# Patient Record
Sex: MALE | Race: WHITE | Employment: OTHER | URBAN - METROPOLITAN AREA
[De-identification: names, ages, dates, MRNs, and addresses within clinical notes are randomized per-mention and may not be internally consistent; named-entity substitution may affect disease eponyms.]

---

## 2018-10-03 ENCOUNTER — ANESTHESIA (OUTPATIENT)
Dept: OPERATING ROOM | Age: 63
End: 2018-10-03
Payer: COMMERCIAL

## 2018-10-03 ENCOUNTER — ANESTHESIA EVENT (OUTPATIENT)
Dept: OPERATING ROOM | Age: 63
End: 2018-10-03
Payer: COMMERCIAL

## 2018-10-03 ENCOUNTER — HOSPITAL ENCOUNTER (OUTPATIENT)
Age: 63
Setting detail: OUTPATIENT SURGERY
Discharge: HOME | End: 2018-10-03
Attending: ORTHOPAEDIC SURGERY | Admitting: ORTHOPAEDIC SURGERY
Payer: COMMERCIAL

## 2018-10-03 VITALS
BODY MASS INDEX: 30.36 KG/M2 | HEART RATE: 87 BPM | SYSTOLIC BLOOD PRESSURE: 167 MMHG | RESPIRATION RATE: 20 BRPM | WEIGHT: 205 LBS | TEMPERATURE: 97.5 F | HEIGHT: 69 IN | OXYGEN SATURATION: 96 % | DIASTOLIC BLOOD PRESSURE: 86 MMHG

## 2018-10-03 VITALS — DIASTOLIC BLOOD PRESSURE: 108 MMHG | SYSTOLIC BLOOD PRESSURE: 205 MMHG | OXYGEN SATURATION: 97 %

## 2018-10-03 LAB
GLUCOSE BLD-MCNC: 211 MG/DL (ref 60–115)
PERFORMED ON: ABNORMAL

## 2018-10-03 PROCEDURE — 2709999900 HC NON-CHARGEABLE SUPPLY: Performed by: ORTHOPAEDIC SURGERY

## 2018-10-03 PROCEDURE — 88305 TISSUE EXAM BY PATHOLOGIST: CPT

## 2018-10-03 PROCEDURE — 2580000003 HC RX 258

## 2018-10-03 PROCEDURE — 3700000000 HC ANESTHESIA ATTENDED CARE: Performed by: ORTHOPAEDIC SURGERY

## 2018-10-03 PROCEDURE — 3700000001 HC ADD 15 MINUTES (ANESTHESIA): Performed by: ORTHOPAEDIC SURGERY

## 2018-10-03 PROCEDURE — 88311 DECALCIFY TISSUE: CPT

## 2018-10-03 PROCEDURE — 3600000002 HC SURGERY LEVEL 2 BASE: Performed by: ORTHOPAEDIC SURGERY

## 2018-10-03 PROCEDURE — 2580000003 HC RX 258: Performed by: ORTHOPAEDIC SURGERY

## 2018-10-03 PROCEDURE — 3600000012 HC SURGERY LEVEL 2 ADDTL 15MIN: Performed by: ORTHOPAEDIC SURGERY

## 2018-10-03 PROCEDURE — 6360000002 HC RX W HCPCS: Performed by: ORTHOPAEDIC SURGERY

## 2018-10-03 RX ORDER — HYDROCODONE BITARTRATE AND ACETAMINOPHEN 5; 325 MG/1; MG/1
1 TABLET ORAL PRN
Status: DISCONTINUED | OUTPATIENT
Start: 2018-10-03 | End: 2018-10-03 | Stop reason: HOSPADM

## 2018-10-03 RX ORDER — SODIUM CHLORIDE, SODIUM LACTATE, POTASSIUM CHLORIDE, CALCIUM CHLORIDE 600; 310; 30; 20 MG/100ML; MG/100ML; MG/100ML; MG/100ML
INJECTION, SOLUTION INTRAVENOUS
Status: COMPLETED
Start: 2018-10-03 | End: 2018-10-03

## 2018-10-03 RX ORDER — INSULIN GLARGINE 100 [IU]/ML
INJECTION, SOLUTION SUBCUTANEOUS NIGHTLY
COMMUNITY
End: 2020-09-30 | Stop reason: ALTCHOICE

## 2018-10-03 RX ORDER — HYDROCODONE BITARTRATE AND ACETAMINOPHEN 5; 325 MG/1; MG/1
2 TABLET ORAL PRN
Status: DISCONTINUED | OUTPATIENT
Start: 2018-10-03 | End: 2018-10-03 | Stop reason: HOSPADM

## 2018-10-03 RX ORDER — GLIPIZIDE 10 MG/1
10 TABLET ORAL
COMMUNITY
Start: 2014-12-31 | End: 2020-09-30 | Stop reason: ALTCHOICE

## 2018-10-03 RX ORDER — MEPERIDINE HYDROCHLORIDE 25 MG/ML
12.5 INJECTION INTRAMUSCULAR; INTRAVENOUS; SUBCUTANEOUS EVERY 5 MIN PRN
Status: DISCONTINUED | OUTPATIENT
Start: 2018-10-03 | End: 2018-10-03 | Stop reason: HOSPADM

## 2018-10-03 RX ORDER — ONDANSETRON 2 MG/ML
4 INJECTION INTRAMUSCULAR; INTRAVENOUS
Status: DISCONTINUED | OUTPATIENT
Start: 2018-10-03 | End: 2018-10-03 | Stop reason: HOSPADM

## 2018-10-03 RX ORDER — MAGNESIUM HYDROXIDE 1200 MG/15ML
LIQUID ORAL CONTINUOUS PRN
Status: DISCONTINUED | OUTPATIENT
Start: 2018-10-03 | End: 2018-10-03 | Stop reason: HOSPADM

## 2018-10-03 RX ORDER — METOCLOPRAMIDE HYDROCHLORIDE 5 MG/ML
10 INJECTION INTRAMUSCULAR; INTRAVENOUS
Status: DISCONTINUED | OUTPATIENT
Start: 2018-10-03 | End: 2018-10-03 | Stop reason: HOSPADM

## 2018-10-03 RX ORDER — FENTANYL CITRATE 50 UG/ML
50 INJECTION, SOLUTION INTRAMUSCULAR; INTRAVENOUS EVERY 10 MIN PRN
Status: DISCONTINUED | OUTPATIENT
Start: 2018-10-03 | End: 2018-10-03 | Stop reason: HOSPADM

## 2018-10-03 RX ORDER — DIPHENHYDRAMINE HYDROCHLORIDE 50 MG/ML
12.5 INJECTION INTRAMUSCULAR; INTRAVENOUS
Status: DISCONTINUED | OUTPATIENT
Start: 2018-10-03 | End: 2018-10-03 | Stop reason: HOSPADM

## 2018-10-03 RX ORDER — SODIUM CHLORIDE, SODIUM LACTATE, POTASSIUM CHLORIDE, CALCIUM CHLORIDE 600; 310; 30; 20 MG/100ML; MG/100ML; MG/100ML; MG/100ML
INJECTION, SOLUTION INTRAVENOUS CONTINUOUS
Status: DISCONTINUED | OUTPATIENT
Start: 2018-10-03 | End: 2018-10-03 | Stop reason: HOSPADM

## 2018-10-03 RX ADMIN — Medication 2 G: at 08:29

## 2018-10-03 RX ADMIN — SODIUM CHLORIDE, POTASSIUM CHLORIDE, SODIUM LACTATE AND CALCIUM CHLORIDE: 600; 310; 30; 20 INJECTION, SOLUTION INTRAVENOUS at 07:14

## 2018-10-03 RX ADMIN — SODIUM CHLORIDE, SODIUM LACTATE, POTASSIUM CHLORIDE, CALCIUM CHLORIDE: 600; 310; 30; 20 INJECTION, SOLUTION INTRAVENOUS at 07:14

## 2018-10-03 ASSESSMENT — PULMONARY FUNCTION TESTS
PIF_VALUE: 0
PIF_VALUE: 0
PIF_VALUE: 1
PIF_VALUE: 0
PIF_VALUE: 1
PIF_VALUE: 1
PIF_VALUE: 0
PIF_VALUE: 1
PIF_VALUE: 0
PIF_VALUE: 1
PIF_VALUE: 0
PIF_VALUE: 0
PIF_VALUE: 1
PIF_VALUE: 0
PIF_VALUE: 1
PIF_VALUE: 0
PIF_VALUE: 1
PIF_VALUE: 0
PIF_VALUE: 1
PIF_VALUE: 0
PIF_VALUE: 1
PIF_VALUE: 0
PIF_VALUE: 1
PIF_VALUE: 0
PIF_VALUE: 1
PIF_VALUE: 1
PIF_VALUE: 0

## 2018-10-03 ASSESSMENT — PAIN - FUNCTIONAL ASSESSMENT: PAIN_FUNCTIONAL_ASSESSMENT: 0-10

## 2018-10-03 NOTE — OP NOTE
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 Assistant:     Anesthesia: Digital block monitored by the anesthesia team    Estimated blood loss: Less than 5 mL    Drains: None    Tourniquet: 10min    Specimens: The specimen was marked for orientation and sent for pathology    Implants: None    Indications: The patient is a pleasant 81-year-old male who presented with chronic wound and a subungual position to the right index finger. Excisional biopsy was performed and the patient was found to have evidence of invasive squamous cell carcinoma with positive margins. Treatment options were discussed. The patient elected to undergo any salvage measures in favor of amputation through the distal portion of the middle phalanx. He understood that there was a possibility that he would need additional surgery in the future. Informed consent was signed and placed in the chart. Complications: None noted at the time of surgery     Description of operation: The patient was taken to the operative suite and placed in the supine position on the operating table. A timeout was performed and the right index finger was confirmed. The operative site. The digital block placed in the preoperative holding area was working well. He was administered appropriate IV antibiotics. He was then prepped and draped in normal sterile fashion. After prepping and draping a turnicot was placed in the base of the right index finger and the planned skin flaps were marked out. We left the palmar flap longer than the dorsal flap. The 15 blade was used to incise skin and dissect down sharply to bone circumferentially. The bone cutter was then used to cut through the P2 neck and the specimen was sent for pathology.   The specimen was

## 2020-09-30 ENCOUNTER — OFFICE VISIT (OUTPATIENT)
Dept: ENDOCRINOLOGY | Age: 65
End: 2020-09-30
Payer: COMMERCIAL

## 2020-09-30 VITALS
DIASTOLIC BLOOD PRESSURE: 73 MMHG | WEIGHT: 209 LBS | BODY MASS INDEX: 30.96 KG/M2 | HEIGHT: 69 IN | HEART RATE: 71 BPM | SYSTOLIC BLOOD PRESSURE: 124 MMHG | OXYGEN SATURATION: 93 %

## 2020-09-30 PROBLEM — E11.65 UNCONTROLLED TYPE 2 DIABETES MELLITUS WITH HYPERGLYCEMIA (HCC): Status: ACTIVE | Noted: 2020-09-30

## 2020-09-30 LAB
CHP ED QC CHECK: NORMAL
GLUCOSE BLD-MCNC: 116 MG/DL
HBA1C MFR BLD: 8.5 %

## 2020-09-30 PROCEDURE — G8417 CALC BMI ABV UP PARAM F/U: HCPCS | Performed by: INTERNAL MEDICINE

## 2020-09-30 PROCEDURE — 2022F DILAT RTA XM EVC RTNOPTHY: CPT | Performed by: INTERNAL MEDICINE

## 2020-09-30 PROCEDURE — 99203 OFFICE O/P NEW LOW 30 MIN: CPT | Performed by: INTERNAL MEDICINE

## 2020-09-30 PROCEDURE — 83036 HEMOGLOBIN GLYCOSYLATED A1C: CPT | Performed by: INTERNAL MEDICINE

## 2020-09-30 PROCEDURE — 95250 CONT GLUC MNTR PHYS/QHP EQP: CPT | Performed by: INTERNAL MEDICINE

## 2020-09-30 PROCEDURE — G8427 DOCREV CUR MEDS BY ELIG CLIN: HCPCS | Performed by: INTERNAL MEDICINE

## 2020-09-30 RX ORDER — INSULIN ASPART 100 [IU]/ML
INJECTION, SUSPENSION SUBCUTANEOUS
Qty: 10 PEN | Refills: 3 | Status: SHIPPED | OUTPATIENT
Start: 2020-09-30 | End: 2020-09-30 | Stop reason: SDUPTHER

## 2020-09-30 RX ORDER — INSULIN ASPART 100 [IU]/ML
INJECTION, SUSPENSION SUBCUTANEOUS
Qty: 10 PEN | Refills: 3 | Status: SHIPPED | OUTPATIENT
Start: 2020-09-30 | End: 2021-06-21

## 2020-09-30 RX ORDER — INSULIN LISPRO 100 [IU]/ML
INJECTION, SUSPENSION SUBCUTANEOUS
Qty: 10 PEN | Refills: 3 | Status: SHIPPED | OUTPATIENT
Start: 2020-09-30 | End: 2020-10-28 | Stop reason: SDUPTHER

## 2020-09-30 RX ORDER — CARVEDILOL 12.5 MG/1
12.5 TABLET ORAL 2 TIMES DAILY
COMMUNITY
Start: 2020-08-20

## 2020-09-30 ASSESSMENT — ENCOUNTER SYMPTOMS: EYES NEGATIVE: 1

## 2020-09-30 NOTE — PROGRESS NOTES
Subjective:      Patient ID: Jennifer Ignacio is a 60 y.o. male. Patient referred here for uncontrolled diabetes has had diabetes for 20 years or so currently on Lantus 30 units at bedtime plus metformin gives a total body was hypotensive was seen recently by  endocrinologist at [facility] A1c done today was 8.5 patient does complain of some neuropathy numbness in his lower feet bilaterally  Patient also sees cardiologist  Patient is A1c 1 time was 11+  Diabetes   He presents for his initial diabetic visit. He has type 2 diabetes mellitus. His disease course has been fluctuating. There are no hypoglycemic associated symptoms. Associated symptoms include foot paresthesias and weakness. Pertinent negatives for diabetes include no polydipsia, no polyuria and no weight loss. Diabetic complications include heart disease and peripheral neuropathy. Current diabetic treatment includes insulin injections (Lantus metformin glipizide). He is currently taking insulin at bedtime. His overall blood glucose range is >200 mg/dl. (Lab Results       Component                Value               Date                       LABA1C                   8.5                 09/30/2020              )        Results for Ben Pascual (MRN [id number]) as of 9/30/2020 10:45   Ref.  Range 9/30/2020 10:24 9/30/2020 10:38   Glucose Latest Units: mg/dL 116    Hemoglobin A1C Latest Units: %  8.5     Lab Results   Component Value Date    GLUCOSE 116 09/30/2020           Patient Active Problem List   Diagnosis    Uncontrolled type 2 diabetes mellitus with hyperglycemia ([facility] [address]     Social History     Socioeconomic History    Marital status:      Spouse name: Not on file    Number of children: Not on file    Years of education: Not on file    Highest education level: Not on file   Occupational History    Not on file   Social Needs    Financial resource strain: Not on file    Food insecurity     Worry: Not on file     Inability: Not on file Disp: 10 pen, Rfl: 3    diltiazem (CARDIZEM LA) 120 MG TB24 extended release tablet, Take 120 mg by mouth daily, Disp: , Rfl:       Review of Systems   Constitutional: Negative for weight loss. Eyes: Negative. Cardiovascular: Negative. Endocrine: Negative for polydipsia and polyuria. Neurological: Positive for weakness. All other systems reviewed and are negative. Vitals:    09/30/20 1026   BP: 124/73   Pulse: 71   SpO2: 93%   Weight: 209 lb (94.8 kg)   Height: 5' 9\" (1.753 m)       Objective:   Physical Exam  Constitutional:       Appearance: Normal appearance. He is obese. HENT:      Head: Normocephalic and atraumatic. Right Ear: External ear normal.      Left Ear: External ear normal.      Nose: Nose normal.      Mouth/Throat:      Mouth: Mucous membranes are moist.      Pharynx: Oropharynx is clear. Eyes:      Extraocular Movements: Extraocular movements intact. Conjunctiva/sclera: Conjunctivae normal.      Pupils: Pupils are equal, round, and reactive to light. Neck:      Musculoskeletal: Normal range of motion and neck supple. Cardiovascular:      Rate and Rhythm: Normal rate and regular rhythm. Heart sounds: Normal heart sounds. Pulmonary:      Breath sounds: Normal breath sounds. Abdominal:      Palpations: Abdomen is soft. Musculoskeletal: Normal range of motion. Feet:    Skin:     General: Skin is warm and dry. Neurological:      General: No focal deficit present. Mental Status: He is alert and oriented to person, place, and time. Psychiatric:         Mood and Affect: Mood normal.         Behavior: Behavior normal.         Assessment:       Diagnosis Orders   1. Type 2 diabetes mellitus with other specified complication, with long-term current use of insulin ([facility])  POCT Glucose    POCT glycosylated hemoglobin (Hb A1C)    [place] CONT GLUC MNTR PHYSICIAN/QHP PROVIDED EQUIPTMENT   2.  Uncontrolled type 2 diabetes mellitus with hyperglycemia ([facility] [address] Plan:      Orders Placed This Encounter   Procedures    POCT Glucose    POCT glycosylated hemoglobin (Hb A1C)    [place] CONT GLUC MNTR PHYSICIAN/QHP PROVIDED EQUIPTMENT     Orders Placed This Encounter   Medications    insulin lispro protamine & lispro (HUMALOG MIX 75/25 KWIKPEN) (75-25) 100 UNIT per ML SUPN injection pen     Si units am and 30 units pm     Dispense:  10 pen     Refill:  3    Insulin Pen Needle (NOVOFINE) 32G X 6 MM MISC     Sig: Bid     Dispense:  100 each     Refill:  5     Medications Discontinued During This Encounter   Medication Reason    glipiZIDE (GLUCOTROL) 10 MG tablet Therapy completed    insulin glargine (LANTUS) 100 UNIT/ML injection vial Therapy completed         Diabetes education provided today:    Continuous Glucose monitor. How it works and checks blood sugars every 5 min. for 4 days during our tests. Managing high and low sugar readings.   Discontinue Lantus insulin discontinue glipizide continue on metformin start patient on Humalog mix 25 units in the morning 30 in the evening will also do 2-week glucose monitoring A1c goal of 6.5-7 more than 50% of 40 minutes spent in patient education thank you for the referral        Rosana Rivers MD

## 2020-09-30 NOTE — TELEPHONE ENCOUNTER
Patient calling to inform you that Humalog Mix 75/25 Sarkis Chadwick is not formulary. Send new RX of Novolog Mix 70/30 to listed [facility].

## 2020-10-14 ENCOUNTER — NURSE ONLY (OUTPATIENT)
Dept: ENDOCRINOLOGY | Age: 65
End: 2020-10-14
Payer: COMMERCIAL

## 2020-10-14 LAB
CHP ED QC CHECK: NORMAL
GLUCOSE BLD-MCNC: 84 MG/DL

## 2020-10-14 PROCEDURE — 95251 CONT GLUC MNTR ANALYSIS I&R: CPT | Performed by: INTERNAL MEDICINE

## 2020-10-14 PROCEDURE — 82962 GLUCOSE BLOOD TEST: CPT | Performed by: INTERNAL MEDICINE

## 2020-10-14 NOTE — PROGRESS NOTES
Removed patient CGM, patient was feeling low and took patient sugar. Patient sugar was 84, gave patient OJ.

## 2020-10-28 ENCOUNTER — VIRTUAL VISIT (OUTPATIENT)
Dept: ENDOCRINOLOGY | Age: 65
End: 2020-10-28
Payer: COMMERCIAL

## 2020-10-28 PROCEDURE — 99443 PR PHYS/QHP TELEPHONE EVALUATION 21-30 MIN: CPT | Performed by: INTERNAL MEDICINE

## 2020-10-28 RX ORDER — INSULIN LISPRO 100 [IU]/ML
INJECTION, SUSPENSION SUBCUTANEOUS
Qty: 10 PEN | Refills: 3
Start: 2020-10-28

## 2020-10-28 NOTE — PROGRESS NOTES
10/28/2020    TELEHEALTH EVALUATION -- Audio/Visual (During RCAMD-68 public health emergency)    Due to COVID 19 outbreak, patient's office visit was converted to a virtual visit. Patient was contacted and agreed to proceed with a virtual visit via Telephone Visit  The risks and benefits of converting to a virtual visit were discussed in light of the current infectious disease epidemic. Patient also understood that insurance coverage and co-pays are up to their individual insurance plans. HPI:    Lyla Hendrix (:  1955) has requested an audio/video evaluation for the following concern(s):    Follow-up of type 2 diabetes condition currently taking 75/25 Humalog 25 units in the morning 25 units in the evening night dose was lowered because of his lower blood sugars late night early morning patient also had a 2-week continuous glucose monitoring which was reviewed with his wife    Average blood sugar was 154 target range was 58% high was 26% very high was 7% low was 5% very low was 4%    Patient also has been testing multiple times a day 6+ due to labile glucose    Patient Active Problem List   Diagnosis    Uncontrolled type 2 diabetes mellitus with hyperglycemia ([facility] [address]         Review of Systems   Endocrine: Negative. All other systems reviewed and are negative. Prior to Visit Medications    Medication Sig Taking?  Authorizing Provider   carvedilol (COREG) 12.5 MG tablet Take 12.5 mg by mouth 2 times daily  Historical Provider, MD   insulin lispro protamine & lispro (HUMALOG MIX 75/25 KWIKPEN) (75-25) 100 UNIT per ML SUPN injection pen 25 units am and 30 units pm  Guanako Carrillo MD   Insulin Pen Needle (NOVOFINE) 32G X 6 MM MISC Bid  uGanako Carrillo MD   insulin aspart protamine-insulin aspart (NOVOLOG MIX 70/30 FLEXPEN) (70-30) 100 UNIT/ML injection 25 units am and 30 units pm  Sharri Hutson MD   metFORMIN (GLUCOPHAGE) 1000 MG tablet Take 1,000 mg by mouth  Historical Provider, MD   aspirin 81 MG tablet Take 81 mg by mouth daily  Historical Provider, MD   diltiazem (CARDIZEM LA) 120 MG TB24 extended release tablet Take 120 mg by mouth daily  Historical Provider, MD       Social History     Tobacco Use    Smoking status: Current Every Day Smoker    Smokeless tobacco: Never Used   Substance Use Topics    Alcohol use: Not on file    Drug use: Not on file            PHYSICAL EXAMINATION:  [ INSTRUCTIONS:  \"[x]\" Indicates a positive item  \"[]\" Indicates a negative item  -- DELETE ALL ITEMS NOT EXAMINED]  [] Alert  [] Oriented to person/place/time    [] No apparent distress  [] Toxic appearing    [] Face flushed appearing [] Sclera clear  [] Lips are cyanotic      [] Breathing appears normal  [] Appears tachypneic      [] Rash on visible skin    [] Cranial Nerves II-XII grossly intact    [] Motor grossly intact in visible upper extremities    [] Motor grossly intact in visible lower extremities    [] Normal Mood  [] Anxious appearing    [] Depressed appearing  [] Confused appearing      [] Poor short term memory  [] Poor long term memory    [] OTHER:      Due to this being a TeleHealth encounter, evaluation of the following organ systems is limited: Vitals/Constitutional/EENT/Resp/CV/GI//MS/Neuro/Skin/Heme-Lymph-Imm. ASSESSMENT/PLAN:       Diagnosis Orders   1. Type 2 diabetes mellitus with other specified complication, with long-term current use of insulin ([facility])  Basic Metabolic Panel    Hemoglobin A1C       Diabetes education provided today:    Insulin pumps, how they work and how they affect blood sugar levels. Continuous Glucose monitor. How it works and checks blood sugars every 5 min. for 4 days during our tests.     Adjusted insulin today to 3 injections a day 25 units in the morning 8 units at lunch and 25 units in the evening of 7525 discussed also about insulin pump for better control    Follow-up in 2 to 3 weeks time to discuss insulin pump therapy    Total time spent with patient was 22 minutes  An electronic signature was used to authenticate this note. --Jef Messina MD on 10/28/2020 at 10:05 AM        Pursuant to the emergency declaration under the [address] [place] [address] waiver authority and the opendorse and [employer] Act, this Virtual  Visit was conducted, with patient's consent, to reduce the patient's risk of exposure to COVID-19 and provide continuity of care for an established patient. Services were provided through a video synchronous discussion virtually to substitute for in-person clinic visit.

## 2021-06-18 DIAGNOSIS — E11.69 TYPE 2 DIABETES MELLITUS WITH OTHER SPECIFIED COMPLICATION, WITH LONG-TERM CURRENT USE OF INSULIN (HCC): ICD-10-CM

## 2021-06-18 DIAGNOSIS — Z79.4 TYPE 2 DIABETES MELLITUS WITH OTHER SPECIFIED COMPLICATION, WITH LONG-TERM CURRENT USE OF INSULIN (HCC): ICD-10-CM

## 2021-06-21 RX ORDER — INSULIN ASPART 100 [IU]/ML
INJECTION, SUSPENSION SUBCUTANEOUS
Qty: 30 ML | Refills: 3 | Status: SHIPPED | OUTPATIENT
Start: 2021-06-21

## 2022-11-16 ENCOUNTER — OFFICE VISIT (OUTPATIENT)
Dept: ENDOCRINOLOGY | Age: 67
End: 2022-11-16

## 2022-11-16 VITALS
WEIGHT: 220 LBS | SYSTOLIC BLOOD PRESSURE: 146 MMHG | HEIGHT: 69 IN | DIASTOLIC BLOOD PRESSURE: 80 MMHG | OXYGEN SATURATION: 97 % | BODY MASS INDEX: 32.58 KG/M2 | HEART RATE: 67 BPM

## 2022-11-16 DIAGNOSIS — Z79.4 TYPE 2 DIABETES MELLITUS WITH OTHER SPECIFIED COMPLICATION, WITH LONG-TERM CURRENT USE OF INSULIN (HCC): Primary | ICD-10-CM

## 2022-11-16 DIAGNOSIS — E11.69 TYPE 2 DIABETES MELLITUS WITH OTHER SPECIFIED COMPLICATION, WITH LONG-TERM CURRENT USE OF INSULIN (HCC): Primary | ICD-10-CM

## 2022-11-16 LAB
CHP ED QC CHECK: NORMAL
GLUCOSE BLD-MCNC: 78 MG/DL

## 2022-11-16 PROCEDURE — 82962 GLUCOSE BLOOD TEST: CPT | Performed by: INTERNAL MEDICINE

## 2022-11-16 PROCEDURE — 1123F ACP DISCUSS/DSCN MKR DOCD: CPT | Performed by: INTERNAL MEDICINE

## 2022-11-16 PROCEDURE — 99213 OFFICE O/P EST LOW 20 MIN: CPT | Performed by: INTERNAL MEDICINE

## 2022-11-16 RX ORDER — LISINOPRIL 20 MG/1
TABLET ORAL
COMMUNITY
Start: 2022-09-12

## 2022-11-16 RX ORDER — BLOOD SUGAR DIAGNOSTIC
STRIP MISCELLANEOUS
COMMUNITY
Start: 2022-11-07

## 2022-11-16 RX ORDER — NITROGLYCERIN 0.4 MG/1
TABLET SUBLINGUAL
COMMUNITY
Start: 2022-11-07

## 2022-11-16 RX ORDER — BLOOD-GLUCOSE METER
EACH MISCELLANEOUS
COMMUNITY
Start: 2022-09-06

## 2022-11-16 RX ORDER — INSULIN ASPART 100 [IU]/ML
INJECTION, SUSPENSION SUBCUTANEOUS
Qty: 30 ADJUSTABLE DOSE PRE-FILLED PEN SYRINGE | Refills: 3 | Status: SHIPPED | OUTPATIENT
Start: 2022-11-16

## 2022-11-16 NOTE — PROGRESS NOTES
2022    Assessment:       Diagnosis Orders   1. Type 2 diabetes mellitus with other specified complication, with long-term current use of insulin ([facility])  POCT Glucose            PLAN:     Orders Placed This Encounter   Procedures    Microalbumin / Creatinine Urine Ratio     Standing Status:   Future     Standing Expiration Date:   2023    Lipid, Fasting     Standing Status:   Future     Standing Expiration Date:   2023    Hemoglobin A1C     Standing Status:   Future     Standing Expiration Date:   63/10/3200    Basic Metabolic Panel, Fasting     Standing Status:   Future     Standing Expiration Date:   2023    POCT Glucose     Orders Placed This Encounter   Medications    insulin aspart protamine-insulin aspart (NOVOLOG MIX 70/30 FLEXPEN) (70-30) 100 UNIT/ML injection     Sig: INJECT 25 UNITS IN THE MORNING and INJECT 30 UNITS in the afternoon. Dispense:  30 Adjustable Dose Pre-filled Pen Syringe     Refill:  3    Insulin Pen Needle 32G X 4 MM MISC     Si each by Does not apply route 2 times daily     Dispense:  100 each     Refill:  5         Orders Placed This Encounter   Procedures    POCT Glucose     No orders of the defined types were placed in this encounter. No follow-ups on file.   Subjective:     Chief Complaint   Patient presents with    Diabetes     Vitals:    22 1328 22 1332   BP: (!) 162/73 (!) 146/80   Pulse: 67    SpO2: 97%    Weight: 220 lb (99.8 kg)    Height: 5' 9\" (1.753 m)      Wt Readings from Last 3 Encounters:   22 220 lb (99.8 kg)   20 209 lb (94.8 kg)   10/03/18 205 lb (93 kg)     BP Readings from Last 3 Encounters:   22 (!) 146/80   20 124/73   10/03/18 (!) 167/86     Follow-up on type 2 diabetes patient is on NovoLog 70/30 25 units in the morning 30 units in the evening A1c was 7.8 LDL was 127 requesting refills overall blood sugars close to 170/180 denies any hypoglycemia    Diabetes  He presents for his follow-up diabetic visit. He has type 2 diabetes mellitus. His disease course has been stable. There are no hypoglycemic associated symptoms. Pertinent negatives for diabetes include no polydipsia and no polyuria. There are no hypoglycemic complications. Risk factors for coronary artery disease include obesity. Current diabetic treatment includes insulin injections. He is currently taking insulin pre-breakfast and pre-dinner. His overall blood glucose range is 180-200 mg/dl. Labs reviewed from outside facility  11/2/2022  Hemoglobin A1c was 7.8  Glucose 153      No past medical history on file. Past Surgical History:   Procedure Laterality Date    NM AMPUTATE METACARPAL+FINGER Right 10/3/2018    RIGHT INDEX FINGER AMPUTATION TRANS MIDDLE PHALANX performed by Ailyn Padilla DO at [address] History     Socioeconomic History    Marital status:      Spouse name: Not on file    Number of children: Not on file    Years of education: Not on file    Highest education level: Not on file   Occupational History    Not on file   Tobacco Use    Smoking status: Every Day    Smokeless tobacco: Never   Vaping Use    Vaping Use: Never used   Substance and Sexual Activity    Alcohol use: Not on file    Drug use: Never    Sexual activity: Yes   Other Topics Concern    Not on file   Social History Narrative    Not on file     Social Determinants of Health     Financial Resource Strain: Not on file   Food Insecurity: Not on file   Transportation Needs: Not on file   Physical Activity: Not on file   Stress: Not on file   Social Connections: Not on file   Intimate Partner Violence: Not on file   Housing Stability: Not on file     No family history on file.   Allergies   Allergen Reactions    Sulfamethoxazole Hives       Current Outpatient Medications:     Blood Glucose Monitoring Suppl (ACCU-CHEK GUIDE) w/Device KIT, Test THREE TIMES DAILY, Disp: , Rfl:     ACCU-CHEK GUIDE strip, test THREE TIMES DAILY, Disp: , Rfl: lisinopril (PRINIVIL;ZESTRIL) 20 MG tablet, TAKE 1 TABLET BY MOUTH DAILY, Disp: , Rfl:     nitroGLYCERIN (NITROSTAT) 0.4 MG SL tablet, DISSOLVE 1 TABLET UNDER THE TONGUE AS NEEDED FOR CHEST PAIN- MAY REPEAT EVERY 5 MINUTES IF NEEDED (MAX 3 DOSES.- IF NO RELIEF CALL 911), Disp: , Rfl:     insulin aspart protamine-insulin aspart (NOVOLOG MIX 70/30 FLEXPEN) (70-30) 100 UNIT/ML injection, INJECT 25 UNITS IN THE MORNING and INJECT 30 UNITS in the afternoon. , Disp: 30 mL, Rfl: 3    carvedilol (COREG) 12.5 MG tablet, Take 12.5 mg by mouth 2 times daily, Disp: , Rfl:     Insulin Pen Needle (NOVOFINE) 32G X 6 MM MISC, Bid, Disp: 100 each, Rfl: 5    metFORMIN (GLUCOPHAGE) 1000 MG tablet, Take 1,000 mg by mouth, Disp: , Rfl:     aspirin 81 MG tablet, Take 81 mg by mouth daily, Disp: , Rfl:     diltiazem (CARDIZEM LA) 120 MG TB24 extended release tablet, Take 120 mg by mouth daily, Disp: , Rfl:   Lab Results   Component Value Date     (L) 05/26/2021    K 4.1 05/26/2021     05/26/2021    CREATININE 0.93 05/26/2021    GLUCOSE 78 11/16/2022    CALCIUM 8.9 05/26/2021    PROT 6.4 04/28/2021    LABALBU 3.7 05/26/2021    BILITOT 0.5 04/28/2021    ALKPHOS 69 04/28/2021    AST 11 04/28/2021    ALT 13 04/28/2021    LABGLOM >60 05/26/2021    GFRAA >60 05/26/2021     No results found for: WBC, HGB, HCT, MCV, PLT  Lab Results   Component Value Date    LABA1C 8.5 09/30/2020       Review of Systems   HENT: Negative. Eyes: Negative. Cardiovascular: Negative. Endocrine: Negative for polydipsia and polyuria. All other systems reviewed and are negative. Objective:   Physical Exam  Vitals reviewed. Constitutional:       General: He is not in acute distress. Appearance: Normal appearance. He is obese. HENT:      Head: Normocephalic and atraumatic. Right Ear: External ear normal.      Left Ear: External ear normal.      Nose: Nose normal.   Eyes:      General: No scleral icterus.         Right eye: No discharge. Left eye: No discharge. Extraocular Movements: Extraocular movements intact. Conjunctiva/sclera: Conjunctivae normal.   Cardiovascular:      Rate and Rhythm: Normal rate. Pulmonary:      Effort: Pulmonary effort is normal.   Musculoskeletal:         General: Normal range of motion. Cervical back: Normal range of motion and neck supple. Neurological:      General: No focal deficit present. Mental Status: He is alert and oriented to person, place, and time.    Psychiatric:         Mood and Affect: Mood normal.         Behavior: Behavior normal.

## 2022-11-23 ASSESSMENT — ENCOUNTER SYMPTOMS: EYES NEGATIVE: 1

## 2023-04-18 DIAGNOSIS — Z79.4 TYPE 2 DIABETES MELLITUS WITH OTHER SPECIFIED COMPLICATION, WITH LONG-TERM CURRENT USE OF INSULIN (HCC): ICD-10-CM

## 2023-04-18 DIAGNOSIS — E11.69 TYPE 2 DIABETES MELLITUS WITH OTHER SPECIFIED COMPLICATION, WITH LONG-TERM CURRENT USE OF INSULIN (HCC): ICD-10-CM

## 2023-04-18 RX ORDER — INSULIN ASPART 100 [IU]/ML
INJECTION, SUSPENSION SUBCUTANEOUS
Qty: 30 ML | Refills: 3 | OUTPATIENT
Start: 2023-04-18

## 2023-07-13 LAB
ALANINE AMINOTRANSFERASE (SGPT) (U/L) IN SER/PLAS: 9 U/L (ref 10–52)
ALBUMIN (G/DL) IN SER/PLAS: 3.1 G/DL (ref 3.4–5)
ALBUMIN (MG/L) IN URINE: >2250 MG/L
ALBUMIN/CREATININE (UG/MG) IN URINE: NORMAL UG/MG CRT (ref 0–30)
ALKALINE PHOSPHATASE (U/L) IN SER/PLAS: 70 U/L (ref 33–136)
ANION GAP IN SER/PLAS: 10 MMOL/L (ref 10–20)
ASPARTATE AMINOTRANSFERASE (SGOT) (U/L) IN SER/PLAS: 10 U/L (ref 9–39)
BILIRUBIN TOTAL (MG/DL) IN SER/PLAS: 0.3 MG/DL (ref 0–1.2)
CALCIUM (MG/DL) IN SER/PLAS: 8.8 MG/DL (ref 8.6–10.3)
CARBON DIOXIDE, TOTAL (MMOL/L) IN SER/PLAS: 30 MMOL/L (ref 21–32)
CHLORIDE (MMOL/L) IN SER/PLAS: 103 MMOL/L (ref 98–107)
CREATININE (MG/DL) IN SER/PLAS: 1.27 MG/DL (ref 0.5–1.3)
CREATININE (MG/DL) IN URINE: 76.5 MG/DL (ref 20–370)
ERYTHROCYTE DISTRIBUTION WIDTH (RATIO) BY AUTOMATED COUNT: 13.8 % (ref 11.5–14.5)
ERYTHROCYTE MEAN CORPUSCULAR HEMOGLOBIN CONCENTRATION (G/DL) BY AUTOMATED: 32.3 G/DL (ref 32–36)
ERYTHROCYTE MEAN CORPUSCULAR VOLUME (FL) BY AUTOMATED COUNT: 92 FL (ref 80–100)
ERYTHROCYTES (10*6/UL) IN BLOOD BY AUTOMATED COUNT: 4.58 X10E12/L (ref 4.5–5.9)
GFR MALE: 62 ML/MIN/1.73M2
GLUCOSE (MG/DL) IN SER/PLAS: 109 MG/DL (ref 74–99)
HEMATOCRIT (%) IN BLOOD BY AUTOMATED COUNT: 42.1 % (ref 41–52)
HEMOGLOBIN (G/DL) IN BLOOD: 13.6 G/DL (ref 13.5–17.5)
LEUKOCYTES (10*3/UL) IN BLOOD BY AUTOMATED COUNT: 13.7 X10E9/L (ref 4.4–11.3)
PLATELETS (10*3/UL) IN BLOOD AUTOMATED COUNT: 312 X10E9/L (ref 150–450)
POTASSIUM (MMOL/L) IN SER/PLAS: 4.5 MMOL/L (ref 3.5–5.3)
PROSTATE SPECIFIC ANTIGEN,SCREEN: 2.79 NG/ML (ref 0–4)
PROTEIN TOTAL: 6 G/DL (ref 6.4–8.2)
SODIUM (MMOL/L) IN SER/PLAS: 138 MMOL/L (ref 136–145)
UREA NITROGEN (MG/DL) IN SER/PLAS: 13 MG/DL (ref 6–23)

## 2023-07-14 LAB
ESTIMATED AVERAGE GLUCOSE FOR HBA1C: 183 MG/DL
HEMOGLOBIN A1C/HEMOGLOBIN TOTAL IN BLOOD: 8 %

## 2023-07-24 ENCOUNTER — OFFICE VISIT (OUTPATIENT)
Dept: ENDOCRINOLOGY | Age: 68
End: 2023-07-24
Payer: COMMERCIAL

## 2023-07-24 VITALS
HEART RATE: 74 BPM | HEIGHT: 69 IN | SYSTOLIC BLOOD PRESSURE: 140 MMHG | WEIGHT: 215 LBS | DIASTOLIC BLOOD PRESSURE: 70 MMHG | BODY MASS INDEX: 31.84 KG/M2 | OXYGEN SATURATION: 93 %

## 2023-07-24 DIAGNOSIS — E11.69 TYPE 2 DIABETES MELLITUS WITH OTHER SPECIFIED COMPLICATION, WITH LONG-TERM CURRENT USE OF INSULIN (HCC): Primary | ICD-10-CM

## 2023-07-24 DIAGNOSIS — B35.1 ONYCHOMYCOSIS: ICD-10-CM

## 2023-07-24 DIAGNOSIS — Z79.4 TYPE 2 DIABETES MELLITUS WITH OTHER SPECIFIED COMPLICATION, WITH LONG-TERM CURRENT USE OF INSULIN (HCC): Primary | ICD-10-CM

## 2023-07-24 LAB
CHP ED QC CHECK: NORMAL
GLUCOSE BLD-MCNC: 276 MG/DL

## 2023-07-24 PROCEDURE — 82962 GLUCOSE BLOOD TEST: CPT | Performed by: INTERNAL MEDICINE

## 2023-07-24 PROCEDURE — 1123F ACP DISCUSS/DSCN MKR DOCD: CPT | Performed by: INTERNAL MEDICINE

## 2023-07-24 PROCEDURE — 99213 OFFICE O/P EST LOW 20 MIN: CPT | Performed by: INTERNAL MEDICINE

## 2023-07-24 RX ORDER — INSULIN ASPART 100 [IU]/ML
INJECTION, SUSPENSION SUBCUTANEOUS
Qty: 30 ADJUSTABLE DOSE PRE-FILLED PEN SYRINGE | Refills: 3 | Status: SHIPPED | OUTPATIENT
Start: 2023-07-24

## 2023-07-24 RX ORDER — LISINOPRIL 40 MG/1
40 TABLET ORAL DAILY
COMMUNITY
Start: 2023-06-19

## 2023-07-24 RX ORDER — BLOOD SUGAR DIAGNOSTIC
STRIP MISCELLANEOUS
Qty: 100 EACH | Refills: 2 | Status: SHIPPED | OUTPATIENT
Start: 2023-07-24

## 2023-07-24 ASSESSMENT — ENCOUNTER SYMPTOMS: VISUAL CHANGE: 0

## 2023-07-24 NOTE — PROGRESS NOTES
2023    Assessment:       Diagnosis Orders   1. Type 2 diabetes mellitus with other specified complication, with long-term current use of insulin ([facility])  POCT Glucose      2. Onychomycosis              PLAN:     Orders Placed This Encounter   Procedures    Hemoglobin A1C     Standing Status:   Future     Standing Expiration Date:       Basic Metabolic Panel     Standing Status:   Future     Standing Expiration Date:   2024    Didi Mendez DPM, Podiatry, [place]     Referral Priority:   Routine     Referral Type:   Eval and Treat     Referral Reason:   Specialty Services Required     Referred to Provider:   Brett Roach DPM     Requested Specialty:   Podiatry     Number of Visits Requested:   1    POCT Glucose     DIABETES FOOT EXAM     Orders Placed This Encounter   Medications    insulin aspart protamine-insulin aspart (NOVOLOG MIX 70/30 FLEXPEN) injection pen     Sig: INJECT 25 UNITS IN THE MORNING and INJECT 30 UNITS in the afternoon. Dispense:  30 Adjustable Dose Pre-filled Pen Syringe     Refill:  3    Insulin Pen Needle 32G X 4 MM MISC     Si each by Does not apply route 2 times daily     Dispense:  100 each     Refill:  5    ACCU-CHEK GUIDE strip     Sig: test THREE TIMES DAILY     Dispense:  100 each     Refill:  2           Orders Placed This Encounter   Procedures    POCT Glucose     No orders of the defined types were placed in this encounter. No follow-ups on file.   Subjective:     Chief Complaint   Patient presents with    Diabetes     Vitals:    23 1442 23 1446   BP: (!) 149/66 (!) 140/70   Pulse: 74    SpO2: 93%    Weight: 215 lb (97.5 kg)    Height: 5' 9\" (1.753 m)      Wt Readings from Last 3 Encounters:   23 215 lb (97.5 kg)   22 220 lb (99.8 kg)   20 209 lb (94.8 kg)     BP Readings from Last 3 Encounters:   23 (!) 140/70   22 (!) 146/80   20 124/73     Follow-up on type 2 diabetes patient on NovoLog

## 2023-10-20 ENCOUNTER — HOSPITAL ENCOUNTER (INPATIENT)
Facility: HOSPITAL | Age: 68
LOS: 6 days | Discharge: NURSING, REHAB OR LONG-TERM CARE | DRG: 065 | End: 2023-10-26
Attending: STUDENT IN AN ORGANIZED HEALTH CARE EDUCATION/TRAINING PROGRAM | Admitting: INTERNAL MEDICINE
Payer: COMMERCIAL

## 2023-10-20 ENCOUNTER — APPOINTMENT (OUTPATIENT)
Dept: CARDIOLOGY | Facility: HOSPITAL | Age: 68
DRG: 065 | End: 2023-10-20
Payer: COMMERCIAL

## 2023-10-20 ENCOUNTER — APPOINTMENT (OUTPATIENT)
Dept: RADIOLOGY | Facility: HOSPITAL | Age: 68
DRG: 065 | End: 2023-10-20
Payer: COMMERCIAL

## 2023-10-20 DIAGNOSIS — I63.49 CEREBRAL INFARCTION DUE TO EMBOLISM OF OTHER CEREBRAL ARTERY (MULTI): ICD-10-CM

## 2023-10-20 DIAGNOSIS — E11.42 DIABETIC POLYNEUROPATHY ASSOCIATED WITH TYPE 2 DIABETES MELLITUS (MULTI): ICD-10-CM

## 2023-10-20 DIAGNOSIS — I63.9 CEREBROVASCULAR ACCIDENT (CVA), UNSPECIFIED MECHANISM (MULTI): Primary | ICD-10-CM

## 2023-10-20 DIAGNOSIS — G45.8 OTHER TRANSIENT CEREBRAL ISCHEMIC ATTACKS AND RELATED SYNDROMES: ICD-10-CM

## 2023-10-20 DIAGNOSIS — I63.40 CEREBROVASCULAR ACCIDENT (CVA) DUE TO EMBOLISM OF CEREBRAL ARTERY (MULTI): ICD-10-CM

## 2023-10-20 LAB
ALBUMIN SERPL BCP-MCNC: 3.3 G/DL (ref 3.4–5)
ALP SERPL-CCNC: 82 U/L (ref 33–136)
ALT SERPL W P-5'-P-CCNC: 12 U/L (ref 10–52)
ANION GAP SERPL CALC-SCNC: 16 MMOL/L (ref 10–20)
AST SERPL W P-5'-P-CCNC: 21 U/L (ref 9–39)
BASOPHILS # BLD AUTO: 0.07 X10*3/UL (ref 0–0.1)
BASOPHILS NFR BLD AUTO: 0.4 %
BILIRUB SERPL-MCNC: 0.5 MG/DL (ref 0–1.2)
BNP SERPL-MCNC: 950 PG/ML (ref 0–99)
BUN SERPL-MCNC: 16 MG/DL (ref 6–23)
CALCIUM SERPL-MCNC: 8.8 MG/DL (ref 8.6–10.3)
CARDIAC TROPONIN I PNL SERPL HS: 32 NG/L (ref 0–20)
CARDIAC TROPONIN I PNL SERPL HS: 35 NG/L (ref 0–20)
CHLORIDE SERPL-SCNC: 101 MMOL/L (ref 98–107)
CHOLEST SERPL-MCNC: 204 MG/DL (ref 0–199)
CHOLESTEROL/HDL RATIO: 4.4
CO2 SERPL-SCNC: 26 MMOL/L (ref 21–32)
CREAT SERPL-MCNC: 1.11 MG/DL (ref 0.5–1.3)
EOSINOPHIL # BLD AUTO: 0.13 X10*3/UL (ref 0–0.7)
EOSINOPHIL NFR BLD AUTO: 0.7 %
ERYTHROCYTE [DISTWIDTH] IN BLOOD BY AUTOMATED COUNT: 13.6 % (ref 11.5–14.5)
GFR SERPL CREATININE-BSD FRML MDRD: 73 ML/MIN/1.73M*2
GLUCOSE BLD MANUAL STRIP-MCNC: 285 MG/DL (ref 74–99)
GLUCOSE SERPL-MCNC: 243 MG/DL (ref 74–99)
HCT VFR BLD AUTO: 39.5 % (ref 41–52)
HDLC SERPL-MCNC: 45.9 MG/DL
HGB BLD-MCNC: 13.2 G/DL (ref 13.5–17.5)
HOLD SPECIMEN: NORMAL
IMM GRANULOCYTES # BLD AUTO: 0.06 X10*3/UL (ref 0–0.7)
IMM GRANULOCYTES NFR BLD AUTO: 0.3 % (ref 0–0.9)
INR PPP: 0.9 (ref 0.9–1.1)
LDLC SERPL CALC-MCNC: 129 MG/DL
LYMPHOCYTES # BLD AUTO: 1.83 X10*3/UL (ref 1.2–4.8)
LYMPHOCYTES NFR BLD AUTO: 10.2 %
MAGNESIUM SERPL-MCNC: 1.93 MG/DL (ref 1.6–2.4)
MCH RBC QN AUTO: 29.7 PG (ref 26–34)
MCHC RBC AUTO-ENTMCNC: 33.4 G/DL (ref 32–36)
MCV RBC AUTO: 89 FL (ref 80–100)
MONOCYTES # BLD AUTO: 0.76 X10*3/UL (ref 0.1–1)
MONOCYTES NFR BLD AUTO: 4.2 %
NEUTROPHILS # BLD AUTO: 15.04 X10*3/UL (ref 1.2–7.7)
NEUTROPHILS NFR BLD AUTO: 84.2 %
NON HDL CHOLESTEROL: 158 MG/DL (ref 0–149)
NRBC BLD-RTO: 0 /100 WBCS (ref 0–0)
PLATELET # BLD AUTO: 361 X10*3/UL (ref 150–450)
PMV BLD AUTO: 10.7 FL (ref 7.5–11.5)
POTASSIUM SERPL-SCNC: 4.7 MMOL/L (ref 3.5–5.3)
PROT SERPL-MCNC: 6.4 G/DL (ref 6.4–8.2)
PROTHROMBIN TIME: 10.5 SECONDS (ref 9.8–12.8)
RBC # BLD AUTO: 4.44 X10*6/UL (ref 4.5–5.9)
SODIUM SERPL-SCNC: 138 MMOL/L (ref 136–145)
TRIGL SERPL-MCNC: 144 MG/DL (ref 0–149)
VLDL: 29 MG/DL (ref 0–40)
WBC # BLD AUTO: 17.9 X10*3/UL (ref 4.4–11.3)

## 2023-10-20 PROCEDURE — 36415 COLL VENOUS BLD VENIPUNCTURE: CPT | Performed by: PHYSICIAN ASSISTANT

## 2023-10-20 PROCEDURE — 99223 1ST HOSP IP/OBS HIGH 75: CPT | Performed by: INTERNAL MEDICINE

## 2023-10-20 PROCEDURE — 2500000001 HC RX 250 WO HCPCS SELF ADMINISTERED DRUGS (ALT 637 FOR MEDICARE OP): Performed by: PHYSICIAN ASSISTANT

## 2023-10-20 PROCEDURE — 70551 MRI BRAIN STEM W/O DYE: CPT

## 2023-10-20 PROCEDURE — 93005 ELECTROCARDIOGRAM TRACING: CPT

## 2023-10-20 PROCEDURE — 85610 PROTHROMBIN TIME: CPT | Performed by: PHYSICIAN ASSISTANT

## 2023-10-20 PROCEDURE — 85025 COMPLETE CBC W/AUTO DIFF WBC: CPT | Performed by: PHYSICIAN ASSISTANT

## 2023-10-20 PROCEDURE — 83036 HEMOGLOBIN GLYCOSYLATED A1C: CPT | Mod: CMCLAB,ELYLAB | Performed by: INTERNAL MEDICINE

## 2023-10-20 PROCEDURE — 96372 THER/PROPH/DIAG INJ SC/IM: CPT | Performed by: INTERNAL MEDICINE

## 2023-10-20 PROCEDURE — 80053 COMPREHEN METABOLIC PANEL: CPT | Performed by: PHYSICIAN ASSISTANT

## 2023-10-20 PROCEDURE — 2500000004 HC RX 250 GENERAL PHARMACY W/ HCPCS (ALT 636 FOR OP/ED): Performed by: INTERNAL MEDICINE

## 2023-10-20 PROCEDURE — 71045 X-RAY EXAM CHEST 1 VIEW: CPT | Mod: FY

## 2023-10-20 PROCEDURE — 82947 ASSAY GLUCOSE BLOOD QUANT: CPT

## 2023-10-20 PROCEDURE — 2500000001 HC RX 250 WO HCPCS SELF ADMINISTERED DRUGS (ALT 637 FOR MEDICARE OP): Performed by: INTERNAL MEDICINE

## 2023-10-20 PROCEDURE — 36415 COLL VENOUS BLD VENIPUNCTURE: CPT | Performed by: INTERNAL MEDICINE

## 2023-10-20 PROCEDURE — 1200000002 HC GENERAL ROOM WITH TELEMETRY DAILY

## 2023-10-20 PROCEDURE — 83735 ASSAY OF MAGNESIUM: CPT | Performed by: PHYSICIAN ASSISTANT

## 2023-10-20 PROCEDURE — 80061 LIPID PANEL: CPT | Performed by: INTERNAL MEDICINE

## 2023-10-20 PROCEDURE — 70551 MRI BRAIN STEM W/O DYE: CPT | Performed by: RADIOLOGY

## 2023-10-20 PROCEDURE — 99285 EMERGENCY DEPT VISIT HI MDM: CPT | Performed by: STUDENT IN AN ORGANIZED HEALTH CARE EDUCATION/TRAINING PROGRAM

## 2023-10-20 PROCEDURE — 2500000002 HC RX 250 W HCPCS SELF ADMINISTERED DRUGS (ALT 637 FOR MEDICARE OP, ALT 636 FOR OP/ED): Performed by: INTERNAL MEDICINE

## 2023-10-20 PROCEDURE — 84484 ASSAY OF TROPONIN QUANT: CPT | Performed by: PHYSICIAN ASSISTANT

## 2023-10-20 PROCEDURE — 71045 X-RAY EXAM CHEST 1 VIEW: CPT | Mod: FOREIGN READ | Performed by: RADIOLOGY

## 2023-10-20 PROCEDURE — 70450 CT HEAD/BRAIN W/O DYE: CPT | Mod: MG

## 2023-10-20 PROCEDURE — 83880 ASSAY OF NATRIURETIC PEPTIDE: CPT | Performed by: INTERNAL MEDICINE

## 2023-10-20 PROCEDURE — 70450 CT HEAD/BRAIN W/O DYE: CPT | Performed by: RADIOLOGY

## 2023-10-20 RX ORDER — CARVEDILOL 25 MG/1
25 TABLET ORAL 2 TIMES DAILY
COMMUNITY

## 2023-10-20 RX ORDER — LISINOPRIL 40 MG/1
40 TABLET ORAL DAILY
Status: ON HOLD | COMMUNITY
End: 2024-03-23 | Stop reason: SDUPTHER

## 2023-10-20 RX ORDER — ATORVASTATIN CALCIUM 20 MG/1
40 TABLET, FILM COATED ORAL NIGHTLY
Status: DISCONTINUED | OUTPATIENT
Start: 2023-10-20 | End: 2023-10-26 | Stop reason: HOSPADM

## 2023-10-20 RX ORDER — DEXTROSE 50 % IN WATER (D50W) INTRAVENOUS SYRINGE
25
Status: DISCONTINUED | OUTPATIENT
Start: 2023-10-20 | End: 2023-10-26 | Stop reason: HOSPADM

## 2023-10-20 RX ORDER — LABETALOL HYDROCHLORIDE 5 MG/ML
10 INJECTION, SOLUTION INTRAVENOUS EVERY 10 MIN PRN
Status: ACTIVE | OUTPATIENT
Start: 2023-10-20 | End: 2023-10-24

## 2023-10-20 RX ORDER — CLOPIDOGREL BISULFATE 75 MG/1
75 TABLET ORAL DAILY
Status: DISCONTINUED | OUTPATIENT
Start: 2023-10-21 | End: 2023-10-26 | Stop reason: HOSPADM

## 2023-10-20 RX ORDER — INSULIN ASPART 100 [IU]/ML
20 INJECTION, SUSPENSION SUBCUTANEOUS
COMMUNITY
End: 2024-03-23 | Stop reason: HOSPADM

## 2023-10-20 RX ORDER — ASPIRIN 81 MG/1
81 TABLET ORAL DAILY
COMMUNITY

## 2023-10-20 RX ORDER — NAPROXEN SODIUM 220 MG/1
243 TABLET, FILM COATED ORAL ONCE
Status: COMPLETED | OUTPATIENT
Start: 2023-10-20 | End: 2023-10-20

## 2023-10-20 RX ORDER — DEXTROSE MONOHYDRATE 100 MG/ML
0.3 INJECTION, SOLUTION INTRAVENOUS ONCE AS NEEDED
Status: DISCONTINUED | OUTPATIENT
Start: 2023-10-20 | End: 2023-10-26 | Stop reason: HOSPADM

## 2023-10-20 RX ORDER — ENOXAPARIN SODIUM 100 MG/ML
40 INJECTION SUBCUTANEOUS EVERY 24 HOURS
Status: DISCONTINUED | OUTPATIENT
Start: 2023-10-20 | End: 2023-10-26 | Stop reason: HOSPADM

## 2023-10-20 RX ORDER — AMOXICILLIN 250 MG
2 CAPSULE ORAL 2 TIMES DAILY
Status: DISCONTINUED | OUTPATIENT
Start: 2023-10-20 | End: 2023-10-26 | Stop reason: HOSPADM

## 2023-10-20 RX ORDER — GLUCOSAM/CHONDRO/HERB 149/HYAL 750-100 MG
1 TABLET ORAL DAILY
Status: ON HOLD | COMMUNITY
End: 2024-03-20 | Stop reason: WASHOUT

## 2023-10-20 RX ORDER — INSULIN GLARGINE 100 [IU]/ML
20 INJECTION, SOLUTION SUBCUTANEOUS NIGHTLY
Status: DISCONTINUED | OUTPATIENT
Start: 2023-10-20 | End: 2023-10-26 | Stop reason: HOSPADM

## 2023-10-20 RX ORDER — HYDRALAZINE HYDROCHLORIDE 25 MG/1
25 TABLET, FILM COATED ORAL EVERY 6 HOURS PRN
Status: DISCONTINUED | OUTPATIENT
Start: 2023-10-22 | End: 2023-10-26 | Stop reason: HOSPADM

## 2023-10-20 RX ORDER — METFORMIN HYDROCHLORIDE 1000 MG/1
1000 TABLET ORAL
COMMUNITY

## 2023-10-20 RX ORDER — ASCORBIC ACID 250 MG
250 TABLET ORAL DAILY
Status: ON HOLD | COMMUNITY
End: 2024-03-20 | Stop reason: WASHOUT

## 2023-10-20 RX ORDER — NITROGLYCERIN 0.4 MG/1
0.4 TABLET SUBLINGUAL EVERY 5 MIN PRN
COMMUNITY

## 2023-10-20 RX ORDER — INSULIN LISPRO 100 [IU]/ML
0-10 INJECTION, SOLUTION INTRAVENOUS; SUBCUTANEOUS
Status: DISCONTINUED | OUTPATIENT
Start: 2023-10-21 | End: 2023-10-26 | Stop reason: HOSPADM

## 2023-10-20 RX ORDER — ZINC SULFATE 50(220)MG
50 CAPSULE ORAL DAILY
Status: ON HOLD | COMMUNITY
End: 2024-03-20 | Stop reason: WASHOUT

## 2023-10-20 RX ORDER — HYDRALAZINE HYDROCHLORIDE 20 MG/ML
10 INJECTION INTRAMUSCULAR; INTRAVENOUS
Status: ACTIVE | OUTPATIENT
Start: 2023-10-20 | End: 2023-10-22

## 2023-10-20 RX ORDER — CARVEDILOL 12.5 MG/1
12.5 TABLET ORAL
Status: DISCONTINUED | OUTPATIENT
Start: 2023-10-21 | End: 2023-10-21

## 2023-10-20 RX ORDER — ASPIRIN 81 MG/1
81 TABLET ORAL DAILY
Status: DISCONTINUED | OUTPATIENT
Start: 2023-10-21 | End: 2023-10-26 | Stop reason: HOSPADM

## 2023-10-20 RX ADMIN — ASPIRIN 243 MG: 81 TABLET, CHEWABLE ORAL at 16:15

## 2023-10-20 RX ADMIN — ENOXAPARIN SODIUM 40 MG: 40 INJECTION SUBCUTANEOUS at 21:30

## 2023-10-20 RX ADMIN — SENNOSIDES AND DOCUSATE SODIUM 2 TABLET: 8.6; 5 TABLET ORAL at 21:30

## 2023-10-20 RX ADMIN — ATORVASTATIN CALCIUM 40 MG: 20 TABLET, FILM COATED ORAL at 21:30

## 2023-10-20 RX ADMIN — INSULIN GLARGINE 20 UNITS: 100 INJECTION, SOLUTION SUBCUTANEOUS at 21:30

## 2023-10-20 SDOH — SOCIAL STABILITY: SOCIAL NETWORK: ARE YOU MARRIED, WIDOWED, DIVORCED, SEPARATED, NEVER MARRIED, OR LIVING WITH A PARTNER?: MARRIED

## 2023-10-20 SDOH — ECONOMIC STABILITY: HOUSING INSECURITY: IN THE LAST 12 MONTHS, HOW MANY PLACES HAVE YOU LIVED?: 1

## 2023-10-20 SDOH — SOCIAL STABILITY: SOCIAL INSECURITY: DO YOU FEEL ANYONE HAS EXPLOITED OR TAKEN ADVANTAGE OF YOU FINANCIALLY OR OF YOUR PERSONAL PROPERTY?: NO

## 2023-10-20 SDOH — SOCIAL STABILITY: SOCIAL NETWORK
DO YOU BELONG TO ANY CLUBS OR ORGANIZATIONS SUCH AS CHURCH GROUPS UNIONS, FRATERNAL OR ATHLETIC GROUPS, OR SCHOOL GROUPS?: NO

## 2023-10-20 SDOH — SOCIAL STABILITY: SOCIAL INSECURITY: WITHIN THE LAST YEAR, HAVE YOU BEEN HUMILIATED OR EMOTIONALLY ABUSED IN OTHER WAYS BY YOUR PARTNER OR EX-PARTNER?: NO

## 2023-10-20 SDOH — HEALTH STABILITY: MENTAL HEALTH: HOW OFTEN DO YOU HAVE 6 OR MORE DRINKS ON ONE OCCASION?: NEVER

## 2023-10-20 SDOH — SOCIAL STABILITY: SOCIAL INSECURITY: HAVE YOU HAD THOUGHTS OF HARMING ANYONE ELSE?: NO

## 2023-10-20 SDOH — SOCIAL STABILITY: SOCIAL NETWORK: HOW OFTEN DO YOU ATTEND CHURCH OR RELIGIOUS SERVICES?: NEVER

## 2023-10-20 SDOH — SOCIAL STABILITY: SOCIAL INSECURITY: DOES ANYONE TRY TO KEEP YOU FROM HAVING/CONTACTING OTHER FRIENDS OR DOING THINGS OUTSIDE YOUR HOME?: NO

## 2023-10-20 SDOH — ECONOMIC STABILITY: INCOME INSECURITY: IN THE PAST 12 MONTHS, HAS THE ELECTRIC, GAS, OIL, OR WATER COMPANY THREATENED TO SHUT OFF SERVICE IN YOUR HOME?: NO

## 2023-10-20 SDOH — HEALTH STABILITY: MENTAL HEALTH
STRESS IS WHEN SOMEONE FEELS TENSE, NERVOUS, ANXIOUS, OR CAN'T SLEEP AT NIGHT BECAUSE THEIR MIND IS TROUBLED. HOW STRESSED ARE YOU?: NOT AT ALL

## 2023-10-20 SDOH — SOCIAL STABILITY: SOCIAL NETWORK: HOW OFTEN DO YOU GET TOGETHER WITH FRIENDS OR RELATIVES?: ONCE A WEEK

## 2023-10-20 SDOH — SOCIAL STABILITY: SOCIAL INSECURITY
WITHIN THE LAST YEAR, HAVE YOU BEEN KICKED, HIT, SLAPPED, OR OTHERWISE PHYSICALLY HURT BY YOUR PARTNER OR EX-PARTNER?: NO

## 2023-10-20 SDOH — SOCIAL STABILITY: SOCIAL INSECURITY: WITHIN THE LAST YEAR, HAVE YOU BEEN AFRAID OF YOUR PARTNER OR EX-PARTNER?: NO

## 2023-10-20 SDOH — ECONOMIC STABILITY: FOOD INSECURITY: WITHIN THE PAST 12 MONTHS, THE FOOD YOU BOUGHT JUST DIDN'T LAST AND YOU DIDN'T HAVE MONEY TO GET MORE.: PATIENT DECLINED

## 2023-10-20 SDOH — HEALTH STABILITY: MENTAL HEALTH: HOW OFTEN DO YOU HAVE A DRINK CONTAINING ALCOHOL?: NEVER

## 2023-10-20 SDOH — SOCIAL STABILITY: SOCIAL INSECURITY: HAS ANYONE EVER THREATENED TO HURT YOUR FAMILY OR YOUR PETS?: NO

## 2023-10-20 SDOH — SOCIAL STABILITY: SOCIAL INSECURITY
WITHIN THE LAST YEAR, HAVE TO BEEN RAPED OR FORCED TO HAVE ANY KIND OF SEXUAL ACTIVITY BY YOUR PARTNER OR EX-PARTNER?: NO

## 2023-10-20 SDOH — SOCIAL STABILITY: SOCIAL INSECURITY: WERE YOU ABLE TO COMPLETE ALL THE BEHAVIORAL HEALTH SCREENINGS?: YES

## 2023-10-20 SDOH — ECONOMIC STABILITY: HOUSING INSECURITY
IN THE LAST 12 MONTHS, WAS THERE A TIME WHEN YOU DID NOT HAVE A STEADY PLACE TO SLEEP OR SLEPT IN A SHELTER (INCLUDING NOW)?: PATIENT REFUSED

## 2023-10-20 SDOH — SOCIAL STABILITY: SOCIAL INSECURITY: POSSIBLE ABUSE REPORTED TO:: OTHER (COMMENT)

## 2023-10-20 SDOH — SOCIAL STABILITY: SOCIAL NETWORK: HOW OFTEN DO YOU ATTENT MEETINGS OF THE CLUB OR ORGANIZATION YOU BELONG TO?: NEVER

## 2023-10-20 SDOH — SOCIAL STABILITY: SOCIAL NETWORK: IN A TYPICAL WEEK, HOW MANY TIMES DO YOU TALK ON THE PHONE WITH FAMILY, FRIENDS, OR NEIGHBORS?: THREE TIMES A WEEK

## 2023-10-20 SDOH — HEALTH STABILITY: MENTAL HEALTH: HOW MANY STANDARD DRINKS CONTAINING ALCOHOL DO YOU HAVE ON A TYPICAL DAY?: PATIENT DOES NOT DRINK

## 2023-10-20 SDOH — ECONOMIC STABILITY: FOOD INSECURITY: WITHIN THE PAST 12 MONTHS, YOU WORRIED THAT YOUR FOOD WOULD RUN OUT BEFORE YOU GOT MONEY TO BUY MORE.: PATIENT DECLINED

## 2023-10-20 SDOH — SOCIAL STABILITY: SOCIAL INSECURITY: DO YOU FEEL UNSAFE GOING BACK TO THE PLACE WHERE YOU ARE LIVING?: NO

## 2023-10-20 SDOH — ECONOMIC STABILITY: TRANSPORTATION INSECURITY
IN THE PAST 12 MONTHS, HAS LACK OF TRANSPORTATION KEPT YOU FROM MEETINGS, WORK, OR FROM GETTING THINGS NEEDED FOR DAILY LIVING?: NO

## 2023-10-20 SDOH — SOCIAL STABILITY: SOCIAL INSECURITY: ARE YOU OR HAVE YOU BEEN THREATENED OR ABUSED PHYSICALLY, EMOTIONALLY, OR SEXUALLY BY ANYONE?: NO

## 2023-10-20 SDOH — SOCIAL STABILITY: SOCIAL INSECURITY: ARE THERE ANY APPARENT SIGNS OF INJURIES/BEHAVIORS THAT COULD BE RELATED TO ABUSE/NEGLECT?: NO

## 2023-10-20 SDOH — ECONOMIC STABILITY: TRANSPORTATION INSECURITY
IN THE PAST 12 MONTHS, HAS THE LACK OF TRANSPORTATION KEPT YOU FROM MEDICAL APPOINTMENTS OR FROM GETTING MEDICATIONS?: NO

## 2023-10-20 SDOH — HEALTH STABILITY: PHYSICAL HEALTH: ON AVERAGE, HOW MANY MINUTES DO YOU ENGAGE IN EXERCISE AT THIS LEVEL?: 0 MIN

## 2023-10-20 SDOH — HEALTH STABILITY: MENTAL HEALTH: EXPERIENCED ANY OF THE FOLLOWING LIFE EVENTS: OTHER (COMMENT)

## 2023-10-20 SDOH — ECONOMIC STABILITY: INCOME INSECURITY: HOW HARD IS IT FOR YOU TO PAY FOR THE VERY BASICS LIKE FOOD, HOUSING, MEDICAL CARE, AND HEATING?: PATIENT DECLINED

## 2023-10-20 SDOH — ECONOMIC STABILITY: INCOME INSECURITY: IN THE LAST 12 MONTHS, WAS THERE A TIME WHEN YOU WERE NOT ABLE TO PAY THE MORTGAGE OR RENT ON TIME?: PATIENT REFUSED

## 2023-10-20 SDOH — SOCIAL STABILITY: SOCIAL INSECURITY: ABUSE: ADULT

## 2023-10-20 SDOH — HEALTH STABILITY: PHYSICAL HEALTH: ON AVERAGE, HOW MANY DAYS PER WEEK DO YOU ENGAGE IN MODERATE TO STRENUOUS EXERCISE (LIKE A BRISK WALK)?: 0 DAYS

## 2023-10-20 ASSESSMENT — COLUMBIA-SUICIDE SEVERITY RATING SCALE - C-SSRS
1. IN THE PAST MONTH, HAVE YOU WISHED YOU WERE DEAD OR WISHED YOU COULD GO TO SLEEP AND NOT WAKE UP?: NO
6. HAVE YOU EVER DONE ANYTHING, STARTED TO DO ANYTHING, OR PREPARED TO DO ANYTHING TO END YOUR LIFE?: NO
2. HAVE YOU ACTUALLY HAD ANY THOUGHTS OF KILLING YOURSELF?: NO
4. HAVE YOU HAD THESE THOUGHTS AND HAD SOME INTENTION OF ACTING ON THEM?: NO
5. HAVE YOU STARTED TO WORK OUT OR WORKED OUT THE DETAILS OF HOW TO KILL YOURSELF? DO YOU INTEND TO CARRY OUT THIS PLAN?: NO
1. IN THE PAST MONTH, HAVE YOU WISHED YOU WERE DEAD OR WISHED YOU COULD GO TO SLEEP AND NOT WAKE UP?: NO

## 2023-10-20 ASSESSMENT — ENCOUNTER SYMPTOMS
ALLERGIC/IMMUNOLOGIC NEGATIVE: 1
MUSCULOSKELETAL NEGATIVE: 1
COUGH: 1
CONSTITUTIONAL NEGATIVE: 1
ENDOCRINE NEGATIVE: 1
WEAKNESS: 1
NUMBNESS: 1
PSYCHIATRIC NEGATIVE: 1
DIARRHEA: 1
DIZZINESS: 1
FACIAL ASYMMETRY: 1
CARDIOVASCULAR NEGATIVE: 1

## 2023-10-20 ASSESSMENT — LIFESTYLE VARIABLES
AUDIT-C TOTAL SCORE: 0
HOW MANY STANDARD DRINKS CONTAINING ALCOHOL DO YOU HAVE ON A TYPICAL DAY: PATIENT DOES NOT DRINK
HAVE YOU EVER FELT YOU SHOULD CUT DOWN ON YOUR DRINKING: NO
REASON UNABLE TO ASSESS: NO
HAVE PEOPLE ANNOYED YOU BY CRITICIZING YOUR DRINKING: NO
HOW OFTEN DO YOU HAVE 6 OR MORE DRINKS ON ONE OCCASION: NEVER
SKIP TO QUESTIONS 9-10: 1
PRESCIPTION_ABUSE_PAST_12_MONTHS: NO
SUBSTANCE_ABUSE_PAST_12_MONTHS: NO
AUDIT-C TOTAL SCORE: 0
EVER HAD A DRINK FIRST THING IN THE MORNING TO STEADY YOUR NERVES TO GET RID OF A HANGOVER: NO
EVER FELT BAD OR GUILTY ABOUT YOUR DRINKING: NO
SKIP TO QUESTIONS 9-10: 1
AUDIT-C TOTAL SCORE: 0
HOW OFTEN DO YOU HAVE A DRINK CONTAINING ALCOHOL: NEVER

## 2023-10-20 ASSESSMENT — COGNITIVE AND FUNCTIONAL STATUS - GENERAL
DAILY ACTIVITIY SCORE: 24
PATIENT BASELINE BEDBOUND: NO
MOBILITY SCORE: 24

## 2023-10-20 ASSESSMENT — PATIENT HEALTH QUESTIONNAIRE - PHQ9
1. LITTLE INTEREST OR PLEASURE IN DOING THINGS: NOT AT ALL
SUM OF ALL RESPONSES TO PHQ9 QUESTIONS 1 & 2: 0
2. FEELING DOWN, DEPRESSED OR HOPELESS: NOT AT ALL

## 2023-10-20 ASSESSMENT — ACTIVITIES OF DAILY LIVING (ADL)
BATHING: INDEPENDENT
ASSISTIVE_DEVICE: EYEGLASSES
LACK_OF_TRANSPORTATION: NO
HEARING - RIGHT EAR: FUNCTIONAL
GROOMING: INDEPENDENT
FEEDING YOURSELF: INDEPENDENT
HEARING - LEFT EAR: FUNCTIONAL
JUDGMENT_ADEQUATE_SAFELY_COMPLETE_DAILY_ACTIVITIES: YES
PATIENT'S MEMORY ADEQUATE TO SAFELY COMPLETE DAILY ACTIVITIES?: YES
DRESSING YOURSELF: INDEPENDENT
WALKS IN HOME: INDEPENDENT
ADEQUATE_TO_COMPLETE_ADL: YES
TOILETING: INDEPENDENT

## 2023-10-20 ASSESSMENT — PAIN SCALES - GENERAL
PAINLEVEL_OUTOF10: 0 - NO PAIN

## 2023-10-20 ASSESSMENT — PAIN - FUNCTIONAL ASSESSMENT
PAIN_FUNCTIONAL_ASSESSMENT: 0-10
PAIN_FUNCTIONAL_ASSESSMENT: 0-10

## 2023-10-20 NOTE — H&P
History Of Present Illness  Amandeep Lara is a 68 y.o. male presenting with medical history for hyperlipidemia, hypertension, diabetes mellitus type 2, diabetic neuropathy, active smoker, bilateral carotid stenosis comes into the hospital secondary to right-sided weakness numbness and tingling.  Patient states all started around 9 PM yesterday.  Patient also stated he has been having difficulty ambulating.  The patient went to sleep and did not improve today.  The family brought the patient into the hospital.  Patient reports she has had vomiting last night and today.  He states his symptoms are somewhat better but he still has numbness and tingling in his right upper extremity right lower extremity and right face.  He denies any chest pain reports a bit of dizziness at times.  He also has cataracts.  He states he had double vision yesterday.  Has any double vision at this time.  Patient is he has a chronic cough because he is a smoker for many years.  Denies any chest pain abdominal pain at this time.  CT of the head was done that was negative.  The patient did receive 243 of aspirin in ED.  Patient is out of window for TNK at this time.  Patient is admitted for further care and management      Past Medical History  Past Medical History:   Diagnosis Date    Personal history of other diseases of the circulatory system     History of hypertension    Personal history of other endocrine, nutritional and metabolic disease     History of diabetes mellitus       Surgical History  Past Surgical History:   Procedure Laterality Date    CARPAL TUNNEL RELEASE  04/28/2015    Neuroplasty Decompression Median Nerve At Carpal Tunnel    CT HEAD ANGIO W AND WO IV CONTRAST  6/3/2021    CT HEAD ANGIO W AND WO IV CONTRAST 6/3/2021 [facility]    CT HEAD ANGIO W AND WO IV CONTRAST  6/3/2021    CT HEAD ANGIO W AND WO IV CONTRAST    CT NECK ANGIO W AND WO IV CONTRAST  6/3/2021    CT NECK ANGIO W AND WO IV CONTRAST 6/3/2021 [facility]    MR HEAD ANGIO WO IV CONTRAST  4/1/2021    MR HEAD ANGIO WO IV CONTRAST 4/1/2021 [facility]    MR HEAD ANGIO WO IV CONTRAST  4/1/2021    MR HEAD ANGIO WO IV CONTRAST    NECK SURGERY  12/27/2021    Neck Surgery    OTHER SURGICAL HISTORY  12/27/2021    Surgery    OTHER SURGICAL HISTORY  12/27/2021    Cardiac catheterization    OTHER SURGICAL HISTORY  12/27/2021    Finger amputation    OTHER SURGICAL HISTORY  12/27/2021    Colonoscopy    OTHER SURGICAL HISTORY  12/27/2021    Wrist surgery    ROTATOR CUFF REPAIR  12/27/2021    Rotator Cuff Repair    TONSILLECTOMY  04/28/2015    Tonsillectomy        Social History  He has no history on file for tobacco use, alcohol use, and drug use.    Family History  No family history on file.     Allergies  Patient has no known allergies.    Review of Systems   Constitutional: Negative.    HENT: Negative.     Eyes:  Positive for visual disturbance.   Respiratory:  Positive for cough.    Cardiovascular: Negative.    Gastrointestinal:  Positive for diarrhea.   Endocrine: Negative.    Musculoskeletal: Negative.    Skin: Negative.    Allergic/Immunologic: Negative.    Neurological:  Positive for dizziness, facial asymmetry, weakness and numbness.   Psychiatric/Behavioral: Negative.     All other systems reviewed and are negative.       Physical Exam  Constitutional:       Appearance: He is obese.   HENT:      Head: Normocephalic and atraumatic.      Nose: Nose normal.      Mouth/Throat:      Mouth: Mucous membranes are dry.   Eyes:      Extraocular Movements: Extraocular movements intact.      Conjunctiva/sclera: Conjunctivae normal.   Cardiovascular:      Rate and Rhythm: Regular rhythm. Tachycardia present.      Pulses: Normal pulses.      Heart sounds: Normal heart sounds.   Pulmonary:      Effort: Pulmonary effort is normal.      Breath sounds: Normal breath sounds.   Abdominal:      General: Bowel sounds are normal.      Palpations: Abdomen is soft.  "  Musculoskeletal:         General: Normal range of motion.      Cervical back: Normal range of motion and neck supple.   Skin:     General: Skin is warm and dry.   Neurological:      Mental Status: He is alert and oriented to person, place, and time.      Sensory: Sensory deficit present.      Motor: Weakness present.   Psychiatric:         Mood and Affect: Mood normal.          Last Recorded Vitals  Blood pressure (!) 189/76, pulse 70, temperature 36.3 °C (97.3 °F), temperature source Temporal, resp. rate 16, height 1.753 m (5' 9\"), weight 99.8 kg (220 lb), SpO2 96 %.    Relevant Results        68 y.o. male presenting with medical history for hyperlipidemia, hypertension, diabetes mellitus type 2, diabetic neuropathy, active smoker, bilateral carotid stenosis comes into the hospital secondary to right-sided weakness numbness and tingling.  Patient states all started around 9 PM yesterday.     Assessment/Plan   Principal Problem:    Cerebrovascular accident (CVA), unspecified mechanism (CMS/HCC)      CVA  -CT head is negative any acute hemorrhage or acute stroke at this time  -MRI from couple years ago shows left frontal infarct with subsequent encephalomalacia  -Received aspirin in ED.  -Neurochecks per stroke  protocol  -Continue aspirin, add Plavix statin.  -Neurology has been consulted  -Check MRI  -Permissive hypertension    Diabetes mellitus type 2  -No medication in the chart in the past.  Continue with sliding scale.  We will likely add Lantus once home medications are reconciled.  Check A1c    Continue rest of the medications once reconciled.    VTE prophylaxis: Lovenox         I spent 45 minutes in the professional and overall care of this patient.      Jermaine Marcelino MD    "

## 2023-10-20 NOTE — ED PROVIDER NOTES
HPI   Chief Complaint   Patient presents with    Weakness, Gen    Stroke       A 68-year-old male patient comes in the emergency department today secondary to right-sided weakness, decree sensation that started at 9 PM yesterday evening.  Patient denies any blood thinner use is on 81 mg aspirin.  Patient was having difficulty ambulating this morning therefore family brought him into the emergency department today for further evaluation.  Patient denies any vision changes.  Wife states that his smile seems normal for him.  Patient describes that headaches, chest pain, shortness of breath.  Otherwise has no complaints this present time.  For this purpose comes emergency department today further evaluation.                          C[place] Coma Scale Score: 15         NIH Stroke Scale: 2          Patient History   Past Medical History:   Diagnosis Date    Personal history of other diseases of the circulatory system     History of hypertension    Personal history of other endocrine, nutritional and metabolic disease     History of diabetes mellitus     Past Surgical History:   Procedure Laterality Date    CARPAL TUNNEL RELEASE  04/28/2015    Neuroplasty Decompression Median Nerve At Carpal Tunnel    CT HEAD ANGIO W AND WO IV CONTRAST  6/3/2021    CT HEAD ANGIO W AND WO IV CONTRAST 6/3/2021 [facility]    CT HEAD ANGIO W AND WO IV CONTRAST  6/3/2021    CT HEAD ANGIO W AND WO IV CONTRAST    CT NECK ANGIO W AND WO IV CONTRAST  6/3/2021    CT NECK ANGIO W AND WO IV CONTRAST 6/3/2021 [facility]    MR HEAD ANGIO WO IV CONTRAST  4/1/2021    MR HEAD ANGIO WO IV CONTRAST 4/1/2021 [facility]    MR HEAD ANGIO WO IV CONTRAST  4/1/2021    MR HEAD ANGIO WO IV CONTRAST    NECK SURGERY  12/27/2021    Neck Surgery    OTHER SURGICAL HISTORY  12/27/2021    Surgery    OTHER SURGICAL HISTORY  12/27/2021    Cardiac catheterization    OTHER SURGICAL HISTORY  12/27/2021    Finger amputation    OTHER SURGICAL  HISTORY  12/27/2021    Colonoscopy    OTHER SURGICAL HISTORY  12/27/2021    Wrist surgery    ROTATOR CUFF REPAIR  12/27/2021    Rotator Cuff Repair    TONSILLECTOMY  04/28/2015    Tonsillectomy     No family history on file.  Social History     Tobacco Use    Smoking status: Not on file    Smokeless tobacco: Not on file   Substance Use Topics    Alcohol use: Not on file    Drug use: Not on file       Physical Exam   ED Triage Vitals [10/20/23 1523]   Temp Heart Rate Resp BP   36.3 °C (97.3 °F) 70 18 --      SpO2 Temp Source Heart Rate Source Patient Position   95 % Temporal Monitor Sitting      BP Location FiO2 (%)     Right arm --       Physical Exam  Neurological:      Mental Status: He is alert and oriented to person, place, and time.      Sensory: Sensory deficit (Complete right-sided sensation decreased) present.      Motor: Weakness (Right-sided weakness) present.         ED Course & MDM   Diagnoses as of 10/20/23 1712   Cerebrovascular accident (CVA), unspecified mechanism (CMS/[facility])       Medical Decision Making  A 68-year-old male patient comes in the emergency department today secondary to right-sided weakness, decree sensation that started at 9 PM yesterday evening.  Patient denies any blood thinner use is on 81 mg aspirin.  Patient was having difficulty ambulating this morning therefore family brought him into the emergency department today for further evaluation.  Patient denies any vision changes.  Wife states that his smile seems normal for him.  Patient describes that headaches, chest pain, shortness of breath.  Otherwise has no complaints this present time.  For this purpose comes emergency department today further evaluation.    Patient evaluated in triage bay.  Stroke alert called.  Van negative as this patient has no drift on either side.    CT of the head ordered, EKG, laboratory studies.  Rule out acute infarct, hemorrhage, electrolyte abnormality    Received phone call from the radiologist  patient CT study of the head is negative.  Gave patient 243 of aspirin as patient already took his baby aspirin today.  Will wait for rest of the patient's studies to return and admit patient to the hospital for further stroke work-up.  Patient is out of the window for TNK. patient's last known well was 9 PM yesterday.    Patient's initial troponin was 35-second order.  Patient has magnesium 1.93.  Patient's potassium normal.  Patient does have a 17.9 white blood cell count with no signs of infection.  Patient tends to run in the 13,000 range.    Spoke to the hospitalist Dr. Marcelino whom agrees to meet the patient under his service    Diagnosis: CVA      Labs Reviewed   TROPONIN I, HIGH SENSITIVITY - Abnormal       Result Value    Troponin I, High Sensitivity 35 (*)     Narrative:     Less than 99th percentile of normal range cutoff-  Female and children under 18 years old <14 ng/L; Male <21 ng/L: Negative  Repeat testing should be performed if clinically indicated.     Female and children under 18 years old 14-50 ng/L; Male 21-50 ng/L:  Consistent with possible cardiac damage and possible increased clinical   risk. Serial measurements may help to assess extent of myocardial damage.     >50 ng/L: Consistent with cardiac damage, increased clinical risk and  myocardial infarction. Serial measurements may help assess extent of   myocardial damage.      NOTE: Children less than 1 year old may have higher baseline troponin   levels and results should be interpreted in conjunction with the overall   clinical context.     NOTE: Troponin I testing is performed using a different   testing methodology at [facility] than at other   [facility]. Direct result comparisons should only   be made within the same method.   COMPREHENSIVE METABOLIC PANEL - Abnormal    Glucose 243 (*)     Sodium 138      Potassium 4.7      Chloride 101      Bicarbonate 26      Anion Gap 16      Urea Nitrogen 16      Creatinine 1.11       eGFR 73      Calcium 8.8      Albumin 3.3 (*)     Alkaline Phosphatase 82      Total Protein 6.4      AST 21      Bilirubin, Total 0.5      ALT 12     CBC WITH AUTO DIFFERENTIAL - Abnormal    WBC 17.9 (*)     nRBC 0.0      RBC 4.44 (*)     Hemoglobin 13.2 (*)     Hematocrit 39.5 (*)     MCV 89      MCH 29.7      MCHC 33.4      RDW 13.6      Platelets 361      MPV 10.7      Neutrophils % 84.2      Immature Granulocytes %, Automated 0.3      Lymphocytes % 10.2      Monocytes % 4.2      Eosinophils % 0.7      Basophils % 0.4      Neutrophils Absolute 15.04 (*)     Immature Granulocytes Absolute, Automated 0.06      Lymphocytes Absolute 1.83      Monocytes Absolute 0.76      Eosinophils Absolute 0.13      Basophils Absolute 0.07     MAGNESIUM - Normal    Magnesium 1.93     PROTIME-INR - Normal    Protime 10.5      INR 0.9     GREEN TOP    Extra Tube Hold for add-ons.     GRAY TOP    Extra Tube Hold for add-ons.     URINALYSIS WITH REFLEX MICROSCOPIC   TROPONIN I, HIGH SENSITIVITY   POCT GLUCOSE METER        CT brain attack head wo IV contrast   Final Result   NO LARGE ACUTE TERRITORIAL INFARCT BY CT. SMALL NEW SUBCORTICAL   HYPODENSITY IN LEFT FRONTAL LOBE WAS NOT PRESENT ON LAST CT 21 AUGUST 2020 BUT THERE WAS CORRELATING ABNORMAL SIGNAL ON MRI 1 APRIL 2021,   PROBABLY REFLECTING AN OLD (LIKELY OCCURRED BETWEEN CT AUGUST 2020   AND MRI APRIL 2021) SUBCORTICAL LEFT FRONTAL LOBE INFARCT WITH   CONSEQUENT MATURE ENCEPHALOMALACIA        NO CT EVIDENCE        NO ACUTE INTRACRANIAL HEMORRHAGE        NO ACUTE INTRACRANIAL MASS EFFECT        I WAS ABLE TO COMMUNICATE THESE FINDINGS BY TELEPHONE TO THE   EMERGENCY DEPARTMENT PHYSICIAN'S ASSISTANT MR. CANCINO AT 15 50 HOURS,   SAME DAY, 20 OCTOBER 2023        MACRO:   Samuel Delgado discussed the significance and urgency of this critical   finding by telephone with  JOSE CANCINO on 10/20/2023 at 3:53 pm.   (**-RCF-**) Findings:  See findings.        Signed by: Samuel Delgado  10/20/2023 3:54 PM   Dictation workstation:   [id number]      XR chest 1 view    (Results Pending)         Procedure  ECG 12 lead    Performed by: Trevor Montes PA-C  Authorized by: Trevor Montes PA-C    Interpretation:     Details:  My EKG interpretation  Rate:     ECG rate:  72    ECG rate assessment: normal    Rhythm:     Rhythm: sinus rhythm    ST segments:     ST segments:  Normal       Trevor Montes PA-C  10/20/23 1710

## 2023-10-21 ENCOUNTER — APPOINTMENT (OUTPATIENT)
Dept: RADIOLOGY | Facility: HOSPITAL | Age: 68
DRG: 065 | End: 2023-10-21
Payer: COMMERCIAL

## 2023-10-21 LAB
APPEARANCE UR: CLEAR
BACTERIA #/AREA URNS AUTO: ABNORMAL /HPF
BILIRUB UR STRIP.AUTO-MCNC: NEGATIVE MG/DL
COLOR UR: YELLOW
EST. AVERAGE GLUCOSE BLD GHB EST-MCNC: 183 MG/DL
GLUCOSE BLD MANUAL STRIP-MCNC: 153 MG/DL (ref 74–99)
GLUCOSE BLD MANUAL STRIP-MCNC: 177 MG/DL (ref 74–99)
GLUCOSE BLD MANUAL STRIP-MCNC: 191 MG/DL (ref 74–99)
GLUCOSE BLD MANUAL STRIP-MCNC: 228 MG/DL (ref 74–99)
GLUCOSE UR STRIP.AUTO-MCNC: ABNORMAL MG/DL
HBA1C MFR BLD: 8 %
HYALINE CASTS #/AREA URNS AUTO: ABNORMAL /LPF
KETONES UR STRIP.AUTO-MCNC: NEGATIVE MG/DL
LEUKOCYTE ESTERASE UR QL STRIP.AUTO: NEGATIVE
MUCOUS THREADS #/AREA URNS AUTO: ABNORMAL /LPF
NITRITE UR QL STRIP.AUTO: NEGATIVE
PH UR STRIP.AUTO: 6 [PH]
PROT UR STRIP.AUTO-MCNC: ABNORMAL MG/DL
RBC # UR STRIP.AUTO: NEGATIVE /UL
RBC #/AREA URNS AUTO: ABNORMAL /HPF
SP GR UR STRIP.AUTO: 1.02
UROBILINOGEN UR STRIP.AUTO-MCNC: <2 MG/DL
WBC #/AREA URNS AUTO: ABNORMAL /HPF

## 2023-10-21 PROCEDURE — 70496 CT ANGIOGRAPHY HEAD: CPT

## 2023-10-21 PROCEDURE — 96372 THER/PROPH/DIAG INJ SC/IM: CPT | Performed by: INTERNAL MEDICINE

## 2023-10-21 PROCEDURE — 70498 CT ANGIOGRAPHY NECK: CPT | Performed by: RADIOLOGY

## 2023-10-21 PROCEDURE — 70496 CT ANGIOGRAPHY HEAD: CPT | Performed by: RADIOLOGY

## 2023-10-21 PROCEDURE — 2500000004 HC RX 250 GENERAL PHARMACY W/ HCPCS (ALT 636 FOR OP/ED): Performed by: INTERNAL MEDICINE

## 2023-10-21 PROCEDURE — 2550000001 HC RX 255 CONTRASTS: Performed by: INTERNAL MEDICINE

## 2023-10-21 PROCEDURE — 97530 THERAPEUTIC ACTIVITIES: CPT | Mod: GO

## 2023-10-21 PROCEDURE — 81001 URINALYSIS AUTO W/SCOPE: CPT | Performed by: INTERNAL MEDICINE

## 2023-10-21 PROCEDURE — 99232 SBSQ HOSP IP/OBS MODERATE 35: CPT | Performed by: INTERNAL MEDICINE

## 2023-10-21 PROCEDURE — 93880 EXTRACRANIAL BILAT STUDY: CPT

## 2023-10-21 PROCEDURE — 2500000001 HC RX 250 WO HCPCS SELF ADMINISTERED DRUGS (ALT 637 FOR MEDICARE OP): Performed by: INTERNAL MEDICINE

## 2023-10-21 PROCEDURE — 2500000002 HC RX 250 W HCPCS SELF ADMINISTERED DRUGS (ALT 637 FOR MEDICARE OP, ALT 636 FOR OP/ED): Performed by: INTERNAL MEDICINE

## 2023-10-21 PROCEDURE — 97162 PT EVAL MOD COMPLEX 30 MIN: CPT | Mod: GP | Performed by: PHYSICAL THERAPIST

## 2023-10-21 PROCEDURE — 99223 1ST HOSP IP/OBS HIGH 75: CPT | Performed by: PSYCHIATRY & NEUROLOGY

## 2023-10-21 PROCEDURE — 1200000002 HC GENERAL ROOM WITH TELEMETRY DAILY

## 2023-10-21 PROCEDURE — 93880 EXTRACRANIAL BILAT STUDY: CPT | Performed by: RADIOLOGY

## 2023-10-21 PROCEDURE — 82947 ASSAY GLUCOSE BLOOD QUANT: CPT

## 2023-10-21 PROCEDURE — 97165 OT EVAL LOW COMPLEX 30 MIN: CPT | Mod: GO

## 2023-10-21 RX ORDER — CARVEDILOL 12.5 MG/1
25 TABLET ORAL 2 TIMES DAILY
Status: DISCONTINUED | OUTPATIENT
Start: 2023-10-21 | End: 2023-10-26 | Stop reason: HOSPADM

## 2023-10-21 RX ORDER — LISINOPRIL 20 MG/1
40 TABLET ORAL DAILY
Status: DISCONTINUED | OUTPATIENT
Start: 2023-10-21 | End: 2023-10-26 | Stop reason: HOSPADM

## 2023-10-21 RX ADMIN — ENOXAPARIN SODIUM 40 MG: 40 INJECTION SUBCUTANEOUS at 21:30

## 2023-10-21 RX ADMIN — CARVEDILOL 25 MG: 12.5 TABLET, FILM COATED ORAL at 21:29

## 2023-10-21 RX ADMIN — SENNOSIDES AND DOCUSATE SODIUM 2 TABLET: 8.6; 5 TABLET ORAL at 21:30

## 2023-10-21 RX ADMIN — CARVEDILOL 12.5 MG: 12.5 TABLET, FILM COATED ORAL at 09:28

## 2023-10-21 RX ADMIN — INSULIN LISPRO 4 UNITS: 100 INJECTION, SOLUTION INTRAVENOUS; SUBCUTANEOUS at 17:14

## 2023-10-21 RX ADMIN — ASPIRIN 81 MG: 81 TABLET, COATED ORAL at 08:24

## 2023-10-21 RX ADMIN — INSULIN LISPRO 2 UNITS: 100 INJECTION, SOLUTION INTRAVENOUS; SUBCUTANEOUS at 12:01

## 2023-10-21 RX ADMIN — CARVEDILOL 12.5 MG: 12.5 TABLET, FILM COATED ORAL at 08:24

## 2023-10-21 RX ADMIN — LISINOPRIL 40 MG: 20 TABLET ORAL at 14:05

## 2023-10-21 RX ADMIN — INSULIN GLARGINE 20 UNITS: 100 INJECTION, SOLUTION SUBCUTANEOUS at 21:30

## 2023-10-21 RX ADMIN — ATORVASTATIN CALCIUM 40 MG: 20 TABLET, FILM COATED ORAL at 21:29

## 2023-10-21 RX ADMIN — CLOPIDOGREL BISULFATE 75 MG: 75 TABLET, FILM COATED ORAL at 08:23

## 2023-10-21 RX ADMIN — IOHEXOL 75 ML: 350 INJECTION, SOLUTION INTRAVENOUS at 14:47

## 2023-10-21 RX ADMIN — INSULIN LISPRO 2 UNITS: 100 INJECTION, SOLUTION INTRAVENOUS; SUBCUTANEOUS at 06:25

## 2023-10-21 ASSESSMENT — ENCOUNTER SYMPTOMS
ADENOPATHY: 0
SLEEP DISTURBANCE: 0
BRUISES/BLEEDS EASILY: 0
BACK PAIN: 0
HYPERACTIVE: 0
FREQUENCY: 0
CONFUSION: 0
SHORTNESS OF BREATH: 0
DIFFICULTY URINATING: 0
ABDOMINAL PAIN: 0
EYE PAIN: 0
SEIZURES: 0
WHEEZING: 0
NECK PAIN: 0
FACIAL ASYMMETRY: 0
HALLUCINATIONS: 0
LIGHT-HEADEDNESS: 0
UNEXPECTED WEIGHT CHANGE: 0
VOMITING: 0
WEAKNESS: 1
NECK STIFFNESS: 0
COUGH: 0
PALPITATIONS: 0
SPEECH DIFFICULTY: 1
FEVER: 0
DIZZINESS: 0
AGITATION: 0
TREMORS: 0
ARTHRALGIAS: 0
NUMBNESS: 1
NAUSEA: 0
JOINT SWELLING: 0
SINUS PRESSURE: 0
FATIGUE: 0
HEADACHES: 0
TROUBLE SWALLOWING: 0
PHOTOPHOBIA: 0

## 2023-10-21 ASSESSMENT — COGNITIVE AND FUNCTIONAL STATUS - GENERAL
TURNING FROM BACK TO SIDE WHILE IN FLAT BAD: A LITTLE
STANDING UP FROM CHAIR USING ARMS: A LOT
CLIMB 3 TO 5 STEPS WITH RAILING: A LOT
PERSONAL GROOMING: A LITTLE
EATING MEALS: A LITTLE
HELP NEEDED FOR BATHING: A LOT
TURNING FROM BACK TO SIDE WHILE IN FLAT BAD: A LITTLE
TOILETING: A LOT
CLIMB 3 TO 5 STEPS WITH RAILING: A LOT
MOBILITY SCORE: 15
DRESSING REGULAR UPPER BODY CLOTHING: A LITTLE
WALKING IN HOSPITAL ROOM: A LOT
MOBILITY SCORE: 15
WALKING IN HOSPITAL ROOM: A LOT
HELP NEEDED FOR BATHING: A LOT
DRESSING REGULAR UPPER BODY CLOTHING: A LITTLE
MOVING TO AND FROM BED TO CHAIR: A LOT
DRESSING REGULAR LOWER BODY CLOTHING: A LOT
TOILETING: A LOT
DAILY ACTIVITIY SCORE: 15
MOVING TO AND FROM BED TO CHAIR: A LOT
STANDING UP FROM CHAIR USING ARMS: A LOT
EATING MEALS: A LITTLE
DRESSING REGULAR LOWER BODY CLOTHING: A LOT
DAILY ACTIVITIY SCORE: 15
PERSONAL GROOMING: A LITTLE

## 2023-10-21 ASSESSMENT — PAIN SCALES - GENERAL
PAINLEVEL_OUTOF10: 0 - NO PAIN

## 2023-10-21 ASSESSMENT — PAIN - FUNCTIONAL ASSESSMENT
PAIN_FUNCTIONAL_ASSESSMENT: 0-10

## 2023-10-21 ASSESSMENT — ACTIVITIES OF DAILY LIVING (ADL): BATHING_ASSISTANCE: MODERATE

## 2023-10-21 NOTE — CARE PLAN
Problem: Fall/Injury  Goal: Not fall by end of shift  Outcome: Progressing   The patient's goals for the shift include maintain safety    The clinical goals for the shift include maintain safety     Heparin drip was stopped at 19:15 AS ORDERED. stopped for an hour and restart at 13 cc/hr until 6am stopped for an hour and restart it at 10pm at 14cc/hr

## 2023-10-21 NOTE — PROGRESS NOTES
Physical Therapy    Physical Therapy Evaluation    Patient Name: Amandeep Lara  MRN: [id number]  Today's Date: 10/21/2023   Time Calculation  Start Time: 1303  Stop Time: 1327  Time Calculation (min): 24 min    Assessment/Plan   PT Assessment  PT Assessment Results: Impaired balance, Decreased mobility, Decreased coordination  Rehab Prognosis: Good  End of Session Communication: PCT/NA/CTA  End of Session Patient Position: Bed, 3 rail up, Alarm on  IP OR SWING BED PT PLAN  Inpatient or Swing Bed: Inpatient  PT Plan  Treatment/Interventions: Bed mobility, Transfer training, Gait training, Stair training, Balance training, Neuromuscular re-education, Therapeutic activity  PT Plan: Skilled PT  PT Frequency: 4 times per week  PT Discharge Recommendations: High intensity level of continued care  PT Recommended Transfer Status: Assist x2 (may benefit from using ww)    Subjective     Current Problem:  1. Cerebrovascular accident (CVA), unspecified mechanism (CMS/HCC)  Transthoracic Echo (TTE) Complete    Transthoracic Echo (TTE) Complete    Carotid duplex bilateral    Carotid duplex bilateral            General Visit Information:  General  Reason for Referral: L CVA  Referred By: Ryland  Past Medical History Relevant to Rehab: Admit 10/20 with R sided weakness, decreased sensation, difficulty ambulating, an episode of double vision and nausea with emesis. /99 CT brain (-); MRI of brain- acute to subacute infarcts L thalamus and midbrain. (PMH: HTN, neuropathy, DMII, CTR, RCR, R finger amputation, smoker, B/L carotid stenosis, cataracts, chronic cough)  Family/Caregiver Present: Yes (son present)  Patient Position Received: Bed, 2 rail up, Alarm off  General Comment: Pt. agreeable to PT/OT eval. Pt. reports that he is having difficulty ambulating.    Home Living:  Home Living  Type of Home:  (Pt. lives with spouse, son and dtr (all work) in split level home. 1 step to enter. 5 steps down with HR to 1/2 bath; 14 steps  "with HR to bed/full bath. Tub shower, no seat or GBs. No DME)    Prior Level of Function:  Prior Function Per Pt/Caregiver Report  Level of [place]:  (Indep. PTA without AD; denies any recent falls. +drives)    Precautions:  Precautions  Medical Precautions: Fall precautions  Precautions Comment: Significant Ataxia during Gait; very high fall risk (Blurry vision R eye)    Vital Signs:     Objective     Pain:  Pain Assessment  Pain Assessment: 0-10  Pain Score: 0 - No pain    Cognition:  Cognition  Orientation Level: Oriented X4  Processing Speed:  (Mild delay)    General Assessments:         Sensation  Sensation Comment: Pt. reports numbness/tingling \"pins and needles\" feeling R side of face, RUE and RLE.  Strength  Strength Comments: BLE and BUE 5/5     Coordination  Movements are Fluid and Coordinated: No (significant RLE ataxia during ambulation)  Heel to [place]:  (+zig zag R heel on L [place])  Finger to [place]:  (mild ataxia RUE)     Static Sitting Balance  Static Sitting-Balance Support: No upper extremity supported  Static Sitting-Level of Assistance: Independent  Dynamic Sitting Balance  Dynamic Sitting-Balance: Forward lean  Dynamic Sitting-Comments: minAx1  Static Standing Balance  Static Standing-Balance Support: No upper extremity supported  Static Standing-Level of Assistance: Moderate assistance  Dynamic Standing Balance  Dynamic Standing-Balance Support: No upper extremity supported  Dynamic Standing-Comments: maxA    Functional Assessments:     Bed Mobility  Bed Mobility: Yes (sup to/from sit with SBA)  Transfers  Transfer: Yes (sit to/from stand with modAx1)  Ambulation/Gait Training  Ambulation/Gait Training Performed: Yes (Pt. initially requiring maxA to attempt ambulation without UE support. B/L HHA provided and pt. ambulated 25' with modAx2. Erratic R foot placement with varying [place] and significant ataxia. Pt. may benefit from gait belt and ww)          Extremity/Trunk Assessments:  RUE   RUE : " Within Functional Limits  LUE   LUE: Within Functional Limits  RLE   RLE : Within Functional Limits  LLE   LLE : Within Functional Limits    Outcome Measures:  [facility] Basic Mobility  Turning from your back to your side while in a flat bed without using bedrails: None  Moving from lying on your back to sitting on the side of a flat bed without using bedrails: A little  Moving to and from bed to chair (including a wheelchair): A lot  Standing up from a chair using your arms (e.g. wheelchair or bedside chair): A lot  To walk in hospital room: A lot  Climbing 3-5 steps with railing: A lot  Basic Mobility - Total Score: 15                            Goals:  Encounter Problems       Encounter Problems (Active)       Impaired mobility s/p CVA        Perform all bed mobility with indep.        Start:  10/21/23    Expected End:  11/04/23            Perform all transfers with ww vs LRAD and CGAx1       Start:  10/21/23    Expected End:  11/04/23            Patient will ambulate >/= 50 ft. with 2ww vs LRAD and CGAx1       Start:  10/21/23    Expected End:  11/04/23            Patient will ascend/descend at least 5 steps with HR and cane, if needed, with minAx1       Start:  10/21/23    Expected End:  11/04/23                 Education Documentation  Precautions, taught by Karina Griggs PT at 10/21/2023  2:00 PM.  Learner: Family, Patient  Readiness: Acceptance  Method: Explanation  Response: Verbalizes Understanding, Needs Reinforcement    Mobility Training, taught by Karina Griggs PT at 10/21/2023  2:00 PM.  Learner: Family, Patient  Readiness: Acceptance  Method: Explanation  Response: Verbalizes Understanding, Needs Reinforcement    Education Comments  No comments found.

## 2023-10-21 NOTE — PROGRESS NOTES
Amandeep Lara is a 68 y.o. male on day 1 of admission presenting with Cerebrovascular accident (CVA), unspecified mechanism (CMS/HCC).      Subjective   The patient seen and evaluated today.  He states he continues to have numbness and tingling in his right upper arm and lower extremity and right face.  He also reports slight weakness.  Otherwise denies any dizziness shortness of breath chest pain nausea or vomiting at this time.       Objective     Last Recorded Vitals  BP (!) 191/83   Pulse 78   Temp 36.1 °C (97 °F) (Temporal)   Resp 16   Wt 93.4 kg (205 lb 14.6 oz)   SpO2 94%   Intake/Output last 3 Shifts:    Intake/Output Summary (Last 24 hours) at 10/21/2023 1341  Last data filed at 10/21/2023 0720  Gross per 24 hour   Intake 590 ml   Output 600 ml   Net -10 ml       Admission Weight  Weight: 99.8 kg (220 lb) (10/20/23 1620)    Daily Weight  10/20/23 : 93.4 kg (205 lb 14.6 oz)    Image Results  Carotid duplex bilateral  Narrative: Interpreted By:  Cezar Tesfaye,   STUDY:  [facility] US CAROTID ARTERY DUPLEX BILATERAL;  10/21/2023 10:18 am      INDICATION:  Signs/Symptoms:CVA.      COMPARISON:  06/11/2020.      ACCESSION NUMBER(S):  [id number]      ORDERING CLINICIAN:  ALEXIS METCALF      TECHNIQUE:  Vascular ultrasound of the extracranial carotid system was performed  bilaterally.  Gray scale, color Doppler and spectral Doppler waveform  analysis was performed.      FINDINGS:  RIGHT:  On the right  irregular atherosclerotic plaque is present. Right ICA  peak velocities not significantly elevated. The peak systolic  velocities are as follows:      RIGHT SIDE PEAK SYSTOLIC VELOCITY TABLE:  CCA 52 cm/sec.  ICA 82 cm/sec.   cm/sec.      The ratio of the peak systolic velocity of the right ICA/CCA is 1.6.      RIGHT VERTEBRAL ARTERY:  The right vertebral artery demonstrates proximal normal anterograde  flow      LEFT:  On the left  irregular atherosclerotic plaque is present. Left ICA  peak velocities  not significantly elevated. The peak systolic  velocities are as follows:      LEFT SIDE PEAK SYSTOLIC VELOCITY TABLE:  CCA 60 cm/sec.  ICA 56 cm/sec.   cm/sec.      The ratio of the peak systolic velocity of the left ICA/CCA is 0.9.      LEFT VERTEBRAL ARTERY:  The left vertebral artery demonstrates proximal normal anterograde  flow      Impression: Estimated ICA stenosis is less than 50% bilaterally based on peak  velocities.      Please note that more significant stenosis was reported on previous  CTA of the neck on 06/03/2021. CTA follow-up may be considered for  further evaluation.      MACRO:  None      Signed by: Cezar Tesfaye 10/21/2023 12:17 PM  Dictation workstation:   [id number]      Physical Exam  HENT:      Head: Normocephalic and atraumatic.      Nose: Nose normal.      Mouth/Throat:      Mouth: Mucous membranes are dry.   Eyes:      Extraocular Movements: Extraocular movements intact.      Conjunctiva/sclera: Conjunctivae normal.   Cardiovascular:      Rate and Rhythm: Normal rate and regular rhythm.      Pulses: Normal pulses.      Heart sounds: Normal heart sounds.   Pulmonary:      Effort: Pulmonary effort is normal.      Breath sounds: Normal breath sounds.   Abdominal:      General: Bowel sounds are normal.      Palpations: Abdomen is soft.   Musculoskeletal:         General: Normal range of motion.      Cervical back: Normal range of motion and neck supple.   Skin:     General: Skin is warm and dry.   Neurological:      Mental Status: He is alert and oriented to person, place, and time.      Sensory: Sensory deficit present.      Motor: Weakness present.      Comments: The patient reports weakness of right arm and lower extremity but on exam there is no significant weakness.  The patient reports numbness and tingling of the right face right upper and lower extremities.    Psychiatric:         Mood and Affect: Mood normal.         Relevant Results               Assessment/Plan   This  patient currently has cardiac telemetry ordered; if you would like to modify or discontinue the telemetry order, click here to go to the orders activity to modify/discontinue the order.      68 y.o. male presenting with medical history for hyperlipidemia, hypertension, diabetes mellitus type 2, diabetic neuropathy, active smoker, bilateral carotid stenosis comes into the hospital secondary to right-sided weakness numbness and tingling.  ED the patient was found to be outside of the window for TNK and was Van negative. CT of the head was done that was negative.  MRI showed Small foci of acute to subacute ischemic infarcts in the left thalamus and midbrain.          Principal Problem:    Cerebrovascular accident (CVA), unspecified mechanism (CMS/HCC)    CVA  -CT head is negative any acute hemorrhage on admission  -MRI from couple years ago shows left frontal infarct with subsequent encephalomalacia  -Continue with aspirin and statin and Plavix  -Neurochecks per stroke  protocol  -Neurology has been consulted  -MRI showed Small foci of acute to subacute ischemic infarcts in the left thalamus and midbrain.   -Carotid Doppler without significant stenosis  -Previous CTA showed significant stenosis another CTA of head and neck has been ordered  -Echo has been ordered    Hypertension  -Continue with Coreg, now resume lisinopril.    Hyperlipidemia  -Continue with statins     Diabetes mellitus type 2  -Continue with sliding scale.    -A1c noted to be 8     VTE prophylaxis: Lovenox              Jermaine Marcelino MD

## 2023-10-21 NOTE — CONSULTS
History Of Present Illness  Amandeep Lara is a 68 y.o. male presenting with with right-sided weakness and difficulty ambulating.  He also had some nausea and vomiting he came to the emergency room and is out of the window for the TNK.  Since coming to the hospital his symptoms has improved but is still complaining of right upper and lower extremity weakness with heaviness.  No history of any headache nausea vomiting    He had a similar symptoms in 2021 had a work-up done which according to him was unremarkable    Please refer to the initial H&P for details and the ER records for details    Patient has a longstanding history of diabetic neuropathy hyperlipidemia and hypertension          Past Medical History  Past Medical History:   Diagnosis Date    Diabetes mellitus (CMS/HCC)     Hypertension     Personal history of other diseases of the circulatory system     History of hypertension    Personal history of other endocrine, nutritional and metabolic disease     History of diabetes mellitus    Stroke (CMS/HCC)      Surgical History  Past Surgical History:   Procedure Laterality Date    CARPAL TUNNEL RELEASE  04/28/2015    Neuroplasty Decompression Median Nerve At Carpal Tunnel    CT HEAD ANGIO W AND WO IV CONTRAST  6/3/2021    CT HEAD ANGIO W AND WO IV CONTRAST 6/3/2021 [facility]    CT HEAD ANGIO W AND WO IV CONTRAST  6/3/2021    CT HEAD ANGIO W AND WO IV CONTRAST    CT NECK ANGIO W AND WO IV CONTRAST  6/3/2021    CT NECK ANGIO W AND WO IV CONTRAST 6/3/2021 [facility]    MR HEAD ANGIO WO IV CONTRAST  4/1/2021    MR HEAD ANGIO WO IV CONTRAST 4/1/2021 [facility]    MR HEAD ANGIO WO IV CONTRAST  4/1/2021    MR HEAD ANGIO WO IV CONTRAST    NECK SURGERY  12/27/2021    Neck Surgery    OTHER SURGICAL HISTORY  12/27/2021    Surgery    OTHER SURGICAL HISTORY  12/27/2021    Cardiac catheterization    OTHER SURGICAL HISTORY  12/27/2021    Finger amputation    OTHER SURGICAL HISTORY  12/27/2021     Colonoscopy    OTHER SURGICAL HISTORY  12/27/2021    Wrist surgery    ROTATOR CUFF REPAIR  12/27/2021    Rotator Cuff Repair    TONSILLECTOMY  04/28/2015    Tonsillectomy     Social History  Social History     Tobacco Use    Smoking status: Every Day     Packs/day: 1.00     Years: 15.00     Additional pack years: 0.00     Total pack years: 15.00     Types: Cigarettes     Start date: 1975     Passive exposure: Never    Smokeless tobacco: Never   Vaping Use    Vaping Use: Never used   Substance Use Topics    Alcohol use: Not Currently    Drug use: Never     Allergies  Bactrim [sulfamethoxazole-trimethoprim]  Home Medications  Medications Prior to Admission   Medication Sig Dispense Refill Last Dose    ascorbic acid (Vitamin C) 250 mg tablet Take 1 tablet (250 mg) by mouth once daily.   10/19/2023 at AM    aspirin 81 mg EC tablet Take 1 tablet (81 mg) by mouth once daily.   10/19/2023 at AM    carvedilol (Coreg) 25 mg tablet Take 1 tablet (25 mg) by mouth 2 times a day.   10/19/2023 at AM    cyanocobalamin, vitamin B-12, (VITAMIN B-12 ORAL) Take 1 tablet by mouth once daily.   10/19/2023 at AM    insulin asp prt-insulin aspart (NovoLOG Mix 70-30 U-100 Insuln) 100 unit/mL (70-30) injection Inject 20 Units under the skin 2 times a day with meals. Take as directed per insulin instructions. If BS <160, decrease by 2 units. If BS >180, increase by 2 units.   10/19/2023 at 2130    lisinopril 40 mg tablet Take 1 tablet (40 mg) by mouth once daily.   10/19/2023 at AM    metFORMIN (Glucophage) 1,000 mg tablet Take 1 tablet (1,000 mg) by mouth 2 times a day with meals.   10/19/2023 at AM    nitroglycerin (Nitrostat) 0.4 mg SL tablet Place 1 tablet (0.4 mg) under the tongue every 5 minutes if needed for chest pain (up to 3 doses).   Past Month    omega 3-dha-epa-fish oil (Fish OiL) 1,000 mg (120 mg-180 mg) capsule Take 1 capsule (1,000 mg) by mouth once daily.   10/19/2023 at AM    zinc sulfate (Zincate) 220 (50 Zn) MG capsule  Take 1 capsule (50 mg of elemental zinc) by mouth once daily.   10/19/2023 at AM       Review of Systems   Constitutional:  Negative for fatigue, fever and unexpected weight change.   HENT:  Negative for dental problem, ear pain, hearing loss, sinus pressure, tinnitus and trouble swallowing.    Eyes:  Negative for photophobia, pain and visual disturbance.   Respiratory:  Negative for cough, shortness of breath and wheezing.    Cardiovascular:  Negative for chest pain, palpitations and leg swelling.   Gastrointestinal:  Negative for abdominal pain, nausea and vomiting.   Genitourinary:  Negative for difficulty urinating, enuresis and frequency.   Musculoskeletal:  Negative for arthralgias, back pain, joint swelling, neck pain and neck stiffness.   Skin:  Negative for pallor and rash.   Allergic/Immunologic: Negative for food allergies.   Neurological:  Positive for speech difficulty, weakness and numbness. Negative for dizziness, tremors, seizures, syncope, facial asymmetry, light-headedness and headaches.   Hematological:  Negative for adenopathy. Does not bruise/bleed easily.   Psychiatric/Behavioral:  Negative for agitation, behavioral problems, confusion, hallucinations and sleep disturbance. The patient is not hyperactive.        Physical Exam on physical examination he was alert awake and not in acute distress.  HEENT ears revealed to be equal and react light urinals and throat was unremarkable no difficult because of patient beer.  Neck was supple without any carotid bruits or lymphadenopathy    Cardiovascular examination normal S1-S2 with clear breath sounds bilaterally.  Abdomen was soft with no organomegaly.  Extremity exam is general edema in his EXTR.  Symmetric.  Fingers no edema swelling or any ecchymotic areas        Neurological Exam patient was alert awake oriented to place person and time.  Speech was slightly dysarthric but he was able to follow simple and complex commands.  Memory was intact.   "Attention span judgment concentration and focus was appropriate.  No hallucinations or delusions.    Cranial nerve exam revealed normal extraocular movement with asymmetry to the face with slight droopiness on the right compared to the left.  Pupils were equal and reactive to light patient was able to elevate the palate and shoulder without difficulty and sensory exam was intact.  Hearing was normal.    Motor examination mild right hemiparesis evidenced by pronator drift on outstretched hand no abnormal movements were seen    Reflexes were examined with slightly increased reflex on the right compared to the left with plantar extensors on the right side    Coordination Station and gait revealed a slightly ataxic gait and required assistance for ambulation    Sensory exam did reveal loss of pinprick and light touch sensation both in the upper and lower extremities        Last Recorded Vitals  Blood pressure (!) 191/83, pulse 78, temperature 36.1 °C (97 °F), temperature source Temporal, resp. rate 16, height 1.727 m (5' 8\"), weight 93.4 kg (205 lb 14.6 oz), SpO2 94 %.      Relevant Results  Recent Results (from the past 24 hour(s))   Troponin I, High Sensitivity    Collection Time: 10/20/23  4:51 PM   Result Value Ref Range    Troponin I, High Sensitivity 32 (H) 0 - 20 ng/L   POCT GLUCOSE    Collection Time: 10/20/23  8:30 PM   Result Value Ref Range    POCT Glucose 285 (H) 74 - 99 mg/dL   Lipid Panel    Collection Time: 10/20/23  9:33 PM   Result Value Ref Range    Cholesterol 204 (H) 0 - 199 mg/dL    HDL-Cholesterol 45.9 mg/dL    Cholesterol/HDL Ratio 4.4     LDL Calculated 129 (H) <=99 mg/dL    VLDL 29 0 - 40 mg/dL    Triglycerides 144 0 - 149 mg/dL    Non HDL Cholesterol 158 (H) 0 - 149 mg/dL   Hemoglobin A1C    Collection Time: 10/20/23  9:33 PM   Result Value Ref Range    Hemoglobin A1C 8.0 (H) see below %    Estimated Average Glucose 183 Not Established mg/dL   B-Type Natriuretic Peptide    Collection Time: " 10/20/23  9:33 PM   Result Value Ref Range     (H) 0 - 99 pg/mL   Lavender Top    Collection Time: 10/20/23 11:02 PM   Result Value Ref Range    Extra Tube Hold for add-ons.    SST TOP    Collection Time: 10/20/23 11:02 PM   Result Value Ref Range    Extra Tube Hold for add-ons.    Urinalysis with Reflex Microscopic    Collection Time: 10/21/23  4:18 AM   Result Value Ref Range    Color, Urine Yellow Straw, Yellow    Appearance, Urine Clear Clear    Specific Gravity, Urine 1.022 1.005 - 1.035    pH, Urine 6.0 5.0, 5.5, 6.0, 6.5, 7.0, 7.5, 8.0    Protein, Urine >=500 (3+) (N) NEGATIVE mg/dL    Glucose, Urine >=500 (3+) (A) NEGATIVE mg/dL    Blood, Urine NEGATIVE NEGATIVE    Ketones, Urine NEGATIVE NEGATIVE mg/dL    Bilirubin, Urine NEGATIVE NEGATIVE    Urobilinogen, Urine <2.0 <2.0 mg/dL    Nitrite, Urine NEGATIVE NEGATIVE    Leukocyte Esterase, Urine NEGATIVE NEGATIVE   Microscopic Only, Urine    Collection Time: 10/21/23  4:18 AM   Result Value Ref Range    WBC, Urine 1-5 1-5, NONE /HPF    RBC, Urine 3-5 NONE, 1-2, 3-5 /HPF    Bacteria, Urine 1+ (A) NONE SEEN /HPF    Mucus, Urine 1+ Reference range not established. /LPF    Hyaline Casts, Urine 1+ (A) NONE /LPF   POCT GLUCOSE    Collection Time: 10/21/23  6:08 AM   Result Value Ref Range    POCT Glucose 153 (H) 74 - 99 mg/dL   POCT GLUCOSE    Collection Time: 10/21/23 11:58 AM   Result Value Ref Range    POCT Glucose 177 (H) 74 - 99 mg/dL      NIH Stroke Scale  1A. Level of Consciousness: Alert, Keenly Responsive  1B. Ask Month and Age: Both Questions Right  1C. Blink Eyes & Squeeze Hands: Performs Both Tasks  2. Best Gaze: Normal  3. Visual: No Visual Loss  4. Facial Palsy: Minor Paralysis  5A. Motor - Left Arm: No Drift  5B. Motor - Right Arm: No Drift  6A. Motor - Left Leg: No Drift  6B. Motor - Right Leg: No Drift  7. Limb Ataxia: Absent  8. Sensory Loss: Mild-to-Moderate Sensory Loss  9. Best Language: No Aphasia  10. Dysarthria: Normal  11. Extinction  and Inattention: No Abnormality  NIH Stroke Scale: 2           Karlo Coma Scale  Best Eye Response: Spontaneous  Best Verbal Response: Oriented  Best Motor Response: Follows commands  Wright Coma Scale Score: 15              CMP:    Results from last 7 days   Lab Units 10/20/23  1535   SODIUM mmol/L 138   POTASSIUM mmol/L 4.7   CHLORIDE mmol/L 101   CO2 mmol/L 26   BUN mg/dL 16   CREATININE mg/dL 1.11   GLUCOSE mg/dL 243*   PROTEIN TOTAL g/dL 6.4   CALCIUM mg/dL 8.8   BILIRUBIN TOTAL mg/dL 0.5   ALK PHOS U/L 82   AST U/L 21   ALT U/L 12       Lipid Panel:  Results from last 7 days   Lab Units 10/20/23  2133   HDL mg/dL 45.9   CHOLESTEROL/HDL RATIO  4.4   VLDL mg/dL 29   TRIGLYCERIDES mg/dL 144   NON HDL CHOL. mg/dL 158*            MR brain wo IV contrast    Result Date: 10/20/2023  Interpreted By:  Carla Rodgers, STUDY: MR BRAIN WO IV CONTRAST;  10/20/2023 11:01 pm   INDICATION: Signs/Symptoms:Strokelike symptoms.   COMPARISON: CT head 10/20/2023.   ACCESSION NUMBER(S): [id number]   ORDERING CLINICIAN: ALEXIS METCALF   TECHNIQUE: Axial T2, FLAIR, DWI, gradient echo T2 and sagittal and coronal T1 weighted images of brain were acquired.   FINDINGS: CSF Spaces: The ventricles, sulci and basal cisterns enlarged, concordant with parenchymal volume loss.   Parenchyma: Small foci of restricted diffusion within the left midbrain, including the medial left cerebellar peduncle with minimal extension to the medial left thalamus. Possible focus of restricted diffusion in the right occipital lobe. Minimal associated T2/FLAIR hyperintense white matter changes present.   Small region of encephalomalacia extending to the cortex of the anterior left frontal lobe. Additional mild-to-moderate nonspecific T2/FLAIR hyperintense white matter changes. Moderate to advanced generalized parenchymal volume loss present. There is no mass effect or midline shift.   Flow voids appear maintained within central intracranial vessels.  Gradient echo hypointense signal within the bilateral thalami corresponds with coarse calcifications seen on same-day CT head. Which are slightly atypical location and clinical correlation recommended. Calcifications in the region of the dentate nucleus and basal ganglia are again noted.   Paranasal Sinuses and Mastoids: Mild scattered mucosal thickening involving the maxillary and ethmoid sinuses. Mastoid air cells are patent.       Small foci of acute to subacute ischemic infarcts in the left thalamus and midbrain. Possible punctate focus of restricted diffusion in the right occipital lobe. No associated mass effect or midline shift present.   Gradient echo signal corresponding with calcifications in the bilateral thalami. Findings are atypical and correlation with underlying toxic, metabolic or other etiology suggested.   Small remote left frontal infarct and additional findings as detailed.   MACRO: None   Signed by: Carla Rodgers 10/20/2023 11:55 PM Dictation workstation:   [id number]   No CT head results found for the past 14 days  No echocardiogram results found for the past 14 days      Results from last 7 days   Lab Units 10/20/23  2133   HEMOGLOBIN A1C % 8.0*     BNP   Date/Time Value Ref Range Status   10/20/2023 09:33  (H) 0 - 99 pg/mL Final        I have personally reviewed the following imaging results Carotid duplex bilateral    Result Date: 10/21/2023  Interpreted By:  Cezar Tesfaye, STUDY: [facility] CAROTID ARTERY DUPLEX BILATERAL;  10/21/2023 10:18 am   INDICATION: Signs/Symptoms:CVA.   COMPARISON: 06/11/2020.   ACCESSION NUMBER(S): [id number]   ORDERING CLINICIAN: ALEXIS METCALF   TECHNIQUE: Vascular ultrasound of the extracranial carotid system was performed bilaterally.  Gray scale, color Doppler and spectral Doppler waveform analysis was performed.   FINDINGS: RIGHT: On the right  irregular atherosclerotic plaque is present. Right ICA peak velocities not significantly elevated.  The peak systolic velocities are as follows:   RIGHT SIDE PEAK SYSTOLIC VELOCITY TABLE: CCA 52 cm/sec. ICA 82 cm/sec.  cm/sec.   The ratio of the peak systolic velocity of the right ICA/CCA is 1.6.   RIGHT VERTEBRAL ARTERY: The right vertebral artery demonstrates proximal normal anterograde flow   LEFT: On the left  irregular atherosclerotic plaque is present. Left ICA peak velocities not significantly elevated. The peak systolic velocities are as follows:   LEFT SIDE PEAK SYSTOLIC VELOCITY TABLE: CCA 60 cm/sec. ICA 56 cm/sec.  cm/sec.   The ratio of the peak systolic velocity of the left ICA/CCA is 0.9.   LEFT VERTEBRAL ARTERY: The left vertebral artery demonstrates proximal normal anterograde flow       Estimated ICA stenosis is less than 50% bilaterally based on peak velocities.   Please note that more significant stenosis was reported on previous CTA of the neck on 06/03/2021. CTA follow-up may be considered for further evaluation.   MACRO: None   Signed by: Cezar Tesfaye 10/21/2023 12:17 PM Dictation workstation:   [id number]    MR brain wo IV contrast    Result Date: 10/20/2023  Interpreted By:  Carla Rodgers, STUDY: MR BRAIN WO IV CONTRAST;  10/20/2023 11:01 pm   INDICATION: Signs/Symptoms:Strokelike symptoms.   COMPARISON: CT head 10/20/2023.   ACCESSION NUMBER(S): [id number]   ORDERING CLINICIAN: ALEXIS METCALF   TECHNIQUE: Axial T2, FLAIR, DWI, gradient echo T2 and sagittal and coronal T1 weighted images of brain were acquired.   FINDINGS: CSF Spaces: The ventricles, sulci and basal cisterns enlarged, concordant with parenchymal volume loss.   Parenchyma: Small foci of restricted diffusion within the left midbrain, including the medial left cerebellar peduncle with minimal extension to the medial left thalamus. Possible focus of restricted diffusion in the right occipital lobe. Minimal associated T2/FLAIR hyperintense white matter changes present.   Small region of encephalomalacia  extending to the cortex of the anterior left frontal lobe. Additional mild-to-moderate nonspecific T2/FLAIR hyperintense white matter changes. Moderate to advanced generalized parenchymal volume loss present. There is no mass effect or midline shift.   Flow voids appear maintained within central intracranial vessels. Gradient echo hypointense signal within the bilateral thalami corresponds with coarse calcifications seen on same-day CT head. Which are slightly atypical location and clinical correlation recommended. Calcifications in the region of the dentate nucleus and basal ganglia are again noted.   Paranasal Sinuses and Mastoids: Mild scattered mucosal thickening involving the maxillary and ethmoid sinuses. Mastoid air cells are patent.       Small foci of acute to subacute ischemic infarcts in the left thalamus and midbrain. Possible punctate focus of restricted diffusion in the right occipital lobe. No associated mass effect or midline shift present.   Gradient echo signal corresponding with calcifications in the bilateral thalami. Findings are atypical and correlation with underlying toxic, metabolic or other etiology suggested.   Small remote left frontal infarct and additional findings as detailed.   MACRO: None   Signed by: Carla Rodgers 10/20/2023 11:55 PM Dictation workstation:   [id number]    XR chest 1 view    Result Date: 10/20/2023  STUDY: Chest Radiograph;  10/20/2023 at 16:15 hours. INDICATION: Weakness. COMPARISON: XR chest 6/22/2020 and 6/21/2020 ACCESSION NUMBER(S): [id number] ORDERING CLINICIAN: JOSE CANCINO TECHNIQUE:  Frontal chest was obtained at 16:15 hours. FINDINGS: CARDIOMEDIASTINAL SILHOUETTE: Cardiomediastinal silhouette is normal in size and configuration. There is mild elevation of the right hemidiaphragm.  LUNGS: Lungs demonstrate chronic changes.  There is no pneumothorax or pleural effusion.  ABDOMEN: No remarkable upper abdominal findings.  BONES: No acute osseous changes.   There postsurgical changes of the cervical spine.    Chronic changes without acute process. Signed by Jamie Loaiza DO    CT brain attack head wo IV contrast    Result Date: 10/20/2023  Interpreted By:  Samuel Delgado, STUDY: CT BRAIN ATTACK HEAD WO IV CONTRAST;  10/20/2023 3:47 pm   INDICATION: Signs/Symptoms:Right-sided weakness, numbness.   COMPARISON: CT 21 August 2020; MRI brain 1 April 2021   ACCESSION NUMBER(S): [id number]   ORDERING CLINICIAN: JOSE CANCINO   TECHNIQUE: CT of the brain from the skull vertex to the skull base, without intravenous contrast   FINDINGS: ACUTE INTRA-AXIAL HEMORRHAGE:  NEGATIVE. I note the symmetric mineralization in the basal ganglia and in the cerebellar hemispheres and bilateral thalami; they are not acute hemorrhages   ACUTE EXTRA-AXIAL/SUBDURAL HEMORRHAGE:  Negative   ACUTE INTRACRANIAL MASS EFFECT:  Negative   CT EVIDENCE OF ACUTE / SUBACUTE TERRITORIAL ISCHEMIA:  Negative   VENTRICLES:  Normal caliber and configuration   OTHER BRAIN FINDINGS:  No additional findings to note   INCLUDED PARANASAL SINUSES: All clear   INCLUDED MASTOID AIR CELLS: All clear   SKULL:  No lytic or blastic lesion   EXTRACRANIAL SOFT TISSUES:  Scalp and occular globes grossly normal by CT       NO LARGE ACUTE TERRITORIAL INFARCT BY CT. SMALL NEW SUBCORTICAL HYPODENSITY IN LEFT FRONTAL LOBE WAS NOT PRESENT ON LAST CT 21 AUGUST 2020 BUT THERE WAS CORRELATING ABNORMAL SIGNAL ON MRI 1 APRIL 2021, PROBABLY REFLECTING AN OLD (LIKELY OCCURRED BETWEEN CT AUGUST 2020 AND MRI APRIL 2021) SUBCORTICAL LEFT FRONTAL LOBE INFARCT WITH CONSEQUENT MATURE ENCEPHALOMALACIA   NO CT EVIDENCE   NO ACUTE INTRACRANIAL HEMORRHAGE   NO ACUTE INTRACRANIAL MASS EFFECT   I WAS ABLE TO COMMUNICATE THESE FINDINGS BY TELEPHONE TO THE EMERGENCY DEPARTMENT PHYSICIAN'S ASSISTANT MR. CANCINO AT 15 50 HOURS, SAME DAY, 20 OCTOBER 2023   MACRO: Samuel Delgado discussed the significance and urgency of this critical finding by telephone  with  JOSE CANCINO on 10/20/2023 at 3:53 pm. (**-RCF-**) Findings:  See findings.   Signed by: Samuel Delgado 10/20/2023 3:54 PM Dictation workstation:   [id number]  .            Assessment/Plan   Impression.  1.  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  History of hyperlipidemia    3.  History of diabetes mellitus which is uncontrolled    Plan.  Continue with current treatment I would add Plavix to aspirin.  Continue with the statin and monitor patient's LDL and with a goal to be less than 70.  Diet and exercise were discussed at great length with patient and son who was at the bedside.  Patient was advised about smoking and to quit and importance of exercise which they understood.  We will have PT OT evaluation and continue with the stroke work-up and will decide on discharge planning    Had a very long discussion about the benefits of the precautions with patient and the family with the understood    Due to technical limitations of voice recognition and human error, this note may not accurately reflect the care of the patient.        NIHSS (worst at presentation):     Vascular Risk Factor modification goals:  Blood pressure goals: avoid hypotension SBP <100 that could worsen cerebral perfusion,   Lipid Goals: education on healthy diet and   Glucose Goals: early treatment of hyperglycemia to goal glucose 140-180 mg/dl with long-term goal A1c < 7%   Smoking Cessation and Education  Assessment for Rehabilitation needs   Patient and family education on signs and symptoms of stroke, calling 911, healthy strategies for stroke prevention.           Due to technical limitations of voice recognition and human error, this note may not accurately reflect the care of the patient.   Elmer Leslie MD

## 2023-10-22 ENCOUNTER — APPOINTMENT (OUTPATIENT)
Dept: RADIOLOGY | Facility: HOSPITAL | Age: 68
DRG: 065 | End: 2023-10-22
Payer: COMMERCIAL

## 2023-10-22 PROBLEM — I10 PRIMARY HYPERTENSION: Status: ACTIVE | Noted: 2023-10-22

## 2023-10-22 PROBLEM — E11.42 DIABETIC POLYNEUROPATHY ASSOCIATED WITH TYPE 2 DIABETES MELLITUS (MULTI): Status: ACTIVE | Noted: 2023-10-22

## 2023-10-22 LAB
GLUCOSE BLD MANUAL STRIP-MCNC: 122 MG/DL (ref 74–99)
GLUCOSE BLD MANUAL STRIP-MCNC: 187 MG/DL (ref 74–99)
GLUCOSE BLD MANUAL STRIP-MCNC: 199 MG/DL (ref 74–99)
GLUCOSE BLD MANUAL STRIP-MCNC: 203 MG/DL (ref 74–99)

## 2023-10-22 PROCEDURE — 97530 THERAPEUTIC ACTIVITIES: CPT | Mod: GP,CQ

## 2023-10-22 PROCEDURE — 71250 CT THORAX DX C-: CPT

## 2023-10-22 PROCEDURE — 1200000002 HC GENERAL ROOM WITH TELEMETRY DAILY

## 2023-10-22 PROCEDURE — 96372 THER/PROPH/DIAG INJ SC/IM: CPT | Performed by: INTERNAL MEDICINE

## 2023-10-22 PROCEDURE — 2500000001 HC RX 250 WO HCPCS SELF ADMINISTERED DRUGS (ALT 637 FOR MEDICARE OP): Performed by: INTERNAL MEDICINE

## 2023-10-22 PROCEDURE — 71250 CT THORAX DX C-: CPT | Performed by: STUDENT IN AN ORGANIZED HEALTH CARE EDUCATION/TRAINING PROGRAM

## 2023-10-22 PROCEDURE — 97116 GAIT TRAINING THERAPY: CPT | Mod: GP,CQ

## 2023-10-22 PROCEDURE — 2500000004 HC RX 250 GENERAL PHARMACY W/ HCPCS (ALT 636 FOR OP/ED): Performed by: INTERNAL MEDICINE

## 2023-10-22 PROCEDURE — 99232 SBSQ HOSP IP/OBS MODERATE 35: CPT | Performed by: INTERNAL MEDICINE

## 2023-10-22 PROCEDURE — 2500000002 HC RX 250 W HCPCS SELF ADMINISTERED DRUGS (ALT 637 FOR MEDICARE OP, ALT 636 FOR OP/ED): Performed by: INTERNAL MEDICINE

## 2023-10-22 PROCEDURE — 82947 ASSAY GLUCOSE BLOOD QUANT: CPT

## 2023-10-22 RX ORDER — AMLODIPINE BESYLATE 5 MG/1
5 TABLET ORAL DAILY
Status: DISCONTINUED | OUTPATIENT
Start: 2023-10-22 | End: 2023-10-26 | Stop reason: HOSPADM

## 2023-10-22 RX ADMIN — AMLODIPINE BESYLATE 5 MG: 5 TABLET ORAL at 13:09

## 2023-10-22 RX ADMIN — INSULIN LISPRO 2 UNITS: 100 INJECTION, SOLUTION INTRAVENOUS; SUBCUTANEOUS at 16:45

## 2023-10-22 RX ADMIN — INSULIN GLARGINE 20 UNITS: 100 INJECTION, SOLUTION SUBCUTANEOUS at 21:26

## 2023-10-22 RX ADMIN — LISINOPRIL 40 MG: 20 TABLET ORAL at 08:40

## 2023-10-22 RX ADMIN — SENNOSIDES AND DOCUSATE SODIUM 2 TABLET: 8.6; 5 TABLET ORAL at 21:23

## 2023-10-22 RX ADMIN — CLOPIDOGREL BISULFATE 75 MG: 75 TABLET, FILM COATED ORAL at 08:40

## 2023-10-22 RX ADMIN — ASPIRIN 81 MG: 81 TABLET, COATED ORAL at 08:40

## 2023-10-22 RX ADMIN — ENOXAPARIN SODIUM 40 MG: 40 INJECTION SUBCUTANEOUS at 21:23

## 2023-10-22 RX ADMIN — CARVEDILOL 25 MG: 12.5 TABLET, FILM COATED ORAL at 21:22

## 2023-10-22 RX ADMIN — ATORVASTATIN CALCIUM 40 MG: 20 TABLET, FILM COATED ORAL at 21:22

## 2023-10-22 RX ADMIN — INSULIN LISPRO 4 UNITS: 100 INJECTION, SOLUTION INTRAVENOUS; SUBCUTANEOUS at 11:38

## 2023-10-22 RX ADMIN — CARVEDILOL 25 MG: 12.5 TABLET, FILM COATED ORAL at 08:39

## 2023-10-22 ASSESSMENT — COGNITIVE AND FUNCTIONAL STATUS - GENERAL
WALKING IN HOSPITAL ROOM: A LITTLE
STANDING UP FROM CHAIR USING ARMS: A LITTLE
MOVING TO AND FROM BED TO CHAIR: A LITTLE
CLIMB 3 TO 5 STEPS WITH RAILING: A LITTLE
WALKING IN HOSPITAL ROOM: A LITTLE
TURNING FROM BACK TO SIDE WHILE IN FLAT BAD: A LITTLE
MOBILITY SCORE: 22
DAILY ACTIVITIY SCORE: 24
CLIMB 3 TO 5 STEPS WITH RAILING: A LOT
MOBILITY SCORE: 18

## 2023-10-22 ASSESSMENT — PAIN - FUNCTIONAL ASSESSMENT: PAIN_FUNCTIONAL_ASSESSMENT: 0-10

## 2023-10-22 ASSESSMENT — PAIN SCALES - GENERAL
PAINLEVEL_OUTOF10: 0 - NO PAIN

## 2023-10-22 NOTE — PROGRESS NOTES
Amandeep Lara is a 68 y.o. male on day 2 of admission presenting with Cerebrovascular accident (CVA), unspecified mechanism (CMS/HCC).      Subjective   The patient seen and evaluated today.  He states he continues to have numbness and tingling in his right upper arm and lower extremity and right face.  He also reports slight weakness with there is improved otherwise denies any dizziness shortness of breath chest pain nausea or vomiting at this time.       Objective     Last Recorded Vitals  BP (!) 189/85 (BP Location: Right arm, Patient Position: Lying)   Pulse 72   Temp 36.9 °C (98.5 °F) (Temporal)   Resp 16   Wt 93.4 kg (205 lb 14.6 oz)   SpO2 94%   Intake/Output last 3 Shifts:    Intake/Output Summary (Last 24 hours) at 10/22/2023 1231  Last data filed at 10/22/2023 0533  Gross per 24 hour   Intake --   Output 1100 ml   Net -1100 ml         Admission Weight  Weight: 99.8 kg (220 lb) (10/20/23 1620)    Daily Weight  10/20/23 : 93.4 kg (205 lb 14.6 oz)    Image Results  CT angio head neck w and wo IV contrast  Narrative: Interpreted By:  Shankar Cho,  and Ted Jigar   STUDY:  CT ANGIO HEAD NECK W AND WO IV CONTRAST      INDICATION:  Signs/Symptoms:CVA      COMPARISON:  CT of the head and MRI of the brain 10/20/2023. CTA of the head and  neck 06/03/2021.      ACCESSION NUMBER(S):  [id number]      ORDERING CLINICIAN:  ALEXIS METCALF      TECHNIQUE:  Axial CT images of the head were obtained without intravenous  contrast. CTA of the head and neck was performed after administration  of 75 mL Omnipaque 350 intravenous contrast. Maximum intensity  projections and 3 D reconstructions were acquired on a separate  workstation and provided for review.      FINDINGS:  CT of the head:      The ventricles, sulci and basal cisterns are diffusely prominent  commensurate with mild age-related parenchymal volume loss. There is  no abnormal extra-axial fluid collection.      Redemonstrated are calcifications within  the bilateral dorsal thalami  as well as punctate calcifications within the bilateral basal  ganglia. Patchy calcifications are also noted within the bilateral  cerebral hemispheres. These calcifications are unchanged from prior  study in 2021. Nonspecific hypoattenuation throughout the  periventricular white matter is compatible with small-vessel ischemic  change. There is no mass effect or midline shift. There is no acute  intracranial hemorrhage.      The calvarium is intact. Minimal mucosal thickening is scattered  throughout the ethmoid air cells and noted within the bilateral  maxillary sinuses. The mastoid air cells are clear.          CTA of the head:      Moderate to severe calcified atherosclerosis involves the bilateral  intracranial internal carotid arteries most notable within the  proximal and lateral segments similar to prior. Additional vascular  calcifications within the distal cavernous, ophthalmic and  supraclinoid segments are without significant luminal stenosis.      There is a hypoplastic left A1 segment. Bilateral anterior and middle  cerebral arteries are otherwise patent.      There is a fetal origin of the left posterior cerebral artery.  Bilateral intracranial vertebral arteries, vertebrobasilar junction,  basilar artery and posterior cerebral arteries are patent. Vascular  calcifications noted within the left V3 and bilateral V4 segments.      No aneurysm or vascular malformation is identified.          CTA of the neck:      There is a three-vessel aortic arch. Mild vascular calcification is  noted at the aortic arch.      Vascular calcifications involving the bilateral carotid bifurcations  and proximal right internal carotid arteries are without  hemodynamically significant stenosis. The distal most portion of the  bilateral internal carotid arteries demonstrate noncalcified and  calcified atherosclerotic plaque which results in approximately  75-80% luminal narrowing with  reconstitution of flow distally,  similar to prior.      Vascular calcifications involve the origins of the cervical vertebral  arteries bilaterally as well as the mid left V2 segment.      Postoperative changes are consistent with cervical discectomy and  fusion at C5-6.      1.7 x 1.0 x 1.6 cm right anterior subpleural mass or masslike focus  on limited examination of the upper chest for instance on image 111  series 507. Moderate bilateral pleural effusions.      Impression: Circumferential luminal narrowing the left greater than right  internal carotid arteries with the distal most cervical segments  demonstrating approximately 75-80% stenosis on the left, similar to  prior possibly due to atherosclerotic changes although underlying  dissection is difficult to entirely exclude this appearance.      Moderate to severe atherosclerotic disease involves the intracranial  internal carotid arteries most pronounced within the bilateral  lateral and proximal cavernous segments, similar to prior.      Otherwise, no acute vascular cut off within the region of the major  arterial vascular structures of the head and neck.      Incidental note is made of a anterior subpleural mass or masslike  focus on limited examination of the right upper lung with moderate  bilateral pleural effusions. Further evaluation with dedicated chest  CT is advised.      MACRO:  Critical Finding:  See findings. Notification was initiated on  10/21/2023 at 5:40 pm by  Shankar Cho.  (**-YCF-**) Instructions:      Signed by: Shankar Cho 10/21/2023 5:10 PM  Dictation workstation:   [id number]  Carotid duplex bilateral  Narrative: Interpreted By:  Cezar Tesfaye,   STUDY:  [facility] US CAROTID ARTERY DUPLEX BILATERAL;  10/21/2023 10:18 am      INDICATION:  Signs/Symptoms:CVA.      COMPARISON:  06/11/2020.      ACCESSION NUMBER(S):  [id number]      ORDERING CLINICIAN:  ALEXIS METCALF      TECHNIQUE:  Vascular ultrasound of the extracranial carotid system  was performed  bilaterally.  Gray scale, color Doppler and spectral Doppler waveform  analysis was performed.      FINDINGS:  RIGHT:  On the right  irregular atherosclerotic plaque is present. Right ICA  peak velocities not significantly elevated. The peak systolic  velocities are as follows:      RIGHT SIDE PEAK SYSTOLIC VELOCITY TABLE:  CCA 52 cm/sec.  ICA 82 cm/sec.   cm/sec.      The ratio of the peak systolic velocity of the right ICA/CCA is 1.6.      RIGHT VERTEBRAL ARTERY:  The right vertebral artery demonstrates proximal normal anterograde  flow      LEFT:  On the left  irregular atherosclerotic plaque is present. Left ICA  peak velocities not significantly elevated. The peak systolic  velocities are as follows:      LEFT SIDE PEAK SYSTOLIC VELOCITY TABLE:  CCA 60 cm/sec.  ICA 56 cm/sec.   cm/sec.      The ratio of the peak systolic velocity of the left ICA/CCA is 0.9.      LEFT VERTEBRAL ARTERY:  The left vertebral artery demonstrates proximal normal anterograde  flow      Impression: Estimated ICA stenosis is less than 50% bilaterally based on peak  velocities.      Please note that more significant stenosis was reported on previous  CTA of the neck on 06/03/2021. CTA follow-up may be considered for  further evaluation.      MACRO:  None      Signed by: Cezar Tesfaye 10/21/2023 12:17 PM  Dictation workstation:   [id number]      Physical Exam  HENT:      Head: Normocephalic and atraumatic.      Nose: Nose normal.      Mouth/Throat:      Mouth: Mucous membranes are dry.   Eyes:      Extraocular Movements: Extraocular movements intact.      Conjunctiva/sclera: Conjunctivae normal.   Cardiovascular:      Rate and Rhythm: Normal rate and regular rhythm.      Pulses: Normal pulses.      Heart sounds: Normal heart sounds.   Pulmonary:      Effort: Pulmonary effort is normal.      Breath sounds: Normal breath sounds.   Abdominal:      General: Bowel sounds are normal.      Palpations: Abdomen is  soft.   Musculoskeletal:         General: Normal range of motion.      Cervical back: Normal range of motion and neck supple.   Skin:     General: Skin is warm and dry.   Neurological:      Mental Status: He is alert and oriented to person, place, and time.      Sensory: Sensory deficit present.      Motor: Weakness present.      Comments: The patient reports weakness of right arm and lower extremity but on exam there is some weakness and right-sided facial droop.  The patient reports numbness and tingling of the right face right upper and lower extremities.    Psychiatric:         Mood and Affect: Mood normal.         Relevant Results               Assessment/Plan   This patient currently has cardiac telemetry ordered; if you would like to modify or discontinue the telemetry order, click here to go to the orders activity to modify/discontinue the order.      68 y.o. male presenting with medical history for hyperlipidemia, hypertension, diabetes mellitus type 2, diabetic neuropathy, active smoker, bilateral carotid stenosis comes into the hospital secondary to right-sided weakness numbness and tingling.  ED the patient was found to be outside of the window for TNK and was Van negative. CT of the head was done that was negative.  MRI showed Small foci of acute to subacute ischemic infarcts in the left thalamus and midbrain.          Principal Problem:    Cerebrovascular accident (CVA), unspecified mechanism (CMS/HCC)    CVA  -CT head is negative any acute hemorrhage on admission  -MRI from couple years ago shows left frontal infarct with subsequent encephalomalacia  -Continue with aspirin and statin and Plavix  -Neurochecks per stroke  protocol  -Neurology has been consulted appreciate recommendations.  Continue with aspirin and Plavix and statins  -MRI showed Small foci of acute to subacute ischemic infarcts in the left thalamus and midbrain and has the weakness and numbness  -Carotid Doppler without significant  stenosis  -Previous CTA showed significant stenosis another CTA done this admission showed Circumferential luminal narrowing the left greater than right internal carotid arteries with the distal most cervical segments demonstrating approximately 75-80% stenosis on the left, similar to prior.  Has followed with [facility] neurovascular center recently.  Recommend continued follow-up  -Echo has been ordered  -Incidental finding of lung mass.  CT dedicated for chest has been ordered as well.    Hypertension  -Continue with Coreg, now resume lisinopril.    Hyperlipidemia  -Continue with statins     Diabetes mellitus type 2  -Continue with sliding scale.    -A1c noted to be 8     VTE prophylaxis: Lovenox       PT OT       Jermaine Marcelino MD

## 2023-10-22 NOTE — PROGRESS NOTES
"Amandeep Lara is a 68 y.o. male on day 2 of admission presenting with Cerebrovascular accident (CVA), unspecified mechanism (CMS/HCC).    Subjective   Patient is feeling better.  Right-sided weakness is improving.  No headache nausea vomiting no seizure or seizure-like activity.       Last Recorded Vitals  Blood pressure (!) 189/85, pulse 72, temperature 36.9 °C (98.5 °F), temperature source Temporal, resp. rate 16, height 1.727 m (5' 8\"), weight 93.4 kg (205 lb 14.6 oz), SpO2 94 %.    Physical Exam/Neurological Exam    Relevant Results  Expand All Collapse All    History Of Present Illness  Amandeep Lara is a 68 y.o. male presenting with with right-sided weakness and difficulty ambulating.  He also had some nausea and vomiting he came to the emergency room and is out of the window for the TNK.  Since coming to the hospital his symptoms has improved but is still complaining of right upper and lower extremity weakness with heaviness.  No history of any headache nausea vomiting     He had a similar symptoms in 2021 had a work-up done which according to him was unremarkable     Please refer to the initial H&P for details and the ER records for details     Patient has a longstanding history of diabetic neuropathy hyperlipidemia and hypertension              Past Medical History  Medical History        Past Medical History:   Diagnosis Date    Diabetes mellitus (CMS/HCC)      Hypertension      Personal history of other diseases of the circulatory system       History of hypertension    Personal history of other endocrine, nutritional and metabolic disease       History of diabetes mellitus    Stroke (CMS/HCC)           Surgical History  Surgical History         Past Surgical History:   Procedure Laterality Date    CARPAL TUNNEL RELEASE   04/28/2015     Neuroplasty Decompression Median Nerve At Carpal Tunnel    CT HEAD ANGIO W AND WO IV CONTRAST   6/3/2021     CT HEAD ANGIO W AND WO IV CONTRAST 6/3/2021 [facility]    CT HEAD ANGIO W AND WO IV CONTRAST   6/3/2021     CT HEAD ANGIO W AND WO IV CONTRAST    CT NECK ANGIO W AND WO IV CONTRAST   6/3/2021     CT NECK ANGIO W AND WO IV CONTRAST 6/3/2021 [facility]    MR HEAD ANGIO WO IV CONTRAST   4/1/2021     MR HEAD ANGIO WO IV CONTRAST 4/1/2021 [facility]    MR HEAD ANGIO WO IV CONTRAST   4/1/2021     MR HEAD ANGIO WO IV CONTRAST    NECK SURGERY   12/27/2021     Neck Surgery    OTHER SURGICAL HISTORY   12/27/2021     Surgery    OTHER SURGICAL HISTORY   12/27/2021     Cardiac catheterization    OTHER SURGICAL HISTORY   12/27/2021     Finger amputation    OTHER SURGICAL HISTORY   12/27/2021     Colonoscopy    OTHER SURGICAL HISTORY   12/27/2021     Wrist surgery    ROTATOR CUFF REPAIR   12/27/2021     Rotator Cuff Repair    TONSILLECTOMY   04/28/2015     Tonsillectomy         Social History  Social History            Tobacco Use    Smoking status: Every Day       Packs/day: 1.00       Years: 15.00       Additional pack years: 0.00       Total pack years: 15.00       Types: Cigarettes       Start date: 1975       Passive exposure: Never    Smokeless tobacco: Never   Vaping Use    Vaping Use: Never used   Substance Use Topics    Alcohol use: Not Currently    Drug use: Never      Allergies  Bactrim [sulfamethoxazole-trimethoprim]  Home Medications  Prescriptions Prior to Admission           Medications Prior to Admission   Medication Sig Dispense Refill Last Dose    ascorbic acid (Vitamin C) 250 mg tablet Take 1 tablet (250 mg) by mouth once daily.     10/19/2023 at AM    aspirin 81 mg EC tablet Take 1 tablet (81 mg) by mouth once daily.     10/19/2023 at AM    carvedilol (Coreg) 25 mg tablet Take 1 tablet (25 mg) by mouth 2 times a day.     10/19/2023 at AM    cyanocobalamin, vitamin B-12, (VITAMIN B-12 ORAL) Take 1 tablet by mouth once daily.     10/19/2023 at AM    insulin asp prt-insulin aspart (NovoLOG Mix 70-30 U-100 Insuln) 100 unit/mL (70-30)  injection Inject 20 Units under the skin 2 times a day with meals. Take as directed per insulin instructions. If BS <160, decrease by 2 units. If BS >180, increase by 2 units.     10/19/2023 at 2130    lisinopril 40 mg tablet Take 1 tablet (40 mg) by mouth once daily.     10/19/2023 at AM    metFORMIN (Glucophage) 1,000 mg tablet Take 1 tablet (1,000 mg) by mouth 2 times a day with meals.     10/19/2023 at AM    nitroglycerin (Nitrostat) 0.4 mg SL tablet Place 1 tablet (0.4 mg) under the tongue every 5 minutes if needed for chest pain (up to 3 doses).     Past Month    omega 3-dha-epa-fish oil (Fish OiL) 1,000 mg (120 mg-180 mg) capsule Take 1 capsule (1,000 mg) by mouth once daily.     10/19/2023 at AM    zinc sulfate (Zincate) 220 (50 Zn) MG capsule Take 1 capsule (50 mg of elemental zinc) by mouth once daily.     10/19/2023 at AM            Review of Systems   Constitutional:  Negative for fatigue, fever and unexpected weight change.   HENT:  Negative for dental problem, ear pain, hearing loss, sinus pressure, tinnitus and trouble swallowing.    Eyes:  Negative for photophobia, pain and visual disturbance.   Respiratory:  Negative for cough, shortness of breath and wheezing.    Cardiovascular:  Negative for chest pain, palpitations and leg swelling.   Gastrointestinal:  Negative for abdominal pain, nausea and vomiting.   Genitourinary:  Negative for difficulty urinating, enuresis and frequency.   Musculoskeletal:  Negative for arthralgias, back pain, joint swelling, neck pain and neck stiffness.   Skin:  Negative for pallor and rash.   Allergic/Immunologic: Negative for food allergies.   Neurological:  Positive for speech difficulty, weakness and numbness. Negative for dizziness, tremors, seizures, syncope, facial asymmetry, light-headedness and headaches.   Hematological:  Negative for adenopathy. Does not bruise/bleed easily.   Psychiatric/Behavioral:  Negative for agitation, behavioral problems, confusion,  hallucinations and sleep disturbance. The patient is not hyperactive.          Physical Exam on physical examination he was alert awake and not in acute distress.  HEENT ears revealed to be equal and react light urinals and throat was unremarkable no difficult because of patient beer.  Neck was supple without any carotid bruits or lymphadenopathy     Cardiovascular examination normal S1-S2 with clear breath sounds bilaterally.  Abdomen was soft with no organomegaly.  Extremity exam is general edema in his EXTR.  Symmetric.  Fingers no edema swelling or any ecchymotic areas           Neurological Exam patient was alert awake oriented to place person and time.  Speech was slightly dysarthric but he was able to follow simple and complex commands.  Memory was intact.  Attention span judgment concentration and focus was appropriate.  No hallucinations or delusions.     Cranial nerve exam revealed normal extraocular movement with asymmetry to the face with slight droopiness on the right compared to the left.  Pupils were equal and reactive to light patient was able to elevate the palate and shoulder without difficulty and sensory exam was intact.  Hearing was normal.     Motor examination mild right hemiparesis evidenced by pronator drift on outstretched hand no abnormal movements were seen     Reflexes were examined with slightly increased reflex on the right compared to the left with plantar extensors on the right side     Coordination Station and gait revealed a slightly ataxic gait and required assistance for ambulation     Sensory exam did reveal loss of pinprick and light touch sensation both in the upper and lower extremities      NIH Stroke Scale  1A. Level of Consciousness: Alert, Keenly Responsive  1B. Ask Month and Age: Both Questions Right  1C. Blink Eyes & Squeeze Hands: Performs Both Tasks  2. Best Gaze: Normal  3. Visual: No Visual Loss  4. Facial Palsy: Normal Symmetrical Movements  5A. Motor - Left Arm:  No Drift  5B. Motor - Right Arm: No Drift  6A. Motor - Left Leg: No Drift  6B. Motor - Right Leg: No Drift  7. Limb Ataxia: Absent  8. Sensory Loss: Normal  9. Best Language: No Aphasia  10. Dysarthria: Normal  11. Extinction and Inattention: No Abnormality  NIH Stroke Scale: 0           [place] Coma Scale  Best Eye Response: Spontaneous  Best Verbal Response: Oriented  Best Motor Response: Follows commands  [place] Coma Scale Score: 15                CT angio head neck w and wo IV contrast    Result Date: 10/21/2023  Interpreted By:  Shankar Cho,  and Ted Kimball STUDY: CT ANGIO HEAD NECK W AND WO IV CONTRAST   INDICATION: Signs/Symptoms:CVA   COMPARISON: CT of the head and MRI of the brain 10/20/2023. CTA of the head and neck 06/03/2021.   ACCESSION NUMBER(S): [id number]   ORDERING CLINICIAN: ALEXIS EMTCALF   TECHNIQUE: Axial CT images of the head were obtained without intravenous contrast. CTA of the head and neck was performed after administration of 75 mL Omnipaque 350 intravenous contrast. Maximum intensity projections and 3 D reconstructions were acquired on a separate workstation and provided for review.   FINDINGS: CT of the head:   The ventricles, sulci and basal cisterns are diffusely prominent commensurate with mild age-related parenchymal volume loss. There is no abnormal extra-axial fluid collection.   Redemonstrated are calcifications within the bilateral dorsal thalami as well as punctate calcifications within the bilateral basal ganglia. Patchy calcifications are also noted within the bilateral cerebral hemispheres. These calcifications are unchanged from prior study in 2021. Nonspecific hypoattenuation throughout the periventricular white matter is compatible with small-vessel ischemic change. There is no mass effect or midline shift. There is no acute intracranial hemorrhage.   The calvarium is intact. Minimal mucosal thickening is scattered throughout the ethmoid air cells and noted  within the bilateral maxillary sinuses. The mastoid air cells are clear.     CTA of the head:   Moderate to severe calcified atherosclerosis involves the bilateral intracranial internal carotid arteries most notable within the proximal and lateral segments similar to prior. Additional vascular calcifications within the distal cavernous, ophthalmic and supraclinoid segments are without significant luminal stenosis.   There is a hypoplastic left A1 segment. Bilateral anterior and middle cerebral arteries are otherwise patent.   There is a fetal origin of the left posterior cerebral artery. Bilateral intracranial vertebral arteries, vertebrobasilar junction, basilar artery and posterior cerebral arteries are patent. Vascular calcifications noted within the left V3 and bilateral V4 segments.   No aneurysm or vascular malformation is identified.     CTA of the neck:   There is a three-vessel aortic arch. Mild vascular calcification is noted at the aortic arch.   Vascular calcifications involving the bilateral carotid bifurcations and proximal right internal carotid arteries are without hemodynamically significant stenosis. The distal most portion of the bilateral internal carotid arteries demonstrate noncalcified and calcified atherosclerotic plaque which results in approximately 75-80% luminal narrowing with reconstitution of flow distally, similar to prior.   Vascular calcifications involve the origins of the cervical vertebral arteries bilaterally as well as the mid left V2 segment.   Postoperative changes are consistent with cervical discectomy and fusion at C5-6.   1.7 x 1.0 x 1.6 cm right anterior subpleural mass or masslike focus on limited examination of the upper chest for instance on image 111 series 507. Moderate bilateral pleural effusions.       Circumferential luminal narrowing the left greater than right internal carotid arteries with the distal most cervical segments demonstrating approximately 75-80%  stenosis on the left, similar to prior possibly due to atherosclerotic changes although underlying dissection is difficult to entirely exclude this appearance.   Moderate to severe atherosclerotic disease involves the intracranial internal carotid arteries most pronounced within the bilateral lateral and proximal cavernous segments, similar to prior.   Otherwise, no acute vascular cut off within the region of the major arterial vascular structures of the head and neck.   Incidental note is made of a anterior subpleural mass or masslike focus on limited examination of the right upper lung with moderate bilateral pleural effusions. Further evaluation with dedicated chest CT is advised.   MACRO: Critical Finding:  See findings. Notification was initiated on 10/21/2023 at 5:40 pm by  Shankar Cho.  (**-YCF-**) Instructions:   Signed by: Shankar hCo 10/21/2023 5:10 PM Dictation workstation:   [id number]    Carotid duplex bilateral    Result Date: 10/21/2023  Interpreted By:  Cezar Tesfaye, STUDY: VAS US CAROTID ARTERY DUPLEX BILATERAL;  10/21/2023 10:18 am   INDICATION: Signs/Symptoms:CVA.   COMPARISON: 06/11/2020.   ACCESSION NUMBER(S): [id number]   ORDERING CLINICIAN: ALEXIS METCALF   TECHNIQUE: Vascular ultrasound of the extracranial carotid system was performed bilaterally.  Gray scale, color Doppler and spectral Doppler waveform analysis was performed.   FINDINGS: RIGHT: On the right  irregular atherosclerotic plaque is present. Right ICA peak velocities not significantly elevated. The peak systolic velocities are as follows:   RIGHT SIDE PEAK SYSTOLIC VELOCITY TABLE: CCA 52 cm/sec. ICA 82 cm/sec.  cm/sec.   The ratio of the peak systolic velocity of the right ICA/CCA is 1.6.   RIGHT VERTEBRAL ARTERY: The right vertebral artery demonstrates proximal normal anterograde flow   LEFT: On the left  irregular atherosclerotic plaque is present. Left ICA peak velocities not significantly elevated. The peak  systolic velocities are as follows:   LEFT SIDE PEAK SYSTOLIC VELOCITY TABLE: CCA 60 cm/sec. ICA 56 cm/sec.  cm/sec.   The ratio of the peak systolic velocity of the left ICA/CCA is 0.9.   LEFT VERTEBRAL ARTERY: The left vertebral artery demonstrates proximal normal anterograde flow       Estimated ICA stenosis is less than 50% bilaterally based on peak velocities.   Please note that more significant stenosis was reported on previous CTA of the neck on 06/03/2021. CTA follow-up may be considered for further evaluation.   MACRO: None   Signed by: Cezar Tesfaye 10/21/2023 12:17 PM Dictation workstation:   [id number]    MR brain wo IV contrast    Result Date: 10/20/2023  Interpreted By:  Carla Rodgers, STUDY: MR BRAIN WO IV CONTRAST;  10/20/2023 11:01 pm   INDICATION: Signs/Symptoms:Strokelike symptoms.   COMPARISON: CT head 10/20/2023.   ACCESSION NUMBER(S): [id number]   ORDERING CLINICIAN: ALEXIS METCALF   TECHNIQUE: Axial T2, FLAIR, DWI, gradient echo T2 and sagittal and coronal T1 weighted images of brain were acquired.   FINDINGS: CSF Spaces: The ventricles, sulci and basal cisterns enlarged, concordant with parenchymal volume loss.   Parenchyma: Small foci of restricted diffusion within the left midbrain, including the medial left cerebellar peduncle with minimal extension to the medial left thalamus. Possible focus of restricted diffusion in the right occipital lobe. Minimal associated T2/FLAIR hyperintense white matter changes present.   Small region of encephalomalacia extending to the cortex of the anterior left frontal lobe. Additional mild-to-moderate nonspecific T2/FLAIR hyperintense white matter changes. Moderate to advanced generalized parenchymal volume loss present. There is no mass effect or midline shift.   Flow voids appear maintained within central intracranial vessels. Gradient echo hypointense signal within the bilateral thalami corresponds with coarse calcifications seen on  same-day CT head. Which are slightly atypical location and clinical correlation recommended. Calcifications in the region of the dentate nucleus and basal ganglia are again noted.   Paranasal Sinuses and Mastoids: Mild scattered mucosal thickening involving the maxillary and ethmoid sinuses. Mastoid air cells are patent.       Small foci of acute to subacute ischemic infarcts in the left thalamus and midbrain. Possible punctate focus of restricted diffusion in the right occipital lobe. No associated mass effect or midline shift present.   Gradient echo signal corresponding with calcifications in the bilateral thalami. Findings are atypical and correlation with underlying toxic, metabolic or other etiology suggested.   Small remote left frontal infarct and additional findings as detailed.   MACRO: None   Signed by: Carla Rodgers 10/20/2023 11:55 PM Dictation workstation:   [id number]    XR chest 1 view    Result Date: 10/20/2023  STUDY: Chest Radiograph;  10/20/2023 at 16:15 hours. INDICATION: Weakness. COMPARISON: XR chest 6/22/2020 and 6/21/2020 ACCESSION NUMBER(S): [id number] ORDERING CLINICIAN: JOSE CANCINO TECHNIQUE:  Frontal chest was obtained at 16:15 hours. FINDINGS: CARDIOMEDIASTINAL SILHOUETTE: Cardiomediastinal silhouette is normal in size and configuration. There is mild elevation of the right hemidiaphragm.  LUNGS: Lungs demonstrate chronic changes.  There is no pneumothorax or pleural effusion.  ABDOMEN: No remarkable upper abdominal findings.  BONES: No acute osseous changes.  There postsurgical changes of the cervical spine.    Chronic changes without acute process. Signed by Jamie Loaiza DO    CT brain attack head wo IV contrast    Result Date: 10/20/2023  Interpreted By:  Samuel Delgado, STUDY: CT BRAIN ATTACK HEAD WO IV CONTRAST;  10/20/2023 3:47 pm   INDICATION: Signs/Symptoms:Right-sided weakness, numbness.   COMPARISON: CT 21 August 2020; MRI brain 1 April 2021   ACCESSION NUMBER(S):  [id number]   ORDERING CLINICIAN: JOSE CANCINO   TECHNIQUE: CT of the brain from the skull vertex to the skull base, without intravenous contrast   FINDINGS: ACUTE INTRA-AXIAL HEMORRHAGE:  NEGATIVE. I note the symmetric mineralization in the basal ganglia and in the cerebellar hemispheres and bilateral thalami; they are not acute hemorrhages   ACUTE EXTRA-AXIAL/SUBDURAL HEMORRHAGE:  Negative   ACUTE INTRACRANIAL MASS EFFECT:  Negative   CT EVIDENCE OF ACUTE / SUBACUTE TERRITORIAL ISCHEMIA:  Negative   VENTRICLES:  Normal caliber and configuration   OTHER BRAIN FINDINGS:  No additional findings to note   INCLUDED PARANASAL SINUSES: All clear   INCLUDED MASTOID AIR CELLS: All clear   SKULL:  No lytic or blastic lesion   EXTRACRANIAL SOFT TISSUES:  Scalp and occular globes grossly normal by CT       NO LARGE ACUTE TERRITORIAL INFARCT BY CT. SMALL NEW SUBCORTICAL HYPODENSITY IN LEFT FRONTAL LOBE WAS NOT PRESENT ON LAST CT 21 AUGUST 2020 BUT THERE WAS CORRELATING ABNORMAL SIGNAL ON MRI 1 APRIL 2021, PROBABLY REFLECTING AN OLD (LIKELY OCCURRED BETWEEN CT AUGUST 2020 AND MRI APRIL 2021) SUBCORTICAL LEFT FRONTAL LOBE INFARCT WITH CONSEQUENT MATURE ENCEPHALOMALACIA   NO CT EVIDENCE   NO ACUTE INTRACRANIAL HEMORRHAGE   NO ACUTE INTRACRANIAL MASS EFFECT   I WAS ABLE TO COMMUNICATE THESE FINDINGS BY TELEPHONE TO THE EMERGENCY DEPARTMENT PHYSICIAN'S ASSISTANT MR. CANCINO AT 15 50 HOURS, SAME DAY, 20 OCTOBER 2023   MACRO: Samuel Delgado discussed the significance and urgency of this critical finding by telephone with  JOSE CANCINO on 10/20/2023 at 3:53 pm. (**-RCF-**) Findings:  See findings.   Signed by: Samuel Delgado 10/20/2023 3:54 PM Dictation workstation:   [id number]       No EEG results found for the past 14 days                 Assessment/Plan     Principal Problem:    Cerebrovascular accident (CVA), unspecified mechanism (CMS/HCC)  Active Problems:    Primary hypertension    Diabetic polyneuropathy associated with type 2  diabetes mellitus (CMS/[facility])    Plan .  Continue with current treatment including aspirin Plavix and statin.  Control blood pressure titrated to a systolic between 1 41-1 80 diastolic 80-90.  Bleeding risk and the precaution as well as the signs and the risk factor for CVA were discussed with patient which patient understood.  Warning signs of CVA were discussed which patient understood.  Okay to discharge home with home health care or rehab when medically stable           Due to technical limitations of voice recognition and human error, this note may not accurately reflect the care of the patient.     Elmer Leslie MD

## 2023-10-22 NOTE — PROGRESS NOTES
10/22/23 1039   Discharge Planning   Living Arrangements Spouse/significant other   Support Systems Spouse/significant other   Type of Residence Private residence   Who is requesting discharge planning? Provider   Home or Post Acute Services Post acute facilities (Rehab/SNF/etc)   Type of Post Acute Facility Services Rehab   Patient expects to be discharged to: TBD REhab vs home with outpatient therapy vs HHC   Patient Choice   Provider Choice list and CMS website (https://medicare.gov/care-compare#search) for post-acute Quality and Resource Measure Data were provided and reviewed with: Patient  (HHC list and acute rehab list provided)     PT evaluated am-pac 15. He is a new CVA. Writer spoke with patient- introduced self and explained role. Patient up in chair. He interacted well with writer and answered assessment questions appropriately. He is alert and oriented x3. He lives with spouse and prior level of functioning independent. He denied any concerns with obtaining meals, groceries, medication and transportation. Writer discussed acute rehab. Patient preferred to return home with [facility]. He will review with spouse. Writer provided a Acute Rehab list and HHC list from [facility]. His wife will be in later today and he will review with her. Contact number provided for questions/ concerns. Care Transition team to continue to follow and assist with discharge planning needs/ concerns.  Telma DÍAZ

## 2023-10-22 NOTE — PROGRESS NOTES
Physical Therapy    Physical Therapy Treatment    Patient Name: Amandeep Lara  MRN: [id number]  Today's Date: 10/22/2023  Time Calculation  Start Time: 0850  Stop Time: 0914  Time Calculation (min): 24 min       Assessment/Plan   PT Assessment  PT Assessment Results: Impaired balance, Decreased mobility, Decreased coordination  Rehab Prognosis: Good  Evaluation/Treatment Tolerance: Patient tolerated treatment well  End of Session Communication: Bedside nurse  Assessment Comment:  (Pt reports feeling better with residual R eye blurry vision still noted. Patient requires less assist and ataxia has somewhat improved with use of WW and gait belt. Cues needed for technique throughout session.)  End of Session Patient Position: Up in chair, Alarm on  PT Plan  Inpatient/Swing Bed or Outpatient: Inpatient  PT Plan  Treatment/Interventions: Bed mobility, Transfer training, Gait training, Therapeutic exercise  PT Plan: Skilled PT  PT Frequency: 4 times per week  PT Discharge Recommendations: High intensity level of continued care  PT Recommended Transfer Status: Assist x2 (may benefit from using ww)      General Visit Information:   PT  Visit  PT Received On: 10/22/23  General  Reason for Referral: Stroke  Referred By: Ryland  Past Medical History Relevant to Rehab: Admit 10/20 with R sided weakness, decreased sensation, difficulty ambulating, an episode of double vision and nausea with emesis. /99 CT brain (-); MRI of brain:  acute to subacute infarcts L thalamus and midbrain.  Prior to Session Communication: Bedside nurse  Patient Position Received: Bed, 2 rail up, Alarm off, not on at start of session  General Comment:  (Pt is pleasant and agreeable to treatment would like to trial ambulation and sit in bedside chair and clean up.)    Subjective   Precautions:  Precautions  Medical Precautions: Fall precautions  Precautions Comment: Significant Ataxia during Gait; very high fall risk (Blurry vision R eye)  Vital  Signs:       Objective   Pain:  Pain Assessment  Pain Assessment: 0-10  Pain Score: 0 - No pain  Cognition:  Cognition  Orientation Level: Oriented X4  Postural Control:     Extremity/Trunk Assessments:        Activity Tolerance:  Activity Tolerance  Endurance: Endurance does not limit participation in activity  Treatments:  Therapeutic Exercise  Therapeutic Exercise Performed: Yes (Seated AP, LAQ and marches x 10 ea BLE)    Bed Mobility  Bed Mobility: Yes (Sup>sit transfer with SBA, HOB slightly elevated and use of hand rail.)    Ambulation/Gait Training  Ambulation/Gait Training Performed: Yes (Pt ambulates 35' x 2 with a WW and min/mod A/gait belt. Initial movements the pt is noted to have a narrow [place] and once instance of scissoring causing mild LOB with CGA for correction. Slow, recip gait with cues for WW management to stay within frame.)  Transfers  Transfer: Yes (Sit<>stand transfer with min A and WW with verbal cues for hand placement on bed and not hold WW.)    Outcome Measures:  [facility] Basic Mobility  Turning from your back to your side while in a flat bed without using bedrails: None  Moving from lying on your back to sitting on the side of a flat bed without using bedrails: A little  Moving to and from bed to chair (including a wheelchair): A little  Standing up from a chair using your arms (e.g. wheelchair or bedside chair): A little  To walk in hospital room: A little  Climbing 3-5 steps with railing: A lot  Basic Mobility - Total Score: 18    Education Documentation  Precautions, taught by Anu Wakefield PTA at 10/22/2023  1:49 PM.  Learner: Patient  Readiness: Acceptance  Method: Explanation  Response: Verbalizes Understanding    Mobility Training, taught by Anu Wakefield PTA at 10/22/2023  1:49 PM.  Learner: Patient  Readiness: Acceptance  Method: Explanation  Response: Verbalizes Understanding    Education Comments  No comments found.        OP EDUCATION:  Education  Individual(s) Educated:  Patient  Education Provided: Body Mechanics, Fall Risk, POC  Patient/Caregiver Demonstrated Understanding: yes  Plan of Care Discussed and Agreed Upon: yes  Patient Response to Education: Patient/Caregiver Verbalized Understanding of Information  Education Comment:  (Pt educated in gait, transfers and ther ex, also WW management.)    Encounter Problems       Encounter Problems (Active)       Impaired mobility s/p CVA        Perform all bed mobility with indep.  (Progressing)       Start:  10/21/23    Expected End:  11/04/23            Perform all transfers with ww vs LRAD and CGAx1 (Progressing)       Start:  10/21/23    Expected End:  11/04/23            Patient will ambulate >/= 50 ft. with 2ww vs LRAD and CGAx1 (Progressing)       Start:  10/21/23    Expected End:  11/04/23            Patient will ascend/descend at least 5 steps with HR and cane, if needed, with minAx1 (Progressing)       Start:  10/21/23    Expected End:  11/04/23

## 2023-10-23 ENCOUNTER — APPOINTMENT (OUTPATIENT)
Dept: CARDIOLOGY | Facility: HOSPITAL | Age: 68
DRG: 065 | End: 2023-10-23
Payer: COMMERCIAL

## 2023-10-23 LAB
EJECTION FRACTION APICAL 4 CHAMBER: 54.9
GLUCOSE BLD MANUAL STRIP-MCNC: 103 MG/DL (ref 74–99)
GLUCOSE BLD MANUAL STRIP-MCNC: 167 MG/DL (ref 74–99)
GLUCOSE BLD MANUAL STRIP-MCNC: 232 MG/DL (ref 74–99)
GLUCOSE BLD MANUAL STRIP-MCNC: 237 MG/DL (ref 74–99)

## 2023-10-23 PROCEDURE — 99232 SBSQ HOSP IP/OBS MODERATE 35: CPT | Performed by: INTERNAL MEDICINE

## 2023-10-23 PROCEDURE — 2500000004 HC RX 250 GENERAL PHARMACY W/ HCPCS (ALT 636 FOR OP/ED): Performed by: INTERNAL MEDICINE

## 2023-10-23 PROCEDURE — 1200000002 HC GENERAL ROOM WITH TELEMETRY DAILY

## 2023-10-23 PROCEDURE — 97530 THERAPEUTIC ACTIVITIES: CPT | Mod: GP,CQ

## 2023-10-23 PROCEDURE — 99233 SBSQ HOSP IP/OBS HIGH 50: CPT | Performed by: SPECIALIST

## 2023-10-23 PROCEDURE — 93306 TTE W/DOPPLER COMPLETE: CPT | Performed by: INTERNAL MEDICINE

## 2023-10-23 PROCEDURE — 97116 GAIT TRAINING THERAPY: CPT | Mod: GP,CQ

## 2023-10-23 PROCEDURE — 96372 THER/PROPH/DIAG INJ SC/IM: CPT | Performed by: INTERNAL MEDICINE

## 2023-10-23 PROCEDURE — 93306 TTE W/DOPPLER COMPLETE: CPT

## 2023-10-23 PROCEDURE — 2500000002 HC RX 250 W HCPCS SELF ADMINISTERED DRUGS (ALT 637 FOR MEDICARE OP, ALT 636 FOR OP/ED): Performed by: INTERNAL MEDICINE

## 2023-10-23 PROCEDURE — 2500000001 HC RX 250 WO HCPCS SELF ADMINISTERED DRUGS (ALT 637 FOR MEDICARE OP): Performed by: INTERNAL MEDICINE

## 2023-10-23 PROCEDURE — 82947 ASSAY GLUCOSE BLOOD QUANT: CPT

## 2023-10-23 RX ADMIN — INSULIN LISPRO 4 UNITS: 100 INJECTION, SOLUTION INTRAVENOUS; SUBCUTANEOUS at 17:41

## 2023-10-23 RX ADMIN — ENOXAPARIN SODIUM 40 MG: 40 INJECTION SUBCUTANEOUS at 19:55

## 2023-10-23 RX ADMIN — CARVEDILOL 25 MG: 12.5 TABLET, FILM COATED ORAL at 08:01

## 2023-10-23 RX ADMIN — CARVEDILOL 25 MG: 12.5 TABLET, FILM COATED ORAL at 19:55

## 2023-10-23 RX ADMIN — ATORVASTATIN CALCIUM 40 MG: 20 TABLET, FILM COATED ORAL at 19:55

## 2023-10-23 RX ADMIN — AMLODIPINE BESYLATE 5 MG: 5 TABLET ORAL at 08:01

## 2023-10-23 RX ADMIN — ASPIRIN 81 MG: 81 TABLET, COATED ORAL at 08:01

## 2023-10-23 RX ADMIN — CLOPIDOGREL BISULFATE 75 MG: 75 TABLET, FILM COATED ORAL at 08:01

## 2023-10-23 RX ADMIN — LISINOPRIL 40 MG: 20 TABLET ORAL at 08:01

## 2023-10-23 RX ADMIN — INSULIN GLARGINE 20 UNITS: 100 INJECTION, SOLUTION SUBCUTANEOUS at 19:55

## 2023-10-23 RX ADMIN — SENNOSIDES AND DOCUSATE SODIUM 2 TABLET: 8.6; 5 TABLET ORAL at 08:01

## 2023-10-23 RX ADMIN — INSULIN LISPRO 4 UNITS: 100 INJECTION, SOLUTION INTRAVENOUS; SUBCUTANEOUS at 11:55

## 2023-10-23 ASSESSMENT — COGNITIVE AND FUNCTIONAL STATUS - GENERAL
STANDING UP FROM CHAIR USING ARMS: A LITTLE
MOBILITY SCORE: 24
CLIMB 3 TO 5 STEPS WITH RAILING: A LITTLE
MOBILITY SCORE: 22
WALKING IN HOSPITAL ROOM: A LITTLE
DAILY ACTIVITIY SCORE: 24
WALKING IN HOSPITAL ROOM: A LITTLE
MOVING TO AND FROM BED TO CHAIR: A LITTLE
DAILY ACTIVITIY SCORE: 24
CLIMB 3 TO 5 STEPS WITH RAILING: A LITTLE
MOBILITY SCORE: 18
MOVING FROM LYING ON BACK TO SITTING ON SIDE OF FLAT BED WITH BEDRAILS: A LITTLE
TURNING FROM BACK TO SIDE WHILE IN FLAT BAD: A LITTLE

## 2023-10-23 ASSESSMENT — PAIN SCALES - GENERAL
PAINLEVEL_OUTOF10: 0 - NO PAIN

## 2023-10-23 ASSESSMENT — PAIN - FUNCTIONAL ASSESSMENT
PAIN_FUNCTIONAL_ASSESSMENT: 0-10
PAIN_FUNCTIONAL_ASSESSMENT: 0-10

## 2023-10-23 NOTE — PROGRESS NOTES
Physical Therapy    Physical Therapy Treatment    Patient Name: Amandeep Lara  MRN: [id number]  Today's Date: 10/23/2023  Time Calculation  Start Time: 0832  Stop Time: 0855  Time Calculation (min): 23 min       Assessment/Plan   PT Assessment  PT Assessment Results: Impaired balance, Decreased mobility, Decreased coordination  Rehab Prognosis: Good  Evaluation/Treatment Tolerance: Patient tolerated treatment well  End of Session Communication: Bedside nurse  Assessment Comment:  (Pt continues to make progress toward all goals with increased gait distance and improved transfers. Pt is also able to complete stair training this session.)  End of Session Patient Position: Up in chair, Alarm on  PT Plan  Inpatient/Swing Bed or Outpatient: Inpatient  PT Plan  Treatment/Interventions: Bed mobility, Transfer training, Gait training, Stair training  PT Plan: Skilled PT  PT Frequency: 4 times per week  PT Discharge Recommendations: High intensity level of continued care  Equipment Recommended upon Discharge: Wheeled walker  PT Recommended Transfer Status: Assist x2 (may benefit from using ww)      General Visit Information:   PT  Visit  PT Received On: 10/23/23  General  Reason for Referral: Stroke  Referred By: Ryland  Past Medical History Relevant to Rehab: Admit 10/20 with R sided weakness, decreased sensation, difficulty ambulating, an episode of double vision and nausea with emesis. /99 CT brain (-); MRI of brain:  acute to subacute infarcts L thalamus and midbrain.  Prior to Session Communication: Bedside nurse  Patient Position Received: Up in chair, Alarm on  General Comment:  (Pt agreeable to work with PT and eager to ambulate.)    Subjective   Precautions:  Precautions  Medical Precautions: Fall precautions  Precautions Comment: Significant Ataxia during Gait; very high fall risk (Blurry vision R eye)  Vital Signs:       Objective   Pain:  Pain Assessment  Pain Assessment: 0-10  Pain Score: 0 - No  pain  Cognition:  Cognition  Orientation Level: Oriented X4  Postural Control:     Extremity/Trunk Assessments:        Activity Tolerance:  Activity Tolerance  Endurance: Endurance does not limit participation in activity  Treatments:  Bed Mobility  Bed Mobility: Yes (SBA level to complete sup>sit transfer with use of hand rail from flat surface.)    Ambulation/Gait Training  Ambulation/Gait Training Performed: Yes (Pt ambulating 100' then 50' with WW and min/CGA and use of  gait belt. Pt demos occasional unsteadiness but no over LOB or scissoring of BLE's noted this session. Occasional cues for WW management to remain within the frame.)  Transfers  Transfer: Yes (Sit<>stand transfers from EOB and chair with CGA and cues for hand placement to avoid reaching for WW.)    Stairs  Stairs: Yes (Stair training x 5 steps with R hand rail. Pt using both hands on one hand rail states he does this at home sometimes prior to admission. Patient uses a non reciprocal gait. CGA given throughout.)    Outcome Measures:  [facility] Basic Mobility  Turning from your back to your side while in a flat bed without using bedrails: A little  Moving from lying on your back to sitting on the side of a flat bed without using bedrails: A little  Moving to and from bed to chair (including a wheelchair): A little  Standing up from a chair using your arms (e.g. wheelchair or bedside chair): A little  To walk in hospital room: A little  Climbing 3-5 steps with railing: A little  Basic Mobility - Total Score: 18    Education Documentation  Precautions, taught by Anu Wakefield PTA at 10/23/2023 12:43 PM.  Learner: Patient  Readiness: Acceptance  Method: Explanation  Response: Verbalizes Understanding    Mobility Training, taught by Anu Wakefield PTA at 10/23/2023 12:43 PM.  Learner: Patient  Readiness: Acceptance  Method: Explanation  Response: Verbalizes Understanding    Precautions, taught by Anu Wakefield PTA at 10/22/2023  1:49  PM.  Learner: Patient  Readiness: Acceptance  Method: Explanation  Response: Verbalizes Understanding    Mobility Training, taught by Anu Wakefield PTA at 10/22/2023  1:49 PM.  Learner: Patient  Readiness: Acceptance  Method: Explanation  Response: Verbalizes Understanding    Education Comments  No comments found.        OP EDUCATION:  Education  Individual(s) Educated: Patient  Education Provided: Body Mechanics, Fall Risk, POC  Patient/Caregiver Demonstrated Understanding: yes  Plan of Care Discussed and Agreed Upon: yes  Patient Response to Education: Patient/Caregiver Verbalized Understanding of Information  Education Comment:  (Educated in gait, transfers and stair training. Patient states he does not have a WW at home if plan is to discharge with HHPT. TCC to notify PTA if patient needing a WW.)    Encounter Problems       Encounter Problems (Active)       Impaired mobility s/p CVA        Perform all bed mobility with indep.  (Progressing)       Start:  10/21/23    Expected End:  11/04/23            Perform all transfers with ww vs LRAD and CGAx1 (Progressing)       Start:  10/21/23    Expected End:  11/04/23            Patient will ambulate >/= 50 ft. with 2ww vs LRAD and CGAx1 (Progressing)       Start:  10/21/23    Expected End:  11/04/23            Patient will ascend/descend at least 5 steps with HR and cane, if needed, with minAx1 (Progressing)       Start:  10/21/23    Expected End:  11/04/23

## 2023-10-23 NOTE — PROGRESS NOTES
Amandeep Lara is a 68 y.o. male on day 3 of admission presenting with Cerebrovascular accident (CVA), unspecified mechanism (CMS/HCC).      Subjective   The patient seen and evaluated today.  He states he continues to have numbness and tingling in his right upper arm and lower extremity and right face.  The patient states his weakness has improved.  Otherwise denies any dizziness shortness of breath chest pain nausea or vomiting at this time.       Objective     Last Recorded Vitals  /76   Pulse 64   Temp 36.9 °C (98.4 °F)   Resp 18   Wt 93.4 kg (205 lb 14.6 oz)   SpO2 97%   Intake/Output last 3 Shifts:    Intake/Output Summary (Last 24 hours) at 10/23/2023 1218  Last data filed at 10/23/2023 0900  Gross per 24 hour   Intake 775 ml   Output 1400 ml   Net -625 ml         Admission Weight  Weight: 99.8 kg (220 lb) (10/20/23 1620)    Daily Weight  10/20/23 : 93.4 kg (205 lb 14.6 oz)    Image Results  CT chest wo IV contrast  Narrative: Interpreted By:  Jessee Vargas,   STUDY:  CT CHEST WO IV CONTRAST;  10/22/2023 4:10 pm      INDICATION:  Incidental lung mass on outside imaging.      COMPARISON:  CTA head and neck 10/21/2023, CT abdomen and pelvis 06/19/2012      ACCESSION NUMBER(S):  [id number]      ORDERING CLINICIAN:  ALEXIS METCALF      TECHNIQUE:  Helical data acquisition of the chest was obtained without IV  contrast material.  Images were reformatted in axial, coronal, and  sagittal planes.      FINDINGS:  LINE AND DEVICES:  None.      LUNGS AND AIRWAYS:  Diffuse mild bronchial wall thickening. No endobronchial lesion.      There is a cluster of nodular opacities in the anterior right upper  lobe. The dominant lesion measures 14 mm (series 203, image 76).  Adjacent subpleural reticulation and cystic changes are present.  Consolidative opacities in the medial right middle lobe with  bronchiectasis and air bronchograms, measuring 2.8 x 2.1 cm (series  203, image 185). Linear atelectasis or  scarring in the lingula. Small  bilateral pleural effusions with adjacent atelectasis. No  pneumothorax.      MEDIASTINUM AND RAFFI, LOWER NECK AND AXILLA:  The visualized thyroid gland is within normal limits.      Multiple prominent mediastinal lymph nodes and a mildly enlarged 11  mm short axis right upper paratracheal node (series 201, image 66),  nonspecific.      Esophagus appears within normal limits. Tiny hiatal hernia.      HEART AND VESSELS:  The thoracic aorta is of normal course and caliber with  mild-to-moderate atherosclerotic calcifications.      Main pulmonary artery and its branches are normal in caliber.      Severe trivessel coronary artery calcifications. The study is not  optimized for evaluation of coronary arteries.      The cardiac chambers are not enlarged.      Trace pericardial effusion.      UPPER ABDOMEN:  Nonobstructing left upper pole renal calculus, 3 mm. No  hydronephrosis. Bilateral renal cysts. Stable 1.7 cm pancreatic tail  cystic lesion dating back to 06/19/2012 abdominal CT (series 201,  image 328), likely benign given long-term stability.      CHEST WALL AND OSSEOUS STRUCTURES:  Bilateral ill-defined masses in the infrascapular regions abutting  the posterolateral chest wall, consistent with benign elastofibroma  dorsi (series 201, image [id number]). Severe right glenohumeral  osteoarthritis. 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 Partially imaged lower cervical ACDF. Multilevel  degenerative disc disease.      Impression: Anterior right upper lobe nodular cluster with dominant nodule  measuring 14 mm. Additional 28 x 21 mm right middle lobe  consolidative opacity. While these could represent  infection/inflammation, neoplasm cannot be excluded. Consider further  evaluation in 3 months with PET-CT or tissue biopsy.      Nonspecific mildly enlarged mediastinal lymph nodes.      Partially imaged left humeral head avascular necrosis. Consider  further  evaluation with dedicated CT of the left humerus.      MACRO:  None      Signed by: Jessee Vargas 10/23/2023 10:07 AM  Dictation workstation:   [id number]      Physical Exam  HENT:      Head: Normocephalic and atraumatic.      Nose: Nose normal.      Mouth/Throat:      Mouth: Mucous membranes are dry.   Eyes:      Extraocular Movements: Extraocular movements intact.      Conjunctiva/sclera: Conjunctivae normal.   Cardiovascular:      Rate and Rhythm: Normal rate and regular rhythm.      Pulses: Normal pulses.      Heart sounds: Normal heart sounds.   Pulmonary:      Effort: Pulmonary effort is normal.      Breath sounds: Normal breath sounds.   Abdominal:      General: Bowel sounds are normal.      Palpations: Abdomen is soft.   Musculoskeletal:         General: Normal range of motion.      Cervical back: Normal range of motion and neck supple.   Skin:     General: Skin is warm and dry.   Neurological:      Mental Status: He is alert and oriented to person, place, and time.      Sensory: Sensory deficit present.      Motor: Weakness present.      Comments: The patient reports weakness of right arm and lower extremity but on exam there is some weakness and right-sided facial droop.  The patient reports numbness and tingling of the right face right upper and lower extremities.    Psychiatric:         Mood and Affect: Mood normal.         Relevant Results               Assessment/Plan   This patient currently has cardiac telemetry ordered; if you would like to modify or discontinue the telemetry order, click here to go to the orders activity to modify/discontinue the order.      68 y.o. male presenting with medical history for hyperlipidemia, hypertension, diabetes mellitus type 2, diabetic neuropathy, active smoker, bilateral carotid stenosis comes into the hospital secondary to right-sided weakness numbness and tingling.  ED the patient was found to be outside of the window for TNK and was Van negative. CT of the  head was done that was negative.  MRI showed Small foci of acute to subacute ischemic infarcts in the left thalamus and midbrain.          Principal Problem:    Cerebrovascular accident (CVA), unspecified mechanism (CMS/HCC)  Active Problems:    Primary hypertension    Diabetic polyneuropathy associated with type 2 diabetes mellitus (CMS/HCC)    CVA  -CT head is negative any acute hemorrhage on admission  -MRI from couple years ago shows left frontal infarct with subsequent encephalomalacia  -Continue with aspirin and statin and Plavix  -Neurochecks per stroke  protocol  -Neurology has been consulted appreciate recommendations.  Continue with aspirin and Plavix and statins  -MRI from this admission showed Small foci of acute to subacute ischemic infarcts in the left thalamus and midbrain   -Carotid Doppler without significant stenosis  -Previous CTA showed significant stenosis another CTA done this admission showed Circumferential luminal narrowing the left greater than right internal carotid arteries with the distal most cervical segments demonstrating approximately 75-80% stenosis on the left, similar to prior.  Has followed with [facility] neurovascular center recently.  Recommend continued follow-up  -Echo has been ordered  -Incidental finding of lung mass.  CT of the chest done showedAnterior right upper lobe nodular cluster with dominant nodule measuring 14 mm. Additional 28 x 21 mm right middle lobe consolidative opacity.  Pulmonology has been consulted while patient is waiting for SNF versus home health.    Hypertension  -Continue with Coreg, now resume lisinopril.  Added amlodipine as well    Hyperlipidemia  -Continue with statins     Diabetes mellitus type 2  -Continue with sliding scale.    -A1c noted to be 8     VTE prophylaxis: Lovenox       PT OT   Disposition : SNF versus home health.  Pending placement at this time    Jermaine Marcelino MD

## 2023-10-23 NOTE — CARE PLAN
The patient's goals for the shift include maintain safety    Problem: Diabetes  Goal: Achieve decreasing blood glucose levels by end of shift  Outcome: Progressing  Goal: Increase stability of blood glucose readings by end of shift  Outcome: Progressing  Goal: Maintain electrolyte levels within acceptable range throughout shift  Outcome: Progressing  Goal: Maintain glucose levels >70mg/dl to <250mg/dl throughout shift  Outcome: Progressing  Goal: No changes in neurological exam by end of shift  Outcome: Progressing  Goal: Learn about and adhere to nutrition recommendations by end of shift  Outcome: Progressing  Goal: Vital signs within normal range for age by end of shift  Outcome: Progressing  Goal: Increase self care and/or family involovement by end of shift  Outcome: Progressing  Goal: Receive DSME education by end of shift  Outcome: Progressing     Problem: Fall/Injury  Goal: Not fall by end of shift  Outcome: Progressing  Goal: Be free from injury by end of the shift  Outcome: Progressing  Goal: Verbalize understanding of personal risk factors for fall in the hospital  Outcome: Progressing  Goal: Verbalize understanding of risk factor reduction measures to prevent injury from fall in the home  Outcome: Progressing  Goal: Pace activities to prevent fatigue by end of the shift  Outcome: Progressing     Problem: General Stroke  Goal: Demonstrate improvement in neurological exam throughout the shift  Outcome: Progressing  Goal: Maintain BP within ordered limits throughout shift  Outcome: Progressing  Goal: Participate in treatment (ie., meds, therapy) throughout shift  Outcome: Progressing  Goal: No symptoms of aspiration throughout shift  Outcome: Progressing  Goal: No symptoms of hemorrhage throughout shift  Outcome: Progressing  Goal: Tolerate enteral feeding throughout shift  Outcome: Progressing  Goal: Decreased nausea/vomiting throughout shift  Outcome: Progressing  Goal: Controlled blood glucose throughout  shift  Outcome: Progressing  Goal: Out of bed three times today  Outcome: Progressing

## 2023-10-23 NOTE — PROGRESS NOTES
"Amandeep Lara is a 68 y.o. male on day 3 of admission presenting with Cerebrovascular accident (CVA), unspecified mechanism (CMS/HCC).      Subjective   Pt. In bed, states that he still has some right sided numbness and tingling to his right face, right are, and right leg. He is equally strong in all extremities. His diplopia comes and goes and he denies it at this time.    Review of systems are negative unless otherwise specified in HPI.     Objective     Last Recorded Vitals  Blood pressure 164/72, pulse 69, temperature 36.7 °C (98.1 °F), temperature source Temporal, resp. rate 16, height 1.727 m (5' 8\"), weight 93.4 kg (205 lb 14.6 oz), SpO2 96 %.    Physical Exam  Neurological Exam  Patient is alert and oriented x 3. PERRLA. Speech is spontaneous and clear. Moving all extremities.   Relevant Results        NIH Stroke Scale  1A. Level of Consciousness: Alert, Keenly Responsive  1B. Ask Month and Age: Both Questions Right  1C. Blink Eyes & Squeeze Hands: Performs Both Tasks  2. Best Gaze: Normal  3. Visual: No Visual Loss  4. Facial Palsy: Normal Symmetrical Movements  5A. Motor - Left Arm: No Drift  5B. Motor - Right Arm: No Drift  6A. Motor - Left Leg: No Drift  6B. Motor - Right Leg: No Drift  7. Limb Ataxia: Absent  8. Sensory Loss: Normal  9. Best Language: No Aphasia  10. Dysarthria: Normal  11. Extinction and Inattention: No Abnormality  NIH Stroke Scale: 0           Karlo Coma Scale  Best Eye Response: Spontaneous  Best Verbal Response: Oriented  Best Motor Response: Follows commands  Karlo Coma Scale Score: 15          Results for orders placed or performed during the hospital encounter of 10/20/23 (from the past 24 hour(s))   POCT GLUCOSE   Result Value Ref Range    POCT Glucose 187 (H) 74 - 99 mg/dL   POCT GLUCOSE   Result Value Ref Range    POCT Glucose 199 (H) 74 - 99 mg/dL   POCT GLUCOSE   Result Value Ref Range    POCT Glucose 103 (H) 74 - 99 mg/dL   POCT GLUCOSE   Result Value Ref Range    " POCT Glucose 237 (H) 74 - 99 mg/dL   Transthoracic Echo (TTE) Complete   Result Value Ref Range    BSA 2.12 m2   MR brain wo IV contrast 10/20/2023    Narrative  Interpreted By:  Carla Rodgers,  STUDY:  MR BRAIN WO IV CONTRAST;  10/20/2023 11:01 pm    INDICATION:  Signs/Symptoms:Strokelike symptoms.    COMPARISON:  CT head 10/20/2023.    ACCESSION NUMBER(S):  [id number]    ORDERING CLINICIAN:  ALEXIS METCALF    TECHNIQUE:  Axial T2, FLAIR, DWI, gradient echo T2 and sagittal and coronal T1  weighted images of brain were acquired.    FINDINGS:  CSF Spaces: The ventricles, sulci and basal cisterns enlarged,  concordant with parenchymal volume loss.    Parenchyma: Small foci of restricted diffusion within the left  midbrain, including the medial left cerebellar peduncle with minimal  extension to the medial left thalamus. Possible focus of restricted  diffusion in the right occipital lobe. Minimal associated T2/FLAIR  hyperintense white matter changes present.    Small region of encephalomalacia extending to the cortex of the  anterior left frontal lobe. Additional mild-to-moderate nonspecific  T2/FLAIR hyperintense white matter changes. Moderate to advanced  generalized parenchymal volume loss present. There is no mass effect  or midline shift.    Flow voids appear maintained within central intracranial vessels.  Gradient echo hypointense signal within the bilateral thalami  corresponds with coarse calcifications seen on same-day CT head.  Which are slightly atypical location and clinical correlation  recommended. Calcifications in the region of the dentate nucleus and  basal ganglia are again noted.    Paranasal Sinuses and Mastoids: Mild scattered mucosal thickening  involving the maxillary and ethmoid sinuses. Mastoid air cells are  patent.    Impression  Small foci of acute to subacute ischemic infarcts in the left  thalamus and midbrain. Possible punctate focus of restricted  diffusion in the right occipital  lobe. No associated mass effect or  midline shift present.    Gradient echo signal corresponding with calcifications in the  bilateral thalami. Findings are atypical and correlation with  underlying toxic, metabolic or other etiology suggested.    Small remote left frontal infarct and additional findings as detailed.    MACRO:  None    Signed by: Carla Rodgers 10/20/2023 11:55 PM  Dictation workstation:   [id number]                     Assessment/Plan   This patient currently has cardiac telemetry ordered; if you would like to modify or discontinue the telemetry order, click here to go to the orders activity to modify/discontinue the order.  Principal Problem:    Cerebrovascular accident (CVA), unspecified mechanism (CMS/HCC)  Active Problems:    Primary hypertension    Diabetic polyneuropathy associated with type 2 diabetes mellitus (CMS/HCC)    Impression:  Right sided hemihyposthesia with horizontal diplopia and transient dysarthria r/t multiple acute/subacute embolic strokes in left thalamus and midbrain, right occipital lobe    Plan:  Continue with Plavix and ASA   TTE result is pending, depending on results-may consult cardiology ( with the consideration of performing JOSE and prolonged cardiac monitoring if needed).   PT/OT  Needs to be evaluated for [facility] outpt.  Smoking cessation     Discussed bleeding precautions with patient while on anti platelet and/or anticoagulation therapy. Discussed stroke prevention such as: HgbA1c <7, tight blood pressure control < 130/<80, antiplatelet and statin therapy (if indicated), and lifestyle modification (Mediterranean diet, daily exercise, nicotine cessation & limiting alcohol use). Discussed s/sx of stroke and to call 911 immediately.?                  Adriana Hernandez, APRN-CNP    Suspect cardioembolism induced bilateral artery stroke with associated horizontal diplopia to the right, dysarthria, bihemispheric strokes.    Agree with work-up as already initiated.      Smoking cessation counseling.     To consider cardiology evaluation as inpatient if JOSE and cardiac event monitoring x4 weeks if TTE is nonrevealing for cardio embolic stroke.       I spent  40 minutes in the professional and overall care of this patient.    Hardy Polo MD

## 2023-10-23 NOTE — PROGRESS NOTES
10/23/23 1544   Discharge Planning   Living Arrangements Spouse/significant other   Support Systems Spouse/significant other;Family members   Assistance Needed none   Type of Residence Private residence   Home or Post Acute Services Post acute facilities (Rehab/SNF/etc)   Type of Post Acute Facility Services Rehab   Patient expects to be discharged to: [facility]   Does the patient need discharge transport arranged? Yes   RoundTrip coordination needed? Yes   Has discharge transport been arranged? No   Patient Choice   Provider Choice list and CMS website (https://medicare.gov/care-compare#search) for post-acute Quality and Resource Measure Data were provided and reviewed with: Patient;Family   Patient / Family choosing to utilize agency / facility established prior to hospitalization No     Pt and his spouse prefer dc to [facility]. Referral sent and facility accepted. Precert was submitted by [facility] today.

## 2023-10-23 NOTE — CONSULTS
Consults    Reason For Consult  Lung mass and various  pulmonary related issues  Expand All Collapse All    History Of Present Illness  Amandeep Lara is a 68 y.o. male presenting with medical history for hyperlipidemia, hypertension, diabetes mellitus type 2, diabetic neuropathy, active smoker, bilateral carotid stenosis comes into the hospital secondary to right-sided weakness numbness and tingling.  Patient states all started around 9 PM yesterday.  Patient also stated he has been having difficulty ambulating.  The patient went to sleep and did not improve today.  The family brought the patient into the hospital.  Patient reports she has had vomiting last night and today.  He states his symptoms are somewhat better but he still has numbness and tingling in his right upper extremity right lower extremity and right face.  He denies any chest pain reports a bit of dizziness at times.  He also has cataracts.  He states he had double vision yesterday.  Has any double vision at this time.  Patient is he has a chronic cough because he is a smoker for many years.  Denies any chest pain abdominal pain at this time.  CT of the head was done that was negative.  The patient did receive 243 of aspirin in ED.  Patient is out of window for TNK at this time.  Patient is admitted for further care and management   I was asked to evaluate and follow from a pulmonary perspective.  On admission patient had a CT scan of the chest also showing a right upper lobe lung mass suggestive of a cancer.  I reviewed current and prior imaging with radiologist Dr. Tesfaye.  Patient has longstanding smoking at least a pack of cigarettes a day most of his life and still smokes until this admission.  He has been suspected to have COPD but he has not had any PFTs done so far.  He denies any prior lung masses.  He denies any DVT or pulmonary embolism.     Past Medical History  Medical History        Past Medical History:   Diagnosis Date    Personal  history of other diseases of the circulatory system       History of hypertension    Personal history of other endocrine, nutritional and metabolic disease       History of diabetes mellitus            Surgical History  Surgical History         Past Surgical History:   Procedure Laterality Date    CARPAL TUNNEL RELEASE   04/28/2015     Neuroplasty Decompression Median Nerve At Carpal Tunnel    CT HEAD ANGIO W AND WO IV CONTRAST   6/3/2021     CT HEAD ANGIO W AND WO IV CONTRAST 6/3/2021 [facility]    CT HEAD ANGIO W AND WO IV CONTRAST   6/3/2021     CT HEAD ANGIO W AND WO IV CONTRAST    CT NECK ANGIO W AND WO IV CONTRAST   6/3/2021     CT NECK ANGIO W AND WO IV CONTRAST 6/3/2021 [facility]    MR HEAD ANGIO WO IV CONTRAST   4/1/2021     MR HEAD ANGIO WO IV CONTRAST 4/1/2021 [facility]    MR HEAD ANGIO WO IV CONTRAST   4/1/2021     MR HEAD ANGIO WO IV CONTRAST    NECK SURGERY   12/27/2021     Neck Surgery    OTHER SURGICAL HISTORY   12/27/2021     Surgery    OTHER SURGICAL HISTORY   12/27/2021     Cardiac catheterization    OTHER SURGICAL HISTORY   12/27/2021     Finger amputation    OTHER SURGICAL HISTORY   12/27/2021     Colonoscopy    OTHER SURGICAL HISTORY   12/27/2021     Wrist surgery    ROTATOR CUFF REPAIR   12/27/2021     Rotator Cuff Repair    TONSILLECTOMY   04/28/2015     Tonsillectomy            Social History  He has no history on file for tobacco use, alcohol use, and drug use.     Family History  Family History   No family history on file.        Allergies  Patient has no known allergies.     Review of Systems   Constitutional: Negative.    HENT: Negative.     Eyes:  Positive for visual disturbance.   Respiratory:  Positive for cough.    Cardiovascular: Negative.    Gastrointestinal:  Positive for diarrhea.   Endocrine: Negative.    Musculoskeletal: Negative.    Skin: Negative.    Allergic/Immunologic: Negative.    Neurological:  Positive for dizziness, facial asymmetry,  "weakness and numbness.   Psychiatric/Behavioral: Negative.     All other systems reviewed and are negative.        Physical Exam  Constitutional:       Appearance: He is obese.   HENT:      Head: Normocephalic and atraumatic.      Nose: Nose normal.      Mouth/Throat:      Mouth: Mucous membranes are dry.   Eyes:      Extraocular Movements: Extraocular movements intact.      Conjunctiva/sclera: Conjunctivae normal.   Cardiovascular:      Rate and Rhythm: Regular rhythm. Tachycardia present.      Pulses: Normal pulses.      Heart sounds: Normal heart sounds.   Pulmonary:      Effort: Pulmonary effort is normal.      Breath sounds: Normal breath sounds.   Abdominal:      General: Bowel sounds are normal.      Palpations: Abdomen is soft.   Musculoskeletal:         General: Normal range of motion.      Cervical back: Normal range of motion and neck supple.   Skin:     General: Skin is warm and dry.   Neurological:      Mental Status: He is alert and oriented to person, place, and time.      Sensory: Sensory deficit present.      Motor: Weakness present.   Psychiatric:         Mood and Affect: Mood normal.            Last Recorded Vitals  Blood pressure (!) 189/76, pulse 70, temperature 36.3 °C (97.3 °F), temperature source Temporal, resp. rate 16, height 1.753 m (5' 9\"), weight 99.8 kg (220 lb), SpO2 96 %.     Relevant Results      68 y.o. male presenting with medical history for hyperlipidemia, hypertension, diabetes mellitus type 2, diabetic neuropathy, active smoker, bilateral carotid stenosis comes into the hospital secondary to right-sided weakness numbness and tingling.  Patient states all started around 9 PM yesterday.     Assessment/Plan   Principal Problem:    Cerebrovascular accident (CVA), unspecified mechanism (CMS/HCC)        CVA  -CT head is negative any acute hemorrhage or acute stroke at this time  -MRI from couple years ago shows left frontal infarct with subsequent encephalomalacia  -Received aspirin " in ED.  -Neurochecks per stroke  protocol  -Continue aspirin, add Plavix statin.  -Neurology has been consulted  -Check MRI  -Permissive hypertension     Diabetes mellitus type 2  -No medication in the chart in the past.  Continue with sliding scale.  We will likely add Lantus once home medications are reconciled.  Check A1c     Right upper lobe lung mass suggestive of lung cancer, cannot be biopsied due to its small size per interventional radiologist Dr. Tesfaye.  Will need a follow-up CT chest in 2 to 3 months.    Probable chronic obstructive pulmonary disease, currently stable    Nicotine dependency  Counseled to quit smoking cigarettes.       VTE prophylaxis: Lovenox          Nicholas Rutherford MD

## 2023-10-24 LAB
GLUCOSE BLD MANUAL STRIP-MCNC: 116 MG/DL (ref 74–99)
GLUCOSE BLD MANUAL STRIP-MCNC: 167 MG/DL (ref 74–99)
GLUCOSE BLD MANUAL STRIP-MCNC: 231 MG/DL (ref 74–99)
GLUCOSE BLD MANUAL STRIP-MCNC: 72 MG/DL (ref 74–99)

## 2023-10-24 PROCEDURE — 97530 THERAPEUTIC ACTIVITIES: CPT | Mod: GP,CQ

## 2023-10-24 PROCEDURE — 2500000001 HC RX 250 WO HCPCS SELF ADMINISTERED DRUGS (ALT 637 FOR MEDICARE OP): Performed by: INTERNAL MEDICINE

## 2023-10-24 PROCEDURE — 82947 ASSAY GLUCOSE BLOOD QUANT: CPT

## 2023-10-24 PROCEDURE — 99232 SBSQ HOSP IP/OBS MODERATE 35: CPT | Performed by: STUDENT IN AN ORGANIZED HEALTH CARE EDUCATION/TRAINING PROGRAM

## 2023-10-24 PROCEDURE — 2500000002 HC RX 250 W HCPCS SELF ADMINISTERED DRUGS (ALT 637 FOR MEDICARE OP, ALT 636 FOR OP/ED): Performed by: INTERNAL MEDICINE

## 2023-10-24 PROCEDURE — 97530 THERAPEUTIC ACTIVITIES: CPT | Mod: GO,CO

## 2023-10-24 PROCEDURE — 96372 THER/PROPH/DIAG INJ SC/IM: CPT | Performed by: INTERNAL MEDICINE

## 2023-10-24 PROCEDURE — 1200000002 HC GENERAL ROOM WITH TELEMETRY DAILY

## 2023-10-24 PROCEDURE — 97535 SELF CARE MNGMENT TRAINING: CPT | Mod: GO,CO

## 2023-10-24 PROCEDURE — 99232 SBSQ HOSP IP/OBS MODERATE 35: CPT | Performed by: SPECIALIST

## 2023-10-24 PROCEDURE — 2500000004 HC RX 250 GENERAL PHARMACY W/ HCPCS (ALT 636 FOR OP/ED): Performed by: INTERNAL MEDICINE

## 2023-10-24 RX ADMIN — CARVEDILOL 25 MG: 12.5 TABLET, FILM COATED ORAL at 20:04

## 2023-10-24 RX ADMIN — AMLODIPINE BESYLATE 5 MG: 5 TABLET ORAL at 08:31

## 2023-10-24 RX ADMIN — CARVEDILOL 25 MG: 12.5 TABLET, FILM COATED ORAL at 08:31

## 2023-10-24 RX ADMIN — INSULIN LISPRO 5 UNITS: 100 INJECTION, SOLUTION INTRAVENOUS; SUBCUTANEOUS at 16:44

## 2023-10-24 RX ADMIN — ENOXAPARIN SODIUM 40 MG: 40 INJECTION SUBCUTANEOUS at 20:06

## 2023-10-24 RX ADMIN — CLOPIDOGREL BISULFATE 75 MG: 75 TABLET, FILM COATED ORAL at 08:31

## 2023-10-24 RX ADMIN — LISINOPRIL 40 MG: 20 TABLET ORAL at 08:31

## 2023-10-24 RX ADMIN — INSULIN GLARGINE 20 UNITS: 100 INJECTION, SOLUTION SUBCUTANEOUS at 20:06

## 2023-10-24 RX ADMIN — ASPIRIN 81 MG: 81 TABLET, COATED ORAL at 08:30

## 2023-10-24 RX ADMIN — ATORVASTATIN CALCIUM 40 MG: 20 TABLET, FILM COATED ORAL at 20:04

## 2023-10-24 ASSESSMENT — COGNITIVE AND FUNCTIONAL STATUS - GENERAL
MOVING FROM LYING ON BACK TO SITTING ON SIDE OF FLAT BED WITH BEDRAILS: A LITTLE
MOBILITY SCORE: 18
MOVING TO AND FROM BED TO CHAIR: A LITTLE
TOILETING: A LOT
TOILETING: A LOT
EATING MEALS: A LITTLE
MOBILITY SCORE: 23
DRESSING REGULAR UPPER BODY CLOTHING: A LITTLE
HELP NEEDED FOR BATHING: A LOT
PERSONAL GROOMING: A LITTLE
DAILY ACTIVITIY SCORE: 15
DRESSING REGULAR UPPER BODY CLOTHING: A LITTLE
PERSONAL GROOMING: A LITTLE
STANDING UP FROM CHAIR USING ARMS: A LITTLE
DAILY ACTIVITIY SCORE: 24
WALKING IN HOSPITAL ROOM: A LITTLE
HELP NEEDED FOR BATHING: A LITTLE
DAILY ACTIVITIY SCORE: 15
TOILETING: A LOT
MOBILITY SCORE: 18
STANDING UP FROM CHAIR USING ARMS: A LITTLE
MOVING FROM LYING ON BACK TO SITTING ON SIDE OF FLAT BED WITH BEDRAILS: A LITTLE
CLIMB 3 TO 5 STEPS WITH RAILING: A LITTLE
TURNING FROM BACK TO SIDE WHILE IN FLAT BAD: A LITTLE
DRESSING REGULAR LOWER BODY CLOTHING: A LOT
CLIMB 3 TO 5 STEPS WITH RAILING: A LITTLE
CLIMB 3 TO 5 STEPS WITH RAILING: A LITTLE
EATING MEALS: A LITTLE
MOVING TO AND FROM BED TO CHAIR: A LITTLE
PERSONAL GROOMING: A LITTLE
HELP NEEDED FOR BATHING: A LOT
DRESSING REGULAR LOWER BODY CLOTHING: A LOT
DRESSING REGULAR LOWER BODY CLOTHING: A LOT
TURNING FROM BACK TO SIDE WHILE IN FLAT BAD: A LITTLE
EATING MEALS: A LITTLE
WALKING IN HOSPITAL ROOM: A LITTLE
DRESSING REGULAR UPPER BODY CLOTHING: A LITTLE
DAILY ACTIVITIY SCORE: 16

## 2023-10-24 ASSESSMENT — PAIN - FUNCTIONAL ASSESSMENT
PAIN_FUNCTIONAL_ASSESSMENT: 0-10

## 2023-10-24 ASSESSMENT — PAIN SCALES - GENERAL
PAINLEVEL_OUTOF10: 0 - NO PAIN

## 2023-10-24 ASSESSMENT — ACTIVITIES OF DAILY LIVING (ADL): HOME_MANAGEMENT_TIME_ENTRY: 12

## 2023-10-24 NOTE — PROGRESS NOTES
Occupational Therapy    Evaluation/Treatment    Patient Name: Amandeep Lara  MRN: [id number]  : 1955  Today's Date: 10/24/23  Time Calculation  Start Time: 1304  Stop Time: 1328  Time Calculation (min): 24 min    This note and billing are from evaluation visit on 10/21/23 at 13:04.         Assessment:  Prognosis: Excellent  Evaluation/Treatment Tolerance: Patient tolerated treatment well  Medical Staff Made Aware: Yes  End of Session Communication: PCT/NA/CTA (Notified PCA of pt.functional mobility status.  Recommended assist of 2 for transfers, and use of BSC rather than ambulation to bathroom with nursing staff.)  End of Session Patient Position: Bed, 3 rail up, Alarm on (all needs in reach, son present)  OT Assessment Results: Decreased ADL status, Decreased sensation, Visual deficit, Decreased fine motor control, Decreased functional mobility, Decreased gross motor control  Prognosis: Excellent  Evaluation/Treatment Tolerance: Patient tolerated treatment well  Medical Staff Made Aware: Yes    Plan:  Treatment Interventions: ADL retraining, Visual perceptual retraining, Functional transfer training, Endurance training, Cognitive reorientation, Equipment evaluation/education, Neuromuscular reeducation, Fine motor coordination activities  OT Frequency: 4 times per week  OT Discharge Recommendations: High intensity level of continued care  Treatment Interventions: ADL retraining, Visual perceptual retraining, Functional transfer training, Endurance training, Cognitive reorientation, Equipment evaluation/education, Neuromuscular reeducation, Fine motor coordination activities  Subjective     Current Problem:  1. Cerebrovascular accident (CVA), unspecified mechanism (CMS/HCC)  Transthoracic Echo (TTE) Complete    Transthoracic Echo (TTE) Complete    Carotid duplex bilateral    Carotid duplex bilateral      2. Other transient cerebral ischemic attacks and related syndromes  Transthoracic Echo (TTE) Complete         Past Medical History:   Diagnosis Date    Diabetes mellitus (CMS/HCC)     Hypertension     Personal history of other diseases of the circulatory system     History of hypertension    Personal history of other endocrine, nutritional and metabolic disease     History of diabetes mellitus    Stroke (CMS/HCC)      Past Surgical History:   Procedure Laterality Date    CARPAL TUNNEL RELEASE  04/28/2015    Neuroplasty Decompression Median Nerve At Carpal Tunnel    CT HEAD ANGIO W AND WO IV CONTRAST  6/3/2021    CT HEAD ANGIO W AND WO IV CONTRAST 6/3/2021 [facility]    CT HEAD ANGIO W AND WO IV CONTRAST  6/3/2021    CT HEAD ANGIO W AND WO IV CONTRAST    CT NECK ANGIO W AND WO IV CONTRAST  6/3/2021    CT NECK ANGIO W AND WO IV CONTRAST 6/3/2021 [facility]    MR HEAD ANGIO WO IV CONTRAST  4/1/2021    MR HEAD ANGIO WO IV CONTRAST 4/1/2021 [facility]    MR HEAD ANGIO WO IV CONTRAST  4/1/2021    MR HEAD ANGIO WO IV CONTRAST    NECK SURGERY  12/27/2021    Neck Surgery    OTHER SURGICAL HISTORY  12/27/2021    Surgery    OTHER SURGICAL HISTORY  12/27/2021    Cardiac catheterization    OTHER SURGICAL HISTORY  12/27/2021    Finger amputation    OTHER SURGICAL HISTORY  12/27/2021    Colonoscopy    OTHER SURGICAL HISTORY  12/27/2021    Wrist surgery    ROTATOR CUFF REPAIR  12/27/2021    Rotator Cuff Repair    TONSILLECTOMY  04/28/2015    Tonsillectomy       General:   OT Received On: 10/21/23  General  Reason for Referral: Stroke  Referred By: R[place]  Past Medical History Relevant to Rehab: Admit 10/20 with R sided weakness, decreased sensation, difficulty ambulating, an episode of double vision and nausea with emesis. /99 CT brain (-); MRI of brain:  acute to subacute infarcts L thalamus and midbrain.  Family/Caregiver Present: Yes (son)  Co-Treatment: PT  Prior to Session Communication: Bedside nurse  Patient Position Received: Bed, 2 rail up, Alarm off, not on at start of session  General  Comment: Pt. agreeable to PT/OT eval. Pt. reports that he is having difficulty ambulating.    Precautions:  Medical Precautions: Fall precautions  Precautions Comment: Significant Ataxia during Gait; very high fall risk    Vital Signs:       Pain:  Pain Assessment  Pain Assessment: 0-10  Pain Score: 0 - No pain  Objective     Cognition:  Overall Cognitive Status: Within Functional Limits  Orientation Level: Oriented X4             Home Living:  Home Living Comments: Lives with wife (works days), daughter (works nights) and son (works 2nd shift.  Pt. is retired.  Bilevel home.  1 step to enter, then 14 steps with handrail up to bed/full bath.  5 steps with handrail down to 1/2 bath.  Tub/shower, no seat or safety bars.  No AD use.  Does not own AD.  Independent with ADL.  Family manages IADL.  Pt. drives.  No falls.    Prior Function:       IADL History:       Activities of Daily Living:   Eating Assistance: Minimal  Grooming Assistance: Minimal  Bathing Assistance: Moderate  UE Dressing Assistance: Minimal  LE Dressing Assistance: Maximal  Toileting Assistance with Device: Maximal  ADL Comments: Ataxia of RUE and RLE.  RLE worse than RUE. Poor standing balance and functional mobility.                         Activity Tolerance:       Functional Standing Tolerance:       Bed Mobility/Transfers: Bed Mobility  Bed Mobility: Yes (Sup to sit and sit to sup, SBA)  Transfers  Transfer: Yes (Sit to stand and stand to sit, mod assist.)                Balance:  Static Sitting: good  Dynamic Sitting: good (-)  Static Standing: poor  Dynamic Standing: poor      Vision:Vision - Basic Assessment  Current Vision:  (Reports impaired vision 2nd to R cataract.  But states that vision became worse when stroke occured.  Is better than it was, but still not back to baseline.  Describes vision as blurry and unclear.  Improved wth R eye closed.)        Sensation:  Light Touch: Partial deficits in the RUE (Sock slipping through fingers,  "however strength is 5/5)  Sensation Comment: Pt. reports numbness/tingling \"pins and needles\" R side of face, RUE and RLE.           Coordination:  Movements are Fluid and Coordinated: No  Upper Body Coordination: Impaired RUE finger to target  Lower Body Coordination: Impaired RLE, ataxia, scissoring, narrow [place]  Heel to Shin: Impaired  Finger to Target: Impaired       Extremities: RUE   RUE :  (RUE AROM WFL, strength 5/5) and LUE   LUE:  (LUE AROM WFL, strength 5/5)    Outcome Measures: [facility] Daily Activity  Putting on and taking off regular lower body clothing: A lot  Bathing (including washing, rinsing, drying): A lot  Putting on and taking off regular upper body clothing: A little  Toileting, which includes using toilet, bedpan or urinal: A lot  Taking care of personal grooming such as brushing teeth: A little  Eating Meals: A little  Daily Activity - Total Score: 15  Education Documentation  No documentation found.  Education Comments  No comments found.        EDUCATION:  Education  Individual(s) Educated: Patient, Child  Education Provided:  (Signs and symptoms of stroke, F.A.S.T., balance deficits, fall risk, recommend continued rehab, no getting up without staff)  Patient Response to Education: Patient/Caregiver Verbalized Understanding of Information    Goals:  Encounter Problems       Encounter Problems (Active)       OT Goals       Min assist sit/stand, bed/chair/commode with FWW. (Progressing)       Start:  10/21/23    Expected End:  11/04/23            Fair dynamic standing balance for ADL with UE support.  (Progressing)       Start:  10/21/23    Expected End:  11/04/23            Min assist ADL functional mobility with FWW.  (Progressing)       Start:  10/21/23    Expected End:  11/04/23            Demonstrate use of visual contact with R hand/UE during functional tasks, without cues, for increased accuracy of R hand function 2nd to sensory deficit.  (Progressing)       Start:  10/21/23    Expected " End:  11/04/23            MANDYE motor planning WFL for ADL and functional mobility.   (Progressing)       Start:  10/21/23    Expected End:  11/04/23

## 2023-10-24 NOTE — PROGRESS NOTES
History Of Present Illness  Amandeep Lara is a 68 y.o. male presenting with medical history for hyperlipidemia, hypertension, diabetes mellitus type 2, diabetic neuropathy, active smoker, bilateral carotid stenosis comes into the hospital secondary to right-sided weakness numbness and tingling.  Patient states all started around 9 PM yesterday.  Patient also stated he has been having difficulty ambulating.  The patient went to sleep and did not improve today.  The family brought the patient into the hospital.  Patient reports she has had vomiting last night and today.  He states his symptoms are somewhat better but he still has numbness and tingling in his right upper extremity right lower extremity and right face.  He denies any chest pain reports a bit of dizziness at times.  He also has cataracts.  He states he had double vision yesterday.  Has any double vision at this time.  Patient is he has a chronic cough because he is a smoker for many years.  Denies any chest pain abdominal pain at this time.  CT of the head was done that was negative.  The patient did receive 243 of aspirin in ED.  Patient is out of window for TNK at this time.  Patient is admitted for further care and management   I was asked to evaluate and follow from a pulmonary perspective.  On admission patient had a CT scan of the chest also showing a right upper lobe lung mass suggestive of a cancer.  I reviewed current and prior imaging with radiologist Dr. Tesfaye.  Patient has longstanding smoking at least a pack of cigarettes a day most of his life and still smokes until this admission.  He has been suspected to have COPD but he has not had any PFTs done so far.  He denies any prior lung masses.  He denies any DVT or pulmonary embolism.  Overall condition unchanged.  Past Medical History  Medical History           Past Medical History:   Diagnosis Date    Personal history of other diseases of the circulatory system       History of  hypertension    Personal history of other endocrine, nutritional and metabolic disease       History of diabetes mellitus            Surgical History  Surgical History             Past Surgical History:   Procedure Laterality Date    CARPAL TUNNEL RELEASE   04/28/2015     Neuroplasty Decompression Median Nerve At Carpal Tunnel    CT HEAD ANGIO W AND WO IV CONTRAST   6/3/2021     CT HEAD ANGIO W AND WO IV CONTRAST 6/3/2021 [facility]    CT HEAD ANGIO W AND WO IV CONTRAST   6/3/2021     CT HEAD ANGIO W AND WO IV CONTRAST    CT NECK ANGIO W AND WO IV CONTRAST   6/3/2021     CT NECK ANGIO W AND WO IV CONTRAST 6/3/2021 [facility]    MR HEAD ANGIO WO IV CONTRAST   4/1/2021     MR HEAD ANGIO WO IV CONTRAST 4/1/2021 [facility]    MR HEAD ANGIO WO IV CONTRAST   4/1/2021     MR HEAD ANGIO WO IV CONTRAST    NECK SURGERY   12/27/2021     Neck Surgery    OTHER SURGICAL HISTORY   12/27/2021     Surgery    OTHER SURGICAL HISTORY   12/27/2021     Cardiac catheterization    OTHER SURGICAL HISTORY   12/27/2021     Finger amputation    OTHER SURGICAL HISTORY   12/27/2021     Colonoscopy    OTHER SURGICAL HISTORY   12/27/2021     Wrist surgery    ROTATOR CUFF REPAIR   12/27/2021     Rotator Cuff Repair    TONSILLECTOMY   04/28/2015     Tonsillectomy            Social History  He has no history on file for tobacco use, alcohol use, and drug use.     Family History  Family History   No family history on file.         Allergies  Patient has no known allergies.     Review of Systems   Constitutional: Negative.    HENT: Negative.     Eyes:  Positive for visual disturbance.   Respiratory:  Positive for cough.    Cardiovascular: Negative.    Gastrointestinal:  Positive for diarrhea.   Endocrine: Negative.    Musculoskeletal: Negative.    Skin: Negative.    Allergic/Immunologic: Negative.    Neurological:  Positive for dizziness, facial asymmetry, weakness and numbness.   Psychiatric/Behavioral: Negative.     All  "other systems reviewed and are negative.        Physical Exam  Constitutional:       Appearance: He is obese.   HENT:      Head: Normocephalic and atraumatic.      Nose: Nose normal.      Mouth/Throat:      Mouth: Mucous membranes are dry.   Eyes:      Extraocular Movements: Extraocular movements intact.      Conjunctiva/sclera: Conjunctivae normal.   Cardiovascular:      Rate and Rhythm: Regular rhythm. Tachycardia present.      Pulses: Normal pulses.      Heart sounds: Normal heart sounds.   Pulmonary:      Effort: Pulmonary effort is normal.      Breath sounds: Normal breath sounds.   Abdominal:      General: Bowel sounds are normal.      Palpations: Abdomen is soft.   Musculoskeletal:         General: Normal range of motion.      Cervical back: Normal range of motion and neck supple.   Skin:     General: Skin is warm and dry.   Neurological:      Mental Status: He is alert and oriented to person, place, and time.      Sensory: Sensory deficit present.      Motor: Weakness present.   Psychiatric:         Mood and Affect: Mood normal.            Last Recorded Vitals  Blood pressure (!) 189/76, pulse 70, temperature 36.3 °C (97.3 °F), temperature source Temporal, resp. rate 16, height 1.753 m (5' 9\"), weight 99.8 kg (220 lb), SpO2 96 %.     Relevant Results      68 y.o. male presenting with medical history for hyperlipidemia, hypertension, diabetes mellitus type 2, diabetic neuropathy, active smoker, bilateral carotid stenosis comes into the hospital secondary to right-sided weakness numbness and tingling.  Patient states all started around 9 PM yesterday.     Assessment/Plan   Principal Problem:    Cerebrovascular accident (CVA), unspecified mechanism (CMS/HCC)        CVA  -CT head is negative any acute hemorrhage or acute stroke at this time  -MRI from couple years ago shows left frontal infarct with subsequent encephalomalacia  -Received aspirin in ED.  -Neurochecks per stroke  protocol  -Continue aspirin, add " Plavix statin.  -Neurology has been consulted  -Check MRI  -Permissive hypertension     Diabetes mellitus type 2  -No medication in the chart in the past.  Continue with sliding scale.  We will likely add Lantus once home medications are reconciled.  Check A1c     Right upper lobe lung mass suggestive of lung cancer, cannot be biopsied due to its small size per interventional radiologist Dr. Tesfaye.  Will need a follow-up CT chest in 2 to 3 months.     Probable chronic obstructive pulmonary disease, currently stable     Nicotine dependency  Counseled to quit smoking cigarettes.  Plan of care discussed with patient and wife at bedside.  Patient counseled to quit smoking cigarettes.  Will follow-up in the office in 3 months after a CT scan of the chest (will be arranged by my office)  Pulmonary to sign off.  Please call if my services are needed again.  VTE prophylaxis: Lovenox            Nicholas Rutherford MD

## 2023-10-24 NOTE — PROGRESS NOTES
"Amandeep Lara is a 68 y.o. male on day 4 of admission presenting with Cerebrovascular accident (CVA), unspecified mechanism (CMS/HCC).    Subjective   Pt. Is in chair, still having right sided sensory changes, no weakness. He was started on Plavix and statin, already taking asa daily. We discussed with pt. Not driving until he is cleared by PT/OT and neurology.      Review of systems are negative unless otherwise specified in HPI.     Objective     Last Recorded Vitals  Blood pressure 177/72, pulse 63, temperature 36.7 °C (98.1 °F), resp. rate 16, height 1.727 m (5' 8\"), weight 93.4 kg (205 lb 14.6 oz), SpO2 97 %.    Physical Exam  Neurological Exam  Patient is alert and oriented x 3. PERRLA. Speech is spontaneous and clear. Moving all extremities.   Relevant Results    NIH Stroke Scale  1A. Level of Consciousness: Alert, Keenly Responsive  1B. Ask Month and Age: Both Questions Right  1C. Blink Eyes & Squeeze Hands: Performs Both Tasks  2. Best Gaze: Normal  3. Visual: No Visual Loss  4. Facial Palsy: Normal Symmetrical Movements  5A. Motor - Left Arm: No Drift  5B. Motor - Right Arm: No Drift  6A. Motor - Left Leg: No Drift  6B. Motor - Right Leg: No Drift  7. Limb Ataxia: Absent  8. Sensory Loss: Normal  9. Best Language: No Aphasia  10. Dysarthria: Normal  11. Extinction and Inattention: No Abnormality  NIH Stroke Scale: 0           Karlo Coma Scale  Best Eye Response: Spontaneous  Best Verbal Response: Oriented  Best Motor Response: Follows commands  Karlo Coma Scale Score: 15                     Results for orders placed or performed during the hospital encounter of 10/20/23 (from the past 24 hour(s))   POCT GLUCOSE   Result Value Ref Range    POCT Glucose 232 (H) 74 - 99 mg/dL   POCT GLUCOSE   Result Value Ref Range    POCT Glucose 167 (H) 74 - 99 mg/dL   POCT GLUCOSE   Result Value Ref Range    POCT Glucose 72 (L) 74 - 99 mg/dL   POCT GLUCOSE   Result Value Ref Range    POCT Glucose 116 (H) 74 - 99 " mg/dL   Transthoracic Echo (TTE) Complete    Result Date: 10/23/2023          [place] [id number]  Tel [phone] Fax [phone] TRANSTHORACIC ECHOCARDIOGRAM REPORT  Patient Name:      HOLLEY DODSON YESSI Holland Physician:    [id number] Bonifacio Quinones DO Study Date:        10/23/2023           Ordering Provider:    [id number] ALEXIS METCALF MRN/PID:           [id number]             Fellow: Accession#:        [id number]         Nurse:                Giovanni Pierce RN Date of Birth/Age: 1955 / 68 years Sonographer:          Karina WILLARD Gender:            M                    Additional Staff: Height:            172.72 cm            Admit Date:           10/20/2023 Weight:            92.99 kg             Admission Status:     Inpatient -                                                               Routine BSA:               2.07 m2              Department Location:  [facility]                                                               Echo Lab Blood Pressure: 191 /81 mmHg Study Type:    TRANSTHORACIC ECHO (TTE) COMPLETE Diagnosis/ICD: Other transient cerebral ischemic attacks and related                syndromes-G45.8 Indication:    Cerebrovascular Accident CPT Codes:     Echo Complete w Full [id number] Patient History: Smoker:            Current. Diabetes:          Yes Pertinent History: CVA, HTN and Hyperlipidemia. Study Detail: The following Echo studies were performed: 2D, M-Mode, Doppler and               color flow. Agitated saline used as a contrast agent for               intraseptal flow evaluation. The patient was awake.  PHYSICIAN INTERPRETATION: Left Ventricle: Left ventricular systolic function is low normal, with an estimated ejection fraction of 50%. There are  no regional wall motion abnormalities. The left ventricular cavity size is normal. There is moderate concentric left ventricular hypertrophy. Spectral Doppler shows a normal pattern of left ventricular diastolic filling. LV Wall Scoring: All segments are normal. Left Atrium: The left atrium is moderately dilated. A bubble study using agitated saline was performed. Bubble study is negative. Right Ventricle: The right ventricle is upper limits of normal in size. There is normal right ventricular global systolic function. Right Atrium: The right atrium is mildly dilated. Aortic Valve: The aortic valve appears structurally normal. The aortic valve appears tricuspid. There is no evidence of aortic valve stenosis. There is no evidence of aortic valve regurgitation. The peak instantaneous gradient of the aortic valve is 3.2 mmHg. The mean gradient of the aortic valve is 2.0 mmHg. Mitral Valve: The mitral valve is normal in structure. There is no evidence of mitral valve stenosis. There is normal mitral valve leaflet mobility. There is mild mitral valve regurgitation. Tricuspid Valve: The tricuspid valve is structurally normal. There is normal tricuspid valve leaflet mobility. There is trace tricuspid regurgitation. Pulmonic Valve: The pulmonic valve is structurally normal. There is no indication of pulmonic valve regurgitation. Pericardium: There is no pericardial effusion noted. Aorta: The aortic root is normal. Pulmonary Artery: The main pulmonary artery is normal in size, and position, with normal bifurcation into the left and right pulmonary arteries. Systemic Veins: The inferior vena cava appears to be of normal size. In comparison to the previous echocardiogram(s): The left ventricular function is unchanged. The left ventricular hypertrophy is unchanged. The left ventricular diastolic function is unchanged.  CONCLUSIONS:  1. Left ventricular systolic function is low normal with a 50% estimated ejection fraction.  2.  There is moderate concentric left ventricular hypertrophy.  3. The left atrium is moderately dilated.  4. There is no evidence of mitral valve stenosis.  5. Mild mitral valve regurgitation.  6. Trace tricuspid regurgitation is visualized.  7. Aortic valve stenosis is not present.  8. The main pulmonary artery is normal in size, and position, with normal bifurcation into the left and right pulmonary arteries. RECOMMENDATIONS: Transthoracic echo has low negative predictive value for mass, vegetation, or embolic source. Consider JOSE or MRI if clinically indicated.  QUANTITATIVE DATA SUMMARY: 2D MEASUREMENTS:                           Normal Ranges: Ao Root d:     3.60 cm    (2.0-3.7cm) LAs:           5.20 cm    (2.7-4.0cm) IVSd:          1.48 cm    (0.6-1.1cm) LVPWd:         1.45 cm    (0.6-1.1cm) LVIDd:         5.57 cm    (3.9-5.9cm) LVIDs:         3.84 cm LV Mass Index: 178.1 g/m2 LV % FS        31.1 % LA VOLUME:                               Normal Ranges: LA Vol A4C:        110.3 ml   (22+/-6mL/m2) LA Vol A2C:        102.5 ml LA Vol BP:         107.4 ml LA Vol Index A4C:  53.4ml/m2 LA Vol Index A2C:  49.6 ml/m2 LA Vol Index BP:   52.0 ml/m2 LA Area A4C:       29.0 cm2 LA Area A2C:       27.7 cm2 LA Major Axis A4C: 6.5 cm LA Major Axis A2C: 6.4 cm LA Volume Index:   49.3 ml/m2 LA Vol A4C:        105.0 ml LA Vol A2C:        98.0 ml RA VOLUME BY A/L METHOD:                               Normal Ranges: RA Vol A4C:        71.3 ml    (8.3-19.5ml) RA Vol Index A4C:  34.5 ml/m2 RA Area A4C:       21.3 cm2 RA Major Axis A4C: 5.4 cm AORTA MEASUREMENTS:                    Normal Ranges: Asc Ao, d: 3.30 cm (2.1-3.4cm) LV SYSTOLIC FUNCTION BY 2D PLANIMETRY (MOD):                     Normal Ranges: EF-A4C View: 54.9 % (>=55%) EF-A2C View: 49.2 % EF-Biplane:  52.4 % LV DIASTOLIC FUNCTION:                               Normal Ranges: MV Peak E:        0.80 m/s    (0.7-1.2 m/s) MV Peak A:        0.53 m/s    (0.42-0.7 m/s) E/A  Ratio:        1.49        (1.0-2.2) MV lateral e'     0.08 m/s MV medial e'      0.06 m/s E/e' Ratio:       9.60        (<8.0) PulmV Sys Geovani:    38.20 cm/s PulmV Lawson Geovani:   57.10 cm/s PulmV S/D Geovani:    0.70 PulmV A Revs Geovani: 33.20 cm/s PulmV A Revs Dur: 143.00 msec MITRAL VALVE:                 Normal Ranges: MV DT: 261 msec (150-240msec) AORTIC VALVE:                                   Normal Ranges: AoV Vmax:                0.90 m/s (<=1.7m/s) AoV Peak PG:             3.2 mmHg (<20mmHg) AoV Mean P.0 mmHg (1.7-11.5mmHg) LVOT Max Geovani:            0.73 m/s (<=1.1m/s) AoV VTI:                 18.80 cm (18-25cm) LVOT VTI:                15.60 cm LVOT Diameter:           2.40 cm  (1.8-2.4cm) AoV Area, VTI:           3.75 cm2 (2.5-5.5cm2) AoV Area,Vmax:           3.71 cm2 (2.5-4.5cm2) AoV Dimensionless Index: 0.83  RIGHT VENTRICLE: RV Basal 4.04 cm RV Mid   2.40 cm RV Major 8.5 cm TAPSE:   29.2 mm RV s'    0.12 m/s TRICUSPID VALVE/RVSP:                             Normal Ranges: Peak TR Velocity: 2.26 m/s RV Syst Pressure: 23.4 mmHg (< 30mmHg) IVC Diam:         1.62 cm PULMONIC VALVE:                         Normal Ranges: PV Accel Time: 90 msec  (>120ms) PV Max Geovani:    0.9 m/s  (0.6-0.9m/s) PV Max PG:     3.1 mmHg Pulmonary Veins: PulmV A Revs Dur: 143.00 msec PulmV A Revs Geovani: 33.20 cm/s PulmV Lawson Geovani:   57.10 cm/s PulmV S/D Geovani:    0.70 PulmV Sys Geovani:    38.20 cm/s  [id number] Bonifacio Quinones DO Electronically signed on 10/23/2023 at 4:24:49 PM  Wall Scoring  ** Final **       Scheduled medications   Medication Dose Route Frequency    amLODIPine  5 mg oral Daily    aspirin  81 mg oral Daily    atorvastatin  40 mg oral Nightly    carvedilol  25 mg oral BID    clopidogrel  75 mg oral Daily    enoxaparin  40 mg subcutaneous q24h    influenza  0.7 mL intramuscular During hospitalization    insulin glargine  20 Units subcutaneous Nightly    insulin lispro  0-10 Units subcutaneous TID with meals    lisinopril  40  mg oral Daily    perflutren lipid microspheres  0.5-10 mL of dilution intravenous Once in imaging    sennosides-docusate sodium  2 tablet oral BID                  Assessment/Plan   This patient currently has cardiac telemetry ordered; if you would like to modify or discontinue the telemetry order, click here to go to the orders activity to modify/discontinue the order.  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     Plan:  Continue with Plavix and ASA  PT/OT  Needs to be evaluated for LUIS FELIPE outpt.  Smoking cessation   Recommend cardiology consult with For inpt. JOSE for further evaluation of cardio embolic stroke with possible cardiac monitoring x 4 weeks outpt.      Discussed bleeding precautions with patient while on anti platelet and/or anticoagulation therapy. Discussed stroke prevention such as: HgbA1c <7, tight blood pressure control < 130/<80, antiplatelet and statin therapy (if indicated), and lifestyle modification (Mediterranean diet, daily exercise, nicotine cessation & limiting alcohol use). Discussed s/sx of stroke and to call 911 immediately.?              MALAIKA Maier-CNP      I agree with above assessment and plan. Cardiology to be consulted per the PCP . We will follow up.     I spent 30 minutes on overcall patient's care.

## 2023-10-24 NOTE — PROGRESS NOTES
Physical Therapy    Physical Therapy Treatment    Patient Name: Amandeep Lara  MRN: [id number]  Today's Date: 10/24/2023  Time Calculation  Start Time: 0825  Stop Time: 0839  Time Calculation (min): 14 min       Assessment/Plan   PT Assessment  PT Assessment Results: Impaired balance, Decreased mobility, Decreased coordination  Rehab Prognosis: Good  Evaluation/Treatment Tolerance: Patient tolerated treatment well  End of Session Communication: Bedside nurse  Assessment Comment:  (Pt continues to make progress toward all goals including gait and transfers. Minimal assist needed with no LOB noted this session. Patient would benefit from cont skilled PT to further progress toward goals.)  End of Session Patient Position: Up in chair, Alarm on  PT Plan  Inpatient/Swing Bed or Outpatient: Inpatient  PT Plan  Treatment/Interventions: Bed mobility, Transfer training, Gait training, Therapeutic exercise  PT Plan: Skilled PT  PT Frequency: 4 times per week  PT Discharge Recommendations: High intensity level of continued care  Equipment Recommended upon Discharge: Wheeled walker  PT Recommended Transfer Status: Assist x2 (may benefit from using ww)      General Visit Information:   PT  Visit  PT Received On: 10/24/23  General  Reason for Referral: Stroke  Referred By: Ryland  Past Medical History Relevant to Rehab: Admit 10/20 with R sided weakness, decreased sensation, difficulty ambulating, an episode of double vision and nausea with emesis. /99 CT brain (-); MRI of brain:  acute to subacute infarcts L thalamus and midbrain.  Prior to Session Communication: Bedside nurse  Patient Position Received: Up in chair, Alarm on  General Comment:  (Pt supine and pleasant agreeable to PT and getting in chair post ambulation.)    Subjective   Precautions:  Precautions  Medical Precautions: Fall precautions  Precautions Comment: Significant Ataxia during Gait; very high fall risk (Blurry vision R eye)  Vital Signs:        Objective   Pain:  Pain Assessment  Pain Assessment: 0-10  Pain Score: 0 - No pain  Cognition:  Cognition  Orientation Level: Oriented X4  Postural Control:     Extremity/Trunk Assessments:        Activity Tolerance:  Activity Tolerance  Endurance: Endurance does not limit participation in activity  Treatments:  Therapeutic Exercise  Therapeutic Exercise Performed: Yes (Seated AP, LAQ and marches x 10 ea BLE)    Bed Mobility  Bed Mobility: Yes (Sup>sit transfer with SBA to EOB.)    Ambulation/Gait Training  Ambulation/Gait Training Performed: Yes (Ambulating with a slow, reciprocal gait with a NBOS but no scissoring. Patient demos fair balance this session and uses a WW with CGA for 100'. Patient continues to report tingling along R side of body)  Transfers  Transfer:  (Sit<>stand transfer from EOB and chair with SBA but verbal/tactile cues for hand placement to avoid pulling on WW.)    Outcome Measures:  [facility] Basic Mobility  Turning from your back to your side while in a flat bed without using bedrails: A little  Moving from lying on your back to sitting on the side of a flat bed without using bedrails: A little  Moving to and from bed to chair (including a wheelchair): A little  Standing up from a chair using your arms (e.g. wheelchair or bedside chair): A little  To walk in hospital room: A little  Climbing 3-5 steps with railing: A little  Basic Mobility - Total Score: 18    Education Documentation  Precautions, taught by Anu Wakefield PTA at 10/24/2023  1:49 PM.  Learner: Patient  Readiness: Acceptance  Method: Explanation  Response: Verbalizes Understanding, Needs Reinforcement    Mobility Training, taught by Anu Wakefield PTA at 10/24/2023  1:49 PM.  Learner: Patient  Readiness: Acceptance  Method: Explanation  Response: Verbalizes Understanding, Needs Reinforcement    Education Comments  No comments found.        EDUCATION:  Education  Individual(s) Educated: Patient  Education Provided: Body  Mechanics, Fall Risk, POC  Patient/Caregiver Demonstrated Understanding: yes  Plan of Care Discussed and Agreed Upon: yes  Patient Response to Education: Patient/Caregiver Verbalized Understanding of Information  Education Comment:  (Patient educated in gait, transfers ther ex and safety awareness.)    Encounter Problems       Encounter Problems (Active)       Impaired mobility s/p CVA        Perform all bed mobility with indep.  (Progressing)       Start:  10/21/23    Expected End:  11/04/23            Perform all transfers with ww vs LRAD and CGAx1 (Progressing)       Start:  10/21/23    Expected End:  11/04/23            Patient will ambulate >/= 50 ft. with 2ww vs LRAD and CGAx1 (Progressing)       Start:  10/21/23    Expected End:  11/04/23            Patient will ascend/descend at least 5 steps with HR and cane, if needed, with minAx1 (Progressing)       Start:  10/21/23    Expected End:  11/04/23

## 2023-10-24 NOTE — PROGRESS NOTES
Occupational Therapy    OT Treatment    Patient Name: Amandeep Lara  MRN: [id number]  Today's Date: 10/24/2023  Time Calculation  Start Time: 1007  Stop Time: 1031  Time Calculation (min): 24 min         Assessment:  Medical Staff Made Aware: Yes  End of Session Communication: Bedside nurse  End of Session Patient Position: Up in chair, Alarm on  Medical Staff Made Aware: Yes  Plan:  OT Frequency: 4 times per week     Subjective   General:  OT Received On: 10/24/23  Reason for Referral: Stroke  Prior to Session Communication: Bedside nurse  Patient Position Received: Bed, 2 rail up, Alarm on  Vital Signs:     Pain:       Objective    Activities of Daily Living: Grooming  Grooming Level of Assistance: Setup, Minimum assistance  Grooming Where Assessed: Standing sinkside  Grooming Comments: with verbal cues to use Right hand          UE Dressing  UE Dressing Level of Assistance: Minimum assistance (don/doff gown)  UE Dressing Where Assessed: Edge of bed    LE Dressing  LE Dressing: Yes  Pants Level of Assistance: Minimum assistance (pants mgmt while in stance with min a)  Sock Level of Assistance:  (doffed with comp tech and min a.  donned with mod a and comp tech)  LE Dressing Where Assessed: Edge of bed       Functional Standing Tolerance:  Functional Standing Tolerance Comments: patient stood for a total of 5 mins this date with fair/fair+ balance with 2-0 ue support as needed during functional ambulation/transfers and ADL tasks  Bed Mobility/Transfers: Bed Mobility  Bed Mobility: Yes  Bed Mobility 1  Bed Mobility 1: Supine to sitting  Level of Assistance 1: Moderate assistance    Transfers  Transfer: Yes  Transfer 1  Technique 1: Sit to stand  Transfer Device 1: Walker  Transfer Level of Assistance 1: Minimum assistance  Trials/Comments 1: with verbal cue for hand placement  Transfers 2  Transfer Device 2: Walker  Transfer Level of Assistance 2: Minimum assistance  Trials/Comments 2: functional ambulation         Therapy/Activity: Therapeutic Activity  Therapeutic Activity Performed: Yes  Therapeutic Activity 1: patient sat EOB for a total of 5 mins this date with fair/fair- balance with 2-0 ue support as needed during ADL tasks       Outcome Measures:[facility] Daily Activity  Putting on and taking off regular lower body clothing: A lot  Bathing (including washing, rinsing, drying): A lot  Putting on and taking off regular upper body clothing: A little  Toileting, which includes using toilet, bedpan or urinal: A lot  Taking care of personal grooming such as brushing teeth: A little  Eating Meals: A little  Daily Activity - Total Score: 15    Education Documentation  Precautions, taught by CHIQUI Delvalle at 10/24/2023 12:12 PM.  Learner: Patient  Readiness: Acceptance  Method: Explanation  Response: Verbalizes Understanding    Education Comments  No comments found.      OP EDUCATION:  Education  Individual(s) Educated: Patient  Education Provided:  (Signs and symptoms of stroke, F.A.S.T., balance deficits, fall risk, recommend continued rehab, no getting up without staff)  Equipment:  (AE)  Risk and Benefits Discussed with Patient/Caregiver/Other: yes  Patient/Caregiver Demonstrated Understanding: yes  Patient Response to Education: Patient/Caregiver Verbalized Understanding of Information    Goals:  Encounter Problems       Encounter Problems (Active)       OT Goals       Min assist sit/stand, bed/chair/commode with FWW. (Progressing)       Start:  10/21/23    Expected End:  11/04/23            Fair dynamic standing balance for ADL with UE support.  (Progressing)       Start:  10/21/23    Expected End:  11/04/23            Min assist ADL functional mobility with FWW.  (Progressing)       Start:  10/21/23    Expected End:  11/04/23            Demonstrate use of visual contact with R hand/UE during functional tasks, without cues, for increased accuracy of R hand function 2nd to sensory deficit.  (Progressing)       Start:   10/21/23    Expected End:  11/04/23            SURI motor planning WFL for ADL and functional mobility.   (Progressing)       Start:  10/21/23    Expected End:  11/04/23

## 2023-10-25 ENCOUNTER — APPOINTMENT (OUTPATIENT)
Dept: CARDIOLOGY | Facility: HOSPITAL | Age: 68
DRG: 065 | End: 2023-10-25
Payer: COMMERCIAL

## 2023-10-25 LAB
GLUCOSE BLD MANUAL STRIP-MCNC: 141 MG/DL (ref 74–99)
GLUCOSE BLD MANUAL STRIP-MCNC: 368 MG/DL (ref 74–99)
GLUCOSE BLD MANUAL STRIP-MCNC: 72 MG/DL (ref 74–99)
GLUCOSE BLD MANUAL STRIP-MCNC: 72 MG/DL (ref 74–99)
GLUCOSE BLD MANUAL STRIP-MCNC: 75 MG/DL (ref 74–99)

## 2023-10-25 PROCEDURE — 99239 HOSP IP/OBS DSCHRG MGMT >30: CPT | Performed by: STUDENT IN AN ORGANIZED HEALTH CARE EDUCATION/TRAINING PROGRAM

## 2023-10-25 PROCEDURE — 93312 ECHO TRANSESOPHAGEAL: CPT | Performed by: INTERNAL MEDICINE

## 2023-10-25 PROCEDURE — 93312 ECHO TRANSESOPHAGEAL: CPT

## 2023-10-25 PROCEDURE — 2500000001 HC RX 250 WO HCPCS SELF ADMINISTERED DRUGS (ALT 637 FOR MEDICARE OP): Performed by: INTERNAL MEDICINE

## 2023-10-25 PROCEDURE — 2500000004 HC RX 250 GENERAL PHARMACY W/ HCPCS (ALT 636 FOR OP/ED): Performed by: INTERNAL MEDICINE

## 2023-10-25 PROCEDURE — 1200000002 HC GENERAL ROOM WITH TELEMETRY DAILY

## 2023-10-25 PROCEDURE — 96372 THER/PROPH/DIAG INJ SC/IM: CPT | Performed by: INTERNAL MEDICINE

## 2023-10-25 PROCEDURE — 82947 ASSAY GLUCOSE BLOOD QUANT: CPT

## 2023-10-25 PROCEDURE — 2500000002 HC RX 250 W HCPCS SELF ADMINISTERED DRUGS (ALT 637 FOR MEDICARE OP, ALT 636 FOR OP/ED): Performed by: INTERNAL MEDICINE

## 2023-10-25 RX ORDER — AMLODIPINE BESYLATE 5 MG/1
5 TABLET ORAL DAILY
Qty: 30 TABLET | Refills: 0 | Status: SHIPPED | OUTPATIENT
Start: 2023-10-26 | End: 2024-03-23 | Stop reason: HOSPADM

## 2023-10-25 RX ORDER — INSULIN LISPRO 100 [IU]/ML
0-10 INJECTION, SOLUTION INTRAVENOUS; SUBCUTANEOUS
Qty: 9 ML | Refills: 0 | Status: SHIPPED | OUTPATIENT
Start: 2023-10-25 | End: 2023-11-24

## 2023-10-25 RX ORDER — FENTANYL CITRATE 50 UG/ML
INJECTION, SOLUTION INTRAMUSCULAR; INTRAVENOUS AS NEEDED
Status: DISCONTINUED | OUTPATIENT
Start: 2023-10-25 | End: 2023-10-26 | Stop reason: HOSPADM

## 2023-10-25 RX ORDER — CLOPIDOGREL BISULFATE 75 MG/1
75 TABLET ORAL DAILY
Qty: 30 TABLET | Refills: 0 | Status: SHIPPED | OUTPATIENT
Start: 2023-10-26 | End: 2023-11-25

## 2023-10-25 RX ORDER — ATORVASTATIN CALCIUM 40 MG/1
40 TABLET, FILM COATED ORAL NIGHTLY
Qty: 30 TABLET | Refills: 0 | Status: SHIPPED | OUTPATIENT
Start: 2023-10-25 | End: 2024-03-20

## 2023-10-25 RX ORDER — INSULIN GLARGINE 100 [IU]/ML
20 INJECTION, SOLUTION SUBCUTANEOUS NIGHTLY
Qty: 6 ML | Refills: 0 | Status: SHIPPED | OUTPATIENT
Start: 2023-10-25 | End: 2024-03-23 | Stop reason: HOSPADM

## 2023-10-25 RX ORDER — MIDAZOLAM HYDROCHLORIDE 5 MG/ML
INJECTION, SOLUTION INTRAMUSCULAR; INTRAVENOUS AS NEEDED
Status: DISCONTINUED | OUTPATIENT
Start: 2023-10-25 | End: 2023-10-26 | Stop reason: HOSPADM

## 2023-10-25 RX ADMIN — CARVEDILOL 25 MG: 12.5 TABLET, FILM COATED ORAL at 08:09

## 2023-10-25 RX ADMIN — CARVEDILOL 25 MG: 12.5 TABLET, FILM COATED ORAL at 22:36

## 2023-10-25 RX ADMIN — ENOXAPARIN SODIUM 40 MG: 40 INJECTION SUBCUTANEOUS at 22:35

## 2023-10-25 RX ADMIN — AMLODIPINE BESYLATE 5 MG: 5 TABLET ORAL at 08:09

## 2023-10-25 RX ADMIN — MIDAZOLAM HYDROCHLORIDE 1 MG: 5 INJECTION, SOLUTION INTRAMUSCULAR; INTRAVENOUS at 13:35

## 2023-10-25 RX ADMIN — ASPIRIN 81 MG: 81 TABLET, COATED ORAL at 08:09

## 2023-10-25 RX ADMIN — BENZOCAINE, BUTAMBEN, AND TETRACAINE HYDROCHLORIDE 3 SPRAY: .028; .004; .004 AEROSOL, SPRAY TOPICAL at 13:27

## 2023-10-25 RX ADMIN — LISINOPRIL 40 MG: 20 TABLET ORAL at 08:09

## 2023-10-25 RX ADMIN — ATORVASTATIN CALCIUM 40 MG: 20 TABLET, FILM COATED ORAL at 22:36

## 2023-10-25 RX ADMIN — CLOPIDOGREL BISULFATE 75 MG: 75 TABLET, FILM COATED ORAL at 08:09

## 2023-10-25 RX ADMIN — FENTANYL CITRATE 1 MCG: 50 INJECTION, SOLUTION INTRAMUSCULAR; INTRAVENOUS at 13:35

## 2023-10-25 RX ADMIN — INSULIN GLARGINE 20 UNITS: 100 INJECTION, SOLUTION SUBCUTANEOUS at 22:37

## 2023-10-25 ASSESSMENT — COGNITIVE AND FUNCTIONAL STATUS - GENERAL
CLIMB 3 TO 5 STEPS WITH RAILING: A LITTLE
DAILY ACTIVITIY SCORE: 24
MOBILITY SCORE: 23

## 2023-10-25 ASSESSMENT — PAIN - FUNCTIONAL ASSESSMENT
PAIN_FUNCTIONAL_ASSESSMENT: 0-10
PAIN_FUNCTIONAL_ASSESSMENT: 0-10

## 2023-10-25 ASSESSMENT — PAIN SCALES - GENERAL
PAINLEVEL_OUTOF10: 0 - NO PAIN
PAINLEVEL_OUTOF10: 0 - NO PAIN

## 2023-10-25 NOTE — CARE PLAN
The patient's goals for the shift include adequate sleep    The clinical goals for the shift include pt to remain free from falls      Problem: Diabetes  Goal: Achieve decreasing blood glucose levels by end of shift  Outcome: Progressing  Goal: Increase stability of blood glucose readings by end of shift  Outcome: Progressing  Goal: Maintain electrolyte levels within acceptable range throughout shift  Outcome: Progressing  Goal: Maintain glucose levels >70mg/dl to <250mg/dl throughout shift  Outcome: Progressing  Goal: No changes in neurological exam by end of shift  Outcome: Progressing  Goal: Learn about and adhere to nutrition recommendations by end of shift  Outcome: Progressing  Goal: Vital signs within normal range for age by end of shift  Outcome: Progressing  Goal: Increase self care and/or family involovement by end of shift  Outcome: Progressing  Goal: Receive DSME education by end of shift  Outcome: Progressing     Problem: Fall/Injury  Goal: Not fall by end of shift  Outcome: Progressing  Goal: Be free from injury by end of the shift  Outcome: Progressing  Goal: Verbalize understanding of personal risk factors for fall in the hospital  Outcome: Progressing  Goal: Verbalize understanding of risk factor reduction measures to prevent injury from fall in the home  Outcome: Progressing  Goal: Pace activities to prevent fatigue by end of the shift  Outcome: Progressing     Problem: General Stroke  Goal: Demonstrate improvement in neurological exam throughout the shift  Outcome: Progressing  Goal: Maintain BP within ordered limits throughout shift  Outcome: Progressing  Goal: Participate in treatment (ie., meds, therapy) throughout shift  Outcome: Progressing  Goal: No symptoms of aspiration throughout shift  Outcome: Progressing  Goal: No symptoms of hemorrhage throughout shift  Outcome: Progressing  Goal: Tolerate enteral feeding throughout shift  Outcome: Progressing  Goal: Decreased nausea/vomiting  throughout shift  Outcome: Progressing  Goal: Controlled blood glucose throughout shift  Outcome: Progressing  Goal: Out of bed three times today  Outcome: Progressing

## 2023-10-25 NOTE — CARE PLAN
The patient's goals for the shift include maintain safety    The clinical goals for the shift include pt to remain free from falls

## 2023-10-25 NOTE — PROGRESS NOTES
"Amandeep Lara is a 68 y.o. male on day 5 of admission presenting with Cerebrovascular accident (CVA), unspecified mechanism (CMS/HCC).    Subjective   Patient seen and examined.  He is at his baseline.  Weakness/numbness in arm is improving, no trouble swallowing, getting up.       Objective     Physical Exam  Eyes:      Extraocular Movements: Extraocular movements intact.      Conjunctiva/sclera: Conjunctivae normal.   Cardiovascular:      Rate and Rhythm: Normal rate and regular rhythm.      Pulses: Normal pulses.      Heart sounds: Normal heart sounds.   Pulmonary:      Effort: Pulmonary effort is normal.      Breath sounds: Normal breath sounds.   Abdominal:      General: Bowel sounds are normal.      Palpations: Abdomen is soft.   Musculoskeletal:         General: Normal range of motion.      Cervical back: Normal range of motion and neck supple.   Skin:     General: Skin is warm and dry.   Neurological:      Mental Status: He is alert and oriented to person, place, and time.      Sensory: Sensory deficit present.      Motor: Weakness present.   Last Recorded Vitals  Blood pressure 139/65, pulse 62, temperature 36.7 °C (98 °F), temperature source Temporal, resp. rate 15, height 1.727 m (5' 8\"), weight 93.4 kg (205 lb 14.6 oz), SpO2 95 %.  Intake/Output last 3 Shifts:  I/O last 3 completed shifts:  In: 775 (8.3 mL/kg) [P.O.:775]  Out: 1375 (14.7 mL/kg) [Urine:1375 (0.4 mL/kg/hr)]  Weight: 93.4 kg     Relevant Results           This patient currently has cardiac telemetry ordered; if you would like to modify or discontinue the telemetry order, click here to go to the orders activity to modify/discontinue the order.                 Assessment/Plan   Principal Problem:    Cerebrovascular accident (CVA), unspecified mechanism (CMS/HCC)  Active Problems:    Primary hypertension    Diabetic polyneuropathy associated with type 2 diabetes mellitus (CMS/HCC)    As per neurology, MRI was suspicious for embolic " stroke  We will order an JOSE  Cardiology was consulted but did not think we need to see the patient at this point  Continue aspirin, statin, Plavix  Neurologically has remained stable  Pulmonology following, will follow-up as an outpatient for incidental finding of lung mass  CTA did show intracranial stenosis, will follow-up as an outpatient with [facility] neurosurgery  Continue Coreg, lisinopril, amlodipine  On Lantus 20 units and insulin sliding scale  DVT prophylaxis               Victor Hugo Oliva MD

## 2023-10-25 NOTE — DISCHARGE SUMMARY
Discharge Diagnosis  Cerebrovascular accident (CVA), unspecified mechanism (CMS/HCC)    Issues Requiring Follow-Up      Test Results Pending At Discharge  Pending Labs       Order Current Status    Extra Tubes Preliminary result    Lavender Top Preliminary result    SST TOP Preliminary result            Hospital Course   68-year-old male with past medical history of diabetes type 2, hypertension, smoking, admitted with right-sided numbness and diplopia.  Was found to have embolic stroke.  Was also found to have an incidental lung mass for which pulmonology was consulted we will follow-up as an outpatient.  Neurology saw the patient, as per them the MRI was suspicious for an embolic stroke, transthoracic echo did not show any vegetations, JOSE was ordered.  There were no arrhythmias observed during the hospital stay.  Patient will be discharged on aspirin, Plavix, he was only taking aspirin before and statin.  Will be on ARB, amlodipine and Coreg at discharge.  We will also be on Lantus 20 units and insulin sliding scale.  Neurologically has remained stable.  Was counseled regarding smoking.  Will be discharged to a rehab facility.    Pertinent Physical Exam At Time of Discharge  Physical Exam  Constitutional:       General: He is not in acute distress.  HENT:      Head: Normocephalic.   Eyes:      Conjunctiva/sclera: Conjunctivae normal.   Cardiovascular:      Rate and Rhythm: Normal rate and regular rhythm.      Heart sounds: No murmur heard.  Pulmonary:      Effort: No respiratory distress.      Breath sounds: No wheezing.   Abdominal:      General: Bowel sounds are normal.      Palpations: Abdomen is soft.   Musculoskeletal:      Cervical back: No rigidity.   Skin:     General: Skin is warm and dry.   Neurological:      Mental Status: He is alert.      Comments: Complains of some right forearm/hand numbness, no weakness appreciated   Psychiatric:         Mood and Affect: Mood normal.         Home Medications      Medication List      START taking these medications     amLODIPine 5 mg tablet; Commonly known as: Norvasc; Take 1 tablet (5 mg)   by mouth once daily. Do not start before October 26, 2023.; Start taking   on: October 26, 2023   atorvastatin 40 mg tablet; Commonly known as: Lipitor; Take 1 tablet (40   mg) by mouth once daily at bedtime.   clopidogrel 75 mg tablet; Commonly known as: Plavix; Take 1 tablet (75   mg) by mouth once daily. Do not start before October 26, 2023.; Start   taking on: October 26, 2023   insulin glargine 100 unit/mL injection; Commonly known as: Lantus;   Inject 20 Units under the skin once daily at bedtime. Take as directed per   insulin instructions.   insulin lispro 100 unit/mL injection; Commonly known as: HumaLOG; Inject   0-0.1 mL (0-10 Units) under the skin 3 times a day with meals. Take as   directed per insulin instructions.     CONTINUE taking these medications     ascorbic acid 250 mg tablet; Commonly known as: Vitamin C   aspirin 81 mg EC tablet   carvedilol 25 mg tablet; Commonly known as: Coreg   Fish OiL 1,000 mg (120 mg-180 mg) capsule; Generic drug: omega   3-dha-epa-fish oil   lisinopril 40 mg tablet   metFORMIN 1,000 mg tablet; Commonly known as: Glucophage   nitroglycerin 0.4 mg SL tablet; Commonly known as: Nitrostat   NovoLOG Mix 70-30 U-100 Insuln 100 unit/mL (70-30) injection; Generic   drug: insulin asp prt-insulin aspart   VITAMIN B-12 ORAL   zinc sulfate 220 (50 Zn) MG capsule; Commonly known as: Zincate       Outpatient Follow-Up  Future Appointments   Date Time Provider Department Center   6/17/2024 12:00 PM Bonifacio Quinones DO [id number] [place]       Victor Hugo Oliva MD

## 2023-10-25 NOTE — POST-PROCEDURE NOTE
Physician Transition of Care Summary  Invasive Cardiovascular Lab    Procedure Date: 10/25/2023  Attending: * Surgery not found *  Resident/Fellow/Other Assistant: * Surgery not found *    Indications:   * No surgery found *    Post-procedure diagnosis:   * No surgery found *    Procedure(s):   * Cannot find OR case *  * No surgery found *    Procedure Findings:   Normal left ventricular function, moderate mitral regurgitation, no thrombus, no PFO, moderate atherosclerotic plaque in the ascending aorta and aortic arch    Description of the Procedure:   Transesophageal echocardiogram with color Doppler and bubble saline injection    Complications:   None    Stents/Implants:       Anticoagulation/Antiplatelet Plan:   None    Estimated Blood Loss:   * No surgery found *    Anesthesia: * No surgery found * Anesthesia Staff: Anesthesia staff cannot be found from this context.    Any Specimen(s) Removed:   * Cannot find OR log *    Disposition:   Medical therapy      Electronically signed by: Selena Alomnte MD, 10/25/2023 1:39 PM

## 2023-10-26 ENCOUNTER — HOSPITAL ENCOUNTER (OUTPATIENT)
Facility: REHABILITATION | Age: 68
End: 2023-10-26

## 2023-10-26 VITALS
HEART RATE: 66 BPM | OXYGEN SATURATION: 94 % | SYSTOLIC BLOOD PRESSURE: 177 MMHG | TEMPERATURE: 98.1 F | DIASTOLIC BLOOD PRESSURE: 83 MMHG | BODY MASS INDEX: 31.21 KG/M2 | HEIGHT: 68 IN | WEIGHT: 205.91 LBS | RESPIRATION RATE: 16 BRPM

## 2023-10-26 LAB
GLUCOSE BLD MANUAL STRIP-MCNC: 178 MG/DL (ref 74–99)
GLUCOSE BLD MANUAL STRIP-MCNC: 190 MG/DL (ref 74–99)

## 2023-10-26 PROCEDURE — 82947 ASSAY GLUCOSE BLOOD QUANT: CPT

## 2023-10-26 PROCEDURE — 99231 SBSQ HOSP IP/OBS SF/LOW 25: CPT | Performed by: STUDENT IN AN ORGANIZED HEALTH CARE EDUCATION/TRAINING PROGRAM

## 2023-10-26 PROCEDURE — 2500000001 HC RX 250 WO HCPCS SELF ADMINISTERED DRUGS (ALT 637 FOR MEDICARE OP): Performed by: INTERNAL MEDICINE

## 2023-10-26 RX ADMIN — CLOPIDOGREL BISULFATE 75 MG: 75 TABLET, FILM COATED ORAL at 08:01

## 2023-10-26 RX ADMIN — ASPIRIN 81 MG: 81 TABLET, COATED ORAL at 08:01

## 2023-10-26 RX ADMIN — INSULIN LISPRO 2 UNITS: 100 INJECTION, SOLUTION INTRAVENOUS; SUBCUTANEOUS at 07:49

## 2023-10-26 RX ADMIN — LISINOPRIL 40 MG: 20 TABLET ORAL at 08:00

## 2023-10-26 RX ADMIN — AMLODIPINE BESYLATE 5 MG: 5 TABLET ORAL at 08:01

## 2023-10-26 RX ADMIN — CARVEDILOL 25 MG: 12.5 TABLET, FILM COATED ORAL at 08:01

## 2023-10-26 RX ADMIN — INSULIN LISPRO 2 UNITS: 100 INJECTION, SOLUTION INTRAVENOUS; SUBCUTANEOUS at 11:05

## 2023-10-26 ASSESSMENT — COGNITIVE AND FUNCTIONAL STATUS - GENERAL
DAILY ACTIVITIY SCORE: 24
MOBILITY SCORE: 24

## 2023-10-26 ASSESSMENT — PAIN SCALES - GENERAL: PAINLEVEL_OUTOF10: 0 - NO PAIN

## 2023-10-26 NOTE — CARE PLAN
The patient's goals for the shift include maintain safety    The clinical goals for the shift include pt remain free of falls

## 2023-10-26 NOTE — PROGRESS NOTES
"Amandeep Lara is a 68 y.o. male on day 6 of admission presenting with Cerebrovascular accident (CVA), unspecified mechanism (CMS/HCC).    Subjective   Patient seen and examined.  No fevers, chills, trouble breathing, nausea, vomiting.  Still has persistent weakness/numbness of right arm.       Objective     Physical Exam     Heart sounds: Normal heart sounds.   Pulmonary:      Effort: Pulmonary effort is normal.      Breath sounds: Normal breath sounds.   Abdominal:      General: Bowel sounds are normal.      Palpations: Abdomen is soft.   Musculoskeletal:         General: Normal range of motion.      Cervical back: Normal range of motion and neck supple.   Skin:     General: Skin is warm and dry.   Neurological:      Mental Status: He is alert and oriented to person, place, and time.     Mild right arm weakness  Last Recorded Vitals  Blood pressure 177/83, pulse 66, temperature 36.7 °C (98.1 °F), temperature source Temporal, resp. rate 16, height 1.727 m (5' 8\"), weight 93.4 kg (205 lb 14.6 oz), SpO2 94 %.  Intake/Output last 3 Shifts:  No intake/output data recorded.    Relevant Results           This patient currently has cardiac telemetry ordered; if you would like to modify or discontinue the telemetry order, click here to go to the orders activity to modify/discontinue the order.                 Assessment/Plan   Principal Problem:    Cerebrovascular accident (CVA), unspecified mechanism (CMS/HCC)  Active Problems:    Primary hypertension    Diabetic polyneuropathy associated with type 2 diabetes mellitus (CMS/HCC)      Underwent JOSE yesterday  JOSE reviewed, did not show any vegetations or clots  We will recommend outpatient follow-up with cardiology  Will be discharged on aspirin, statin, Plavix  Neurologically has remained stable  We will continue rehab as an outpatient  Will be discharged to a rehab facility  We will follow-up pulmonology for incidental finding of lung mass  He does follow-up with " [facility] neurosurgery which she can follow as an outpatient for intracranial stenosis  Continue Coreg, lisinopril, amlodipine, on Lantus 20 units of insulin sliding scale which will be continued as an outpatient  DVT prophylaxis           Victor Hugo Oliva MD

## 2023-10-31 ENCOUNTER — HOME HEALTH ADMISSION (OUTPATIENT)
Dept: HOME HEALTH SERVICES | Facility: HOME HEALTH | Age: 68
End: 2023-10-31
Payer: COMMERCIAL

## 2023-11-04 ENCOUNTER — HOME CARE VISIT (OUTPATIENT)
Dept: HOME HEALTH SERVICES | Facility: HOME HEALTH | Age: 68
End: 2023-11-04
Payer: COMMERCIAL

## 2023-11-04 VITALS
HEART RATE: 70 BPM | WEIGHT: 216 LBS | DIASTOLIC BLOOD PRESSURE: 68 MMHG | SYSTOLIC BLOOD PRESSURE: 132 MMHG | OXYGEN SATURATION: 97 % | BODY MASS INDEX: 31.99 KG/M2 | RESPIRATION RATE: 18 BRPM | HEIGHT: 69 IN | TEMPERATURE: 97.9 F

## 2023-11-04 PROCEDURE — 1090000002 HH PPS REVENUE DEBIT

## 2023-11-04 PROCEDURE — G0299 HHS/HOSPICE OF RN EA 15 MIN: HCPCS | Mod: HHH

## 2023-11-04 PROCEDURE — 0023 HH SOC

## 2023-11-04 PROCEDURE — G0151 HHCP-SERV OF PT,EA 15 MIN: HCPCS

## 2023-11-04 PROCEDURE — 169592 NO-PAY CLAIM PROCEDURE

## 2023-11-04 PROCEDURE — 1090000001 HH PPS REVENUE CREDIT

## 2023-11-04 ASSESSMENT — PAIN SCALES - PAIN ASSESSMENT IN ADVANCED DEMENTIA (PAINAD)
NEGVOCALIZATION: 0
NEGVOCALIZATION: 0 - NONE.
BREATHING: 0
BODYLANGUAGE: 0
BODYLANGUAGE: 0 - RELAXED.
CONSOLABILITY: 0
FACIALEXPRESSION: 0
FACIALEXPRESSION: 0 - SMILING OR INEXPRESSIVE.
CONSOLABILITY: 0 - NO NEED TO CONSOLE.
TOTALSCORE: 0

## 2023-11-04 ASSESSMENT — ENCOUNTER SYMPTOMS
SUBJECTIVE PAIN PROGRESSION: UNCHANGED
LOSS OF SENSATION IN FEET: 0
BOWEL PATTERN NORMAL: 1
OCCASIONAL FEELINGS OF UNSTEADINESS: 1
LOWEST PAIN SEVERITY IN PAST 24 HOURS: 0/10
STOOL FREQUENCY: DAILY
PAIN SEVERITY GOAL: 0/10
PERSON REPORTING PAIN: PATIENT
DENIES PAIN: 1
PERSON REPORTING PAIN: PATIENT
DESCRIPTION OF MEMORY LOSS: SHORT TERM
LAST BOWEL MOVEMENT: 66782
HIGHEST PAIN SEVERITY IN PAST 24 HOURS: 0/10
MUSCLE WEAKNESS: 1
HYPERTENSION: 1
OCCASIONAL FEELINGS OF UNSTEADINESS: 0
APPETITE LEVEL: GOOD
HEADACHES: 1
DEPRESSION: 0
DENIES PAIN: 1
CHANGE IN APPETITE: UNCHANGED

## 2023-11-04 ASSESSMENT — ACTIVITIES OF DAILY LIVING (ADL)
AMBULATION_DISTANCE/DURATION_TOLERATED: 75 FEET
FEEDING: INDEPENDENT
FEEDING ASSESSED: 1
AMBULATION ASSISTANCE ON FLAT SURFACES: 1
PHYSICAL TRANSFERS ASSESSED: 1
CURRENT_FUNCTION: INDEPENDENT
AMBULATION ASSISTANCE: 1
ENTERING_EXITING_HOME: MINIMUM ASSIST
AMBULATION ASSISTANCE: INDEPENDENT

## 2023-11-04 ASSESSMENT — LIFESTYLE VARIABLES: SMOKING_STATUS: 0

## 2023-11-04 NOTE — HOME HEALTH
SOC COMPLETED 11/4/23. ASSESSMENT AS DOCUMENTED. VITAL SIGNS STABLE. MEDICATION RECONCILIATION COMPLETED. NO PROBLEMS FOUND.  PATIENT WAS DISCHARGED FROM ACUTE REHAB FOLLOWING STROKE WITH RIGHT SIDED WEAKNESS. AMBULATES WITH CANE, INDEPENDENT WITH PHYSICAL TRANSFERS. SPEECH CLEAR, DENIES ANY SWALLOWING DIFFICULTY, REPORTS SOME MILD FORGETFULNESS THATS NOT ATYPICAL FOR HIS BASELINE LEVEL OF FUNCTIONING. ABLE TO FOLLOW DIRECTIONS. UPON MY ARRIVAL PATIENT SITTING ON LIVING ROOM FLOOR DENIES FALL REPORTS HE LIKES TO SIT THERE. HE IS ABLE TO INDEPENDENTLY GET UP FROM FLOOR BY PUSHING SELF UP WITH ARMS. HAS OLDER BP CUFF IN HOME THAT IS COMPARABLE WITH MANUAL BP OBTAINED TODAY IN HOME. SELF CHECK HIS OWN GLUCOSE LEVELS AND RECORDS, HE CHECKED THIS MORNING REPORTS FROM [employer] LEVEL 158, HE COULDNT RECALL FROM MEMORY BUT REPORTS THATS NORMAL FOR HIM. SON PRESENT WITH VISIT TODAY. REPORTS THAT OTHER FAMILY MEMBERS WIFE/SON/DTR ALL LIVE IN HOME AND WORK DIFFERENT SHIFTS SO SOMEBODY IS USUALLY HOME WITH PATIENT AT ALL TIMES.   REVIEWED POC AND INSURANCE COVERAGE WITH PATIENT/SON. SON REPORTS THAT SPOUSE THOUGHT [facility] WAS COVERED 100%, HAS 80% COVERAGE WITH $120 COPAY PER VISIT. SON TO REPORT THIS TO HIS MOTHER.  PATIENT HAD THERAPY EVALUATION EARLIER PROVIDED WITH HOME THERAPY PROGRAM AND NO FURTHER FOLLOW UP.  NURSING WILL FOLLOW UP WITH PATIENT FOR 1-2 MORE VISITS THAT WILL BE COORDINATED WITH SPOUSE FOR MEDICATION OR DISEASE PROCESS EDUCATION/QUESTIONS.  PATIENT DECLINES HOME CARE AIDE SERVICES.    PLAN FOR NEXT VISIT: CP ASSESS/DISEASE PROCESS AND MEDICATION EDUCATION

## 2023-11-05 PROCEDURE — 1090000002 HH PPS REVENUE DEBIT

## 2023-11-05 PROCEDURE — 1090000001 HH PPS REVENUE CREDIT

## 2023-11-06 ENCOUNTER — HOME CARE VISIT (OUTPATIENT)
Dept: HOME HEALTH SERVICES | Facility: HOME HEALTH | Age: 68
End: 2023-11-06
Payer: COMMERCIAL

## 2023-11-06 PROCEDURE — G0152 HHCP-SERV OF OT,EA 15 MIN: HCPCS

## 2023-11-06 PROCEDURE — 1090000001 HH PPS REVENUE CREDIT

## 2023-11-06 PROCEDURE — 1090000002 HH PPS REVENUE DEBIT

## 2023-11-06 SDOH — ECONOMIC STABILITY: HOUSING INSECURITY
HOME SAFETY: 67 YO MALE S/P CVA. PATIENT IS RETIRED, LIVES AT HOME WITH HIS WIFE AND 2 ADULT CHILDREN. PATIENT LIVES IN SPLIT LEVEL HOME, STEPS TO ENTER WITH HAND RAIL. PRIOR TO HOSPITALIZATION PATIENT WAS INDEP, DRIVING, COOKING, FAMILY COMPLETES OTHER IADLS.

## 2023-11-06 SDOH — ECONOMIC STABILITY: HOUSING INSECURITY
HOME SAFETY: HIS LEGS ARE STRONGER. PATIENT EXHIBITS 5/5 STRENGTH BILATERAL UE, NO ROM DEFICITS. PATIENT DOES REPORT NUMBNESS TO RIGHT SIDE OF BODY. INSTRUCTED IN TECHNIQUES TO IMPROVE SENSATION SUCH AS VIBRATION, TEXTURES, MASSAGE, ETC.   PATIENT DECLINES HHA SE

## 2023-11-06 SDOH — ECONOMIC STABILITY: HOUSING INSECURITY
HOME SAFETY: ON THERPISTS ARRIVAL. PATIENT STATES HE PREFERS TO SIT ON THE FLOOR. PATIENT ABLE TO TRANSFER SELF FROM FLOOR WITHOUT DIFFICULTY. PATIENT AND SPOUSE STATES HE IS PERFORMING ADLS AND TRANSFERS AT BASELINE AND HE PREFERS TO USE SPC UNTIL HE FEELS LIKE

## 2023-11-06 SDOH — ECONOMIC STABILITY: HOUSING INSECURITY: HOME SAFETY: RVICES AT THIS TIME.

## 2023-11-06 SDOH — ECONOMIC STABILITY: HOUSING INSECURITY
HOME SAFETY: SINCE RETURNING HOME PATIENT HAS BEEN USING A SPC THAT HE BORROWED FROM A FAMILY MEMBER FOR MOBILITY. PATIENT HAS BEEN SHOWERING AND DRESSING INDEP. PATIENT PREPARED A POT OF SOUP FOR LUNCH THIS DATE. PATIENT SEATED ON LIVING ROOM FLOOR EATING LUNCH

## 2023-11-06 ASSESSMENT — ACTIVITIES OF DAILY LIVING (ADL)
DRESSING_LB_CURRENT_FUNCTION: INDEPENDENT
DRESSING_UB_CURRENT_FUNCTION: INDEPENDENT
CURRENT_FUNCTION: INDEPENDENT
AMBULATION ASSISTANCE: INDEPENDENT
AMBULATION ASSISTANCE: 1
PHYSICAL TRANSFERS ASSESSED: 1

## 2023-11-06 ASSESSMENT — ENCOUNTER SYMPTOMS: DENIES PAIN: 1

## 2023-11-07 PROCEDURE — 1090000001 HH PPS REVENUE CREDIT

## 2023-11-07 PROCEDURE — 1090000002 HH PPS REVENUE DEBIT

## 2023-11-08 ENCOUNTER — HOME CARE VISIT (OUTPATIENT)
Dept: HOME HEALTH SERVICES | Facility: HOME HEALTH | Age: 68
End: 2023-11-08
Payer: COMMERCIAL

## 2023-11-08 VITALS
TEMPERATURE: 97.3 F | RESPIRATION RATE: 18 BRPM | HEART RATE: 66 BPM | DIASTOLIC BLOOD PRESSURE: 72 MMHG | SYSTOLIC BLOOD PRESSURE: 122 MMHG | OXYGEN SATURATION: 96 %

## 2023-11-08 PROCEDURE — G0299 HHS/HOSPICE OF RN EA 15 MIN: HCPCS | Mod: HHH

## 2023-11-08 PROCEDURE — 1090000002 HH PPS REVENUE DEBIT

## 2023-11-08 PROCEDURE — 1090000001 HH PPS REVENUE CREDIT

## 2023-11-08 ASSESSMENT — ENCOUNTER SYMPTOMS
LOSS OF SENSATION IN FEET: 0
DEPRESSION: 0
STOOL FREQUENCY: LESS THAN DAILY
BOWEL PATTERN NORMAL: 1
HYPERTENSION: 1
PERSON REPORTING PAIN: PATIENT
LAST BOWEL MOVEMENT: 66784
MUSCLE WEAKNESS: 1
SUBJECTIVE PAIN PROGRESSION: UNCHANGED
OCCASIONAL FEELINGS OF UNSTEADINESS: 1
APPETITE LEVEL: FAIR
DENIES PAIN: 1
CHANGE IN APPETITE: UNCHANGED
FATIGUES EASILY: 1

## 2023-11-08 ASSESSMENT — PAIN SCALES - PAIN ASSESSMENT IN ADVANCED DEMENTIA (PAINAD)
FACIALEXPRESSION: 0
CONSOLABILITY: 0
NEGVOCALIZATION: 0 - NONE.
NEGVOCALIZATION: 0
BODYLANGUAGE: 0
TOTALSCORE: 0
FACIALEXPRESSION: 0 - SMILING OR INEXPRESSIVE.
BODYLANGUAGE: 0 - RELAXED.
CONSOLABILITY: 0 - NO NEED TO CONSOLE.
BREATHING: 0

## 2023-11-08 ASSESSMENT — ACTIVITIES OF DAILY LIVING (ADL)
AMBULATION ASSISTANCE: INDEPENDENT
AMBULATION ASSISTANCE: 1

## 2023-11-08 NOTE — HOME HEALTH
FOLLOW UP RN VISIT. VITAL SIGNS STABLE. SPOUSE PRESENT FOR VISIT TODAY. NO CHANGES. PATIENT REPORTS NO PAIN AND NO FALLS. TAKING MEDICATIONS AS ORDERED. WILL CALL SPOUSE NEXT WEEK. IF NO CHANGES SHE/PATIENT IS REQUESTING NON-VISIT DISCHARGE.     PLAN FOR NEXT VISIT: CP ASSESS/MEDICATION-DISEASE PROCESS EDUCATION.  DISCHARGE FROM [facility] SERVICES.

## 2023-11-09 PROCEDURE — 1090000002 HH PPS REVENUE DEBIT

## 2023-11-09 PROCEDURE — 1090000001 HH PPS REVENUE CREDIT

## 2023-11-10 PROCEDURE — 1090000002 HH PPS REVENUE DEBIT

## 2023-11-10 PROCEDURE — 1090000001 HH PPS REVENUE CREDIT

## 2023-11-11 PROCEDURE — 1090000002 HH PPS REVENUE DEBIT

## 2023-11-11 PROCEDURE — 1090000001 HH PPS REVENUE CREDIT

## 2023-11-12 PROCEDURE — 1090000001 HH PPS REVENUE CREDIT

## 2023-11-12 PROCEDURE — 1090000002 HH PPS REVENUE DEBIT

## 2023-11-13 PROCEDURE — 1090000002 HH PPS REVENUE DEBIT

## 2023-11-13 PROCEDURE — 1090000001 HH PPS REVENUE CREDIT

## 2023-11-14 PROCEDURE — 1090000001 HH PPS REVENUE CREDIT

## 2023-11-14 PROCEDURE — 1090000002 HH PPS REVENUE DEBIT

## 2023-11-15 ENCOUNTER — HOME CARE VISIT (OUTPATIENT)
Dept: HOME HEALTH SERVICES | Facility: HOME HEALTH | Age: 68
End: 2023-11-15
Payer: COMMERCIAL

## 2023-11-15 PROCEDURE — 1090000001 HH PPS REVENUE CREDIT

## 2023-11-15 PROCEDURE — 1090000002 HH PPS REVENUE DEBIT

## 2023-11-15 ASSESSMENT — PAIN SCALES - PAIN ASSESSMENT IN ADVANCED DEMENTIA (PAINAD)
BREATHING: 0
TOTALSCORE: 0
NEGVOCALIZATION: 0 - NONE.
CONSOLABILITY: 0
BODYLANGUAGE: 0 - RELAXED.
FACIALEXPRESSION: 0
CONSOLABILITY: 0 - NO NEED TO CONSOLE.
FACIALEXPRESSION: 0 - SMILING OR INEXPRESSIVE.
NEGVOCALIZATION: 0
BODYLANGUAGE: 0

## 2023-11-15 ASSESSMENT — ENCOUNTER SYMPTOMS
HIGHEST PAIN SEVERITY IN PAST 24 HOURS: 0/10
SUBJECTIVE PAIN PROGRESSION: UNCHANGED
LOWEST PAIN SEVERITY IN PAST 24 HOURS: 0/10
DENIES PAIN: 1
PAIN SEVERITY GOAL: 0/10

## 2023-11-15 ASSESSMENT — ACTIVITIES OF DAILY LIVING (ADL)
CURRENT_FUNCTION: INDEPENDENT
AMBULATION ASSISTANCE: INDEPENDENT
HOME_HEALTH_OASIS: 01
PHYSICAL TRANSFERS ASSESSED: 1
AMBULATION ASSISTANCE: 1

## 2023-11-15 NOTE — HOME HEALTH
c discharge. patient/spouse decline further in home visits. denies any questions or further need at this time.  non-visit discharge.

## 2023-11-16 PROCEDURE — 1090000002 HH PPS REVENUE DEBIT

## 2023-11-16 PROCEDURE — 1090000001 HH PPS REVENUE CREDIT

## 2023-11-17 PROCEDURE — 1090000002 HH PPS REVENUE DEBIT

## 2023-11-17 PROCEDURE — 1090000001 HH PPS REVENUE CREDIT

## 2023-11-18 PROCEDURE — 1090000001 HH PPS REVENUE CREDIT

## 2023-11-18 PROCEDURE — 1090000002 HH PPS REVENUE DEBIT

## 2023-11-19 PROCEDURE — 1090000002 HH PPS REVENUE DEBIT

## 2023-11-19 PROCEDURE — 1090000001 HH PPS REVENUE CREDIT

## 2023-11-20 PROCEDURE — 1090000002 HH PPS REVENUE DEBIT

## 2023-11-20 PROCEDURE — 1090000001 HH PPS REVENUE CREDIT

## 2023-11-21 PROCEDURE — 1090000002 HH PPS REVENUE DEBIT

## 2023-11-21 PROCEDURE — 1090000001 HH PPS REVENUE CREDIT

## 2023-11-21 ASSESSMENT — ACTIVITIES OF DAILY LIVING (ADL): OASIS_M1830: 03

## 2023-11-28 ASSESSMENT — ACTIVITIES OF DAILY LIVING (ADL): OASIS_M1830: 00

## 2023-12-13 ENCOUNTER — OFFICE VISIT (OUTPATIENT)
Dept: ENDOCRINOLOGY | Age: 68
End: 2023-12-13

## 2023-12-13 VITALS
HEIGHT: 69 IN | OXYGEN SATURATION: 98 % | BODY MASS INDEX: 31.31 KG/M2 | WEIGHT: 211.4 LBS | DIASTOLIC BLOOD PRESSURE: 75 MMHG | SYSTOLIC BLOOD PRESSURE: 138 MMHG | TEMPERATURE: 97.3 F | HEART RATE: 66 BPM

## 2023-12-13 DIAGNOSIS — Z79.4 TYPE 2 DIABETES MELLITUS WITH OTHER SPECIFIED COMPLICATION, WITH LONG-TERM CURRENT USE OF INSULIN (HCC): Primary | ICD-10-CM

## 2023-12-13 DIAGNOSIS — E11.69 TYPE 2 DIABETES MELLITUS WITH OTHER SPECIFIED COMPLICATION, WITH LONG-TERM CURRENT USE OF INSULIN (HCC): Primary | ICD-10-CM

## 2023-12-13 LAB
CHP ED QC CHECK: NORMAL
GLUCOSE BLD-MCNC: NORMAL MG/DL
HOLD SPECIMEN: NORMAL
HOLD SPECIMEN: NORMAL

## 2023-12-13 RX ORDER — ATORVASTATIN CALCIUM 40 MG/1
40 TABLET, FILM COATED ORAL NIGHTLY
COMMUNITY
End: 2023-12-13

## 2023-12-13 RX ORDER — CLOPIDOGREL BISULFATE 75 MG/1
75 TABLET ORAL DAILY
COMMUNITY
Start: 2023-12-06

## 2023-12-13 RX ORDER — INSULIN ASPART 100 [IU]/ML
INJECTION, SUSPENSION SUBCUTANEOUS
Qty: 30 ADJUSTABLE DOSE PRE-FILLED PEN SYRINGE | Refills: 3 | Status: SHIPPED | OUTPATIENT
Start: 2023-12-13

## 2023-12-13 RX ORDER — AMLODIPINE BESYLATE 5 MG/1
5 TABLET ORAL DAILY
COMMUNITY
Start: 2023-10-26

## 2023-12-13 RX ORDER — ASCORBIC ACID 250 MG
250 TABLET ORAL DAILY
COMMUNITY

## 2023-12-13 NOTE — PROGRESS NOTES
04/28/2021    ALT 13 04/28/2021    LABGLOM >60 05/26/2021    GFRAA >60 05/26/2021     No results found for: \"WBC\", \"HGB\", \"HCT\", \"MCV\", \"PLT\"  Lab Results   Component Value Date    LABA1C 8.5 09/30/2020     No results found for: \"CHOLFAST\", \"TRIGLYCFAST\", \"HDL\", \"LDLCALC\", \"CHOL\", \"TRIG\"  No results found for: \"TESTM\"  No results found for: \"TSH\", \"TSHREFLEX\", \"FT3\", \"T4FREE\"  No results found for: \"TPOABS\"    Review of Systems   Cardiovascular: Negative. Endocrine: Negative. Negative for polydipsia and polyuria. All other systems reviewed and are negative. Objective:   Physical Exam  Vitals reviewed. Constitutional:       General: He is not in acute distress. Appearance: Normal appearance. He is obese. HENT:      Head: Normocephalic and atraumatic. Right Ear: External ear normal.      Left Ear: External ear normal.      Nose: Nose normal.   Eyes:      General: No scleral icterus. Right eye: No discharge. Left eye: No discharge. Extraocular Movements: Extraocular movements intact. Conjunctiva/sclera: Conjunctivae normal.   Cardiovascular:      Rate and Rhythm: Normal rate. Pulmonary:      Effort: Pulmonary effort is normal.   Musculoskeletal:         General: Normal range of motion. Cervical back: Normal range of motion and neck supple. Neurological:      General: No focal deficit present. Mental Status: He is alert and oriented to person, place, and time.    Psychiatric:         Mood and Affect: Mood normal.         Behavior: Behavior normal.

## 2023-12-13 NOTE — TELEPHONE ENCOUNTER
Patient requesting medication refill.  Please approve or deny this request.    Rx requested:  Requested Prescriptions     Pending Prescriptions Disp Refills    metFORMIN (GLUCOPHAGE) 1000 MG tablet 180 tablet 1     Sig: Take 1 tablet by mouth 2 times daily (with meals)         Last Office Visit:   12/13/2023      Next Visit Date:  Future Appointments   Date Time Provider [address]   2/21/2024  3:00 PM Deep Dougherty MD [facility]

## 2023-12-27 ENCOUNTER — NURSE ONLY (OUTPATIENT)
Dept: ENDOCRINOLOGY | Age: 68
End: 2023-12-27

## 2023-12-27 DIAGNOSIS — Z79.4 TYPE 2 DIABETES MELLITUS WITH OTHER SPECIFIED COMPLICATION, WITH LONG-TERM CURRENT USE OF INSULIN (HCC): Primary | ICD-10-CM

## 2023-12-27 DIAGNOSIS — E11.69 TYPE 2 DIABETES MELLITUS WITH OTHER SPECIFIED COMPLICATION, WITH LONG-TERM CURRENT USE OF INSULIN (HCC): Primary | ICD-10-CM

## 2023-12-28 ENCOUNTER — APPOINTMENT (OUTPATIENT)
Dept: NEUROLOGY | Facility: CLINIC | Age: 68
End: 2023-12-28
Payer: COMMERCIAL

## 2024-02-01 PROBLEM — E66.9 CLASS 1 OBESITY WITH BODY MASS INDEX (BMI) OF 32.0 TO 32.9 IN ADULT: Status: ACTIVE | Noted: 2024-02-01

## 2024-02-01 PROBLEM — F17.200 CURRENT EVERY DAY SMOKER: Status: ACTIVE | Noted: 2024-02-01

## 2024-02-01 PROBLEM — R01.1 MURMUR: Status: ACTIVE | Noted: 2024-02-01

## 2024-02-01 PROBLEM — Z86.39 H/O INSULIN DEPENDENT DIABETES MELLITUS: Status: ACTIVE | Noted: 2024-02-01

## 2024-02-01 PROBLEM — E66.9 OBESITY: Status: ACTIVE | Noted: 2024-02-01

## 2024-02-01 PROBLEM — R07.9 CHEST PAIN: Status: ACTIVE | Noted: 2024-02-01

## 2024-02-01 PROBLEM — E66.811 CLASS 1 OBESITY WITH BODY MASS INDEX (BMI) OF 32.0 TO 32.9 IN ADULT: Status: ACTIVE | Noted: 2024-02-01

## 2024-02-01 PROBLEM — E66.811 CLASS 1 OBESITY WITH BODY MASS INDEX (BMI) OF 33.0 TO 33.9 IN ADULT: Status: ACTIVE | Noted: 2024-02-01

## 2024-02-01 PROBLEM — R30.0 DYSURIA: Status: ACTIVE | Noted: 2024-02-01

## 2024-02-01 PROBLEM — E66.9 CLASS 1 OBESITY WITH BODY MASS INDEX (BMI) OF 33.0 TO 33.9 IN ADULT: Status: ACTIVE | Noted: 2024-02-01

## 2024-02-01 PROBLEM — Z01.810 PRE-OPERATIVE CARDIOVASCULAR EXAMINATION: Status: ACTIVE | Noted: 2024-02-01

## 2024-02-01 PROBLEM — I25.10 CAD (CORONARY ARTERY DISEASE): Status: ACTIVE | Noted: 2024-02-01

## 2024-02-01 PROBLEM — R10.9 FLANK PAIN: Status: ACTIVE | Noted: 2024-02-01

## 2024-02-01 PROBLEM — N28.1 COMPLEX RENAL CYST: Status: ACTIVE | Noted: 2024-02-01

## 2024-02-01 PROBLEM — E78.5 HYPERLIPIDEMIA: Status: ACTIVE | Noted: 2021-09-29

## 2024-02-01 PROBLEM — Z87.898 HISTORY OF SHORTNESS OF BREATH: Status: ACTIVE | Noted: 2024-02-01

## 2024-02-01 RX ORDER — PEN NEEDLE, DIABETIC 31 GX5/16"
NEEDLE, DISPOSABLE MISCELLANEOUS
COMMUNITY
Start: 2023-09-07

## 2024-02-01 RX ORDER — AMOXICILLIN 500 MG
CAPSULE ORAL
COMMUNITY

## 2024-02-01 RX ORDER — INSULIN ASPART 100 [IU]/ML
25 INJECTION, SUSPENSION SUBCUTANEOUS
Status: ON HOLD | COMMUNITY
End: 2024-03-23 | Stop reason: SDUPTHER

## 2024-02-01 RX ORDER — BLOOD-GLUCOSE METER
KIT MISCELLANEOUS 3 TIMES DAILY
COMMUNITY
Start: 2021-07-19

## 2024-02-21 ENCOUNTER — OFFICE VISIT (OUTPATIENT)
Dept: ENDOCRINOLOGY | Age: 69
End: 2024-02-21

## 2024-02-21 VITALS
DIASTOLIC BLOOD PRESSURE: 69 MMHG | HEIGHT: 69 IN | OXYGEN SATURATION: 95 % | SYSTOLIC BLOOD PRESSURE: 140 MMHG | HEART RATE: 75 BPM | BODY MASS INDEX: 32.58 KG/M2 | WEIGHT: 220 LBS

## 2024-02-21 DIAGNOSIS — Z79.4 TYPE 2 DIABETES MELLITUS WITH OTHER SPECIFIED COMPLICATION, WITH LONG-TERM CURRENT USE OF INSULIN (HCC): Primary | ICD-10-CM

## 2024-02-21 DIAGNOSIS — E11.69 TYPE 2 DIABETES MELLITUS WITH OTHER SPECIFIED COMPLICATION, WITH LONG-TERM CURRENT USE OF INSULIN (HCC): Primary | ICD-10-CM

## 2024-02-21 LAB
CHP ED QC CHECK: NORMAL
GLUCOSE BLD-MCNC: 305 MG/DL
HBA1C MFR BLD: 10.1 %

## 2024-02-21 PROCEDURE — 3046F HEMOGLOBIN A1C LEVEL >9.0%: CPT | Performed by: INTERNAL MEDICINE

## 2024-02-21 PROCEDURE — 82962 GLUCOSE BLOOD TEST: CPT | Performed by: INTERNAL MEDICINE

## 2024-02-21 PROCEDURE — 1123F ACP DISCUSS/DSCN MKR DOCD: CPT | Performed by: INTERNAL MEDICINE

## 2024-02-21 PROCEDURE — 99213 OFFICE O/P EST LOW 20 MIN: CPT | Performed by: INTERNAL MEDICINE

## 2024-02-21 PROCEDURE — 83036 HEMOGLOBIN GLYCOSYLATED A1C: CPT | Performed by: INTERNAL MEDICINE

## 2024-02-21 PROCEDURE — 95251 CONT GLUC MNTR ANALYSIS I&R: CPT | Performed by: INTERNAL MEDICINE

## 2024-02-21 RX ORDER — LANCETS 30 GAUGE
1 EACH MISCELLANEOUS DAILY
Qty: 100 EACH | Refills: 3 | Status: SHIPPED | OUTPATIENT
Start: 2024-02-21

## 2024-02-21 RX ORDER — BLOOD SUGAR DIAGNOSTIC
STRIP MISCELLANEOUS
Qty: 100 EACH | Refills: 2 | Status: SHIPPED | OUTPATIENT
Start: 2024-02-21

## 2024-02-21 RX ORDER — GLUCOSAMINE HCL/CHONDROITIN SU 500-400 MG
CAPSULE ORAL
Qty: 100 STRIP | Refills: 3 | Status: SHIPPED | OUTPATIENT
Start: 2024-02-21

## 2024-02-21 RX ORDER — INSULIN ASPART 100 [IU]/ML
INJECTION, SUSPENSION SUBCUTANEOUS
Qty: 30 ADJUSTABLE DOSE PRE-FILLED PEN SYRINGE | Refills: 3 | Status: SHIPPED | OUTPATIENT
Start: 2024-02-21

## 2024-02-21 ASSESSMENT — ENCOUNTER SYMPTOMS: BACK PAIN: 1

## 2024-02-21 NOTE — PROGRESS NOTES
Not on file    Years of education: Not on file    Highest education level: Not on file   Occupational History    Not on file   Tobacco Use    Smoking status: Former     Current packs/day: 0.00     Types: Cigarettes     Quit date: 10/13/2023     Years since quittin.3     Passive exposure: Past    Smokeless tobacco: Never   Vaping Use    Vaping Use: Never used   Substance and Sexual Activity    Alcohol use: Never    Drug use: Never    Sexual activity: Yes   Other Topics Concern    Not on file   Social History Narrative    Not on file     Social Determinants of Health     Financial Resource Strain: Not on file   Food Insecurity: Not on file   Transportation Needs: Not on file   Physical Activity: Not on file   Stress: Not on file   Social Connections: Not on file   Intimate Partner Violence: Not on file   Housing Stability: Not on file     No family history on file.  Allergies   Allergen Reactions    Statins     Sulfa Antibiotics     Sulfamethoxazole Hives    Ether Nausea And Vomiting    Sulfamethoxazole-Trimethoprim Rash       Current Outpatient Medications:     cyanocobalamin 50 MCG tablet, Take 1 tablet by mouth daily, Disp: , Rfl:     amLODIPine (NORVASC) 5 MG tablet, Take 1 tablet by mouth daily, Disp: , Rfl:     ascorbic acid (VITAMIN C) 250 MG tablet, Take 1 tablet by mouth daily, Disp: , Rfl:     clopidogrel (PLAVIX) 75 MG tablet, Take 1 tablet by mouth daily, Disp: , Rfl:     insulin aspart prot & aspart (NOVOLOG MIX 70/30 FLEXPEN) injection pen, INJECT 25 UNITS IN THE MORNING and INJECT 30 UNITS in the afternoon., Disp: 30 Adjustable Dose Pre-filled Pen Syringe, Rfl: 3    metFORMIN (GLUCOPHAGE) 1000 MG tablet, Take 1 tablet by mouth 2 times daily (with meals), Disp: 180 tablet, Rfl: 1    lisinopril (PRINIVIL;ZESTRIL) 40 MG tablet, Take 1 tablet by mouth daily, Disp: , Rfl:     Insulin Pen Needle 32G X 4 MM MISC, 1 each by Does not apply route 2 times daily, Disp: 100 each, Rfl: 5    nitroGLYCERIN

## 2024-03-13 ENCOUNTER — HOSPITAL ENCOUNTER (OUTPATIENT)
Dept: RADIOLOGY | Facility: HOSPITAL | Age: 69
Discharge: HOME | End: 2024-03-13
Payer: COMMERCIAL

## 2024-03-13 DIAGNOSIS — R91.8 OTHER NONSPECIFIC ABNORMAL FINDING OF LUNG FIELD: ICD-10-CM

## 2024-03-13 PROCEDURE — 71250 CT THORAX DX C-: CPT | Performed by: RADIOLOGY

## 2024-03-13 PROCEDURE — 71250 CT THORAX DX C-: CPT

## 2024-03-20 ENCOUNTER — APPOINTMENT (OUTPATIENT)
Dept: CARDIOLOGY | Facility: HOSPITAL | Age: 69
DRG: 300 | End: 2024-03-20
Payer: COMMERCIAL

## 2024-03-20 ENCOUNTER — APPOINTMENT (OUTPATIENT)
Dept: RADIOLOGY | Facility: HOSPITAL | Age: 69
DRG: 300 | End: 2024-03-20
Payer: COMMERCIAL

## 2024-03-20 ENCOUNTER — HOSPITAL ENCOUNTER (INPATIENT)
Facility: HOSPITAL | Age: 69
LOS: 1 days | Discharge: HOME | DRG: 300 | End: 2024-03-23
Attending: EMERGENCY MEDICINE | Admitting: STUDENT IN AN ORGANIZED HEALTH CARE EDUCATION/TRAINING PROGRAM
Payer: COMMERCIAL

## 2024-03-20 ENCOUNTER — HOSPITAL ENCOUNTER (EMERGENCY)
Dept: CARDIOLOGY | Facility: HOSPITAL | Age: 69
Discharge: HOME | DRG: 300 | End: 2024-03-20
Payer: COMMERCIAL

## 2024-03-20 DIAGNOSIS — R30.0 DYSURIA: ICD-10-CM

## 2024-03-20 DIAGNOSIS — I73.9 PAD (PERIPHERAL ARTERY DISEASE) (CMS-HCC): ICD-10-CM

## 2024-03-20 DIAGNOSIS — Z86.39 H/O INSULIN DEPENDENT DIABETES MELLITUS: ICD-10-CM

## 2024-03-20 DIAGNOSIS — I99.9 VASCULAR DISEASE: ICD-10-CM

## 2024-03-20 DIAGNOSIS — I10 PRIMARY HYPERTENSION: ICD-10-CM

## 2024-03-20 DIAGNOSIS — R10.9 FLANK PAIN: ICD-10-CM

## 2024-03-20 DIAGNOSIS — R55 NEAR SYNCOPE: Primary | ICD-10-CM

## 2024-03-20 PROBLEM — R53.1 GENERALIZED WEAKNESS: Status: ACTIVE | Noted: 2024-03-20

## 2024-03-20 LAB
ABO GROUP (TYPE) IN BLOOD: NORMAL
ALBUMIN SERPL BCP-MCNC: 3.1 G/DL (ref 3.4–5)
ALP SERPL-CCNC: 92 U/L (ref 33–136)
ALT SERPL W P-5'-P-CCNC: 9 U/L (ref 10–52)
ANION GAP BLDA CALCULATED.4IONS-SCNC: 10 MMO/L (ref 10–25)
ANION GAP SERPL CALC-SCNC: 14 MMOL/L (ref 10–20)
ANTIBODY SCREEN: NORMAL
APPEARANCE UR: CLEAR
ARTERIAL PATENCY WRIST A: POSITIVE
AST SERPL W P-5'-P-CCNC: 10 U/L (ref 9–39)
ATRIAL RATE: 58 BPM
ATRIAL RATE: 60 BPM
BASE EXCESS BLDA CALC-SCNC: -1.9 MMOL/L (ref -2–3)
BASOPHILS # BLD AUTO: 0.11 X10*3/UL (ref 0–0.1)
BASOPHILS NFR BLD AUTO: 0.6 %
BILIRUB SERPL-MCNC: 0.5 MG/DL (ref 0–1.2)
BILIRUB UR STRIP.AUTO-MCNC: NEGATIVE MG/DL
BNP SERPL-MCNC: 242 PG/ML (ref 0–99)
BODY TEMPERATURE: ABNORMAL
BUN SERPL-MCNC: 14 MG/DL (ref 6–23)
CA-I BLDA-SCNC: 1.14 MMOL/L (ref 1.1–1.33)
CALCIUM SERPL-MCNC: 8.7 MG/DL (ref 8.6–10.3)
CARDIAC TROPONIN I PNL SERPL HS: 10 NG/L (ref 0–20)
CARDIAC TROPONIN I PNL SERPL HS: 10 NG/L (ref 0–20)
CHLORIDE BLDA-SCNC: 103 MMOL/L (ref 98–107)
CHLORIDE SERPL-SCNC: 100 MMOL/L (ref 98–107)
CO2 SERPL-SCNC: 24 MMOL/L (ref 21–32)
COLOR UR: YELLOW
CREAT SERPL-MCNC: 1.6 MG/DL (ref 0.5–1.3)
EGFRCR SERPLBLD CKD-EPI 2021: 47 ML/MIN/1.73M*2
EOSINOPHIL # BLD AUTO: 0.67 X10*3/UL (ref 0–0.7)
EOSINOPHIL NFR BLD AUTO: 3.8 %
ERYTHROCYTE [DISTWIDTH] IN BLOOD BY AUTOMATED COUNT: 13.1 % (ref 11.5–14.5)
GLUCOSE BLD MANUAL STRIP-MCNC: 188 MG/DL (ref 74–99)
GLUCOSE BLD MANUAL STRIP-MCNC: 367 MG/DL (ref 74–99)
GLUCOSE BLDA-MCNC: 155 MG/DL (ref 74–99)
GLUCOSE SERPL-MCNC: 163 MG/DL (ref 74–99)
GLUCOSE UR STRIP.AUTO-MCNC: ABNORMAL MG/DL
HCO3 BLDA-SCNC: 22.1 MMOL/L (ref 22–26)
HCT VFR BLD AUTO: 38.1 % (ref 41–52)
HCT VFR BLD EST: 33 % (ref 41–52)
HGB BLD-MCNC: 12.9 G/DL (ref 13.5–17.5)
HGB BLDA-MCNC: 11.1 G/DL (ref 13.5–17.5)
HYALINE CASTS #/AREA URNS AUTO: ABNORMAL /LPF
IMM GRANULOCYTES # BLD AUTO: 0.06 X10*3/UL (ref 0–0.7)
IMM GRANULOCYTES NFR BLD AUTO: 0.3 % (ref 0–0.9)
INHALED O2 CONCENTRATION: 21 %
INR PPP: 1 (ref 0.9–1.1)
KETONES UR STRIP.AUTO-MCNC: NEGATIVE MG/DL
LACTATE BLDA-SCNC: 1.8 MMOL/L (ref 0.4–2)
LACTATE SERPL-SCNC: 2.4 MMOL/L (ref 0.4–2)
LACTATE SERPL-SCNC: 3.4 MMOL/L (ref 0.4–2)
LEUKOCYTE ESTERASE UR QL STRIP.AUTO: NEGATIVE
LYMPHOCYTES # BLD AUTO: 3.84 X10*3/UL (ref 1.2–4.8)
LYMPHOCYTES NFR BLD AUTO: 21.6 %
MAGNESIUM SERPL-MCNC: 1.81 MG/DL (ref 1.6–2.4)
MCH RBC QN AUTO: 29.9 PG (ref 26–34)
MCHC RBC AUTO-ENTMCNC: 33.9 G/DL (ref 32–36)
MCV RBC AUTO: 88 FL (ref 80–100)
MONOCYTES # BLD AUTO: 1.03 X10*3/UL (ref 0.1–1)
MONOCYTES NFR BLD AUTO: 5.8 %
NEUTROPHILS # BLD AUTO: 12.03 X10*3/UL (ref 1.2–7.7)
NEUTROPHILS NFR BLD AUTO: 67.9 %
NITRITE UR QL STRIP.AUTO: NEGATIVE
NRBC BLD-RTO: 0 /100 WBCS (ref 0–0)
OXYHGB MFR BLDA: 95.8 % (ref 94–98)
P AXIS: 35 DEGREES
P AXIS: 43 DEGREES
P OFFSET: 172 MS
P OFFSET: 187 MS
P ONSET: 138 MS
P ONSET: 149 MS
PCO2 BLDA: 34 MM HG (ref 38–42)
PH BLDA: 7.42 PH (ref 7.38–7.42)
PH UR STRIP.AUTO: 7 [PH]
PLATELET # BLD AUTO: 367 X10*3/UL (ref 150–450)
PO2 BLDA: 81 MM HG (ref 85–95)
POTASSIUM BLDA-SCNC: 4.2 MMOL/L (ref 3.5–5.3)
POTASSIUM SERPL-SCNC: 4.4 MMOL/L (ref 3.5–5.3)
PR INTERVAL: 146 MS
PR INTERVAL: 166 MS
PROT SERPL-MCNC: 6.1 G/DL (ref 6.4–8.2)
PROT UR STRIP.AUTO-MCNC: ABNORMAL MG/DL
PROTHROMBIN TIME: 11.4 SECONDS (ref 9.8–12.8)
Q ONSET: 221 MS
Q ONSET: 222 MS
QRS COUNT: 10 BEATS
QRS COUNT: 9 BEATS
QRS DURATION: 144 MS
QRS DURATION: 148 MS
QT INTERVAL: 486 MS
QT INTERVAL: 492 MS
QTC CALCULATION(BAZETT): 477 MS
QTC CALCULATION(BAZETT): 492 MS
QTC FREDERICIA: 480 MS
QTC FREDERICIA: 492 MS
R AXIS: 30 DEGREES
R AXIS: 32 DEGREES
RBC # BLD AUTO: 4.31 X10*6/UL (ref 4.5–5.9)
RBC # UR STRIP.AUTO: NEGATIVE /UL
RBC #/AREA URNS AUTO: ABNORMAL /HPF
RH FACTOR (ANTIGEN D): NORMAL
SAO2 % BLDA: 98 % (ref 94–100)
SODIUM BLDA-SCNC: 131 MMOL/L (ref 136–145)
SODIUM SERPL-SCNC: 134 MMOL/L (ref 136–145)
SP GR UR STRIP.AUTO: 1.03
SPECIMEN DRAWN FROM PATIENT: ABNORMAL
T AXIS: 57 DEGREES
T AXIS: 65 DEGREES
T OFFSET: 464 MS
T OFFSET: 468 MS
UROBILINOGEN UR STRIP.AUTO-MCNC: <2 MG/DL
VENTRICULAR RATE: 58 BPM
VENTRICULAR RATE: 60 BPM
WBC # BLD AUTO: 17.7 X10*3/UL (ref 4.4–11.3)
WBC #/AREA URNS AUTO: ABNORMAL /HPF

## 2024-03-20 PROCEDURE — 70450 CT HEAD/BRAIN W/O DYE: CPT

## 2024-03-20 PROCEDURE — 81001 URINALYSIS AUTO W/SCOPE: CPT | Performed by: EMERGENCY MEDICINE

## 2024-03-20 PROCEDURE — 2550000001 HC RX 255 CONTRASTS: Performed by: EMERGENCY MEDICINE

## 2024-03-20 PROCEDURE — G0378 HOSPITAL OBSERVATION PER HR: HCPCS

## 2024-03-20 PROCEDURE — 2500000004 HC RX 250 GENERAL PHARMACY W/ HCPCS (ALT 636 FOR OP/ED): Performed by: INTERNAL MEDICINE

## 2024-03-20 PROCEDURE — 36600 WITHDRAWAL OF ARTERIAL BLOOD: CPT

## 2024-03-20 PROCEDURE — 70450 CT HEAD/BRAIN W/O DYE: CPT | Performed by: RADIOLOGY

## 2024-03-20 PROCEDURE — 85610 PROTHROMBIN TIME: CPT | Performed by: EMERGENCY MEDICINE

## 2024-03-20 PROCEDURE — 84132 ASSAY OF SERUM POTASSIUM: CPT | Performed by: EMERGENCY MEDICINE

## 2024-03-20 PROCEDURE — 87040 BLOOD CULTURE FOR BACTERIA: CPT | Mod: ELYLAB | Performed by: EMERGENCY MEDICINE

## 2024-03-20 PROCEDURE — 71045 X-RAY EXAM CHEST 1 VIEW: CPT | Performed by: RADIOLOGY

## 2024-03-20 PROCEDURE — 83605 ASSAY OF LACTIC ACID: CPT | Performed by: EMERGENCY MEDICINE

## 2024-03-20 PROCEDURE — 36415 COLL VENOUS BLD VENIPUNCTURE: CPT | Performed by: EMERGENCY MEDICINE

## 2024-03-20 PROCEDURE — 2500000004 HC RX 250 GENERAL PHARMACY W/ HCPCS (ALT 636 FOR OP/ED): Performed by: EMERGENCY MEDICINE

## 2024-03-20 PROCEDURE — 2500000002 HC RX 250 W HCPCS SELF ADMINISTERED DRUGS (ALT 637 FOR MEDICARE OP, ALT 636 FOR OP/ED): Performed by: INTERNAL MEDICINE

## 2024-03-20 PROCEDURE — 86900 BLOOD TYPING SEROLOGIC ABO: CPT | Performed by: EMERGENCY MEDICINE

## 2024-03-20 PROCEDURE — 71275 CT ANGIOGRAPHY CHEST: CPT

## 2024-03-20 PROCEDURE — 93005 ELECTROCARDIOGRAM TRACING: CPT

## 2024-03-20 PROCEDURE — 96361 HYDRATE IV INFUSION ADD-ON: CPT

## 2024-03-20 PROCEDURE — 96360 HYDRATION IV INFUSION INIT: CPT

## 2024-03-20 PROCEDURE — 99285 EMERGENCY DEPT VISIT HI MDM: CPT | Mod: 25

## 2024-03-20 PROCEDURE — 80053 COMPREHEN METABOLIC PANEL: CPT | Performed by: EMERGENCY MEDICINE

## 2024-03-20 PROCEDURE — 74174 CTA ABD&PLVS W/CONTRAST: CPT | Performed by: STUDENT IN AN ORGANIZED HEALTH CARE EDUCATION/TRAINING PROGRAM

## 2024-03-20 PROCEDURE — 82947 ASSAY GLUCOSE BLOOD QUANT: CPT

## 2024-03-20 PROCEDURE — 83880 ASSAY OF NATRIURETIC PEPTIDE: CPT | Performed by: EMERGENCY MEDICINE

## 2024-03-20 PROCEDURE — 84484 ASSAY OF TROPONIN QUANT: CPT | Performed by: EMERGENCY MEDICINE

## 2024-03-20 PROCEDURE — 2500000001 HC RX 250 WO HCPCS SELF ADMINISTERED DRUGS (ALT 637 FOR MEDICARE OP): Performed by: INTERNAL MEDICINE

## 2024-03-20 PROCEDURE — 71045 X-RAY EXAM CHEST 1 VIEW: CPT

## 2024-03-20 PROCEDURE — 99223 1ST HOSP IP/OBS HIGH 75: CPT | Performed by: INTERNAL MEDICINE

## 2024-03-20 PROCEDURE — 85025 COMPLETE CBC W/AUTO DIFF WBC: CPT | Performed by: EMERGENCY MEDICINE

## 2024-03-20 PROCEDURE — 83735 ASSAY OF MAGNESIUM: CPT | Performed by: EMERGENCY MEDICINE

## 2024-03-20 PROCEDURE — 71275 CT ANGIOGRAPHY CHEST: CPT | Performed by: STUDENT IN AN ORGANIZED HEALTH CARE EDUCATION/TRAINING PROGRAM

## 2024-03-20 RX ORDER — CLOPIDOGREL BISULFATE 75 MG/1
75 TABLET ORAL DAILY
Status: DISCONTINUED | OUTPATIENT
Start: 2024-03-20 | End: 2024-03-23 | Stop reason: HOSPADM

## 2024-03-20 RX ORDER — ATORVASTATIN CALCIUM 80 MG/1
80 TABLET, FILM COATED ORAL NIGHTLY
Status: DISCONTINUED | OUTPATIENT
Start: 2024-03-20 | End: 2024-03-23 | Stop reason: HOSPADM

## 2024-03-20 RX ORDER — DEXTROSE 50 % IN WATER (D50W) INTRAVENOUS SYRINGE
12.5
Status: DISCONTINUED | OUTPATIENT
Start: 2024-03-20 | End: 2024-03-23 | Stop reason: HOSPADM

## 2024-03-20 RX ORDER — DEXTROSE 50 % IN WATER (D50W) INTRAVENOUS SYRINGE
25
Status: DISCONTINUED | OUTPATIENT
Start: 2024-03-20 | End: 2024-03-20

## 2024-03-20 RX ORDER — DEXTROSE 50 % IN WATER (D50W) INTRAVENOUS SYRINGE
25
Status: DISCONTINUED | OUTPATIENT
Start: 2024-03-20 | End: 2024-03-23 | Stop reason: HOSPADM

## 2024-03-20 RX ORDER — ONDANSETRON 4 MG/1
4 TABLET, FILM COATED ORAL EVERY 8 HOURS PRN
Status: DISCONTINUED | OUTPATIENT
Start: 2024-03-20 | End: 2024-03-23 | Stop reason: HOSPADM

## 2024-03-20 RX ORDER — SODIUM CHLORIDE, SODIUM LACTATE, POTASSIUM CHLORIDE, CALCIUM CHLORIDE 600; 310; 30; 20 MG/100ML; MG/100ML; MG/100ML; MG/100ML
75 INJECTION, SOLUTION INTRAVENOUS CONTINUOUS
Status: DISCONTINUED | OUTPATIENT
Start: 2024-03-20 | End: 2024-03-23 | Stop reason: HOSPADM

## 2024-03-20 RX ORDER — AMLODIPINE BESYLATE 5 MG/1
5 TABLET ORAL DAILY
Status: DISCONTINUED | OUTPATIENT
Start: 2024-03-20 | End: 2024-03-21

## 2024-03-20 RX ORDER — HEPARIN SODIUM 5000 [USP'U]/ML
5000 INJECTION, SOLUTION INTRAVENOUS; SUBCUTANEOUS EVERY 8 HOURS
Status: DISCONTINUED | OUTPATIENT
Start: 2024-03-20 | End: 2024-03-23 | Stop reason: HOSPADM

## 2024-03-20 RX ORDER — ASPIRIN 81 MG/1
81 TABLET ORAL DAILY
Status: DISCONTINUED | OUTPATIENT
Start: 2024-03-21 | End: 2024-03-23 | Stop reason: HOSPADM

## 2024-03-20 RX ORDER — CLOPIDOGREL BISULFATE 75 MG/1
75 TABLET ORAL DAILY
COMMUNITY
End: 2024-04-04 | Stop reason: SDUPTHER

## 2024-03-20 RX ORDER — ACETAMINOPHEN 325 MG/1
650 TABLET ORAL EVERY 4 HOURS PRN
Status: DISCONTINUED | OUTPATIENT
Start: 2024-03-20 | End: 2024-03-23 | Stop reason: HOSPADM

## 2024-03-20 RX ORDER — HYDRALAZINE HYDROCHLORIDE 20 MG/ML
10 INJECTION INTRAMUSCULAR; INTRAVENOUS EVERY 4 HOURS PRN
Status: DISCONTINUED | OUTPATIENT
Start: 2024-03-20 | End: 2024-03-23 | Stop reason: HOSPADM

## 2024-03-20 RX ORDER — ONDANSETRON HYDROCHLORIDE 2 MG/ML
4 INJECTION, SOLUTION INTRAVENOUS EVERY 8 HOURS PRN
Status: DISCONTINUED | OUTPATIENT
Start: 2024-03-20 | End: 2024-03-23 | Stop reason: HOSPADM

## 2024-03-20 RX ORDER — CARVEDILOL 12.5 MG/1
12.5 TABLET ORAL
Status: DISCONTINUED | OUTPATIENT
Start: 2024-03-21 | End: 2024-03-21

## 2024-03-20 RX ORDER — INSULIN LISPRO 100 [IU]/ML
0-10 INJECTION, SOLUTION INTRAVENOUS; SUBCUTANEOUS
Status: DISCONTINUED | OUTPATIENT
Start: 2024-03-21 | End: 2024-03-23 | Stop reason: HOSPADM

## 2024-03-20 RX ORDER — DEXTROSE 50 % IN WATER (D50W) INTRAVENOUS SYRINGE
12.5
Status: DISCONTINUED | OUTPATIENT
Start: 2024-03-20 | End: 2024-03-20

## 2024-03-20 RX ORDER — INSULIN GLARGINE 100 [IU]/ML
10 INJECTION, SOLUTION SUBCUTANEOUS 2 TIMES DAILY
Status: DISCONTINUED | OUTPATIENT
Start: 2024-03-20 | End: 2024-03-20

## 2024-03-20 RX ORDER — ACETAMINOPHEN 500 MG
5 TABLET ORAL NIGHTLY PRN
Status: DISCONTINUED | OUTPATIENT
Start: 2024-03-20 | End: 2024-03-23 | Stop reason: HOSPADM

## 2024-03-20 RX ADMIN — ATORVASTATIN CALCIUM 80 MG: 80 TABLET, FILM COATED ORAL at 20:55

## 2024-03-20 RX ADMIN — SODIUM CHLORIDE 1000 ML: 9 INJECTION, SOLUTION INTRAVENOUS at 11:03

## 2024-03-20 RX ADMIN — IOHEXOL 100 ML: 350 INJECTION, SOLUTION INTRAVENOUS at 14:16

## 2024-03-20 RX ADMIN — AMLODIPINE BESYLATE 5 MG: 5 TABLET ORAL at 20:55

## 2024-03-20 RX ADMIN — SODIUM CHLORIDE 1000 ML: 9 INJECTION, SOLUTION INTRAVENOUS at 12:57

## 2024-03-20 RX ADMIN — SODIUM CHLORIDE, POTASSIUM CHLORIDE, SODIUM LACTATE AND CALCIUM CHLORIDE 75 ML/HR: 600; 310; 30; 20 INJECTION, SOLUTION INTRAVENOUS at 20:54

## 2024-03-20 RX ADMIN — INSULIN HUMAN 20 UNITS: 100 INJECTION, SUSPENSION SUBCUTANEOUS at 22:48

## 2024-03-20 SDOH — SOCIAL STABILITY: SOCIAL INSECURITY: DOES ANYONE TRY TO KEEP YOU FROM HAVING/CONTACTING OTHER FRIENDS OR DOING THINGS OUTSIDE YOUR HOME?: NO

## 2024-03-20 SDOH — SOCIAL STABILITY: SOCIAL INSECURITY: DO YOU FEEL ANYONE HAS EXPLOITED OR TAKEN ADVANTAGE OF YOU FINANCIALLY OR OF YOUR PERSONAL PROPERTY?: NO

## 2024-03-20 SDOH — SOCIAL STABILITY: SOCIAL INSECURITY: DO YOU FEEL UNSAFE GOING BACK TO THE PLACE WHERE YOU ARE LIVING?: NO

## 2024-03-20 SDOH — SOCIAL STABILITY: SOCIAL INSECURITY: ARE YOU OR HAVE YOU BEEN THREATENED OR ABUSED PHYSICALLY, EMOTIONALLY, OR SEXUALLY BY ANYONE?: NO

## 2024-03-20 SDOH — SOCIAL STABILITY: SOCIAL INSECURITY: ARE THERE ANY APPARENT SIGNS OF INJURIES/BEHAVIORS THAT COULD BE RELATED TO ABUSE/NEGLECT?: NO

## 2024-03-20 SDOH — SOCIAL STABILITY: SOCIAL INSECURITY: HAVE YOU HAD THOUGHTS OF HARMING ANYONE ELSE?: NO

## 2024-03-20 SDOH — SOCIAL STABILITY: SOCIAL INSECURITY: HAS ANYONE EVER THREATENED TO HURT YOUR FAMILY OR YOUR PETS?: NO

## 2024-03-20 ASSESSMENT — LIFESTYLE VARIABLES
AUDIT-C TOTAL SCORE: 1
AUDIT-C TOTAL SCORE: 1
SKIP TO QUESTIONS 9-10: 1
HOW OFTEN DO YOU HAVE 6 OR MORE DRINKS ON ONE OCCASION: NEVER
HAVE YOU EVER FELT YOU SHOULD CUT DOWN ON YOUR DRINKING: NO
HAVE PEOPLE ANNOYED YOU BY CRITICIZING YOUR DRINKING: NO
EVER FELT BAD OR GUILTY ABOUT YOUR DRINKING: NO
HOW OFTEN DO YOU HAVE A DRINK CONTAINING ALCOHOL: MONTHLY OR LESS
EVER HAD A DRINK FIRST THING IN THE MORNING TO STEADY YOUR NERVES TO GET RID OF A HANGOVER: NO
HOW MANY STANDARD DRINKS CONTAINING ALCOHOL DO YOU HAVE ON A TYPICAL DAY: 1 OR 2

## 2024-03-20 ASSESSMENT — PAIN SCALES - GENERAL
PAINLEVEL_OUTOF10: 0 - NO PAIN

## 2024-03-20 ASSESSMENT — ACTIVITIES OF DAILY LIVING (ADL)
HEARING - RIGHT EAR: FUNCTIONAL
HEARING - LEFT EAR: FUNCTIONAL
WALKS IN HOME: INDEPENDENT
ASSISTIVE_DEVICE: CANE;EYEGLASSES
TOILETING: INDEPENDENT
DRESSING YOURSELF: INDEPENDENT
BATHING: INDEPENDENT
GROOMING: INDEPENDENT
FEEDING YOURSELF: INDEPENDENT
PATIENT'S MEMORY ADEQUATE TO SAFELY COMPLETE DAILY ACTIVITIES?: YES
LACK_OF_TRANSPORTATION: NO
ADEQUATE_TO_COMPLETE_ADL: YES
JUDGMENT_ADEQUATE_SAFELY_COMPLETE_DAILY_ACTIVITIES: YES

## 2024-03-20 ASSESSMENT — COGNITIVE AND FUNCTIONAL STATUS - GENERAL
MOBILITY SCORE: 22
WALKING IN HOSPITAL ROOM: A LITTLE
DAILY ACTIVITIY SCORE: 24
CLIMB 3 TO 5 STEPS WITH RAILING: A LITTLE
PATIENT BASELINE BEDBOUND: NO

## 2024-03-20 ASSESSMENT — COLUMBIA-SUICIDE SEVERITY RATING SCALE - C-SSRS
6. HAVE YOU EVER DONE ANYTHING, STARTED TO DO ANYTHING, OR PREPARED TO DO ANYTHING TO END YOUR LIFE?: NO
6. HAVE YOU EVER DONE ANYTHING, STARTED TO DO ANYTHING, OR PREPARED TO DO ANYTHING TO END YOUR LIFE?: NO
2. HAVE YOU ACTUALLY HAD ANY THOUGHTS OF KILLING YOURSELF?: NO
1. IN THE PAST MONTH, HAVE YOU WISHED YOU WERE DEAD OR WISHED YOU COULD GO TO SLEEP AND NOT WAKE UP?: NO
2. HAVE YOU ACTUALLY HAD ANY THOUGHTS OF KILLING YOURSELF?: NO

## 2024-03-20 ASSESSMENT — PAIN - FUNCTIONAL ASSESSMENT
PAIN_FUNCTIONAL_ASSESSMENT: 0-10

## 2024-03-20 NOTE — H&P
Medical Group History and Physical    ASSESSMENT & PLAN:     PAD  -CTA on admission showed near complete occlusion of b/l CFA, and complete occlusion of L SFA  -per ED physician, findings were reviewed from ED by Dr. Owens, who felt likely chronic in nature, no need for hep gtt right now  -b/l pedal pulses Dopplerable, no dusky discoloration, no rest pain, no ulcers  Plan:  -vascular surgery consult  -cont PTA ASA, Plavix, high intensity statin  -get CARROL    Generalized weakness  -feeling much better after IVF, cont    FENG  -Scr 1.6 on admission, previously was 1-1.3 late last year  -trend BMP with IVF    Leukocytosis  -unclear etiology, has been chronically elevated since 2020  -no obvious source of infection on ruth so far, hold off on abx  -trend here with ivf, maybe some component of hemoconcentration  -will rec outpt heme fu    RUL pulmonary lesion  -chronic appearing, has been present since at least 10/2023, similar in size on rpt imaging  -outpt fu with pulm    Hx of embolic stroke  HTN  HLD  -home meds    IDDM2  -home insulin at reduced dose, ssi, titrate prn    Obesity    Vte ppx sqh  Home meds to be entered once reconciled    Shantanu Montelongo MD    HISTORY OF PRESENT ILLNESS:   Chief Complaint: weakness    History Of Present Illness:    68M with PMH of IDDM2, HTN, HLD, stroke, former smoker who is presenting with worsening generalized weakness. Per pt he was seeing his PCP earlier today, when he was told he appeared ill and his vitals were abnormal and he was sent to ED. He said he has been feeling very weak now that he thinks about it. Denies CP, dyspnea, palpitations, HA, confusion, LOC, dysuria, abd pain. Says he has chronic diarrhea for many months. He denies any foot or leg pain, but does say his hips hurt after ambulating.     In the ED, afeb, HDS, on RA. He appeared very ill and diaphoretic in ED per ED provider. Labs showed FENG, lactic acidosis, leukocytosis. ABG unremarkable. CT head noncon  unremarkable. CXR no acute findings. CTA C/A/P showed multiple occlusions in LE arteries, see A&P. Received IVF.     Review of systems: 10 point review of systems is otherwise negative except as mentioned above.    PAST HISTORIES:     Past Medical History:  He has a past medical history of Diabetes mellitus (CMS/[facility]), Hypertension, Personal history of other diseases of the circulatory system, Personal history of other endocrine, nutritional and metabolic disease, and Stroke (CMS/[facility]).    Past Surgical History:  He has a past surgical history that includes Carpal tunnel release (04/28/2015); Tonsillectomy (04/28/2015); Other surgical history (12/27/2021); Other surgical history (12/27/2021); Other surgical history (12/27/2021); Other surgical history (12/27/2021); Other surgical history (12/27/2021); Neck surgery (12/27/2021); Rotator cuff repair (12/27/2021); MR angio head wo IV contrast (04/01/2021); CT angio head w and wo IV contrast (06/03/2021); CT angio neck (06/03/2021); CT angio head w and wo IV contrast (06/03/2021); MR angio head wo IV contrast (04/01/2021); and Other surgical history.      Social History:  He reports that he has been smoking cigarettes. He started smoking about 49 years ago. He has a 15.00 pack-year smoking history. He has never been exposed to tobacco smoke. He has never used smokeless tobacco. He reports that he does not currently use alcohol. He reports that he does not use drugs.    Family History:  Family History   Problem Relation Name Age of Onset   • Other (malignant neoplasm of urinary bladder) Mother     • Other (arteriosclerotic cardiovascular disease) Father     • Other (malignant neoplasm of prostate) Father     • Other (arteriosclerotic cardiovascular disease) Brother          Allergies:  Ether, Other, and Bactrim [sulfamethoxazole-trimethoprim]    OBJECTIVE:     Last Recorded Vitals:  Vitals:    03/20/24 1515 03/20/24 1530 03/20/24 1559 03/20/24 1714   BP:  173/87 168/63 (!)  "181/73   BP Location:    Left arm   Patient Position:    Sitting   Pulse: 64 67 67 67   Resp:  18 17 18   TempSrc:       SpO2: 96% 96% (!) 91% (!) 92%   Weight:       Height:           Last I/O:  No intake/output data recorded.    Physical Exam:  GEN: appears stated age, NAD  CV: RRR, no m/r/g, no LE edema  LUNGS: CTAB, no w/r/c  ABD: soft, NT, obese  MSK; b/l pedal pulses not grossly palpable  NEURO: A+Ox3, no FND  PSYCH: appropriate mood, affect    Scheduled Medications      PRN Medications      Continuous Medications      Outpatient Medications:  Prior to Admission medications    Medication Sig Start Date End Date Taking? Authorizing Provider   amLODIPine (Norvasc) 10 mg tablet Take 10 mg by mouth once daily. Indications: high blood pressure    Historical Provider, MD   amLODIPine (Norvasc) 5 mg tablet Take 1 tablet (5 mg) by mouth once daily. Do not start before October 26, 2023. 10/26/23 11/25/23  Victor Hugo Oliva MD   ascorbic acid (Vitamin C) 250 mg tablet Take 1 tablet (250 mg) by mouth once daily.    Historical Provider, MD   aspirin 81 mg EC tablet Take 1 tablet (81 mg) by mouth once daily.    Historical Provider, MD   atorvastatin (Lipitor) 40 mg tablet Take 1 tablet (40 mg) by mouth once daily at bedtime. 10/25/23 11/24/23  Victor Hugo Oliva MD   BD Ultra-Fine Lin Pen Needle 32 gauge x 5/32\" needle use 1 each 2 times daily 9/7/23   Historical Provider, MD   carvedilol (Coreg) 25 mg tablet Take 1 tablet (25 mg) by mouth 2 times a day.    Historical Provider, MD   cyanocobalamin, vitamin B-12, (VITAMIN B-12 ORAL) Take 1 tablet by mouth once daily.    Historical Provider, MD   cyanocobalamin, vitamin B-12, (VITAMIN B-12 ORAL) Take by mouth. Vitamin B Complex Oral Tablet:  TAKE 1 TABLET DAILY    Historical Provider, MD   FreeStyle Lite Strips strip 3 times a day. 7/19/21   Historical Provider, MD   insulin asp prt-insulin aspart (NovoLOG Mix 70-30 U-100 Insuln) 100 unit/mL (70-30) injection Inject 20 Units " under the skin 2 times a day with meals. Take as directed per insulin instructions. If BS <160, decrease by 2 units. If BS >180, increase by 2 units.    Historical Provider, MD   insulin glargine (Lantus) 100 unit/mL injection Inject 20 Units under the skin once daily at bedtime. Take as directed per insulin instructions. 10/25/23 11/24/23  Victor Hugo Oliva MD   insulin lispro (HumaLOG) 100 unit/mL injection Inject 0-0.1 mL (0-10 Units) under the skin 3 times a day with meals. Take as directed per insulin instructions. 10/25/23 11/24/23  Victor Hugo Oliva MD   lisinopril 40 mg tablet Take 1 tablet (40 mg) by mouth once daily.    Phong Real MD   metFORMIN (Glucophage) 1,000 mg tablet Take 1 tablet (1,000 mg) by mouth 2 times a day with meals.    Phong Provider, MD   nitroglycerin (Nitrostat) 0.4 mg SL tablet Place 1 tablet (0.4 mg) under the tongue every 5 minutes if needed for chest pain (up to 3 doses).    Historical Provider, MD   NovoLOG Mix 70-30FlexPen U-100 100 unit/mL (70-30) injection INJECT 25 UNITS IN THE MORNING AND INJECT 30 UNITS IN THE AFTERNOON    Historical Provider, MD   omega 3-dha-epa-fish oil (Fish OiL) 1,000 mg (120 mg-180 mg) capsule Take 1 capsule (1,000 mg) by mouth once daily.    Phong Real MD   omega-3 fatty acids-fish oil 300-1,000 mg capsule Take by mouth.    Historical Provider, MD   zinc sulfate (Zincate) 220 (50 Zn) MG capsule Take 1 capsule (50 mg of elemental zinc) by mouth once daily.    Historical Provider, MD       LABS AND IMAGING:     Labs:  Results for orders placed or performed during the hospital encounter of 03/20/24 (from the past 24 hour(s))   POCT GLUCOSE   Result Value Ref Range    POCT Glucose 188 (H) 74 - 99 mg/dL   ECG 12 lead   Result Value Ref Range    Ventricular Rate 60 BPM    Atrial Rate 60 BPM    WI Interval 146 ms    QRS Duration 148 ms    QT Interval 492 ms    QTC Calculation(Bazett) 492 ms    P Axis 43 degrees    R Axis 30 degrees     T Axis 65 degrees    QRS Count 10 beats    Q Onset 222 ms    P Onset 149 ms    P Offset 187 ms    T Offset 468 ms    QTC Fredericia 492 ms   CBC and Auto Differential   Result Value Ref Range    WBC 17.7 (H) 4.4 - 11.3 x10*3/uL    nRBC 0.0 0.0 - 0.0 /100 WBCs    RBC 4.31 (L) 4.50 - 5.90 x10*6/uL    Hemoglobin 12.9 (L) 13.5 - 17.5 g/dL    Hematocrit 38.1 (L) 41.0 - 52.0 %    MCV 88 80 - 100 fL    MCH 29.9 26.0 - 34.0 pg    MCHC 33.9 32.0 - 36.0 g/dL    RDW 13.1 11.5 - 14.5 %    Platelets 367 150 - 450 x10*3/uL    Neutrophils % 67.9 40.0 - 80.0 %    Immature Granulocytes %, Automated 0.3 0.0 - 0.9 %    Lymphocytes % 21.6 13.0 - 44.0 %    Monocytes % 5.8 2.0 - 10.0 %    Eosinophils % 3.8 0.0 - 6.0 %    Basophils % 0.6 0.0 - 2.0 %    Neutrophils Absolute 12.03 (H) 1.20 - 7.70 x10*3/uL    Immature Granulocytes Absolute, Automated 0.06 0.00 - 0.70 x10*3/uL    Lymphocytes Absolute 3.84 1.20 - 4.80 x10*3/uL    Monocytes Absolute 1.03 (H) 0.10 - 1.00 x10*3/uL    Eosinophils Absolute 0.67 0.00 - 0.70 x10*3/uL    Basophils Absolute 0.11 (H) 0.00 - 0.10 x10*3/uL   Comprehensive Metabolic Panel   Result Value Ref Range    Glucose 163 (H) 74 - 99 mg/dL    Sodium 134 (L) 136 - 145 mmol/L    Potassium 4.4 3.5 - 5.3 mmol/L    Chloride 100 98 - 107 mmol/L    Bicarbonate 24 21 - 32 mmol/L    Anion Gap 14 10 - 20 mmol/L    Urea Nitrogen 14 6 - 23 mg/dL    Creatinine 1.60 (H) 0.50 - 1.30 mg/dL    eGFR 47 (L) >60 mL/min/1.73m*2    Calcium 8.7 8.6 - 10.3 mg/dL    Albumin 3.1 (L) 3.4 - 5.0 g/dL    Alkaline Phosphatase 92 33 - 136 U/L    Total Protein 6.1 (L) 6.4 - 8.2 g/dL    AST 10 9 - 39 U/L    Bilirubin, Total 0.5 0.0 - 1.2 mg/dL    ALT 9 (L) 10 - 52 U/L   Magnesium   Result Value Ref Range    Magnesium 1.81 1.60 - 2.40 mg/dL   B-Type Natriuretic Peptide   Result Value Ref Range     (H) 0 - 99 pg/mL   Troponin I, High Sensitivity, Initial   Result Value Ref Range    Troponin I, High Sensitivity 10 0 - 20 ng/L   Protime-INR    Result Value Ref Range    Protime 11.4 9.8 - 12.8 seconds    INR 1.0 0.9 - 1.1   Lactate   Result Value Ref Range    Lactate 3.4 (H) 0.4 - 2.0 mmol/L   Type and Screen   Result Value Ref Range    ABO TYPE A     Rh TYPE POS     ANTIBODY SCREEN NEG    Troponin, High Sensitivity, 1 Hour   Result Value Ref Range    Troponin I, High Sensitivity 10 0 - 20 ng/L   Lactate   Result Value Ref Range    Lactate 2.4 (H) 0.4 - 2.0 mmol/L   BLOOD GAS ARTERIAL FULL PANEL   Result Value Ref Range    POCT pH, Arterial 7.42 7.38 - 7.42 pH    POCT pCO2, Arterial 34 (L) 38 - 42 mm Hg    POCT pO2, Arterial 81 (L) 85 - 95 mm Hg    POCT SO2, Arterial 98 94 - 100 %    POCT Oxy Hemoglobin, Arterial 95.8 94.0 - 98.0 %    POCT Hematocrit Calculated, Arterial 33.0 (L) 41.0 - 52.0 %    POCT Sodium, Arterial 131 (L) 136 - 145 mmol/L    POCT Potassium, Arterial 4.2 3.5 - 5.3 mmol/L    POCT Chloride, Arterial 103 98 - 107 mmol/L    POCT Ionized Calcium, Arterial 1.14 1.10 - 1.33 mmol/L    POCT Glucose, Arterial 155 (H) 74 - 99 mg/dL    POCT Lactate, Arterial 1.8 0.4 - 2.0 mmol/L    POCT Base Excess, Arterial -1.9 -2.0 - 3.0 mmol/L    POCT HCO3 Calculated, Arterial 22.1 22.0 - 26.0 mmol/L    POCT Hemoglobin, Arterial 11.1 (L) 13.5 - 17.5 g/dL    POCT Anion Gap, Arterial 10 10 - 25 mmo/L    Patient Temperature      FiO2 21 %    Site of Arterial Puncture Brachial Left     Ben's Test Positive    ECG 12 lead   Result Value Ref Range    Ventricular Rate 58 BPM    Atrial Rate 58 BPM    KS Interval 166 ms    QRS Duration 144 ms    QT Interval 486 ms    QTC Calculation(Bazett) 477 ms    P Axis 35 degrees    R Axis 32 degrees    T Axis 57 degrees    QRS Count 9 beats    Q Onset 221 ms    P Onset 138 ms    P Offset 172 ms    T Offset 464 ms    QTC Fredericia 480 ms   Urinalysis with Reflex Culture and Microscopic   Result Value Ref Range    Color, Urine Yellow Straw, Yellow    Appearance, Urine Clear Clear    Specific Gravity, Urine 1.026 1.005 - 1.035     pH, Urine 7.0 5.0, 5.5, 6.0, 6.5, 7.0, 7.5, 8.0    Protein, Urine >=500 (3+) (N) NEGATIVE mg/dL    Glucose, Urine 50 (1+) (A) NEGATIVE mg/dL    Blood, Urine NEGATIVE NEGATIVE    Ketones, Urine NEGATIVE NEGATIVE mg/dL    Bilirubin, Urine NEGATIVE NEGATIVE    Urobilinogen, Urine <2.0 <2.0 mg/dL    Nitrite, Urine NEGATIVE NEGATIVE    Leukocyte Esterase, Urine NEGATIVE NEGATIVE   Urinalysis Microscopic   Result Value Ref Range    WBC, Urine NONE 1-5, NONE /HPF    RBC, Urine 1-2 NONE, 1-2, 3-5 /HPF    Hyaline Casts, Urine OCCASIONAL (A) NONE /LPF        Imaging:  CT head wo IV contrast  Narrative: Interpreted By:  Schoenberger, Joseph,   STUDY:  CT HEAD WO IV CONTRAST;  3/20/2024 2:17 pm      INDICATION:  Signs/Symptoms:weakness.      COMPARISON:  10/21/2023      ACCESSION NUMBER(S):  [id number]      ORDERING CLINICIAN:  DANIEL ROSS      TECHNIQUE:  Noncontrast axial CT scan of head was performed. Angled reformats in  brain and bone windows were generated. The images were reviewed in  bone, brain, blood and soft tissue windows.      FINDINGS:  CSF Spaces: Enlarged due to parenchymal volume loss. Normal  configuration with intact basal cisterns. There is no extraaxial  fluid collection.      Parenchyma: There are symmetric calcifications in the dentate nuclei  of the cerebellum, posterior of thalamus, and medial basal ganglia.  These are unchanged from several prior exams. The do not exhibit mass  effect. There are areas of hypoattenuation in the deep white matter  of both cerebral hemispheres consistent with small vessel ischemic  white matter demyelination. The grey-white differentiation is intact.  There is no mass effect or midline shift.  There is no intracranial  hemorrhage.      Calvarium: The calvarium is unremarkable.      Paranasal sinuses and mastoids: Visualized paranasal sinuses and  mastoids are clear.      Impression: No evidence of acute cortical infarct or intracranial hemorrhage.      No evidence  of intracranial hemorrhage or displaced skull fracture.      MACRO:  None      Signed by: Joseph Schoenberger 3/20/2024 4:29 PM  Dictation workstation:   [id number]  CT angio chest abdomen pelvis  Narrative: Interpreted By:  Norma Nair and Nakamoto Kent   STUDY:  CT ANGIO CHEST ABDOMEN PELVIS;  3/20/2024 2:38 pm      INDICATION:  Signs/Symptoms:nearsyncope, R/O PE, dissection, pneumonia.      COMPARISON:  CT abdomen and pelvis 06/19/2012, CT chest without contrast  03/13/2024.      ACCESSION NUMBER(S):  [id number]      ORDERING CLINICIAN:  DANIEL ROSS      TECHNIQUE:  Axial non-contrast images of the chest, abdomen, and pelvis with  coronal and sagittal reformatted images. Axial CT images of the  chest, abdomen and pelvis after the intravenous administration of 100  mL of Omnipaque 350 using angiographic technique with coronal and  sagittal reformatted images. MIP images were provided and reviewed.  3D reconstructions were created on a separate independent workstation  and reviewed.      FINDINGS:  VASCULATURE:      PULMONARY ARTERIES:  No acute pulmonary embolism within the  limitations of this non CT PE protocol.      THORACIC AORTA: Non-contrast images show no evidence of acute  intramural hematoma. No thoracic aortic aneurysm or dissection. Mild  thoracic aortic atherosclerosis. There is evidence of eccentric  thrombus versus small penetrating atherosclerotic ulcer on series  401, image 95.      ABDOMINAL AORTA: No abdominal aortic aneurysm or dissection.  Moderate-to-severe abdominal aortic atherosclerosis. The celiac  artery is patent without significant atherosclerosis. The dominant  branches of the celiac artery are patent. There is mild-to-moderate  atherosclerosis of the splenic artery with an area of stenosis on  series 401, image 104. The superior mesenteric artery has a mild  stenosis with moderate atherosclerosis at the ostium (series 401,  image 113; series 404, image 103). The  distal superior mesenteric  artery has mild-to-moderate atherosclerosis. The inferior mesenteric  artery is patent at the origin. After the origin, there is a  short-segment of complete occlusion of the inferior mesenteric artery  with reconstitution distally (series 404, image 101). There is severe  atherosclerosis of the inferior mesenteric artery. The bilateral  renal arteries are patent with mild atherosclerosis.      ABDOMINAL AND PELVIC ARTERIES:  The right common iliac artery is patent with severe atherosclerosis.  The right internal iliac artery has critical stenosis with severe  atherosclerosis (series 404, image 75). The right external iliac  artery has severe atherosclerosis with areas of severe stenosis such  as on series 401, image 184. The right common femoral artery has  severe atherosclerosis with critical stenosis/near complete occlusion  on series 401, image 201. The right superficial femoral artery is  moderately stenosed with moderate-to-severe atherosclerosis. The  right profunda artery is severely stenosed with severe  atherosclerosis.      Left common iliac artery is patent with severe atherosclerosis. The  left internal iliac artery has an areas severe stenosis near the  origin with severe atherosclerosis of the ostium. The left external  iliac artery is severely stenosed with severe atherosclerosis. The  left common femoral artery has critical stenosis with severe  atherosclerosis at is terminal end (series 401, image 206). There is  complete occlusion of the left superficial femoral artery (series  401, image 220) without evidence of reconstitution within the  visualized field of view. There is moderate-to-severe of the left  profunda artery atherosclerosis.          CT CHEST:      MEDIASTINUM AND LYMPH NODES: No enlarged intrathoracic or axillary  lymph nodes by CT size criteria. There are several lymph nodes that  are similar in size compared to prior exam as annotated in PACS.  For  example, there is a 0.7 cm AP lymph node (series 401, image 38), a  pretracheal lymph node measuring 0.7 cm (series 401, image 35),  another pretracheal lymph node measuring 0.9 cm (series 401, image  28). No pneumomediastinum.      HEART: Normal size.  Severe coronary artery calcifications. No  significant pericardial effusion.      LUNG, PLEURA, LARGE AIRWAYS:  No consolidation, pulmonary edema,  pleural effusion, or pneumothorax. The right upper lobe pulmonary  lesion is similar in size compared to prior exam measuring 2.2 x 1.2  cm compared to 2.2 x 1.3 cm previously (series 202, image 55). There  is a small similar sized area of chronic consolidation in the right  middle lobe measuring 2.7 x 1.3 cm compared to 2.9 x 1.5 cm (series  202, image 157). There are redemonstrated mild emphysematous changes  of the lung parenchyma. There are ground-glass opacities within the  right lower lobe that likely represent atelectasis. There is also  bibasilar streaky opacities within the lung bases that are also  likely representing atelectasis.      OSSEOUS STRUCTURES/CHEST WALL:  No acute osseous abnormality.          CT ABDOMEN/PELVIS:      LIVER: There is a 1.0 cm peripherally hyperenhancing lesion within  the left hepatic lobe that has been previously described and likely  represents a hemangioma. The liver measures 19.9 cm within the  craniocaudal dimension and is mildly enlarged.      BILE DUCTS: No significant intrahepatic or extrahepatic dilatation.      GALLBLADDER: No significant abnormality.      PANCREAS: There is a similar hypodense 2.1 x 1.7 cm (series 401,  image 106) pancreatic tail lesion that has been stable since  examination in 2012. There is no pancreatic ductal dilation..      SPLEEN: No significant abnormality.      ADRENALS: No significant abnormality.      KIDNEYS, URETERS, BLADDER: There is a 1.8 cm left exophytic renal  hypodensity (series 401, image 125) with simple free fluid  attenuation  that likely represents a renal cyst. There is a 4.8 x 5.6  cm right superior pole hypodensity with simple free fluid attenuation  that likely represents a renal cyst (series 401, image 119). This  lesion has increased in size compared to prior exam where it measured  2.5 x 2.4 cm. There is no hydroureteronephrosis or  nephroureterolithiasis. There is a bilateral mild perinephric edema  noted. The anterior portion of the urinary bladder is mildly  thickened without evidence of pericystic fat stranding.. The amount  of bladder wall thickening is decreased compared to prior exam.      REPRODUCTIVE ORGANS: No significant abnormality. No pelvic masses.      VESSELS: (See above). No additional significant abnormality.      RETROPERITONEUM/LYMPH NODES: No enlarged lymph nodes.      BOWEL/MESENTERY/PERITONEUM: No inflammatory bowel wall thickening or  dilatation.  Normal appendix. Diverticulosis without diverticulitis.      No significant ascites, free air, or fluid collection.          ABDOMINAL WALL: No significant abnormality.      OSSEOUS STRUCTURES:  No acute osseous abnormality.      Impression: No thoracic or abdominal aortic aneurysm or acute aortic pathology.  The inferior mesenteric artery has a short segment of complete  occlusion with reconstitution distally. The right common femoral  artery has near-complete occlusion. The left common femoral artery  has critical stenosis/near complete occlusion. There is complete  occlusion of the left superficial femoral artery without evidence of  reconstitution within the visualized field of view. Additional  vasculature findings as described above.      The right upper lobe pulmonary lesion is similar in size compared to  prior exam. There is redemonstrated small area of chronic  consolidation within the right middle lobe. There is similar  prominent lymph nodes within the mediastinum as described above.      Redemonstrated 2.1 x 1.7 cm hypodense pancreatic IPMN that has  been  stable since CT exam in 2012.      Additional findings as described above.      I personally reviewed the images/study and I agree with the findings  as stated by Simone Arroyo MD. This study was interpreted at  [facility], [place], [place].      MACRO:  None.      Signed by: Norma Nair 3/20/2024 4:23 PM  Dictation workstation:   [id number]  XR chest 1 view  Narrative: Interpreted By:  Magen Cazares,   STUDY:  XR CHEST 1 VIEW;  3/20/2024 12:23 pm      INDICATION:  Signs/Symptoms:Chest Pain.      COMPARISON:  March 13, 2024 and October 22 2023 chest CT examinations.      ACCESSION NUMBER(S):  [id number]      ORDERING CLINICIAN:  DANIEL ROSS      FINDINGS:  AP radiograph of the chest was provided.      Apical lordotic position. Overlapping radiopaque leads      CARDIOMEDIASTINAL SILHOUETTE:  Cardiomediastinal silhouette is normal in size and configuration.      LUNGS:  Persistent nodular density overlapping the right upper chest which is  more reliably compared by CT. No apparent consolidative pneumonia,  edema, or effusion. Stable lobulated right cardiophrenic angle region  density compatible with prominence of the pericardiophrenic region  fat.      ABDOMEN:  No remarkable upper abdominal findings.      BONES:  No acute osseous changes.      Impression: 1.  Persistent nodular density right upper chest.  2. No separate acute detected abnormality.              MACRO:  None      Signed by: Magen Cazares 3/20/2024 12:49 PM  Dictation workstation:   [id number]  ECG 12 lead  Sinus bradycardia  Right bundle branch block  T wave abnormality, consider lateral ischemia  Abnormal ECG  When compared with ECG of 20-MAR-2024 10:55, (unconfirmed)  Premature ventricular complexes are no longer Present  Inverted T waves have replaced nonspecific T wave abnormality in Lateral leads    See ED provider note for full interpretation and clinical correlation  ECG 12 lead  Sinus  rhythm with occasional Premature ventricular complexes  Right bundle branch block  Cannot rule out Inferior infarct (cited on or before 11-JUN-2020)  Abnormal ECG  When compared with ECG of 21-AUG-2020 13:28,  Premature ventricular complexes are now Present    See ED provider note for full interpretation and clinical correlation

## 2024-03-20 NOTE — ED PROVIDER NOTES
HPI   Chief Complaint   Patient presents with    Weakness, Gen         History provided by:  Patient and spouse  History of Present Illness:  68-year-old male presents with his wife with increasing generalized weakness.  Patient has been getting worse over the past week or so.  No fever, chills or cough.  No nausea, vomiting or diarrhea.  No chest pain or shortness of breath.  No back or flank pain.  No abdominal pain.  No loss of motor or sensory function.  No loss of bladder or bowel function.  No slurred speech or facial droop.  No headache.  No sick contacts.  No trauma or travel.      PMFSH:   As per HPI, otherwise nurses notes reviewed in EMR.    Past Medical History:   Active Ambulatory Problems     Diagnosis Date Noted    Cerebrovascular accident (CVA), unspecified mechanism (CMS/HCC) 10/20/2023    Primary hypertension 10/22/2023    Diabetic polyneuropathy associated with type 2 diabetes mellitus (CMS/HCC) 10/22/2023    CAD (coronary artery disease) 02/01/2024    Chest pain 02/01/2024    Complex renal cyst 02/01/2024    Dysuria 02/01/2024    Hyperlipidemia 09/29/2021    Class 1 obesity with body mass index (BMI) of 32.0 to 32.9 in adult 02/01/2024    Class 1 obesity with body mass index (BMI) of 33.0 to 33.9 in adult 02/01/2024    Current every day smoker 02/01/2024    Flank pain 02/01/2024    History of shortness of breath 02/01/2024    H/O insulin dependent diabetes mellitus 02/01/2024    Murmur 02/01/2024    Pre-operative cardiovascular examination 02/01/2024    Obesity 02/01/2024     Resolved Ambulatory Problems     Diagnosis Date Noted    No Resolved Ambulatory Problems     Past Medical History:   Diagnosis Date    Diabetes mellitus (CMS/HCC)     Hypertension     Personal history of other diseases of the circulatory system     Personal history of other endocrine, nutritional and metabolic disease     Stroke (CMS/HCC)       Past Surgical History:   Past Surgical History:   Procedure Laterality Date     CARPAL TUNNEL RELEASE  04/28/2015    Neuroplasty Decompression Median Nerve At Carpal Tunnel    CT ANGIO NECK  06/03/2021    CT NECK ANGIO W AND WO IV CONTRAST 6/3/2021 [facility]    CT HEAD ANGIO W AND WO IV CONTRAST  06/03/2021    CT HEAD ANGIO W AND WO IV CONTRAST 6/3/2021 [facility]    CT HEAD ANGIO W AND WO IV CONTRAST  06/03/2021    CT HEAD ANGIO W AND WO IV CONTRAST    MR HEAD ANGIO WO IV CONTRAST  04/01/2021    MR HEAD ANGIO WO IV CONTRAST 4/1/2021 [facility]    MR HEAD ANGIO WO IV CONTRAST  04/01/2021    MR HEAD ANGIO WO IV CONTRAST    NECK SURGERY  12/27/2021    Neck Surgery    OTHER SURGICAL HISTORY  12/27/2021    Surgery    OTHER SURGICAL HISTORY  12/27/2021    Cardiac catheterization    OTHER SURGICAL HISTORY  12/27/2021    Finger amputation    OTHER SURGICAL HISTORY  12/27/2021    Colonoscopy    OTHER SURGICAL HISTORY  12/27/2021    Wrist surgery    OTHER SURGICAL HISTORY      Tailbone    ROTATOR CUFF REPAIR  12/27/2021    Rotator Cuff Repair    TONSILLECTOMY  04/28/2015    Tonsillectomy      Family History:   Family History   Problem Relation Name Age of Onset    Other (malignant neoplasm of urinary bladder) Mother      Other (arteriosclerotic cardiovascular disease) Father      Other (malignant neoplasm of prostate) Father      Other (arteriosclerotic cardiovascular disease) Brother        Social History:    Social History     Socioeconomic History    Marital status:      Spouse name: Not on file    Number of children: Not on file    Years of education: Not on file    Highest education level: Not on file   Occupational History    Not on file   Tobacco Use    Smoking status: Every Day     Packs/day: 1.00     Years: 15.00     Additional pack years: 0.00     Total pack years: 15.00     Types: Cigarettes     Start date: 1975     Passive exposure: Never    Smokeless tobacco: Never   Vaping Use    Vaping Use: Never used   Substance and Sexual Activity    Alcohol use:  Not Currently    Drug use: Never    Sexual activity: Yes     Partners: Female     Birth control/protection: None   Other Topics Concern    Not on file   Social History Narrative    Not on file     Social Determinants of Health     Financial Resource Strain: Patient Declined (10/20/2023)    Overall Financial Resource Strain (CARDIA)     Difficulty of Paying Living Expenses: Patient declined   Food Insecurity: Patient Declined (10/20/2023)    Hunger Vital Sign     Worried About Running Out of Food in the Last Year: Patient declined     Ran Out of Food in the Last Year: Patient declined   Transportation Needs: No Transportation Needs (11/15/2023)    OASIS : Transportation     Lack of Transportation (Medical): No     Lack of Transportation (Non-Medical): No     Patient Unable or Declines to Respond: No   Physical Activity: Inactive (10/20/2023)    Exercise Vital Sign     Days of Exercise per Week: 0 days     Minutes of Exercise per Session: 0 min   Stress: No Stress Concern Present (10/20/2023)    [employer] [place] [employer] - Occupational Stress Questionnaire     Feeling of Stress : Not at all   Social Connections: Feeling Socially Integrated (11/15/2023)    OASIS : Social Isolation     Frequency of experiencing loneliness or isolation: Never   Recent Concern: Social Connections - Moderately Isolated (10/20/2023)    Social Connection and Isolation Panel [NHANES]     Frequency of Communication with Friends and Family: Three times a week     Frequency of Social Gatherings with Friends and Family: Once a week     Attends Anabaptist Services: Never     Active Member of Clubs or Organizations: No     Attends Club or Organization Meetings: Never     Marital Status:    Intimate Partner Violence: Not At Risk (10/20/2023)    Humiliation, Afraid, Rape, and Kick questionnaire     Fear of Current or Ex-Partner: No     Emotionally Abused: No     Physically Abused: No     Sexually Abused: No   Housing  Stability: Unknown (10/20/2023)    Housing Stability Vital Sign     Unable to Pay for Housing in the Last Year: Patient refused     Number of Places Lived in the Last Year: 1     Unstable Housing in the Last Year: Patient refused                          [place] Coma Scale Score: 15                     Patient History   Past Medical History:   Diagnosis Date    Diabetes mellitus (CMS/HCC)     Hypertension     Personal history of other diseases of the circulatory system     History of hypertension    Personal history of other endocrine, nutritional and metabolic disease     History of diabetes mellitus    Stroke (CMS/HCC)      Past Surgical History:   Procedure Laterality Date    CARPAL TUNNEL RELEASE  04/28/2015    Neuroplasty Decompression Median Nerve At Carpal Tunnel    CT ANGIO NECK  06/03/2021    CT NECK ANGIO W AND WO IV CONTRAST 6/3/2021 [facility]    CT HEAD ANGIO W AND WO IV CONTRAST  06/03/2021    CT HEAD ANGIO W AND WO IV CONTRAST 6/3/2021 [facility]    CT HEAD ANGIO W AND WO IV CONTRAST  06/03/2021    CT HEAD ANGIO W AND WO IV CONTRAST    MR HEAD ANGIO WO IV CONTRAST  04/01/2021    MR HEAD ANGIO WO IV CONTRAST 4/1/2021 [facility]    MR HEAD ANGIO WO IV CONTRAST  04/01/2021    MR HEAD ANGIO WO IV CONTRAST    NECK SURGERY  12/27/2021    Neck Surgery    OTHER SURGICAL HISTORY  12/27/2021    Surgery    OTHER SURGICAL HISTORY  12/27/2021    Cardiac catheterization    OTHER SURGICAL HISTORY  12/27/2021    Finger amputation    OTHER SURGICAL HISTORY  12/27/2021    Colonoscopy    OTHER SURGICAL HISTORY  12/27/2021    Wrist surgery    OTHER SURGICAL HISTORY      Tailbone    ROTATOR CUFF REPAIR  12/27/2021    Rotator Cuff Repair    TONSILLECTOMY  04/28/2015    Tonsillectomy     Family History   Problem Relation Name Age of Onset    Other (malignant neoplasm of urinary bladder) Mother      Other (arteriosclerotic cardiovascular disease) Father      Other (malignant neoplasm of  prostate) Father      Other (arteriosclerotic cardiovascular disease) Brother       Social History     Tobacco Use    Smoking status: Every Day     Packs/day: 1.00     Years: 15.00     Additional pack years: 0.00     Total pack years: 15.00     Types: Cigarettes     Start date: 1975     Passive exposure: Never    Smokeless tobacco: Never   Vaping Use    Vaping Use: Never used   Substance Use Topics    Alcohol use: Not Currently    Drug use: Never       Physical Exam   ED Triage Vitals [03/20/24 1057]   Temp Heart Rate Respirations BP   -- 68 16 150/75      Pulse Ox Temp Source Heart Rate Source Patient Position   96 % Temporal Monitor --      BP Location FiO2 (%)     -- --       Physical Exam  Physical Exam:    ED Triage Vitals [03/20/24 1057]   Temp Heart Rate Respirations BP   -- 68 16 150/75      Pulse Ox Temp Source Heart Rate Source Patient Position   96 % Temporal Monitor --      BP Location FiO2 (%)     -- --         Constitutional: Vital signs per nursing notes.  Well developed, well nourished.  Moderate acute distress.  Pale, diaphoretic.  Psychiatric: alert and oriented to person, place, and time; no abnormalities of mood or affect; memory intact  Eyes: PERRL; conjunctivae and lids normal; EOMI  ENT: otoscopic exam of external canal and TM´s normal; nasal mucosa, turbinates, and septum normal; mouth, tongue, and pharynx normal; pharynx without edema, exudate, or injection  Respiratory: normal respiratory effort and excursion; no rales, rhonchi, or wheezes; equal but diminished air entry  Cardiovascular: regular rate and rhythm; no murmurs, rubs or gallops; symmetric pulses; no edema; normal capillary refill; distal pulses present  Neurological: normal speech; CN II-XII grossly intact; normal motor and sensory function; no nystagmus; no pronator drift; generally weak; NIH stroke scale 0  GI: no masses, tenderness, rebound or guarding; no palpable, pulsatile mass; no organomegaly; no hernia; normal bowel  sounds; (-) Recinos´s sign; (-) McBurney´s sign; (-) CVA tenderness  Lymphatic: no adenopathy of neck  Musculoskeletal: normal digits and nails; no gross tendon or ligament injury; normal to palpation; normal strength/tone; neurovascular status intact; (-) Nurys´s sign; (-) straight leg raise  Skin: normal to inspection; normal to palpation; no rash  GCS: 15    ED Course & MDM   Diagnoses as of 03/20/24 1703   Near syncope   Vascular disease       Medical Decision Making  Medical Decision Making:    Differential Diagnoses Considered: ACS, arrhythmia, blood loss anemia, infection, electrolyte disorder     EKG: My interpretation of EKG is sinus rhythm at 60 bpm with right bundle branch block and nonspecific ST-T changes with occasional PVC.            My interpretation of second EKG is sinus bradycardia 58 bpm with right bundle branch block and nonspecific ST-T changes.  There are no significant changes from the initial EKG.    Labs Reviewed   CBC WITH AUTO DIFFERENTIAL - Abnormal       Result Value    WBC 17.7 (*)     nRBC 0.0      RBC 4.31 (*)     Hemoglobin 12.9 (*)     Hematocrit 38.1 (*)     MCV 88      MCH 29.9      MCHC 33.9      RDW 13.1      Platelets 367      Neutrophils % 67.9      Immature Granulocytes %, Automated 0.3      Lymphocytes % 21.6      Monocytes % 5.8      Eosinophils % 3.8      Basophils % 0.6      Neutrophils Absolute 12.03 (*)     Immature Granulocytes Absolute, Automated 0.06      Lymphocytes Absolute 3.84      Monocytes Absolute 1.03 (*)     Eosinophils Absolute 0.67      Basophils Absolute 0.11 (*)    COMPREHENSIVE METABOLIC PANEL - Abnormal    Glucose 163 (*)     Sodium 134 (*)     Potassium 4.4      Chloride 100      Bicarbonate 24      Anion Gap 14      Urea Nitrogen 14      Creatinine 1.60 (*)     eGFR 47 (*)     Calcium 8.7      Albumin 3.1 (*)     Alkaline Phosphatase 92      Total Protein 6.1 (*)     AST 10      Bilirubin, Total 0.5      ALT 9 (*)    B-TYPE NATRIURETIC PEPTIDE -  Abnormal     (*)     Narrative:        <100 pg/mL - Heart failure unlikely  100-299 pg/mL - Intermediate probability of acute heart                  failure exacerbation. Correlate with clinical                  context and patient history.    >=300 pg/mL - Heart Failure likely. Correlate with clinical                  context and patient history.    BNP testing is performed using different testing methodology at [facility] than at other [facility]. Direct result comparisons should only be made within the same method.      LACTATE - Abnormal    Lactate 3.4 (*)     Narrative:     Venipuncture immediately after or during the administration of Metamizole may lead to falsely low results. Testing should be performed immediately  prior to Metamizole dosing.   BLOOD GAS ARTERIAL FULL PANEL - Abnormal    POCT pH, Arterial 7.42      POCT pCO2, Arterial 34 (*)     POCT pO2, Arterial 81 (*)     POCT SO2, Arterial 98      POCT Oxy Hemoglobin, Arterial 95.8      POCT Hematocrit Calculated, Arterial 33.0 (*)     POCT Sodium, Arterial 131 (*)     POCT Potassium, Arterial 4.2      POCT Chloride, Arterial 103      POCT Ionized Calcium, Arterial 1.14      POCT Glucose, Arterial 155 (*)     POCT Lactate, Arterial 1.8      POCT Base Excess, Arterial -1.9      POCT HCO3 Calculated, Arterial 22.1      POCT Hemoglobin, Arterial 11.1 (*)     POCT Anion Gap, Arterial 10      Patient Temperature        FiO2 21      Site of Arterial Puncture Brachial Left      Bne's Test Positive     LACTATE - Abnormal    Lactate 2.4 (*)     Narrative:     Venipuncture immediately after or during the administration of Metamizole may lead to falsely low results. Testing should be performed immediately  prior to Metamizole dosing.   URINALYSIS WITH REFLEX CULTURE AND MICROSCOPIC - Abnormal    Color, Urine Yellow      Appearance, Urine Clear      Specific Gravity, Urine 1.026      pH, Urine 7.0      Protein, Urine >=500 (3+) (*)      Glucose, Urine 50 (1+) (*)     Blood, Urine NEGATIVE      Ketones, Urine NEGATIVE      Bilirubin, Urine NEGATIVE      Urobilinogen, Urine <2.0      Nitrite, Urine NEGATIVE      Leukocyte Esterase, Urine NEGATIVE     POCT GLUCOSE - Abnormal    POCT Glucose 188 (*)    URINALYSIS MICROSCOPIC WITH REFLEX CULTURE - Abnormal    WBC, Urine NONE      RBC, Urine 1-2      Hyaline Casts, Urine OCCASIONAL (*)    MAGNESIUM - Normal    Magnesium 1.81     SERIAL TROPONIN-INITIAL - Normal    Troponin I, High Sensitivity 10      Narrative:     Less than 99th percentile of normal range cutoff-  Female and children under 18 years old <14 ng/L; Male <21 ng/L: Negative  Repeat testing should be performed if clinically indicated.     Female and children under 18 years old 14-50 ng/L; Male 21-50 ng/L:  Consistent with possible cardiac damage and possible increased clinical   risk. Serial measurements may help to assess extent of myocardial damage.     >50 ng/L: Consistent with cardiac damage, increased clinical risk and  myocardial infarction. Serial measurements may help assess extent of   myocardial damage.      NOTE: Children less than 1 year old may have higher baseline troponin   levels and results should be interpreted in conjunction with the overall   clinical context.     NOTE: Troponin I testing is performed using a different   testing methodology at [facility] than at other   [facility]. Direct result comparisons should only   be made within the same method.   PROTIME-INR - Normal    Protime 11.4      INR 1.0     SERIAL TROPONIN, 1 HOUR - Normal    Troponin I, High Sensitivity 10      Narrative:     Less than 99th percentile of normal range cutoff-  Female and children under 18 years old <14 ng/L; Male <21 ng/L: Negative  Repeat testing should be performed if clinically indicated.     Female and children under 18 years old 14-50 ng/L; Male 21-50 ng/L:  Consistent with possible cardiac damage and possible  increased clinical   risk. Serial measurements may help to assess extent of myocardial damage.     >50 ng/L: Consistent with cardiac damage, increased clinical risk and  myocardial infarction. Serial measurements may help assess extent of   myocardial damage.      NOTE: Children less than 1 year old may have higher baseline troponin   levels and results should be interpreted in conjunction with the overall   clinical context.     NOTE: Troponin I testing is performed using a different   testing methodology at [facility] than at other   [facility]. Direct result comparisons should only   be made within the same method.   BLOOD CULTURE   BLOOD CULTURE   TROPONIN SERIES- (INITIAL, 1 HR)    Narrative:     The following orders were created for panel order Troponin I Series, High Sensitivity (0, 1 HR).  Procedure                               Abnormality         Status                     ---------                               -----------         ------                     Troponin I, High Sensiti...[[id number]]  Normal              Final result               Troponin, High Sensitivi...[[id number]]  Normal              Final result                 Please view results for these tests on the individual orders.   TYPE AND SCREEN    ABO TYPE A      Rh TYPE POS      ANTIBODY SCREEN NEG     URINALYSIS WITH REFLEX CULTURE AND MICROSCOPIC    Narrative:     The following orders were created for panel order Urinalysis with Reflex Culture and Microscopic.  Procedure                               Abnormality         Status                     ---------                               -----------         ------                     Urinalysis with Reflex C...[[id number]]  Abnormal            Final result               Extra Urine Gray Tube[[id number]]                            In process                   Please view results for these tests on the individual orders.   EXTRA URINE GRAY TUBE       CT angio chest abdomen  pelvis   Final Result   No thoracic or abdominal aortic aneurysm or acute aortic pathology.   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 The right common femoral   artery has near-complete occlusion. The left common femoral artery   has critical stenosis/near complete occlusion. There is complete   occlusion of the left superficial femoral artery without evidence of   reconstitution within the visualized field of view. Additional   vasculature findings as described above.        The right upper lobe pulmonary lesion is similar in size compared to   prior exam. There is redemonstrated small area of chronic   consolidation within the right middle lobe. There is similar   prominent lymph nodes within the mediastinum as described above.        Redemonstrated 2.1 x 1.7 cm hypodense pancreatic IPMN that has been   stable since CT exam in 2012.        Additional findings as described above.        I personally reviewed the images/study and I agree with the findings   as stated by Simone Arroyo MD. This study was interpreted at   [facility], [place], [place].        MACRO:   None.        Signed by: Norma Nair 3/20/2024 4:23 PM   Dictation workstation:   [id number]      CT head wo IV contrast   Final Result   No evidence of acute cortical infarct or intracranial hemorrhage.        No evidence of intracranial hemorrhage or displaced skull fracture.        MACRO:   None        Signed by: Joseph Schoenberger 3/20/2024 4:29 PM   Dictation workstation:   [id number]      XR chest 1 view   Final Result   1.  Persistent nodular density right upper chest.   2. No separate acute detected abnormality.                  MACRO:   None        Signed by: Magen Cazares 3/20/2024 12:49 PM   Dictation workstation:   [id number]          Diagnostic testing considered:         Review of recent and relevant records:    ED Medication  Administration:   ED Medication Administration from 03/20/2024 1048 to 03/20/2024 1703         Date/Time Order Dose Route Action Action by     03/20/2024 1103 EDT sodium chloride 0.9 % bolus 1,000 mL 1,000 mL intravenous New Bag Toyoda, P     03/20/2024 1256 EDT sodium chloride 0.9 % bolus 1,000 mL 0 mL intravenous Stopped Toyoda, P     03/20/2024 1257 EDT sodium chloride 0.9 % bolus 1,000 mL 1,000 mL intravenous New Bag Toyoda, P     03/20/2024 1416 EDT iohexol (OMNIPaque) 350 mg iodine/mL solution 75 mL 100 mL intravenous Given Till, D     03/20/2024 1613 EDT sodium chloride 0.9 % bolus 1,000 mL 0 mL intravenous Stopped Toyoda, P            Prescription Medication Consideration/Given:     Social Determinants of Health Significantly Affecting Care:      Summary:    BP      Temp      Pulse     Resp      SpO2       Abnormal Labs Reviewed   CBC WITH AUTO DIFFERENTIAL - Abnormal; Notable for the following components:       Result Value    WBC 17.7 (*)     RBC 4.31 (*)     Hemoglobin 12.9 (*)     Hematocrit 38.1 (*)     Neutrophils Absolute 12.03 (*)     Monocytes Absolute 1.03 (*)     Basophils Absolute 0.11 (*)     All other components within normal limits   COMPREHENSIVE METABOLIC PANEL - Abnormal; Notable for the following components:    Glucose 163 (*)     Sodium 134 (*)     Creatinine 1.60 (*)     eGFR 47 (*)     Albumin 3.1 (*)     Total Protein 6.1 (*)     ALT 9 (*)     All other components within normal limits   B-TYPE NATRIURETIC PEPTIDE - Abnormal; Notable for the following components:     (*)     All other components within normal limits    Narrative:        <100 pg/mL - Heart failure unlikely  100-299 pg/mL - Intermediate probability of acute heart                  failure exacerbation. Correlate with clinical                  context and patient history.    >=300 pg/mL - Heart Failure likely. Correlate with clinical                  context and patient history.    BNP testing is performed using  different testing methodology at [facility] than at other [facility]. Direct result comparisons should only be made within the same method.      LACTATE - Abnormal; Notable for the following components:    Lactate 3.4 (*)     All other components within normal limits    Narrative:     Venipuncture immediately after or during the administration of Metamizole may lead to falsely low results. Testing should be performed immediately  prior to Metamizole dosing.   BLOOD GAS ARTERIAL FULL PANEL - Abnormal; Notable for the following components:    POCT pCO2, Arterial 34 (*)     POCT pO2, Arterial 81 (*)     POCT Hematocrit Calculated, Arterial 33.0 (*)     POCT Sodium, Arterial 131 (*)     POCT Glucose, Arterial 155 (*)     POCT Hemoglobin, Arterial 11.1 (*)     All other components within normal limits   LACTATE - Abnormal; Notable for the following components:    Lactate 2.4 (*)     All other components within normal limits    Narrative:     Venipuncture immediately after or during the administration of Metamizole may lead to falsely low results. Testing should be performed immediately  prior to Metamizole dosing.   URINALYSIS WITH REFLEX CULTURE AND MICROSCOPIC - Abnormal; Notable for the following components:    Protein, Urine >=500 (3+) (*)     Glucose, Urine 50 (1+) (*)     All other components within normal limits   POCT GLUCOSE - Abnormal; Notable for the following components:    POCT Glucose 188 (*)     All other components within normal limits   URINALYSIS MICROSCOPIC WITH REFLEX CULTURE - Abnormal; Notable for the following components:    Hyaline Casts, Urine OCCASIONAL (*)     All other components within normal limits       Diagnostic evaluation was completed.  Urinalysis shows 3+ protein and 1+ glucose.  2 sets of high-sensitivity troponin are unremarkable so I do not suspect acute coronary syndrome.  Initial lactate was elevated at 3.4.  After treatment with IV fluids it came down to 2.4.   ABG showed a pH of 7.42 with pCO2 of 34 and pO2 of 81.  BNP is slightly elevated at 242.  The significance of this is unclear but I do not suspect it represents acute congestive heart failure at this time.  Metabolic panel shows an elevated glucose of 163.  Sodium is slightly low at 134.  Potassium is in the normal range.  Creatinine is slightly elevated at 1.60.  Liver function tests are in the normal range.  INR is 1.0.  CBC shows an elevated white blood cell count of 17.7.  The exact etiology of this is unclear.  It could represent infectious etiology but this is uncertain.  The patient does have some anemia with a hemoglobin of 12.9.  However I feel this is chronic in nature and does not represent acute blood loss.  CT of the head shows no evidence of acute cortical infarct or intracranial hemorrhage.  Chest x-ray shows persistent nodular density right upper chest.  CT of the chest abdomen pelvis shows no thoracic or abdominal aortic aneurysm or acute aortic pathology.  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  The right common femoral artery has near complete occlusion.  The left common femoral artery has critical stenosis/near complete occlusion.  There is complete occlusion of the left superficial femoral artery without evidence of reconstitution within the visualized field-of-view.  The right upper lobe pulmonary lesion is similar in size compared to prior exam.  There is redemonstrated small area of chronic consolidation within the right middle lobe.  There is similar prominent lymph nodes within the mediastinum.  Redemonstrated 2.1 x 1.7 cm hypodense pancreatic IPMN that has been stable since 2012.    After getting results of the CT, the patient's lower extremities were examined.  They are cool bilaterally but the patient states that that how his feet always are.  We were able to Doppler dorsalis pedis and posterior tibialis on the bilateral  lower extremities.  There is no purplish discoloration to the extremities.    I have reviewed the patient's history, physical exam, and test information with the consultant,    Dr. Owens               , who agrees to care for the patient.  He suspect this is most likely chronic in nature.  He does not recommend any further therapy at this time.  He reviewed the images himself.    I discussed the results and plan for hospitalization with the patient and/or family/friend if present.  Questions were addressed.  Patient and/or family/friend expressed understanding.    The patient's condition requires ongoing treatment and evaluation necessitating hospital admission.  I have reviewed the patient's history, physical exam, and test information with the admitting physician,    Dr. Montelongo               , who agrees to hospitalize the patient.  It appears the patient has a chronically elevated white blood cell count for years now.        Diagnoses as of 03/20/24 1703   Near syncope   Vascular disease         Procedure  Procedures     Carlos BRUMFIELD MD  03/20/24 1703

## 2024-03-21 ENCOUNTER — APPOINTMENT (OUTPATIENT)
Dept: RADIOLOGY | Facility: HOSPITAL | Age: 69
DRG: 300 | End: 2024-03-21
Payer: COMMERCIAL

## 2024-03-21 LAB
ANION GAP SERPL CALC-SCNC: 10 MMOL/L (ref 10–20)
BASOPHILS # BLD AUTO: 0.07 X10*3/UL (ref 0–0.1)
BASOPHILS NFR BLD AUTO: 0.5 %
BUN SERPL-MCNC: 19 MG/DL (ref 6–23)
CALCIUM SERPL-MCNC: 8 MG/DL (ref 8.6–10.3)
CHLORIDE SERPL-SCNC: 104 MMOL/L (ref 98–107)
CO2 SERPL-SCNC: 25 MMOL/L (ref 21–32)
CREAT SERPL-MCNC: 1.44 MG/DL (ref 0.5–1.3)
EGFRCR SERPLBLD CKD-EPI 2021: 53 ML/MIN/1.73M*2
EOSINOPHIL # BLD AUTO: 0.59 X10*3/UL (ref 0–0.7)
EOSINOPHIL NFR BLD AUTO: 4 %
ERYTHROCYTE [DISTWIDTH] IN BLOOD BY AUTOMATED COUNT: 13.2 % (ref 11.5–14.5)
GLUCOSE BLD MANUAL STRIP-MCNC: 128 MG/DL (ref 74–99)
GLUCOSE BLD MANUAL STRIP-MCNC: 141 MG/DL (ref 74–99)
GLUCOSE BLD MANUAL STRIP-MCNC: 158 MG/DL (ref 74–99)
GLUCOSE BLD MANUAL STRIP-MCNC: 209 MG/DL (ref 74–99)
GLUCOSE SERPL-MCNC: 146 MG/DL (ref 74–99)
HCT VFR BLD AUTO: 34.5 % (ref 41–52)
HGB BLD-MCNC: 11.4 G/DL (ref 13.5–17.5)
HOLD SPECIMEN: NORMAL
IMM GRANULOCYTES # BLD AUTO: 0.05 X10*3/UL (ref 0–0.7)
IMM GRANULOCYTES NFR BLD AUTO: 0.3 % (ref 0–0.9)
LYMPHOCYTES # BLD AUTO: 3.07 X10*3/UL (ref 1.2–4.8)
LYMPHOCYTES NFR BLD AUTO: 20.9 %
MAGNESIUM SERPL-MCNC: 1.79 MG/DL (ref 1.6–2.4)
MCH RBC QN AUTO: 28.8 PG (ref 26–34)
MCHC RBC AUTO-ENTMCNC: 33 G/DL (ref 32–36)
MCV RBC AUTO: 87 FL (ref 80–100)
MONOCYTES # BLD AUTO: 1.34 X10*3/UL (ref 0.1–1)
MONOCYTES NFR BLD AUTO: 9.1 %
NEUTROPHILS # BLD AUTO: 9.56 X10*3/UL (ref 1.2–7.7)
NEUTROPHILS NFR BLD AUTO: 65.2 %
NRBC BLD-RTO: 0 /100 WBCS (ref 0–0)
PLATELET # BLD AUTO: 324 X10*3/UL (ref 150–450)
POTASSIUM SERPL-SCNC: 3.9 MMOL/L (ref 3.5–5.3)
RBC # BLD AUTO: 3.96 X10*6/UL (ref 4.5–5.9)
SODIUM SERPL-SCNC: 135 MMOL/L (ref 136–145)
VANCOMYCIN SERPL-MCNC: 22.9 UG/ML (ref 5–20)
WBC # BLD AUTO: 14.7 X10*3/UL (ref 4.4–11.3)

## 2024-03-21 PROCEDURE — 82947 ASSAY GLUCOSE BLOOD QUANT: CPT

## 2024-03-21 PROCEDURE — 83735 ASSAY OF MAGNESIUM: CPT | Performed by: INTERNAL MEDICINE

## 2024-03-21 PROCEDURE — 87040 BLOOD CULTURE FOR BACTERIA: CPT | Mod: ELYLAB | Performed by: INTERNAL MEDICINE

## 2024-03-21 PROCEDURE — 2500000002 HC RX 250 W HCPCS SELF ADMINISTERED DRUGS (ALT 637 FOR MEDICARE OP, ALT 636 FOR OP/ED): Performed by: INTERNAL MEDICINE

## 2024-03-21 PROCEDURE — 36415 COLL VENOUS BLD VENIPUNCTURE: CPT | Performed by: INTERNAL MEDICINE

## 2024-03-21 PROCEDURE — 93922 UPR/L XTREMITY ART 2 LEVELS: CPT

## 2024-03-21 PROCEDURE — 96372 THER/PROPH/DIAG INJ SC/IM: CPT | Performed by: INTERNAL MEDICINE

## 2024-03-21 PROCEDURE — 80202 ASSAY OF VANCOMYCIN: CPT

## 2024-03-21 PROCEDURE — 2500000001 HC RX 250 WO HCPCS SELF ADMINISTERED DRUGS (ALT 637 FOR MEDICARE OP): Performed by: INTERNAL MEDICINE

## 2024-03-21 PROCEDURE — G0378 HOSPITAL OBSERVATION PER HR: HCPCS

## 2024-03-21 PROCEDURE — 80048 BASIC METABOLIC PNL TOTAL CA: CPT | Performed by: INTERNAL MEDICINE

## 2024-03-21 PROCEDURE — 99222 1ST HOSP IP/OBS MODERATE 55: CPT | Performed by: NURSE PRACTITIONER

## 2024-03-21 PROCEDURE — 99232 SBSQ HOSP IP/OBS MODERATE 35: CPT | Performed by: INTERNAL MEDICINE

## 2024-03-21 PROCEDURE — 2500000004 HC RX 250 GENERAL PHARMACY W/ HCPCS (ALT 636 FOR OP/ED): Performed by: INTERNAL MEDICINE

## 2024-03-21 PROCEDURE — 2500000004 HC RX 250 GENERAL PHARMACY W/ HCPCS (ALT 636 FOR OP/ED)

## 2024-03-21 PROCEDURE — 85025 COMPLETE CBC W/AUTO DIFF WBC: CPT | Performed by: INTERNAL MEDICINE

## 2024-03-21 RX ORDER — AMLODIPINE BESYLATE 5 MG/1
10 TABLET ORAL DAILY
Status: DISCONTINUED | OUTPATIENT
Start: 2024-03-21 | End: 2024-03-23 | Stop reason: HOSPADM

## 2024-03-21 RX ORDER — VANCOMYCIN HYDROCHLORIDE 1 G/20ML
INJECTION, POWDER, LYOPHILIZED, FOR SOLUTION INTRAVENOUS DAILY PRN
Status: DISCONTINUED | OUTPATIENT
Start: 2024-03-21 | End: 2024-03-23 | Stop reason: HOSPADM

## 2024-03-21 RX ORDER — CARVEDILOL 12.5 MG/1
25 TABLET ORAL 2 TIMES DAILY
Status: DISCONTINUED | OUTPATIENT
Start: 2024-03-21 | End: 2024-03-23 | Stop reason: HOSPADM

## 2024-03-21 RX ADMIN — HEPARIN SODIUM 5000 UNITS: 5000 INJECTION INTRAVENOUS; SUBCUTANEOUS at 18:36

## 2024-03-21 RX ADMIN — AMLODIPINE BESYLATE 10 MG: 5 TABLET ORAL at 08:31

## 2024-03-21 RX ADMIN — INSULIN HUMAN 20 UNITS: 100 INJECTION, SUSPENSION SUBCUTANEOUS at 17:05

## 2024-03-21 RX ADMIN — CLOPIDOGREL BISULFATE 75 MG: 75 TABLET, FILM COATED ORAL at 08:24

## 2024-03-21 RX ADMIN — SODIUM CHLORIDE, POTASSIUM CHLORIDE, SODIUM LACTATE AND CALCIUM CHLORIDE 75 ML/HR: 600; 310; 30; 20 INJECTION, SOLUTION INTRAVENOUS at 10:38

## 2024-03-21 RX ADMIN — VANCOMYCIN HYDROCHLORIDE 2000 MG: 10 INJECTION, POWDER, LYOPHILIZED, FOR SOLUTION INTRAVENOUS at 11:01

## 2024-03-21 RX ADMIN — ATORVASTATIN CALCIUM 80 MG: 80 TABLET, FILM COATED ORAL at 21:17

## 2024-03-21 RX ADMIN — CARVEDILOL 25 MG: 12.5 TABLET, FILM COATED ORAL at 09:38

## 2024-03-21 RX ADMIN — INSULIN LISPRO 4 UNITS: 100 INJECTION, SOLUTION INTRAVENOUS; SUBCUTANEOUS at 17:05

## 2024-03-21 RX ADMIN — HEPARIN SODIUM 5000 UNITS: 5000 INJECTION INTRAVENOUS; SUBCUTANEOUS at 11:30

## 2024-03-21 RX ADMIN — CARVEDILOL 25 MG: 12.5 TABLET, FILM COATED ORAL at 21:16

## 2024-03-21 RX ADMIN — ASPIRIN 81 MG: 81 TABLET, COATED ORAL at 08:24

## 2024-03-21 ASSESSMENT — COGNITIVE AND FUNCTIONAL STATUS - GENERAL
MOVING TO AND FROM BED TO CHAIR: A LITTLE
WALKING IN HOSPITAL ROOM: A LITTLE
DAILY ACTIVITIY SCORE: 24
DAILY ACTIVITIY SCORE: 24
CLIMB 3 TO 5 STEPS WITH RAILING: A LITTLE
MOBILITY SCORE: 22
WALKING IN HOSPITAL ROOM: A LITTLE
MOBILITY SCORE: 20
STANDING UP FROM CHAIR USING ARMS: A LITTLE
CLIMB 3 TO 5 STEPS WITH RAILING: A LITTLE

## 2024-03-21 ASSESSMENT — PAIN SCALES - GENERAL
PAINLEVEL_OUTOF10: 0 - NO PAIN
PAINLEVEL_OUTOF10: 0 - NO PAIN

## 2024-03-21 ASSESSMENT — PAIN - FUNCTIONAL ASSESSMENT: PAIN_FUNCTIONAL_ASSESSMENT: 0-10

## 2024-03-21 NOTE — SIGNIFICANT EVENT
Imaging reviewed with Dr. Owens. PVR showing Right CARROL 0.89; TBI 0.38 Left CARROL 0.72; TBI 0.40. There is no indication for urgent intervention at this time. Discussed with patient follow up will be scheduled to see Dr. Owens in office as outpatient.

## 2024-03-21 NOTE — CONSULTS
"Vancomycin Dosing by Pharmacy- INITIAL    Amandeep Lara is a 68 y.o. year old male who Pharmacy has been consulted for vancomycin dosing for other bacteremia . Based on the patient's indication and renal status this patient will be dosed based on a goal AUC of 500-600.     Renal function is currently stable.    Visit Vitals  /79 (Patient Position: 3 minute standing)   Pulse 84   Temp 37.6 °C (99.7 °F) (Temporal)   Resp 16        Lab Results   Component Value Date    CREATININE 1.44 (H) 03/21/2024    CREATININE 1.60 (H) 03/20/2024    CREATININE 1.34 (H) 10/30/2023    CREATININE 1.23 10/27/2023        Patient weight is No results found for: \"PTWEIGHT\"    No results found for: \"CULTURE\"     I/O last 3 completed shifts:  In: 2712.5 (27.5 mL/kg) [I.V.:712.5 (7.2 mL/kg); IV Piggyback:2000]  Out: - (0 mL/kg)   Weight: 98.6 kg   [unfilled]    Lab Results   Component Value Date    PATIENTTEMP  03/20/2024      Comment:      NOTE: Patient Results are Not Corrected for Temperature          Assessment/Plan     Patient will be given a loading dose of 2000 mg.  Will initiate vancomycin maintenance,  1500 mg every 24 hours.    This dosing regimen is predicted by [employer] to result in the following pharmacokinetic parameters:  Loading dose: 2000 mg at 10:00 03/21/2024.  Regimen: 1500 mg IV every 24 hours.  Start time: 10:00 on 03/22/2024  Exposure target: AUC24 (range)400-600 mg/L.hr   AUC24,ss: 508 mg/L.hr  Probability of AUC24 > 400: 75 %  Ctrough,ss: 15.3 mg/L  Probability of Ctrough,ss > 20: 28 %  Probability of nephrotoxicity (Lodise NOREEN 2009): 11 %    Follow-up level will be ordered on 3/21/24 at 1400 unless clinically indicated sooner.  Will continue to monitor renal function daily while on vancomycin and order serum creatinine at least every 48 hours if not already ordered.  Follow for continued vancomycin needs, clinical response, and signs/symptoms of toxicity.       Natalya Forbes, PharmD       "

## 2024-03-21 NOTE — PROGRESS NOTES
ASSESSMENT & PLAN:     PAD  -CTA on admission showed near complete occlusion of b/l CFA, and complete occlusion of L SFA  -per ED physician, findings were reviewed from ED by Dr. Owens, who felt likely chronic in nature, no need for hep gtt right now  -b/l pedal pulses Dopplerable, no dusky discoloration, no rest pain, no ulcers  Plan:  -vascular surgery consulted  -CARROL pending  -cont PTA ASA, Plavix, high intensity statin    GPC bacteremia  -1/2 sets from 3/20 growing GPC in clusters  -possibly contaminant, but given ill presentation and leukocytosis (albeit chronic) on admission, will start vanc, and fu speciation  -rpt bcx today     Presyncope  Generalized weakness  -feeling much better after IVF, cont     FENG  -Scr 1.6 on admission, previously was 1-1.3 late last year  -improving with IVF     Leukocytosis  -unclear etiology, has been chronically elevated since 2020  -down trended with IVF alone, may have had some component of hemoconcentration on admission     RUL pulmonary lesion  -chronic appearing, has been present since at least 10/2023, similar in size on rpt imaging  -outpt fu with pulm     Hx of embolic stroke  HTN  HLD  -home meds, hold ACE-I for now d/t FENG, resume as tolerated     IDDM2  -home insulin at reduced dose, ssi, titrate prn     Obesity     Vte ppx sqh    Shantanu Montelongo MD    SUBJECTIVE     NAEON. Feeling better. Denies CP, dyspnea, pain.     OBJECTIVE:       Last Recorded Vitals:  Vitals:    03/20/24 2015 03/21/24 0814 03/21/24 0816 03/21/24 0817   BP: 166/70 164/72 167/74 171/79   BP Location:       Patient Position:  Lying 1 minute standing 3 minute standing   Pulse:  78 81 84   Resp:  16 16 16   Temp:  37.6 °C (99.7 °F) 37.6 °C (99.7 °F) 37.6 °C (99.7 °F)   TempSrc:  Temporal Temporal Temporal   SpO2:  96%  96%   Weight:       Height:           Last I/O:  I/O last 3 completed shifts:  In: 2712.5 (27.5 mL/kg) [I.V.:712.5 (7.2 mL/kg); IV Piggyback:2000]  Out: - (0 mL/kg)   Weight: 98.6 kg      Physical Exam:  GEN: appears stated age, NAD  CV: RRR, no m/r/g, no LE edema  LUNGS: CTAB, no w/r/c  ABD: soft, NT, obese  MSK; b/l pedal pulses not grossly palpable  NEURO: A+Ox3, no FND  PSYCH: appropriate mood, affect    Inpatient Medications:  amLODIPine, 10 mg, oral, Daily  aspirin, 81 mg, oral, Daily  atorvastatin, 80 mg, oral, Nightly  carvedilol, 25 mg, oral, BID  clopidogrel, 75 mg, oral, Daily  heparin (porcine), 5,000 Units, subcutaneous, q8h  insulin lispro, 0-10 Units, subcutaneous, TID with meals  insulin NPH and regular human, 20 Units, subcutaneous, BID AC  vancomycin, 2,000 mg, intravenous, Once        PRN Medications  PRN medications: acetaminophen, dextrose, dextrose, glucagon, hydrALAZINE, melatonin, ondansetron **OR** ondansetron, vancomycin    Continuous Medications:  lactated Ringer's, 75 mL/hr, Last Rate: 75 mL/hr (03/21/24 [facility])          LABS AND IMAGING:     Labs:  Results for orders placed or performed during the hospital encounter of 03/20/24 (from the past 24 hour(s))   CBC and Auto Differential   Result Value Ref Range    WBC 17.7 (H) 4.4 - 11.3 x10*3/uL    nRBC 0.0 0.0 - 0.0 /100 WBCs    RBC 4.31 (L) 4.50 - 5.90 x10*6/uL    Hemoglobin 12.9 (L) 13.5 - 17.5 g/dL    Hematocrit 38.1 (L) 41.0 - 52.0 %    MCV 88 80 - 100 fL    MCH 29.9 26.0 - 34.0 pg    MCHC 33.9 32.0 - 36.0 g/dL    RDW 13.1 11.5 - 14.5 %    Platelets 367 150 - 450 x10*3/uL    Neutrophils % 67.9 40.0 - 80.0 %    Immature Granulocytes %, Automated 0.3 0.0 - 0.9 %    Lymphocytes % 21.6 13.0 - 44.0 %    Monocytes % 5.8 2.0 - 10.0 %    Eosinophils % 3.8 0.0 - 6.0 %    Basophils % 0.6 0.0 - 2.0 %    Neutrophils Absolute 12.03 (H) 1.20 - 7.70 x10*3/uL    Immature Granulocytes Absolute, Automated 0.06 0.00 - 0.70 x10*3/uL    Lymphocytes Absolute 3.84 1.20 - 4.80 x10*3/uL    Monocytes Absolute 1.03 (H) 0.10 - 1.00 x10*3/uL    Eosinophils Absolute 0.67 0.00 - 0.70 x10*3/uL    Basophils Absolute 0.11 (H) 0.00 - 0.10 x10*3/uL    Comprehensive Metabolic Panel   Result Value Ref Range    Glucose 163 (H) 74 - 99 mg/dL    Sodium 134 (L) 136 - 145 mmol/L    Potassium 4.4 3.5 - 5.3 mmol/L    Chloride 100 98 - 107 mmol/L    Bicarbonate 24 21 - 32 mmol/L    Anion Gap 14 10 - 20 mmol/L    Urea Nitrogen 14 6 - 23 mg/dL    Creatinine 1.60 (H) 0.50 - 1.30 mg/dL    eGFR 47 (L) >60 mL/min/1.73m*2    Calcium 8.7 8.6 - 10.3 mg/dL    Albumin 3.1 (L) 3.4 - 5.0 g/dL    Alkaline Phosphatase 92 33 - 136 U/L    Total Protein 6.1 (L) 6.4 - 8.2 g/dL    AST 10 9 - 39 U/L    Bilirubin, Total 0.5 0.0 - 1.2 mg/dL    ALT 9 (L) 10 - 52 U/L   Magnesium   Result Value Ref Range    Magnesium 1.81 1.60 - 2.40 mg/dL   B-Type Natriuretic Peptide   Result Value Ref Range     (H) 0 - 99 pg/mL   Troponin I, High Sensitivity, Initial   Result Value Ref Range    Troponin I, High Sensitivity 10 0 - 20 ng/L   Protime-INR   Result Value Ref Range    Protime 11.4 9.8 - 12.8 seconds    INR 1.0 0.9 - 1.1   Lactate   Result Value Ref Range    Lactate 3.4 (H) 0.4 - 2.0 mmol/L   Blood Culture    Specimen: Peripheral Venipuncture; Blood culture   Result Value Ref Range    Blood Culture       Identification and susceptibility testing to follow    Gram Stain Gram positive cocci, clusters (AA)    Type and Screen   Result Value Ref Range    ABO TYPE A     Rh TYPE POS     ANTIBODY SCREEN NEG    Blood Culture    Specimen: Peripheral Venipuncture; Blood culture   Result Value Ref Range    Blood Culture Loaded on Instrument - Culture in progress    Troponin, High Sensitivity, 1 Hour   Result Value Ref Range    Troponin I, High Sensitivity 10 0 - 20 ng/L   Lactate   Result Value Ref Range    Lactate 2.4 (H) 0.4 - 2.0 mmol/L   BLOOD GAS ARTERIAL FULL PANEL   Result Value Ref Range    POCT pH, Arterial 7.42 7.38 - 7.42 pH    POCT pCO2, Arterial 34 (L) 38 - 42 mm Hg    POCT pO2, Arterial 81 (L) 85 - 95 mm Hg    POCT SO2, Arterial 98 94 - 100 %    POCT Oxy Hemoglobin, Arterial 95.8 94.0 - 98.0  %    POCT Hematocrit Calculated, Arterial 33.0 (L) 41.0 - 52.0 %    POCT Sodium, Arterial 131 (L) 136 - 145 mmol/L    POCT Potassium, Arterial 4.2 3.5 - 5.3 mmol/L    POCT Chloride, Arterial 103 98 - 107 mmol/L    POCT Ionized Calcium, Arterial 1.14 1.10 - 1.33 mmol/L    POCT Glucose, Arterial 155 (H) 74 - 99 mg/dL    POCT Lactate, Arterial 1.8 0.4 - 2.0 mmol/L    POCT Base Excess, Arterial -1.9 -2.0 - 3.0 mmol/L    POCT HCO3 Calculated, Arterial 22.1 22.0 - 26.0 mmol/L    POCT Hemoglobin, Arterial 11.1 (L) 13.5 - 17.5 g/dL    POCT Anion Gap, Arterial 10 10 - 25 mmo/L    Patient Temperature      FiO2 21 %    Site of Arterial Puncture Brachial Left     Ben's Test Positive    ECG 12 lead   Result Value Ref Range    Ventricular Rate 58 BPM    Atrial Rate 58 BPM    SD Interval 166 ms    QRS Duration 144 ms    QT Interval 486 ms    QTC Calculation(Bazett) 477 ms    P Axis 35 degrees    R Axis 32 degrees    T Axis 57 degrees    QRS Count 9 beats    Q Onset 221 ms    P Onset 138 ms    P Offset 172 ms    T Offset 464 ms    QTC Fredericia 480 ms   Urinalysis with Reflex Culture and Microscopic   Result Value Ref Range    Color, Urine Yellow Straw, Yellow    Appearance, Urine Clear Clear    Specific Gravity, Urine 1.026 1.005 - 1.035    pH, Urine 7.0 5.0, 5.5, 6.0, 6.5, 7.0, 7.5, 8.0    Protein, Urine >=500 (3+) (N) NEGATIVE mg/dL    Glucose, Urine 50 (1+) (A) NEGATIVE mg/dL    Blood, Urine NEGATIVE NEGATIVE    Ketones, Urine NEGATIVE NEGATIVE mg/dL    Bilirubin, Urine NEGATIVE NEGATIVE    Urobilinogen, Urine <2.0 <2.0 mg/dL    Nitrite, Urine NEGATIVE NEGATIVE    Leukocyte Esterase, Urine NEGATIVE NEGATIVE   Extra Urine Gray Tube   Result Value Ref Range    Extra Tube Hold for add-ons.    Urinalysis Microscopic   Result Value Ref Range    WBC, Urine NONE 1-5, NONE /HPF    RBC, Urine 1-2 NONE, 1-2, 3-5 /HPF    Hyaline Casts, Urine OCCASIONAL (A) NONE /LPF   POCT GLUCOSE   Result Value Ref Range    POCT Glucose 367 (H) 74 -  99 mg/dL   Magnesium   Result Value Ref Range    Magnesium 1.79 1.60 - 2.40 mg/dL   Basic Metabolic Panel   Result Value Ref Range    Glucose 146 (H) 74 - 99 mg/dL    Sodium 135 (L) 136 - 145 mmol/L    Potassium 3.9 3.5 - 5.3 mmol/L    Chloride 104 98 - 107 mmol/L    Bicarbonate 25 21 - 32 mmol/L    Anion Gap 10 10 - 20 mmol/L    Urea Nitrogen 19 6 - 23 mg/dL    Creatinine 1.44 (H) 0.50 - 1.30 mg/dL    eGFR 53 (L) >60 mL/min/1.73m*2    Calcium 8.0 (L) 8.6 - 10.3 mg/dL   CBC and Auto Differential   Result Value Ref Range    WBC 14.7 (H) 4.4 - 11.3 x10*3/uL    nRBC 0.0 0.0 - 0.0 /100 WBCs    RBC 3.96 (L) 4.50 - 5.90 x10*6/uL    Hemoglobin 11.4 (L) 13.5 - 17.5 g/dL    Hematocrit 34.5 (L) 41.0 - 52.0 %    MCV 87 80 - 100 fL    MCH 28.8 26.0 - 34.0 pg    MCHC 33.0 32.0 - 36.0 g/dL    RDW 13.2 11.5 - 14.5 %    Platelets 324 150 - 450 x10*3/uL    Neutrophils % 65.2 40.0 - 80.0 %    Immature Granulocytes %, Automated 0.3 0.0 - 0.9 %    Lymphocytes % 20.9 13.0 - 44.0 %    Monocytes % 9.1 2.0 - 10.0 %    Eosinophils % 4.0 0.0 - 6.0 %    Basophils % 0.5 0.0 - 2.0 %    Neutrophils Absolute 9.56 (H) 1.20 - 7.70 x10*3/uL    Immature Granulocytes Absolute, Automated 0.05 0.00 - 0.70 x10*3/uL    Lymphocytes Absolute 3.07 1.20 - 4.80 x10*3/uL    Monocytes Absolute 1.34 (H) 0.10 - 1.00 x10*3/uL    Eosinophils Absolute 0.59 0.00 - 0.70 x10*3/uL    Basophils Absolute 0.07 0.00 - 0.10 x10*3/uL   SST TOP   Result Value Ref Range    Extra Tube Hold for add-ons.    POCT GLUCOSE   Result Value Ref Range    POCT Glucose 128 (H) 74 - 99 mg/dL   POCT GLUCOSE   Result Value Ref Range    POCT Glucose 141 (H) 74 - 99 mg/dL        Imaging:  ECG 12 lead  Sinus rhythm with occasional Premature ventricular complexes  Right bundle branch block  Cannot rule out Inferior infarct (cited on or before 11-JUN-2020)  Abnormal ECG  When compared with ECG of 21-AUG-2020 13:28,  Premature ventricular complexes are now Present    See ED provider note for full  interpretation and clinical correlation    Confirmed by Alina Wright ([id number]) on 3/20/2024 9:51:23 PM  ECG 12 lead  Sinus bradycardia  Right bundle branch block  T wave abnormality, consider lateral ischemia  Abnormal ECG  When compared with ECG of 20-MAR-2024 10:55, (unconfirmed)  Premature ventricular complexes are no longer Present  Inverted T waves have replaced nonspecific T wave abnormality in Lateral leads    See ED provider note for full interpretation and clinical correlation    Confirmed by Alina Wright ([id number]) on 3/20/2024 9:50:50 PM  CT head wo IV contrast  Narrative: Interpreted By:  Schoenberger, Joseph,   STUDY:  CT HEAD WO IV CONTRAST;  3/20/2024 2:17 pm      INDICATION:  Signs/Symptoms:weakness.      COMPARISON:  10/21/2023      ACCESSION NUMBER(S):  [id number]      ORDERING CLINICIAN:  DANIEL ROSS      TECHNIQUE:  Noncontrast axial CT scan of head was performed. Angled reformats in  brain and bone windows were generated. The images were reviewed in  bone, brain, blood and soft tissue windows.      FINDINGS:  CSF Spaces: Enlarged due to parenchymal volume loss. Normal  configuration with intact basal cisterns. There is no extraaxial  fluid collection.      Parenchyma: There are symmetric calcifications in the dentate nuclei  of the cerebellum, posterior of thalamus, and medial basal ganglia.  These are unchanged from several prior exams. The do not exhibit mass  effect. There are areas of hypoattenuation in the deep white matter  of both cerebral hemispheres consistent with small vessel ischemic  white matter demyelination. The grey-white differentiation is intact.  There is no mass effect or midline shift.  There is no intracranial  hemorrhage.      Calvarium: The calvarium is unremarkable.      Paranasal sinuses and mastoids: Visualized paranasal sinuses and  mastoids are clear.      Impression: No evidence of acute cortical infarct or intracranial hemorrhage.      No evidence  of intracranial hemorrhage or displaced skull fracture.      MACRO:  None      Signed by: Joseph Schoenberger 3/20/2024 4:29 PM  Dictation workstation:   [id number]  CT angio chest abdomen pelvis  Narrative: Interpreted By:  Norma Nair and Nakamoto Kent   STUDY:  CT ANGIO CHEST ABDOMEN PELVIS;  3/20/2024 2:38 pm      INDICATION:  Signs/Symptoms:nearsyncope, R/O PE, dissection, pneumonia.      COMPARISON:  CT abdomen and pelvis 06/19/2012, CT chest without contrast  03/13/2024.      ACCESSION NUMBER(S):  [id number]      ORDERING CLINICIAN:  DANIEL ROSS      TECHNIQUE:  Axial non-contrast images of the chest, abdomen, and pelvis with  coronal and sagittal reformatted images. Axial CT images of the  chest, abdomen and pelvis after the intravenous administration of 100  mL of Omnipaque 350 using angiographic technique with coronal and  sagittal reformatted images. MIP images were provided and reviewed.  3D reconstructions were created on a separate independent workstation  and reviewed.      FINDINGS:  VASCULATURE:      PULMONARY ARTERIES:  No acute pulmonary embolism within the  limitations of this non CT PE protocol.      THORACIC AORTA: Non-contrast images show no evidence of acute  intramural hematoma. No thoracic aortic aneurysm or dissection. Mild  thoracic aortic atherosclerosis. There is evidence of eccentric  thrombus versus small penetrating atherosclerotic ulcer on series  401, image 95.      ABDOMINAL AORTA: No abdominal aortic aneurysm or dissection.  Moderate-to-severe abdominal aortic atherosclerosis. The celiac  artery is patent without significant atherosclerosis. The dominant  branches of the celiac artery are patent. There is mild-to-moderate  atherosclerosis of the splenic artery with an area of stenosis on  series 401, image 104. The superior mesenteric artery has a mild  stenosis with moderate atherosclerosis at the ostium (series 401,  image 113; series 404, image 103). The  distal superior mesenteric  artery has mild-to-moderate atherosclerosis. The inferior mesenteric  artery is patent at the origin. After the origin, there is a  short-segment of complete occlusion of the inferior mesenteric artery  with reconstitution distally (series 404, image 101). There is severe  atherosclerosis of the inferior mesenteric artery. The bilateral  renal arteries are patent with mild atherosclerosis.      ABDOMINAL AND PELVIC ARTERIES:  The right common iliac artery is patent with severe atherosclerosis.  The right internal iliac artery has critical stenosis with severe  atherosclerosis (series 404, image 75). The right external iliac  artery has severe atherosclerosis with areas of severe stenosis such  as on series 401, image 184. The right common femoral artery has  severe atherosclerosis with critical stenosis/near complete occlusion  on series 401, image 201. The right superficial femoral artery is  moderately stenosed with moderate-to-severe atherosclerosis. The  right profunda artery is severely stenosed with severe  atherosclerosis.      Left common iliac artery is patent with severe atherosclerosis. The  left internal iliac artery has an areas severe stenosis near the  origin with severe atherosclerosis of the ostium. The left external  iliac artery is severely stenosed with severe atherosclerosis. The  left common femoral artery has critical stenosis with severe  atherosclerosis at is terminal end (series 401, image 206). There is  complete occlusion of the left superficial femoral artery (series  401, image 220) without evidence of reconstitution within the  visualized field of view. There is moderate-to-severe of the left  profunda artery atherosclerosis.          CT CHEST:      MEDIASTINUM AND LYMPH NODES: No enlarged intrathoracic or axillary  lymph nodes by CT size criteria. There are several lymph nodes that  are similar in size compared to prior exam as annotated in PACS.  For  example, there is a 0.7 cm AP lymph node (series 401, image 38), a  pretracheal lymph node measuring 0.7 cm (series 401, image 35),  another pretracheal lymph node measuring 0.9 cm (series 401, image  28). No pneumomediastinum.      HEART: Normal size.  Severe coronary artery calcifications. No  significant pericardial effusion.      LUNG, PLEURA, LARGE AIRWAYS:  No consolidation, pulmonary edema,  pleural effusion, or pneumothorax. The right upper lobe pulmonary  lesion is similar in size compared to prior exam measuring 2.2 x 1.2  cm compared to 2.2 x 1.3 cm previously (series 202, image 55). There  is a small similar sized area of chronic consolidation in the right  middle lobe measuring 2.7 x 1.3 cm compared to 2.9 x 1.5 cm (series  202, image 157). There are redemonstrated mild emphysematous changes  of the lung parenchyma. There are ground-glass opacities within the  right lower lobe that likely represent atelectasis. There is also  bibasilar streaky opacities within the lung bases that are also  likely representing atelectasis.      OSSEOUS STRUCTURES/CHEST WALL:  No acute osseous abnormality.          CT ABDOMEN/PELVIS:      LIVER: There is a 1.0 cm peripherally hyperenhancing lesion within  the left hepatic lobe that has been previously described and likely  represents a hemangioma. The liver measures 19.9 cm within the  craniocaudal dimension and is mildly enlarged.      BILE DUCTS: No significant intrahepatic or extrahepatic dilatation.      GALLBLADDER: No significant abnormality.      PANCREAS: There is a similar hypodense 2.1 x 1.7 cm (series 401,  image 106) pancreatic tail lesion that has been stable since  examination in 2012. There is no pancreatic ductal dilation..      SPLEEN: No significant abnormality.      ADRENALS: No significant abnormality.      KIDNEYS, URETERS, BLADDER: There is a 1.8 cm left exophytic renal  hypodensity (series 401, image 125) with simple free fluid  attenuation  that likely represents a renal cyst. There is a 4.8 x 5.6  cm right superior pole hypodensity with simple free fluid attenuation  that likely represents a renal cyst (series 401, image 119). This  lesion has increased in size compared to prior exam where it measured  2.5 x 2.4 cm. There is no hydroureteronephrosis or  nephroureterolithiasis. There is a bilateral mild perinephric edema  noted. The anterior portion of the urinary bladder is mildly  thickened without evidence of pericystic fat stranding.. The amount  of bladder wall thickening is decreased compared to prior exam.      REPRODUCTIVE ORGANS: No significant abnormality. No pelvic masses.      VESSELS: (See above). No additional significant abnormality.      RETROPERITONEUM/LYMPH NODES: No enlarged lymph nodes.      BOWEL/MESENTERY/PERITONEUM: No inflammatory bowel wall thickening or  dilatation.  Normal appendix. Diverticulosis without diverticulitis.      No significant ascites, free air, or fluid collection.          ABDOMINAL WALL: No significant abnormality.      OSSEOUS STRUCTURES:  No acute osseous abnormality.      Impression: No thoracic or abdominal aortic aneurysm or acute aortic pathology.  The inferior mesenteric artery has a short segment of complete  occlusion with reconstitution distally. The right common femoral  artery has near-complete occlusion. The left common femoral artery  has critical stenosis/near complete occlusion. There is complete  occlusion of the left superficial femoral artery without evidence of  reconstitution within the visualized field of view. Additional  vasculature findings as described above.      The right upper lobe pulmonary lesion is similar in size compared to  prior exam. There is redemonstrated small area of chronic  consolidation within the right middle lobe. There is similar  prominent lymph nodes within the mediastinum as described above.      Redemonstrated 2.1 x 1.7 cm hypodense pancreatic IPMN that has  been  stable since CT exam in 2012.      Additional findings as described above.      I personally reviewed the images/study and I agree with the findings  as stated by Simone Arroyo MD. This study was interpreted at  [facility], [place], [place].      MACRO:  None.      Signed by: Norma Nair 3/20/2024 4:23 PM  Dictation workstation:   [id number]  XR chest 1 view  Narrative: Interpreted By:  Magen Cazares,   STUDY:  XR CHEST 1 VIEW;  3/20/2024 12:23 pm      INDICATION:  Signs/Symptoms:Chest Pain.      COMPARISON:  March 13, 2024 and October 22 2023 chest CT examinations.      ACCESSION NUMBER(S):  [id number]      ORDERING CLINICIAN:  DANIEL ROSS      FINDINGS:  AP radiograph of the chest was provided.      Apical lordotic position. Overlapping radiopaque leads      CARDIOMEDIASTINAL SILHOUETTE:  Cardiomediastinal silhouette is normal in size and configuration.      LUNGS:  Persistent nodular density overlapping the right upper chest which is  more reliably compared by CT. No apparent consolidative pneumonia,  edema, or effusion. Stable lobulated right cardiophrenic angle region  density compatible with prominence of the pericardiophrenic region  fat.      ABDOMEN:  No remarkable upper abdominal findings.      BONES:  No acute osseous changes.      Impression: 1.  Persistent nodular density right upper chest.  2. No separate acute detected abnormality.              MACRO:  None      Signed by: Magen Cazares 3/20/2024 12:49 PM  Dictation workstation:   [id number]

## 2024-03-21 NOTE — CONSULTS
Inpatient consult to Vascular Surgery  Consult performed by: MALAIKA Allison-CNP  Consult ordered by: Shantanu Montelongo MD          Reason For Consult  Significant occlusion of b/l CFA    History Of Present Illness  Amandeep Lara is a 68 y.o. male presenting with PMH of IDDM2, HTN, HLD, stroke (10/23), former smoker who is presenting with worsening generalized weakness. Per pt he was seeing his PCP yesterday when he was told he appeared ill and his vitals were abnormal and he was sent to ED.     In the ED, afebrile, HDS, on RA. Labs showed FENG, lactic acidosis, leukocytosis. ABG unremarkable. CT of the head shows no evidence of acute cortical infarct or intracranial hemorrhage. Chest x-ray shows persistent nodular density right upper chest. CT of the chest abdomen pelvis shows no thoracic or abdominal aortic aneurysm or acute aortic pathology. 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 The right common femoral artery has near complete occlusion. The left common femoral artery has critical stenosis/near complete occlusion. There is complete occlusion of the left superficial femoral artery without evidence of reconstitution within the visualized field-of-view. Vascular surgery consulted for further evaluation.      At the time of consultation, patient is in no acute distress. He remains afebrile and hemodynamically stable. He is maintaining adequate oxygen saturation on RA. Denies pain in lower extremities. States when ambulating he does use a walker at times. No wounds or discoloration present on exam. Per pt right great toe nail fell off a few weeks ago but denies pain or drainage. No ssx of infection. CSM intact of b/l extremities. B/L DP/PT monophasic signal noted. Unable to palpate b/l femoral pulses. CARROL pending. Dr. Owens updated, further recommendations to follow once imaging complete.     Past Medical History  He has a past medical  history of Diabetes mellitus (CMS/[facility]), Hypertension, Personal history of other diseases of the circulatory system, Personal history of other endocrine, nutritional and metabolic disease, and Stroke (CMS/[facility]).    Surgical History  He has a past surgical history that includes Carpal tunnel release (04/28/2015); Tonsillectomy (04/28/2015); Other surgical history (12/27/2021); Other surgical history (12/27/2021); Other surgical history (12/27/2021); Other surgical history (12/27/2021); Other surgical history (12/27/2021); Neck surgery (12/27/2021); Rotator cuff repair (12/27/2021); MR angio head wo IV contrast (04/01/2021); CT angio head w and wo IV contrast (06/03/2021); CT angio neck (06/03/2021); CT angio head w and wo IV contrast (06/03/2021); MR angio head wo IV contrast (04/01/2021); and Other surgical history.     Social History  He reports that he has been smoking cigarettes. He started smoking about 49 years ago. He has a 15.00 pack-year smoking history. He has never been exposed to tobacco smoke. He has never used smokeless tobacco. He reports that he does not currently use alcohol. He reports that he does not use drugs.    Family History  Family History   Problem Relation Name Age of Onset    Other (malignant neoplasm of urinary bladder) Mother      Other (arteriosclerotic cardiovascular disease) Father      Other (malignant neoplasm of prostate) Father      Other (arteriosclerotic cardiovascular disease) Brother          Allergies  Ether, Other, and Bactrim [sulfamethoxazole-trimethoprim]    Review of Systems   All other systems reviewed and are negative.       Physical Exam  Vitals and nursing note reviewed.   Constitutional:       General: He is not in acute distress.     Appearance: Normal appearance. He is not ill-appearing.   HENT:      Head: Normocephalic and atraumatic.   Eyes:      Pupils: Pupils are equal, round, and reactive to light. Pupils are equal.   Cardiovascular:      Rate and Rhythm:  "Bradycardia present.      Pulses:           Dorsalis pedis pulses are detected w/ Doppler on the right side and detected w/ Doppler on the left side.        Posterior tibial pulses are detected w/ Doppler on the right side and detected w/ Doppler on the left side.      Comments: CSM intact   Pulmonary:      Effort: Pulmonary effort is normal.      Breath sounds: Normal breath sounds.   Chest:      Chest wall: No tenderness.   Abdominal:      General: Abdomen is protuberant. Bowel sounds are normal. There is no distension.   Skin:     General: Skin is warm.   Neurological:      General: No focal deficit present.      Mental Status: He is alert and oriented to person, place, and time.      Comments: Residual right side numbness s/p stroke    Psychiatric:         Attention and Perception: Attention and perception normal.         Mood and Affect: Mood normal.         Speech: Speech normal.         Behavior: Behavior is cooperative.          Last Recorded Vitals  Blood pressure 171/79, pulse 84, temperature 37.6 °C (99.7 °F), temperature source Temporal, resp. rate 16, height 1.753 m (5' 9.02\"), weight 98.6 kg (217 lb 6 oz), SpO2 96 %.    Relevant Results  Scheduled medications  amLODIPine, 10 mg, oral, Daily  aspirin, 81 mg, oral, Daily  atorvastatin, 80 mg, oral, Nightly  carvedilol, 25 mg, oral, BID  clopidogrel, 75 mg, oral, Daily  heparin (porcine), 5,000 Units, subcutaneous, q8h  insulin lispro, 0-10 Units, subcutaneous, TID with meals  insulin NPH and regular human, 20 Units, subcutaneous, BID AC  vancomycin, 2,000 mg, intravenous, Once      Continuous medications  lactated Ringer's, 75 mL/hr, Last Rate: 75 mL/hr (03/21/24 1038)      PRN medications  PRN medications: acetaminophen, dextrose, dextrose, glucagon, hydrALAZINE, melatonin, ondansetron **OR** ondansetron, vancomycin     Results for orders placed or performed during the hospital encounter of 03/20/24 (from the past 24 hour(s))   POCT GLUCOSE   Result " Value Ref Range    POCT Glucose 188 (H) 74 - 99 mg/dL   ECG 12 lead   Result Value Ref Range    Ventricular Rate 60 BPM    Atrial Rate 60 BPM    IN Interval 146 ms    QRS Duration 148 ms    QT Interval 492 ms    QTC Calculation(Bazett) 492 ms    P Axis 43 degrees    R Axis 30 degrees    T Axis 65 degrees    QRS Count 10 beats    Q Onset 222 ms    P Onset 149 ms    P Offset 187 ms    T Offset 468 ms    QTC Fredericia 492 ms   CBC and Auto Differential   Result Value Ref Range    WBC 17.7 (H) 4.4 - 11.3 x10*3/uL    nRBC 0.0 0.0 - 0.0 /100 WBCs    RBC 4.31 (L) 4.50 - 5.90 x10*6/uL    Hemoglobin 12.9 (L) 13.5 - 17.5 g/dL    Hematocrit 38.1 (L) 41.0 - 52.0 %    MCV 88 80 - 100 fL    MCH 29.9 26.0 - 34.0 pg    MCHC 33.9 32.0 - 36.0 g/dL    RDW 13.1 11.5 - 14.5 %    Platelets 367 150 - 450 x10*3/uL    Neutrophils % 67.9 40.0 - 80.0 %    Immature Granulocytes %, Automated 0.3 0.0 - 0.9 %    Lymphocytes % 21.6 13.0 - 44.0 %    Monocytes % 5.8 2.0 - 10.0 %    Eosinophils % 3.8 0.0 - 6.0 %    Basophils % 0.6 0.0 - 2.0 %    Neutrophils Absolute 12.03 (H) 1.20 - 7.70 x10*3/uL    Immature Granulocytes Absolute, Automated 0.06 0.00 - 0.70 x10*3/uL    Lymphocytes Absolute 3.84 1.20 - 4.80 x10*3/uL    Monocytes Absolute 1.03 (H) 0.10 - 1.00 x10*3/uL    Eosinophils Absolute 0.67 0.00 - 0.70 x10*3/uL    Basophils Absolute 0.11 (H) 0.00 - 0.10 x10*3/uL   Comprehensive Metabolic Panel   Result Value Ref Range    Glucose 163 (H) 74 - 99 mg/dL    Sodium 134 (L) 136 - 145 mmol/L    Potassium 4.4 3.5 - 5.3 mmol/L    Chloride 100 98 - 107 mmol/L    Bicarbonate 24 21 - 32 mmol/L    Anion Gap 14 10 - 20 mmol/L    Urea Nitrogen 14 6 - 23 mg/dL    Creatinine 1.60 (H) 0.50 - 1.30 mg/dL    eGFR 47 (L) >60 mL/min/1.73m*2    Calcium 8.7 8.6 - 10.3 mg/dL    Albumin 3.1 (L) 3.4 - 5.0 g/dL    Alkaline Phosphatase 92 33 - 136 U/L    Total Protein 6.1 (L) 6.4 - 8.2 g/dL    AST 10 9 - 39 U/L    Bilirubin, Total 0.5 0.0 - 1.2 mg/dL    ALT 9 (L) 10 - 52 U/L    Magnesium   Result Value Ref Range    Magnesium 1.81 1.60 - 2.40 mg/dL   B-Type Natriuretic Peptide   Result Value Ref Range     (H) 0 - 99 pg/mL   Troponin I, High Sensitivity, Initial   Result Value Ref Range    Troponin I, High Sensitivity 10 0 - 20 ng/L   Protime-INR   Result Value Ref Range    Protime 11.4 9.8 - 12.8 seconds    INR 1.0 0.9 - 1.1   Lactate   Result Value Ref Range    Lactate 3.4 (H) 0.4 - 2.0 mmol/L   Blood Culture    Specimen: Peripheral Venipuncture; Blood culture   Result Value Ref Range    Blood Culture       Identification and susceptibility testing to follow    Gram Stain Gram positive cocci, clusters (AA)    Type and Screen   Result Value Ref Range    ABO TYPE A     Rh TYPE POS     ANTIBODY SCREEN NEG    Blood Culture    Specimen: Peripheral Venipuncture; Blood culture   Result Value Ref Range    Blood Culture Loaded on Instrument - Culture in progress    Troponin, High Sensitivity, 1 Hour   Result Value Ref Range    Troponin I, High Sensitivity 10 0 - 20 ng/L   Lactate   Result Value Ref Range    Lactate 2.4 (H) 0.4 - 2.0 mmol/L   BLOOD GAS ARTERIAL FULL PANEL   Result Value Ref Range    POCT pH, Arterial 7.42 7.38 - 7.42 pH    POCT pCO2, Arterial 34 (L) 38 - 42 mm Hg    POCT pO2, Arterial 81 (L) 85 - 95 mm Hg    POCT SO2, Arterial 98 94 - 100 %    POCT Oxy Hemoglobin, Arterial 95.8 94.0 - 98.0 %    POCT Hematocrit Calculated, Arterial 33.0 (L) 41.0 - 52.0 %    POCT Sodium, Arterial 131 (L) 136 - 145 mmol/L    POCT Potassium, Arterial 4.2 3.5 - 5.3 mmol/L    POCT Chloride, Arterial 103 98 - 107 mmol/L    POCT Ionized Calcium, Arterial 1.14 1.10 - 1.33 mmol/L    POCT Glucose, Arterial 155 (H) 74 - 99 mg/dL    POCT Lactate, Arterial 1.8 0.4 - 2.0 mmol/L    POCT Base Excess, Arterial -1.9 -2.0 - 3.0 mmol/L    POCT HCO3 Calculated, Arterial 22.1 22.0 - 26.0 mmol/L    POCT Hemoglobin, Arterial 11.1 (L) 13.5 - 17.5 g/dL    POCT Anion Gap, Arterial 10 10 - 25 mmo/L    Patient  Temperature      FiO2 21 %    Site of Arterial Puncture Brachial Left     Ben's Test Positive    ECG 12 lead   Result Value Ref Range    Ventricular Rate 58 BPM    Atrial Rate 58 BPM    MO Interval 166 ms    QRS Duration 144 ms    QT Interval 486 ms    QTC Calculation(Bazett) 477 ms    P Axis 35 degrees    R Axis 32 degrees    T Axis 57 degrees    QRS Count 9 beats    Q Onset 221 ms    P Onset 138 ms    P Offset 172 ms    T Offset 464 ms    QTC Fredericia 480 ms   Urinalysis with Reflex Culture and Microscopic   Result Value Ref Range    Color, Urine Yellow Straw, Yellow    Appearance, Urine Clear Clear    Specific Gravity, Urine 1.026 1.005 - 1.035    pH, Urine 7.0 5.0, 5.5, 6.0, 6.5, 7.0, 7.5, 8.0    Protein, Urine >=500 (3+) (N) NEGATIVE mg/dL    Glucose, Urine 50 (1+) (A) NEGATIVE mg/dL    Blood, Urine NEGATIVE NEGATIVE    Ketones, Urine NEGATIVE NEGATIVE mg/dL    Bilirubin, Urine NEGATIVE NEGATIVE    Urobilinogen, Urine <2.0 <2.0 mg/dL    Nitrite, Urine NEGATIVE NEGATIVE    Leukocyte Esterase, Urine NEGATIVE NEGATIVE   Extra Urine Gray Tube   Result Value Ref Range    Extra Tube Hold for add-ons.    Urinalysis Microscopic   Result Value Ref Range    WBC, Urine NONE 1-5, NONE /HPF    RBC, Urine 1-2 NONE, 1-2, 3-5 /HPF    Hyaline Casts, Urine OCCASIONAL (A) NONE /LPF   POCT GLUCOSE   Result Value Ref Range    POCT Glucose 367 (H) 74 - 99 mg/dL   Magnesium   Result Value Ref Range    Magnesium 1.79 1.60 - 2.40 mg/dL   Basic Metabolic Panel   Result Value Ref Range    Glucose 146 (H) 74 - 99 mg/dL    Sodium 135 (L) 136 - 145 mmol/L    Potassium 3.9 3.5 - 5.3 mmol/L    Chloride 104 98 - 107 mmol/L    Bicarbonate 25 21 - 32 mmol/L    Anion Gap 10 10 - 20 mmol/L    Urea Nitrogen 19 6 - 23 mg/dL    Creatinine 1.44 (H) 0.50 - 1.30 mg/dL    eGFR 53 (L) >60 mL/min/1.73m*2    Calcium 8.0 (L) 8.6 - 10.3 mg/dL   CBC and Auto Differential   Result Value Ref Range    WBC 14.7 (H) 4.4 - 11.3 x10*3/uL    nRBC 0.0 0.0 - 0.0  /100 WBCs    RBC 3.96 (L) 4.50 - 5.90 x10*6/uL    Hemoglobin 11.4 (L) 13.5 - 17.5 g/dL    Hematocrit 34.5 (L) 41.0 - 52.0 %    MCV 87 80 - 100 fL    MCH 28.8 26.0 - 34.0 pg    MCHC 33.0 32.0 - 36.0 g/dL    RDW 13.2 11.5 - 14.5 %    Platelets 324 150 - 450 x10*3/uL    Neutrophils % 65.2 40.0 - 80.0 %    Immature Granulocytes %, Automated 0.3 0.0 - 0.9 %    Lymphocytes % 20.9 13.0 - 44.0 %    Monocytes % 9.1 2.0 - 10.0 %    Eosinophils % 4.0 0.0 - 6.0 %    Basophils % 0.5 0.0 - 2.0 %    Neutrophils Absolute 9.56 (H) 1.20 - 7.70 x10*3/uL    Immature Granulocytes Absolute, Automated 0.05 0.00 - 0.70 x10*3/uL    Lymphocytes Absolute 3.07 1.20 - 4.80 x10*3/uL    Monocytes Absolute 1.34 (H) 0.10 - 1.00 x10*3/uL    Eosinophils Absolute 0.59 0.00 - 0.70 x10*3/uL    Basophils Absolute 0.07 0.00 - 0.10 x10*3/uL   SST TOP   Result Value Ref Range    Extra Tube Hold for add-ons.    POCT GLUCOSE   Result Value Ref Range    POCT Glucose 128 (H) 74 - 99 mg/dL   POCT GLUCOSE   Result Value Ref Range    POCT Glucose 141 (H) 74 - 99 mg/dL    ECG 12 lead    Result Date: 3/20/2024  Sinus rhythm with occasional Premature ventricular complexes Right bundle branch block Cannot rule out Inferior infarct (cited on or before 11-JUN-2020) Abnormal ECG When compared with ECG of 21-AUG-2020 13:28, Premature ventricular complexes are now Present See ED provider note for full interpretation and clinical correlation Confirmed by Alina Wright ([id number]) on 3/20/2024 9:51:23 PM    ECG 12 lead    Result Date: 3/20/2024  Sinus bradycardia Right bundle branch block T wave abnormality, consider lateral ischemia Abnormal ECG When compared with ECG of 20-MAR-2024 10:55, (unconfirmed) Premature ventricular complexes are no longer Present Inverted T waves have replaced nonspecific T wave abnormality in Lateral leads See ED provider note for full interpretation and clinical correlation Confirmed by Alina Wright ([id number]) on 3/20/2024 9:50:50 PM    CT  head wo IV contrast    Result Date: 3/20/2024  Interpreted By:  Schoenberger, Joseph, STUDY: CT HEAD WO IV CONTRAST;  3/20/2024 2:17 pm   INDICATION: Signs/Symptoms:weakness.   COMPARISON: 10/21/2023   ACCESSION NUMBER(S): [id number]   ORDERING CLINICIAN: DANIEL ROSS   TECHNIQUE: Noncontrast axial CT scan of head was performed. Angled reformats in brain and bone windows were generated. The images were reviewed in bone, brain, blood and soft tissue windows.   FINDINGS: CSF Spaces: Enlarged due to parenchymal volume loss. Normal configuration with intact basal cisterns. There is no extraaxial fluid collection.   Parenchyma: There are symmetric calcifications in the dentate nuclei of the cerebellum, posterior of thalamus, and medial basal ganglia. These are unchanged from several prior exams. The do not exhibit mass effect. There are areas of hypoattenuation in the deep white matter of both cerebral hemispheres consistent with small vessel ischemic white matter demyelination. The grey-white differentiation is intact. There is no mass effect or midline shift.  There is no intracranial hemorrhage.   Calvarium: The calvarium is unremarkable.   Paranasal sinuses and mastoids: Visualized paranasal sinuses and mastoids are clear.       No evidence of acute cortical infarct or intracranial hemorrhage.   No evidence of intracranial hemorrhage or displaced skull fracture.   MACRO: None   Signed by: Joseph Schoenberger 3/20/2024 4:29 PM Dictation workstation:   [id number]    CT angio chest abdomen pelvis    Result Date: 3/20/2024  Interpreted By:  Norma Nair and Nakamoto Kent STUDY: CT ANGIO CHEST ABDOMEN PELVIS;  3/20/2024 2:38 pm   INDICATION: Signs/Symptoms:nearsyncope, R/O PE, dissection, pneumonia.   COMPARISON: CT abdomen and pelvis 06/19/2012, CT chest without contrast 03/13/2024.   ACCESSION NUMBER(S): [id number]   ORDERING CLINICIAN: DANIEL ROSS   TECHNIQUE: Axial non-contrast images of the  chest, abdomen, and pelvis with coronal and sagittal reformatted images. Axial CT images of the chest, abdomen and pelvis after the intravenous administration of 100 mL of Omnipaque 350 using angiographic technique with coronal and sagittal reformatted images. MIP images were provided and reviewed. 3D reconstructions were created on a separate independent workstation and reviewed.   FINDINGS: VASCULATURE:   PULMONARY ARTERIES:  No acute pulmonary embolism within the limitations of this non CT PE protocol.   THORACIC AORTA: Non-contrast images show no evidence of acute intramural hematoma. No thoracic aortic aneurysm or dissection. Mild thoracic aortic atherosclerosis. There is evidence of eccentric thrombus versus small penetrating atherosclerotic ulcer on series 401, image 95.   ABDOMINAL AORTA: No abdominal aortic aneurysm or dissection. Moderate-to-severe abdominal aortic atherosclerosis. The celiac artery is patent without significant atherosclerosis. The dominant branches of the celiac artery are patent. There is mild-to-moderate atherosclerosis of the splenic artery with an area of stenosis on series 401, image 104. The superior mesenteric artery has a mild stenosis with moderate atherosclerosis at the ostium (series 401, image 113; series 404, image 103). The distal superior mesenteric artery has mild-to-moderate atherosclerosis. The inferior mesenteric artery is patent at the origin. After the origin, there is a short-segment of complete occlusion of the inferior mesenteric artery with reconstitution distally (series 404, image 101). There is severe atherosclerosis of the inferior mesenteric artery. The bilateral renal arteries are patent with mild atherosclerosis.   ABDOMINAL AND PELVIC ARTERIES: The right common iliac artery is patent with severe atherosclerosis. The right internal iliac artery has critical stenosis with severe atherosclerosis (series 404, image 75). The right external iliac artery has  severe atherosclerosis with areas of severe stenosis such as on series 401, image 184. The right common femoral artery has severe atherosclerosis with critical stenosis/near complete occlusion on series 401, image 201. The right superficial femoral artery is moderately stenosed with moderate-to-severe atherosclerosis. The right profunda artery is severely stenosed with severe atherosclerosis.   Left common iliac artery is patent with severe atherosclerosis. The left internal iliac artery has an areas severe stenosis near the origin with severe atherosclerosis of the ostium. The left external iliac artery is severely stenosed with severe atherosclerosis. The left common femoral artery has critical stenosis with severe atherosclerosis at is terminal end (series 401, image 206). There is complete occlusion of the left superficial femoral artery (series 401, image 220) without evidence of reconstitution within the visualized field of view. There is moderate-to-severe of the left profunda artery atherosclerosis.     CT CHEST:   MEDIASTINUM AND LYMPH NODES: No enlarged intrathoracic or axillary lymph nodes by CT size criteria. There are several lymph nodes that are similar in size compared to prior exam as annotated in PACS. For example, there is a 0.7 cm AP lymph node (series 401, image 38), a pretracheal lymph node measuring 0.7 cm (series 401, image 35), another pretracheal lymph node measuring 0.9 cm (series 401, image 28). No pneumomediastinum.   HEART: Normal size.  Severe coronary artery calcifications. No significant pericardial effusion.   LUNG, PLEURA, LARGE AIRWAYS:  No consolidation, pulmonary edema, pleural effusion, or pneumothorax. The right upper lobe pulmonary lesion is similar in size compared to prior exam measuring 2.2 x 1.2 cm compared to 2.2 x 1.3 cm previously (series 202, image 55). There is a small similar sized area of chronic consolidation in the right middle lobe measuring 2.7 x 1.3 cm compared  to 2.9 x 1.5 cm (series 202, image 157). There are redemonstrated mild emphysematous changes of the lung parenchyma. There are ground-glass opacities within the right lower lobe that likely represent atelectasis. There is also bibasilar streaky opacities within the lung bases that are also likely representing atelectasis.   OSSEOUS STRUCTURES/CHEST WALL:  No acute osseous abnormality.     CT ABDOMEN/PELVIS:   LIVER: There is a 1.0 cm peripherally hyperenhancing lesion within the left hepatic lobe that has been previously described and likely represents a hemangioma. The liver measures 19.9 cm within the craniocaudal dimension and is mildly enlarged.   BILE DUCTS: No significant intrahepatic or extrahepatic dilatation.   GALLBLADDER: No significant abnormality.   PANCREAS: There is a similar hypodense 2.1 x 1.7 cm (series 401, image 106) pancreatic tail lesion that has been stable since examination in 2012. There is no pancreatic ductal dilation..   SPLEEN: No significant abnormality.   ADRENALS: No significant abnormality.   KIDNEYS, URETERS, BLADDER: There is a 1.8 cm left exophytic renal hypodensity (series 401, image 125) with simple free fluid attenuation that likely represents a renal cyst. There is a 4.8 x 5.6 cm right superior pole hypodensity with simple free fluid attenuation that likely represents a renal cyst (series 401, image 119). This lesion has increased in size compared to prior exam where it measured 2.5 x 2.4 cm. There is no hydroureteronephrosis or nephroureterolithiasis. There is a bilateral mild perinephric edema noted. The anterior portion of the urinary bladder is mildly thickened without evidence of pericystic fat stranding.. The amount of bladder wall thickening is decreased compared to prior exam.   REPRODUCTIVE ORGANS: No significant abnormality. No pelvic masses.   VESSELS: (See above). No additional significant abnormality.   RETROPERITONEUM/LYMPH NODES: No enlarged lymph nodes.    BOWEL/MESENTERY/PERITONEUM: No inflammatory bowel wall thickening or dilatation.  Normal appendix. Diverticulosis without diverticulitis.   No significant ascites, free air, or fluid collection.     ABDOMINAL WALL: No significant abnormality.   OSSEOUS STRUCTURES:  No acute osseous abnormality.       No thoracic or abdominal aortic aneurysm or acute aortic pathology. 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 The right common femoral artery has near-complete occlusion. The left common femoral artery has critical stenosis/near complete occlusion. There is complete occlusion of the left superficial femoral artery without evidence of reconstitution within the visualized field of view. Additional vasculature findings as described above.   The right upper lobe pulmonary lesion is similar in size compared to prior exam. There is redemonstrated small area of chronic consolidation within the right middle lobe. There is similar prominent lymph nodes within the mediastinum as described above.   Redemonstrated 2.1 x 1.7 cm hypodense pancreatic IPMN that has been stable since CT exam in 2012.   Additional findings as described above.   I personally reviewed the images/study and I agree with the findings as stated by Simone Arroyo MD. This study was interpreted at [facility], [place], [place].   MACRO: None.   Signed by: Norma Nair 3/20/2024 4:23 PM Dictation workstation:   [id number]    XR chest 1 view    Result Date: 3/20/2024  Interpreted By:  Magen Cazares, STUDY: XR CHEST 1 VIEW;  3/20/2024 12:23 pm   INDICATION: Signs/Symptoms:Chest Pain.   COMPARISON: March 13, 2024 and October 22 2023 chest CT examinations.   ACCESSION NUMBER(S): [id number]   ORDERING CLINICIAN: DANIEL ROSS   FINDINGS: AP radiograph of the chest was provided.   Apical lordotic position. Overlapping radiopaque leads   CARDIOMEDIASTINAL  SILHOUETTE: Cardiomediastinal silhouette is normal in size and configuration.   LUNGS: Persistent nodular density overlapping the right upper chest which is more reliably compared by CT. No apparent consolidative pneumonia, edema, or effusion. Stable lobulated right cardiophrenic angle region density compatible with prominence of the pericardiophrenic region fat.   ABDOMEN: No remarkable upper abdominal findings.   BONES: No acute osseous changes.       1.  Persistent nodular density right upper chest. 2. No separate acute detected abnormality.       MACRO: None   Signed by: Magen Cazares 3/20/2024 12:49 PM Dictation workstation:   [id number]    Assessment/Plan     #PAD  CTA on admission showed near complete occlusion of b/l CFA, and complete occlusion of L SFA.   Dr. Owens updated:  -Heparin drip not indicated at this time  -monophasic signal of b/l DP/PT pulses   -Continue ASA, Plavix, Statin   -CARROL pending   -Further recommendations to follow once imaging complete    #FENG  Scr on admission 1.6- baseline 1-1.3  -3/21 Scr 1.4    #IDDM2  -continue home insulin   -SSI     I spent 60 minutes in the professional and overall care of this patient.

## 2024-03-21 NOTE — PROGRESS NOTES
Pt is from a correctional facility.  Pt seen by psych for SI. Psych rec. Inpt psych once medically cleared. ? 5w if bed available. RN to contact EPAT once medically cleared.

## 2024-03-21 NOTE — PROGRESS NOTES
03/21/24 0956   Discharge Planning   Living Arrangements Spouse/significant other   Support Systems Spouse/significant other   Assistance Needed uses a cane, independent. spouse drives. denies medication barriers.   Type of Residence   (BI-level home)   Number of Stairs to Enter Residence 1   Number of Stairs Within Residence 6   Do you have animals or pets at home? No   Who is requesting discharge planning? Provider   Home or Post Acute Services None   Patient expects to be discharged to: home   Does the patient need discharge transport arranged? No     Met w/ pt and spouse. Confirmed demo's ins and pcp. Pt is independent in adls. Uses a cane. Does not drive. Asking if I can obtain another packet of stroke info given to him last oct. that had info re: renewing  license.  I reviewed AVS and re-printed it for him, I did not see any info re: driving license. I gave him a stroke booklet- spouse reports that is not what she is looking for. I contacted the stroke coordinator sae Haider . She is not aware of any handouts/packets for stroke pt's that we give out w/ this info in it.  Pt went [facility]/ [facility] in Oct. post discharge.

## 2024-03-22 VITALS
OXYGEN SATURATION: 92 % | RESPIRATION RATE: 18 BRPM | TEMPERATURE: 95.5 F | HEART RATE: 60 BPM | DIASTOLIC BLOOD PRESSURE: 70 MMHG | SYSTOLIC BLOOD PRESSURE: 130 MMHG

## 2024-03-22 PROBLEM — R55 NEAR SYNCOPE: Status: ACTIVE | Noted: 2024-03-22

## 2024-03-22 LAB
ANION GAP SERPL CALC-SCNC: 11 MMOL/L (ref 10–20)
BASOPHILS # BLD AUTO: 0.08 X10*3/UL (ref 0–0.1)
BASOPHILS NFR BLD AUTO: 0.5 %
BUN SERPL-MCNC: 17 MG/DL (ref 6–23)
CALCIUM SERPL-MCNC: 8.3 MG/DL (ref 8.6–10.3)
CHLORIDE SERPL-SCNC: 102 MMOL/L (ref 98–107)
CO2 SERPL-SCNC: 24 MMOL/L (ref 21–32)
CREAT SERPL-MCNC: 1.41 MG/DL (ref 0.5–1.3)
EGFRCR SERPLBLD CKD-EPI 2021: 55 ML/MIN/1.73M*2
EOSINOPHIL # BLD AUTO: 0.26 X10*3/UL (ref 0–0.7)
EOSINOPHIL NFR BLD AUTO: 1.6 %
ERYTHROCYTE [DISTWIDTH] IN BLOOD BY AUTOMATED COUNT: 12.9 % (ref 11.5–14.5)
GLUCOSE BLD MANUAL STRIP-MCNC: 115 MG/DL (ref 74–99)
GLUCOSE BLD MANUAL STRIP-MCNC: 156 MG/DL (ref 74–99)
GLUCOSE BLD MANUAL STRIP-MCNC: 172 MG/DL (ref 74–99)
GLUCOSE BLD MANUAL STRIP-MCNC: 210 MG/DL (ref 74–99)
GLUCOSE BLD MANUAL STRIP-MCNC: 226 MG/DL (ref 74–99)
GLUCOSE BLD MANUAL STRIP-MCNC: 284 MG/DL (ref 74–99)
GLUCOSE BLD MANUAL STRIP-MCNC: 289 MG/DL (ref 74–99)
GLUCOSE BLD MANUAL STRIP-MCNC: 39 MG/DL (ref 74–99)
GLUCOSE SERPL-MCNC: 201 MG/DL (ref 74–99)
HCT VFR BLD AUTO: 38.6 % (ref 41–52)
HGB BLD-MCNC: 12.8 G/DL (ref 13.5–17.5)
HOLD SPECIMEN: NORMAL
IMM GRANULOCYTES # BLD AUTO: 0.08 X10*3/UL (ref 0–0.7)
IMM GRANULOCYTES NFR BLD AUTO: 0.5 % (ref 0–0.9)
LYMPHOCYTES # BLD AUTO: 1.95 X10*3/UL (ref 1.2–4.8)
LYMPHOCYTES NFR BLD AUTO: 12.3 %
MCH RBC QN AUTO: 29.4 PG (ref 26–34)
MCHC RBC AUTO-ENTMCNC: 33.2 G/DL (ref 32–36)
MCV RBC AUTO: 89 FL (ref 80–100)
MONOCYTES # BLD AUTO: 1.04 X10*3/UL (ref 0.1–1)
MONOCYTES NFR BLD AUTO: 6.6 %
NEUTROPHILS # BLD AUTO: 12.46 X10*3/UL (ref 1.2–7.7)
NEUTROPHILS NFR BLD AUTO: 78.5 %
NRBC BLD-RTO: 0 /100 WBCS (ref 0–0)
PLATELET # BLD AUTO: 313 X10*3/UL (ref 150–450)
POTASSIUM SERPL-SCNC: 4.4 MMOL/L (ref 3.5–5.3)
RBC # BLD AUTO: 4.36 X10*6/UL (ref 4.5–5.9)
SODIUM SERPL-SCNC: 133 MMOL/L (ref 136–145)
VANCOMYCIN SERPL-MCNC: 8.5 UG/ML (ref 5–20)
WBC # BLD AUTO: 15.9 X10*3/UL (ref 4.4–11.3)

## 2024-03-22 PROCEDURE — 1200000002 HC GENERAL ROOM WITH TELEMETRY DAILY

## 2024-03-22 PROCEDURE — 82947 ASSAY GLUCOSE BLOOD QUANT: CPT

## 2024-03-22 PROCEDURE — 2500000004 HC RX 250 GENERAL PHARMACY W/ HCPCS (ALT 636 FOR OP/ED): Performed by: PHARMACIST

## 2024-03-22 PROCEDURE — 93922 UPR/L XTREMITY ART 2 LEVELS: CPT | Performed by: INTERNAL MEDICINE

## 2024-03-22 PROCEDURE — 2500000002 HC RX 250 W HCPCS SELF ADMINISTERED DRUGS (ALT 637 FOR MEDICARE OP, ALT 636 FOR OP/ED): Performed by: INTERNAL MEDICINE

## 2024-03-22 PROCEDURE — 2500000004 HC RX 250 GENERAL PHARMACY W/ HCPCS (ALT 636 FOR OP/ED): Performed by: INTERNAL MEDICINE

## 2024-03-22 PROCEDURE — 80202 ASSAY OF VANCOMYCIN: CPT

## 2024-03-22 PROCEDURE — 80048 BASIC METABOLIC PNL TOTAL CA: CPT | Performed by: INTERNAL MEDICINE

## 2024-03-22 PROCEDURE — 2500000005 HC RX 250 GENERAL PHARMACY W/O HCPCS: Performed by: INTERNAL MEDICINE

## 2024-03-22 PROCEDURE — 2500000001 HC RX 250 WO HCPCS SELF ADMINISTERED DRUGS (ALT 637 FOR MEDICARE OP): Performed by: INTERNAL MEDICINE

## 2024-03-22 PROCEDURE — 99232 SBSQ HOSP IP/OBS MODERATE 35: CPT | Performed by: STUDENT IN AN ORGANIZED HEALTH CARE EDUCATION/TRAINING PROGRAM

## 2024-03-22 PROCEDURE — 36415 COLL VENOUS BLD VENIPUNCTURE: CPT | Performed by: INTERNAL MEDICINE

## 2024-03-22 PROCEDURE — 85025 COMPLETE CBC W/AUTO DIFF WBC: CPT | Performed by: INTERNAL MEDICINE

## 2024-03-22 RX ORDER — DEXTROSE, SODIUM CHLORIDE, SODIUM LACTATE, POTASSIUM CHLORIDE, AND CALCIUM CHLORIDE 5; .6; .31; .03; .02 G/100ML; G/100ML; G/100ML; G/100ML; G/100ML
100 INJECTION, SOLUTION INTRAVENOUS CONTINUOUS
Status: DISCONTINUED | OUTPATIENT
Start: 2024-03-22 | End: 2024-03-22

## 2024-03-22 RX ADMIN — CARVEDILOL 25 MG: 12.5 TABLET, FILM COATED ORAL at 09:43

## 2024-03-22 RX ADMIN — DEXTROSE MONOHYDRATE 25 G: 25 INJECTION, SOLUTION INTRAVENOUS at 00:02

## 2024-03-22 RX ADMIN — HYDRALAZINE HYDROCHLORIDE 10 MG: 20 INJECTION INTRAMUSCULAR; INTRAVENOUS at 14:38

## 2024-03-22 RX ADMIN — ASPIRIN 81 MG: 81 TABLET, COATED ORAL at 09:36

## 2024-03-22 RX ADMIN — HEPARIN SODIUM 5000 UNITS: 5000 INJECTION INTRAVENOUS; SUBCUTANEOUS at 11:11

## 2024-03-22 RX ADMIN — CARVEDILOL 25 MG: 12.5 TABLET, FILM COATED ORAL at 20:03

## 2024-03-22 RX ADMIN — HEPARIN SODIUM 5000 UNITS: 5000 INJECTION INTRAVENOUS; SUBCUTANEOUS at 20:03

## 2024-03-22 RX ADMIN — ATORVASTATIN CALCIUM 80 MG: 80 TABLET, FILM COATED ORAL at 20:03

## 2024-03-22 RX ADMIN — VANCOMYCIN HYDROCHLORIDE 1750 MG: 10 INJECTION, POWDER, LYOPHILIZED, FOR SOLUTION INTRAVENOUS at 11:12

## 2024-03-22 RX ADMIN — SODIUM CHLORIDE, SODIUM LACTATE, POTASSIUM CHLORIDE, CALCIUM CHLORIDE AND DEXTROSE MONOHYDRATE 100 ML/HR: 5; 600; 310; 30; 20 INJECTION, SOLUTION INTRAVENOUS at 01:25

## 2024-03-22 RX ADMIN — INSULIN LISPRO 2 UNITS: 100 INJECTION, SOLUTION INTRAVENOUS; SUBCUTANEOUS at 16:36

## 2024-03-22 RX ADMIN — INSULIN HUMAN 20 UNITS: 100 INJECTION, SUSPENSION SUBCUTANEOUS at 09:36

## 2024-03-22 RX ADMIN — INSULIN LISPRO 6 UNITS: 100 INJECTION, SOLUTION INTRAVENOUS; SUBCUTANEOUS at 11:31

## 2024-03-22 RX ADMIN — CLOPIDOGREL BISULFATE 75 MG: 75 TABLET, FILM COATED ORAL at 09:36

## 2024-03-22 RX ADMIN — SODIUM CHLORIDE, POTASSIUM CHLORIDE, SODIUM LACTATE AND CALCIUM CHLORIDE 75 ML/HR: 600; 310; 30; 20 INJECTION, SOLUTION INTRAVENOUS at 09:36

## 2024-03-22 RX ADMIN — HEPARIN SODIUM 5000 UNITS: 5000 INJECTION INTRAVENOUS; SUBCUTANEOUS at 03:59

## 2024-03-22 RX ADMIN — AMLODIPINE BESYLATE 10 MG: 5 TABLET ORAL at 09:36

## 2024-03-22 ASSESSMENT — PAIN SCALES - GENERAL
PAINLEVEL_OUTOF10: 0 - NO PAIN
PAINLEVEL_OUTOF10: 0 - NO PAIN

## 2024-03-22 ASSESSMENT — PAIN - FUNCTIONAL ASSESSMENT: PAIN_FUNCTIONAL_ASSESSMENT: 0-10

## 2024-03-22 NOTE — CARE PLAN
The patient's goals for the shift include discharge.    The clinical goals for the shift include Patient will remain free from additional injury for the remainder of the shift.    Over the shift, the patient did make progress toward the following goals.    Problem: Pain  Goal: My pain/discomfort is manageable  Outcome: Progressing     Problem: Daily Care  Goal: Daily care needs are met  Outcome: Progressing     Problem: Psychosocial Needs  Goal: Demonstrates ability to cope with hospitalization/illness  Outcome: Progressing  Goal: Collaborate with me, my family, and caregiver to identify my specific goals  Outcome: Progressing     Problem: Discharge Barriers  Goal: My discharge needs are met  Outcome: Progressing

## 2024-03-22 NOTE — PROGRESS NOTES
Medical Group Progress Note  ASSESSMENT & PLAN:     PAD  -CTA on admission showed near complete occlusion of b/l CFA, and complete occlusion of L SFA  -per ED physician, findings were reviewed from ED by Dr. Owens, who felt likely chronic in nature, no need for hep gtt right now  -b/l pedal pulses Dopplerable, no dusky discoloration, no rest pain, no ulcers  Plan:  -vascular surgery consulted  -CARROL pending  -cont PTA ASA, Plavix, high intensity statin     GPC bacteremia  -1/2 sets from 3/20 growing GPC in clusters  -possibly contaminant, but given ill presentation and leukocytosis (albeit chronic) on admission, will   Continue vancomycin and follow-up speciation.  -rpt bcx  show no growth at this point in time.     Presyncope  Generalized weakness  -feeling much better after IVF, cont     FENG  -Scr 1.6 on admission, previously was 1-1.3 late last year  -improving with IVF     Leukocytosis  -unclear etiology, has been chronically elevated since 2020  -down trended with IVF alone, may have had some component of hemoconcentration on admission     RUL pulmonary lesion  -chronic appearing, has been present since at least 10/2023, similar in size on rpt imaging  -outpt fu with pulm     Hx of embolic stroke  HTN  HLD  -home meds, hold ACE-I for now d/t FENG, resume as tolerated     IDDM2  -home insulin at reduced dose, ssi, titrate prn  -  Patient had an episode of hypoglycemia overnight with blood glucose dropping down to 35 and patient was symptomatic with diaphoresis lethargy and hypoxia.  Patient was given D50 and placed on D5 LR with improvement in his mental status as well as blood glucose level.  Will need to adjust insulin.      Obesity     Vte ppx sqh    Nahid Cancino MD    SUBJECTIVE      patient seen and assessed.  Patient with no acute complaints at this point in time.    OBJECTIVE:     Last Recorded Vitals:  Vitals:    03/21/24 2009 03/22/24 0033 03/22/24 0747 03/22/24 1429   BP: 168/77 130/70 158/72  (!) 186/84   Patient Position:       Pulse: 77 60 75 75   Resp: 18 18 16    Temp: 37.2 °C (99 °F) 35.3 °C (95.5 °F) 36.4 °C (97.5 °F) 37.2 °C (99 °F)   TempSrc:       SpO2: 95% 92% 94% 93%   Weight:       Height:         Last I/O:  I/O last 3 completed shifts:  In: 3872.9 (39.3 mL/kg) [P.O.:660; I.V.:2712.9 (27.5 mL/kg); IV Piggyback:500]  Out: - (0 mL/kg)   Weight: 98.6 kg     Physical Exam    GEN: Healthy appearing, well-developed, NAD.  PSYCH: Good Judgment. AOx3. Normal memory, mood, and affect.  HEENT:NC/AT; No discharge or redness; external ears are normal; Normal nares; Normal gums, mucosa, palate  CV: RRR, no m/r/g.  LUNGS: CTAB, no w/r/c.  ABD: Soft, NT/ND, NBS, no masses or organomegaly.  : N/A  SKIN:   Nonpalpable pulses in lower extremities.  MSK: Normal gait. No deformities.  EXT: No clubbing, cyanosis, or edema.  NEURO: Ambulating with no limitations. No focal deficits.    Inpatient Medications:  amLODIPine, 10 mg, oral, Daily  aspirin, 81 mg, oral, Daily  atorvastatin, 80 mg, oral, Nightly  carvedilol, 25 mg, oral, BID  clopidogrel, 75 mg, oral, Daily  heparin (porcine), 5,000 Units, subcutaneous, q8h  insulin lispro, 0-10 Units, subcutaneous, TID with meals  insulin NPH and regular human, 20 Units, subcutaneous, BID AC  vancomycin, 1,750 mg, intravenous, q24h    PRN Medications  PRN medications: acetaminophen, dextrose, dextrose, glucagon, hydrALAZINE, melatonin, ondansetron **OR** ondansetron, vancomycin  Continuous Medications:  lactated Ringer's, 75 mL/hr, Last Rate: 75 mL/hr (03/22/24 0936)      LABS AND IMAGING:     Labs:  Results from last 7 days   Lab Units 03/22/24  0623 03/21/24  0556 03/20/24  1101   WBC AUTO x10*3/uL 15.9* 14.7* 17.7*   RBC AUTO x10*6/uL 4.36* 3.96* 4.31*   HEMOGLOBIN g/dL 12.8* 11.4* 12.9*   HEMATOCRIT % 38.6* 34.5* 38.1*   MCV fL 89 87 88   MCH pg 29.4 28.8 29.9   MCHC g/dL 33.2 33.0 33.9   RDW % 12.9 13.2 13.1   PLATELETS AUTO x10*3/uL 313 324 367     Results from  last 7 days   Lab Units 03/22/24  0623 03/21/24  0556 03/20/24  1101   SODIUM mmol/L 133* 135* 134*   POTASSIUM mmol/L 4.4 3.9 4.4   CHLORIDE mmol/L 102 104 100   CO2 mmol/L 24 25 24   BUN mg/dL 17 19 14   CREATININE mg/dL 1.41* 1.44* 1.60*   GLUCOSE mg/dL 201* 146* 163*   PROTEIN TOTAL g/dL  --   --  6.1*   CALCIUM mg/dL 8.3* 8.0* 8.7   BILIRUBIN TOTAL mg/dL  --   --  0.5   ALK PHOS U/L  --   --  92   AST U/L  --   --  10   ALT U/L  --   --  9*     Results from last 7 days   Lab Units 03/21/24  0556 03/20/24  1101   MAGNESIUM mg/dL 1.79 1.81     Results from last 7 days   Lab Units 03/20/24  1143 03/20/24  1101   TROPHS ng/L 10 10     Imaging:  Vascular US ankle brachial index (CARROL) without exercise               [place] [address]      Tel [phone] Fax [phone]       Vascular Lab Report     [facility] US ANKLE BRACHIAL INDEX (CARROL) WITHOUT EXERCISE    Patient Name:      HOLLEY Holland Physician: [id number] Re Chapa MD  Study Date:        3/21/2024           Ordering Provider: [id number] SENIOR  MRN/PID:           [id number]            Fellow:  Accession#:        [id number]        Technologist:      SHARITA CABRAL RDMS,                                                            [facility]  Date of Birth/Age: 1955 / 68      Technologist 2:                     years  Gender:            M                   Encounter#:        [id number]  Admission Status:  Outpatient          Location           [facility]                                         Performed:       Diagnosis/ICD: Peripheral vascular disease, unspecified-I73.9  CPT Codes:     45077 Peripheral artery CARROL Only       CONCLUSIONS:  Right Lower PVR: Evidence of mild arterial occlusive disease in the right lower extremity at rest. Decreased digital perfusion noted. Monophasic flow is noted in the right common femoral artery, right posterior tibial artery and right dorsalis pedis artery. Unable  to get right blood pressure due to IV pump location.  Left Lower PVR: Evidence of mild arterial occlusive disease in the left lower extremity at rest. Decreased digital perfusion noted. Monophasic flow is noted in the left common femoral artery, left posterior tibial artery and left dorsalis pedis artery.     Imaging & Doppler Findings:     RIGHT Lower PVR                Pressures Ratios  Right Posterior Tibial (Ankle) 127 mmHg  0.89  Right Dorsalis Pedis (Ankle)   85 mmHg   0.60  Right Digit (Great Toe)        54 mmHg   0.38          LEFT Lower PVR                Pressures Ratios  Left Posterior Tibial (Ankle) 92 mmHg   0.65  Left Dorsalis Pedis (Ankle)   102 mmHg  0.72  Left Digit (Great Toe)        57 mmHg   0.40                              Left  Brachial Pressure 142 mmHg          [id number] Re Chapa MD  Electronically signed by [id number] Re Chapa MD on 3/22/2024 at 10:31:14 AM       ** Final **

## 2024-03-22 NOTE — PROGRESS NOTES
"Vancomycin Dosing by Pharmacy- FOLLOW UP    Amandeep Lara is a 68 y.o. year old male who Pharmacy has been consulted for vancomycin dosing for other BACTEREMIA . Based on the patient's indication and renal status this patient is being dosed based on a goal AUC of 500-600.     Renal function is currently stable.    Current vancomycin dose: 1500 mg given every 24 hours    Estimated vancomycin AUC on current dose: 436 mg/L.hr     Visit Vitals  /72   Pulse 75   Temp 36.4 °C (97.5 °F)   Resp 16        Lab Results   Component Value Date    CREATININE 1.41 (H) 03/22/2024    CREATININE 1.44 (H) 03/21/2024    CREATININE 1.60 (H) 03/20/2024    CREATININE 1.34 (H) 10/30/2023        Patient weight is No results found for: \"PTWEIGHT\"    No results found for: \"CULTURE\"     I/O last 3 completed shifts:  In: 3872.9 (39.3 mL/kg) [P.O.:660; I.V.:2712.9 (27.5 mL/kg); IV Piggyback:500]  Out: - (0 mL/kg)   Weight: 98.6 kg   [unfilled]    Lab Results   Component Value Date    PATIENTTEMP  03/20/2024      Comment:      NOTE: Patient Results are Not Corrected for Temperature        Assessment/Plan    Below goal AUC. Orders placed for new vancomcyin regimen of 1750mg every 24 hours to begin at 3/22 11:00.     This dosing regimen is predicted by [employer] to result in the following pharmacokinetic parameters:  <<<<<PASTE [employer] DATA HERE>>>>>  Loading dose: 2000mg  Regimen: 1750 mg IV every 24 hours.  Start time: 11:01 on 03/22/2024  Exposure target: AUC24 (range)400-600 mg/L.hr   AUC24,ss: 512 mg/L.hr  Probability of AUC24 > 400: 91 %  Ctrough,ss: 12.8 mg/L  Probability of Ctrough,ss > 20: 8 %  Probability of nephrotoxicity (Lodise NOREEN 2009): 8 %    The next level will be obtained on 3/23 at 05:00. May be obtained sooner if clinically indicated.   Will continue to monitor renal function daily while on vancomycin and order serum creatinine at least every 48 hours if not already ordered.  Follow for continued vancomycin needs, " clinical response, and signs/symptoms of toxicity.       Mindy Weldon, [facility]

## 2024-03-22 NOTE — SIGNIFICANT EVENT
Patient had an episode of hypoglycemia 39 was diaphoretic, lethargic, and hypoxic.  D50 was given.  His sugar improved quickly.  Patient became more responsive and oriented within 5 to 10 minutes.  Patient was placed on D5 LR.  Was kept on nasal cannula oxygen for hypoxia.  Patient is more stable now, and his blood glucose level is stable.  Will defer an insulin coverage till morning.

## 2024-03-23 VITALS
DIASTOLIC BLOOD PRESSURE: 58 MMHG | SYSTOLIC BLOOD PRESSURE: 131 MMHG | WEIGHT: 217.37 LBS | HEIGHT: 69 IN | OXYGEN SATURATION: 92 % | BODY MASS INDEX: 32.2 KG/M2 | RESPIRATION RATE: 18 BRPM | HEART RATE: 74 BPM | TEMPERATURE: 98.6 F

## 2024-03-23 LAB
ERYTHROCYTE [DISTWIDTH] IN BLOOD BY AUTOMATED COUNT: 13 % (ref 11.5–14.5)
GLUCOSE BLD MANUAL STRIP-MCNC: 169 MG/DL (ref 74–99)
GLUCOSE BLD MANUAL STRIP-MCNC: 171 MG/DL (ref 74–99)
GLUCOSE BLD MANUAL STRIP-MCNC: 183 MG/DL (ref 74–99)
HCT VFR BLD AUTO: 32.4 % (ref 41–52)
HGB BLD-MCNC: 11.4 G/DL (ref 13.5–17.5)
HOLD SPECIMEN: NORMAL
MCH RBC QN AUTO: 30.1 PG (ref 26–34)
MCHC RBC AUTO-ENTMCNC: 35.2 G/DL (ref 32–36)
MCV RBC AUTO: 86 FL (ref 80–100)
NRBC BLD-RTO: 0 /100 WBCS (ref 0–0)
PLATELET # BLD AUTO: 359 X10*3/UL (ref 150–450)
RBC # BLD AUTO: 3.79 X10*6/UL (ref 4.5–5.9)
VANCOMYCIN SERPL-MCNC: 10.2 UG/ML (ref 5–20)
WBC # BLD AUTO: 11.9 X10*3/UL (ref 4.4–11.3)

## 2024-03-23 PROCEDURE — 80202 ASSAY OF VANCOMYCIN: CPT | Performed by: PHARMACIST

## 2024-03-23 PROCEDURE — 2500000004 HC RX 250 GENERAL PHARMACY W/ HCPCS (ALT 636 FOR OP/ED): Performed by: INTERNAL MEDICINE

## 2024-03-23 PROCEDURE — 82947 ASSAY GLUCOSE BLOOD QUANT: CPT

## 2024-03-23 PROCEDURE — 85027 COMPLETE CBC AUTOMATED: CPT | Performed by: STUDENT IN AN ORGANIZED HEALTH CARE EDUCATION/TRAINING PROGRAM

## 2024-03-23 PROCEDURE — 2500000001 HC RX 250 WO HCPCS SELF ADMINISTERED DRUGS (ALT 637 FOR MEDICARE OP): Performed by: INTERNAL MEDICINE

## 2024-03-23 PROCEDURE — 36415 COLL VENOUS BLD VENIPUNCTURE: CPT | Performed by: STUDENT IN AN ORGANIZED HEALTH CARE EDUCATION/TRAINING PROGRAM

## 2024-03-23 PROCEDURE — 36415 COLL VENOUS BLD VENIPUNCTURE: CPT | Performed by: PHARMACIST

## 2024-03-23 PROCEDURE — 2500000004 HC RX 250 GENERAL PHARMACY W/ HCPCS (ALT 636 FOR OP/ED)

## 2024-03-23 PROCEDURE — 2500000002 HC RX 250 W HCPCS SELF ADMINISTERED DRUGS (ALT 637 FOR MEDICARE OP, ALT 636 FOR OP/ED): Performed by: INTERNAL MEDICINE

## 2024-03-23 PROCEDURE — 99239 HOSP IP/OBS DSCHRG MGMT >30: CPT | Performed by: STUDENT IN AN ORGANIZED HEALTH CARE EDUCATION/TRAINING PROGRAM

## 2024-03-23 RX ORDER — CEPHALEXIN 500 MG/1
500 CAPSULE ORAL 2 TIMES DAILY
Qty: 20 CAPSULE | Refills: 0 | Status: SHIPPED | OUTPATIENT
Start: 2024-03-23 | End: 2024-04-02

## 2024-03-23 RX ORDER — VANCOMYCIN HYDROCHLORIDE 1 G/200ML
1000 INJECTION, SOLUTION INTRAVENOUS EVERY 12 HOURS
Status: DISCONTINUED | OUTPATIENT
Start: 2024-03-23 | End: 2024-03-23 | Stop reason: HOSPADM

## 2024-03-23 RX ORDER — INSULIN ASPART 100 [IU]/ML
20 INJECTION, SUSPENSION SUBCUTANEOUS
Qty: 12 ML | Refills: 1 | Status: SHIPPED | OUTPATIENT
Start: 2024-03-23 | End: 2024-05-22

## 2024-03-23 RX ORDER — LISINOPRIL 40 MG/1
40 TABLET ORAL DAILY
Start: 2024-03-23 | End: 2024-04-04 | Stop reason: WASHOUT

## 2024-03-23 RX ADMIN — INSULIN HUMAN 20 UNITS: 100 INJECTION, SUSPENSION SUBCUTANEOUS at 06:30

## 2024-03-23 RX ADMIN — INSULIN LISPRO 2 UNITS: 100 INJECTION, SOLUTION INTRAVENOUS; SUBCUTANEOUS at 06:32

## 2024-03-23 RX ADMIN — CARVEDILOL 25 MG: 12.5 TABLET, FILM COATED ORAL at 08:11

## 2024-03-23 RX ADMIN — AMLODIPINE BESYLATE 10 MG: 5 TABLET ORAL at 08:11

## 2024-03-23 RX ADMIN — CLOPIDOGREL BISULFATE 75 MG: 75 TABLET, FILM COATED ORAL at 08:11

## 2024-03-23 RX ADMIN — HEPARIN SODIUM 5000 UNITS: 5000 INJECTION INTRAVENOUS; SUBCUTANEOUS at 04:14

## 2024-03-23 RX ADMIN — ASPIRIN 81 MG: 81 TABLET, COATED ORAL at 08:11

## 2024-03-23 RX ADMIN — VANCOMYCIN HYDROCHLORIDE 1000 MG: 1 INJECTION, SOLUTION INTRAVENOUS at 12:17

## 2024-03-23 RX ADMIN — HYDRALAZINE HYDROCHLORIDE 10 MG: 20 INJECTION INTRAMUSCULAR; INTRAVENOUS at 08:15

## 2024-03-23 RX ADMIN — HEPARIN SODIUM 5000 UNITS: 5000 INJECTION INTRAVENOUS; SUBCUTANEOUS at 12:17

## 2024-03-23 ASSESSMENT — PAIN SCALES - GENERAL: PAINLEVEL_OUTOF10: 0 - NO PAIN

## 2024-03-23 NOTE — DISCHARGE SUMMARY
"Discharge Diagnosis  Generalized weakness    Issues Requiring Follow-Up  ***    Discharge Meds     Your medication list        START taking these medications        Instructions Last Dose Given Next Dose Due   cephalexin 500 mg capsule  Commonly known as: Keflex      Take 1 capsule (500 mg) by mouth 2 times a day for 10 days.              CHANGE how you take these medications        Instructions Last Dose Given Next Dose Due   amLODIPine 10 mg tablet  Commonly known as: Norvasc  What changed: Another medication with the same name was removed. Continue taking this medication, and follow the directions you see here.           lisinopril 40 mg tablet  What changed: additional instructions      Take 1 tablet (40 mg) by mouth once daily. Discuss with pcp before restarting       NovoLOG Mix 70-30FlexPen U-100 100 unit/mL (70-30) injection  Generic drug: insulin asp prt-insulin aspart  What changed:   See the new instructions.  Another medication with the same name was removed. Continue taking this medication, and follow the directions you see here.      Inject 20 Units under the skin 2 times a day with meals.              CONTINUE taking these medications        Instructions Last Dose Given Next Dose Due   aspirin 81 mg EC tablet           atorvastatin 40 mg tablet  Commonly known as: Lipitor      Take 1 tablet (40 mg) by mouth once daily at bedtime.       BD Ultra-Fine Lin Pen Needle 32 gauge x 5/32\" needle  Generic drug: pen needle, diabetic           carvedilol 25 mg tablet  Commonly known as: Coreg           clopidogrel 75 mg tablet  Commonly known as: Plavix           FreeStyle Lite Strips strip  Generic drug: blood sugar diagnostic           insulin lispro 100 unit/mL injection  Commonly known as: HumaLOG      Inject 0-0.1 mL (0-10 Units) under the skin 3 times a day with meals. Take as directed per insulin instructions.       metFORMIN 1,000 mg tablet  Commonly known as: Glucophage           nitroglycerin 0.4 mg " SL tablet  Commonly known as: Nitrostat           omega-3 fatty acids-fish oil 300-1,000 mg capsule           VITAMIN B-12 ORAL                  STOP taking these medications      insulin glargine 100 unit/mL injection  Commonly known as: Lantus                  Where to Get Your Medications        These medications were sent to [id number] - [place], [place] - [address]  [address], [place] [address]      Phone: [phone]   cephalexin 500 mg capsule  NovoLOG Mix 70-30FlexPen U-100 100 unit/mL (70-30) injection       Information about where to get these medications is not yet available    Ask your nurse or doctor about these medications  lisinopril 40 mg tablet         Test Results Pending At Discharge  Pending Labs       Order Current Status    Blood Culture Preliminary result    Blood Culture Preliminary result    Blood Culture Preliminary result    Blood Culture Preliminary result            Hospital Course  ***    Pertinent Physical Exam At Time of Discharge  Physical Exam    Outpatient Follow-Up  Future Appointments   Date Time Provider Department Center   5/9/2024 10:45 AM Anthony Owens MD [id number] [facility]   6/17/2024 12:00 PM Bonifacio Quinones DO [id number] [facility]         Nahid Cnacino MD

## 2024-03-24 LAB — BACTERIA BLD CULT: NORMAL

## 2024-03-25 LAB
BACTERIA BLD AEROBE CULT: ABNORMAL
BACTERIA BLD CULT: ABNORMAL
BACTERIA BLD CULT: NORMAL
BACTERIA BLD CULT: NORMAL
GRAM STN SPEC: ABNORMAL

## 2024-04-01 NOTE — PROGRESS NOTES
"Subjective   Amandeep Lara is a 68 y.o.   male. Here with wife Raya  [facility]  PMH of HTN, HLD, DM2. Is here being seen s/p hospitalization for stroke-like symptoms on 10-21-23. Symptoms included: right sided weakness and difficulty ambulating, nausea, vomiting. MRI brain showed small cute to subacute ischemic infarcts in the left thalamus and midbrain. Possible punctate focus of restricted diffusion in the right occipital lobe. Small remote left frontal infarct and additional findings as detailed. MRI ,TTE, JOSE normal. Was taking aspirin at home, started on Plavix and statin.   Today, he is doing ok, denies weakness. Was at [facility] x 10 days. He continues with DAPT, statin. He had a fall at home, stood up to urinate and passed out. Later on, he was seeing his PCP, and had another episode where he appeared ill and his vitals were abnormal and he was sent to ED on 3-20-24.  Work up revealed + blood cultures and he was started on antibiotics, discharge diagnosis was \"generalized weakness\". Per wife, he had come to hospital for syncopal episode at PCP. CTA on admission showed near complete occlusion of b/l CFA, and complete occlusion of L SFA, vascular consulted and deemed chronic. Per wife, he has a history of passing out and had full cardiac workup which was unrevealing. He has appt. In June with Dr. Quinones, cardiology and sees vascular in May. Following with puilmonology for RUL lesion. BP is lower than baseline at 108/70, may be due to low fluid intake vs. Cardiac medications, although he has had recent adjustment in these meds. The patient is wondering if he is clear to resume driving.   I have reviewed the medications, labs, imaging results, and the chart. Review of systems are negative unless otherwise specified in HPI.     Objective   Neurological Exam  Mental Status  Awake, alert and oriented to person, place and time. Recent and remote memory are intact. Speech is normal. Language is fluent with no aphasia. " Attention and concentration are normal.    Cranial Nerves  CN II: Right normal visual field. Left normal visual field.  CN III, IV, VI: Extraocular movements intact bilaterally. No nystagmus. Pupils equal round and reactive to light bilaterally.   Right pupil: 3 mm. Round. Reactive to light. Reactive to accommodation.   Left pupil: 3 mm. Round. Reactive to light. Reactive to accommodation.  CN V:  Right: Facial sensation is normal.  Left: Facial sensation is normal on the left.  CN VII:  Right: There is no facial weakness.  Left: There is no facial weakness.  CN VIII:  Right: Hearing is normal.  Left: Hearing is normal.  CN IX, X:  Right: Palate is normal.  Left: Palate is normal.  CN XI:  Right: Trapezius strength is normal.  Left: Trapezius strength is normal.  CN XII: Tongue midline without atrophy or fasciculations.  And EOM's Normal.    Motor  Normal muscle bulk throughout. Normal muscle tone. Strength is 5/5 throughout all four extremities.    Sensory  Light touch is normal in upper and lower extremities.     Reflexes  Deep tendon reflexes are 2+ and symmetric in all four extremities.    Coordination  Right: Finger-to-nose normal.    Gait  Casual gait is normal including stance, stride, and arm swing.    Physical Exam  HENT:      Right Ear: Hearing normal.      Left Ear: Hearing normal.   Eyes:      Extraocular Movements: EOM normal. No nystagmus.      Pupils: Pupils are equal, round, and reactive to light.   Neurological:      Motor: Motor strength is normal.     Deep Tendon Reflexes: Reflexes are normal and symmetric.   Psychiatric:         Speech: Speech normal.       Assessment/Plan   Discussed following up in 3 months. Icontinue with aspirin and Plavix, will reassess at next visit. I have advised pt. Not to drive until cardiac workup is complete for syncope.  Discussed bleeding precautions with patient while on anti platelet and/or anticoagulation therapy. Discussed stroke prevention such as: HgbA1c <7,  tight blood pressure control <130/<80, LDL <70 with statin therapy (if indicated), CPAP/BiPAP compliance (if indicated) and lifestyle modification (Mediterranean diet, daily exercise, nicotine cessation & limiting alcohol use).   Discussed s/sx of stroke such as: face drooping, arm/leg weakness, speech difficulty; sudden numbness of face/extremity, sudden confusion, sudden trouble seeing, sudden trouble walking, sudden severe headache, dizziness, loss of balance or coordination and to call 911 immediately.?

## 2024-04-04 ENCOUNTER — OFFICE VISIT (OUTPATIENT)
Dept: NEUROLOGY | Facility: CLINIC | Age: 69
End: 2024-04-04
Payer: COMMERCIAL

## 2024-04-04 VITALS
HEIGHT: 69 IN | DIASTOLIC BLOOD PRESSURE: 70 MMHG | SYSTOLIC BLOOD PRESSURE: 108 MMHG | WEIGHT: 216.4 LBS | BODY MASS INDEX: 32.05 KG/M2 | HEART RATE: 67 BPM

## 2024-04-04 DIAGNOSIS — I63.9 CEREBROVASCULAR ACCIDENT (CVA), UNSPECIFIED MECHANISM (MULTI): Primary | ICD-10-CM

## 2024-04-04 DIAGNOSIS — F17.200 SMOKES TOBACCO DAILY: ICD-10-CM

## 2024-04-04 PROBLEM — I10 ESSENTIAL HYPERTENSION: Status: ACTIVE | Noted: 2024-04-04

## 2024-04-04 PROBLEM — H57.813 BROW PTOSIS, BILATERAL: Status: ACTIVE | Noted: 2023-12-04

## 2024-04-04 PROBLEM — H02.403 PTOSIS OF BOTH EYELIDS: Status: ACTIVE | Noted: 2023-12-04

## 2024-04-04 PROBLEM — R91.8 LUNG FIELD ABNORMAL: Status: ACTIVE | Noted: 2024-04-04

## 2024-04-04 PROBLEM — E11.42 POLYNEUROPATHY DUE TO TYPE 2 DIABETES MELLITUS (MULTI): Status: ACTIVE | Noted: 2023-10-22

## 2024-04-04 PROBLEM — R01.1 HEART MURMUR: Status: ACTIVE | Noted: 2024-02-01

## 2024-04-04 PROBLEM — I25.10 ARTERIOSCLEROSIS OF CORONARY ARTERY: Status: ACTIVE | Noted: 2024-02-01

## 2024-04-04 PROBLEM — E11.65 UNCONTROLLED TYPE 2 DIABETES MELLITUS WITH HYPERGLYCEMIA (MULTI): Status: ACTIVE | Noted: 2020-09-30

## 2024-04-04 PROBLEM — H02.834 DERMATOCHALASIS OF BOTH UPPER EYELIDS: Status: ACTIVE | Noted: 2023-12-04

## 2024-04-04 PROBLEM — Z86.79 HISTORY OF HYPERTENSION: Status: ACTIVE | Noted: 2024-04-04

## 2024-04-04 PROBLEM — I65.23 STENOSIS OF INTRACRANIAL PORTION OF BOTH INTERNAL CAROTID ARTERIES: Status: ACTIVE | Noted: 2021-09-29

## 2024-04-04 PROBLEM — I73.9 PERIPHERAL ARTERIAL DISEASE (CMS-HCC): Status: ACTIVE | Noted: 2024-04-04

## 2024-04-04 PROBLEM — I99.9 VASCULAR DISORDER: Status: ACTIVE | Noted: 2024-04-04

## 2024-04-04 PROBLEM — E11.9 DIABETES MELLITUS (MULTI): Status: ACTIVE | Noted: 2018-11-13

## 2024-04-04 PROBLEM — H02.831 DERMATOCHALASIS OF BOTH UPPER EYELIDS: Status: ACTIVE | Noted: 2023-12-04

## 2024-04-04 PROBLEM — H52.222 REGULAR ASTIGMATISM OF LEFT EYE: Status: ACTIVE | Noted: 2023-12-04

## 2024-04-04 PROBLEM — H25.812 COMBINED FORMS OF AGE-RELATED CATARACT OF LEFT EYE: Status: ACTIVE | Noted: 2023-12-04

## 2024-04-04 PROBLEM — E11.9 TYPE 2 DIABETES MELLITUS WITHOUT RETINOPATHY (MULTI): Status: ACTIVE | Noted: 2023-12-04

## 2024-04-04 PROBLEM — R55 PRE-SYNCOPE: Status: ACTIVE | Noted: 2024-03-22

## 2024-04-04 PROBLEM — H35.371 EPIRETINAL MEMBRANE (ERM) OF RIGHT EYE: Status: ACTIVE | Noted: 2023-12-04

## 2024-04-04 PROBLEM — H18.513 FUCHS' CORNEAL DYSTROPHY OF BOTH EYES: Status: ACTIVE | Noted: 2023-12-04

## 2024-04-04 PROBLEM — Z86.39 HISTORY OF DIABETES MELLITUS: Status: ACTIVE | Noted: 2024-02-01

## 2024-04-04 PROBLEM — R53.1 WEAKNESS: Status: ACTIVE | Noted: 2024-03-20

## 2024-04-04 PROCEDURE — 1111F DSCHRG MED/CURRENT MED MERGE: CPT

## 2024-04-04 PROCEDURE — 4010F ACE/ARB THERAPY RXD/TAKEN: CPT

## 2024-04-04 PROCEDURE — 1160F RVW MEDS BY RX/DR IN RCRD: CPT

## 2024-04-04 PROCEDURE — 3074F SYST BP LT 130 MM HG: CPT

## 2024-04-04 PROCEDURE — 99215 OFFICE O/P EST HI 40 MIN: CPT

## 2024-04-04 PROCEDURE — 3078F DIAST BP <80 MM HG: CPT

## 2024-04-04 PROCEDURE — 1159F MED LIST DOCD IN RCRD: CPT

## 2024-04-04 RX ORDER — HYDRALAZINE HYDROCHLORIDE 20 MG/ML
10 INJECTION INTRAMUSCULAR; INTRAVENOUS EVERY 4 HOURS PRN
COMMUNITY
Start: 2024-03-20

## 2024-04-04 RX ORDER — DEXTROSE 50 % IN WATER (D50W) INTRAVENOUS SYRINGE
12.5
COMMUNITY
Start: 2024-03-20

## 2024-04-04 RX ORDER — INSULIN GLARGINE-YFGN 100 [IU]/ML
20 INJECTION, SOLUTION SUBCUTANEOUS NIGHTLY
COMMUNITY
Start: 2023-11-03

## 2024-04-04 RX ORDER — POLYMYXIN B SULFATE AND TRIMETHOPRIM 1; 10000 MG/ML; [USP'U]/ML
1 SOLUTION OPHTHALMIC 4 TIMES DAILY
COMMUNITY
Start: 2015-01-05

## 2024-04-04 RX ORDER — PREDNISOLONE ACETATE 10 MG/ML
SUSPENSION/ DROPS OPHTHALMIC
COMMUNITY
Start: 2024-01-30

## 2024-04-04 RX ORDER — BLOOD-GLUCOSE METER
EACH MISCELLANEOUS
COMMUNITY
Start: 2024-02-21

## 2024-04-04 RX ORDER — OMEGA-3 FATTY ACIDS 1000 MG
2 CAPSULE ORAL
COMMUNITY

## 2024-04-04 RX ORDER — NAPROXEN SODIUM 220 MG/1
TABLET, FILM COATED ORAL
COMMUNITY

## 2024-04-04 RX ORDER — KETOROLAC TROMETHAMINE 5 MG/ML
SOLUTION OPHTHALMIC
COMMUNITY
Start: 2015-01-05

## 2024-04-04 RX ORDER — ASPIRIN 81 MG/1
81 TABLET ORAL
COMMUNITY

## 2024-04-04 RX ORDER — LISINOPRIL 20 MG/1
20 TABLET ORAL
COMMUNITY
Start: 2021-06-18 | End: 2024-04-04 | Stop reason: WASHOUT

## 2024-04-04 RX ORDER — GLIPIZIDE 10 MG/1
TABLET ORAL 2 TIMES DAILY
COMMUNITY

## 2024-04-04 RX ORDER — NITROGLYCERIN 0.4 MG/1
TABLET SUBLINGUAL
COMMUNITY
Start: 2020-10-07

## 2024-04-04 RX ORDER — CLOPIDOGREL BISULFATE 75 MG/1
75 TABLET ORAL DAILY
Qty: 90 TABLET | Refills: 0 | Status: SHIPPED | OUTPATIENT
Start: 2024-04-04

## 2024-04-04 RX ORDER — LANCETS
EACH MISCELLANEOUS
COMMUNITY
Start: 2024-02-21

## 2024-04-04 RX ORDER — CARVEDILOL 25 MG/1
1 TABLET ORAL 2 TIMES DAILY
COMMUNITY
Start: 2022-06-13

## 2024-04-04 RX ORDER — OFLOXACIN 3 MG/ML
SOLUTION/ DROPS OPHTHALMIC
COMMUNITY
Start: 2023-12-05

## 2024-04-04 RX ORDER — CARVEDILOL 12.5 MG/1
1 TABLET ORAL 2 TIMES DAILY
COMMUNITY
Start: 2020-08-20

## 2024-04-04 RX ORDER — LISINOPRIL 20 MG/1
20 TABLET ORAL DAILY
COMMUNITY
End: 2024-04-04 | Stop reason: WASHOUT

## 2024-04-04 RX ORDER — SODIUM CHLORIDE, SODIUM LACTATE, POTASSIUM CHLORIDE, CALCIUM CHLORIDE 600; 310; 30; 20 MG/100ML; MG/100ML; MG/100ML; MG/100ML
75 INJECTION, SOLUTION INTRAVENOUS
COMMUNITY
Start: 2024-03-20

## 2024-04-04 RX ORDER — CLOPIDOGREL BISULFATE 75 MG/1
1 TABLET ORAL
COMMUNITY
Start: 2023-12-06

## 2024-04-04 RX ORDER — VANCOMYCIN HYDROCHLORIDE 1 G/200ML
1000 INJECTION, SOLUTION INTRAVENOUS EVERY 12 HOURS
COMMUNITY
Start: 2024-03-23

## 2024-04-04 RX ORDER — ACETAMINOPHEN 325 MG/1
650 TABLET ORAL EVERY 4 HOURS PRN
COMMUNITY
Start: 2024-03-20

## 2024-04-04 RX ORDER — LISINOPRIL 40 MG/1
1 TABLET ORAL DAILY
COMMUNITY
Start: 2023-06-19

## 2024-04-04 RX ORDER — METFORMIN HYDROCHLORIDE 1000 MG/1
1000 TABLET ORAL 2 TIMES DAILY
COMMUNITY
Start: 2014-12-31

## 2024-04-04 RX ORDER — HEPARIN SODIUM 5000 [USP'U]/ML
5000 INJECTION, SOLUTION INTRAVENOUS; SUBCUTANEOUS EVERY 8 HOURS
COMMUNITY
Start: 2024-03-20

## 2024-04-04 RX ORDER — METFORMIN HYDROCHLORIDE 1000 MG/1
1 TABLET ORAL
COMMUNITY
Start: 2014-02-07

## 2024-04-04 RX ORDER — ACETAMINOPHEN 500 MG
5 TABLET ORAL DAILY PRN
COMMUNITY
Start: 2024-03-20

## 2024-04-04 RX ORDER — BLOOD-GLUCOSE METER
KIT MISCELLANEOUS
COMMUNITY
Start: 2021-09-12

## 2024-04-04 ASSESSMENT — ENCOUNTER SYMPTOMS
DEPRESSION: 0
OCCASIONAL FEELINGS OF UNSTEADINESS: 1
LOSS OF SENSATION IN FEET: 1

## 2024-04-04 ASSESSMENT — PATIENT HEALTH QUESTIONNAIRE - PHQ9
SUM OF ALL RESPONSES TO PHQ9 QUESTIONS 1 & 2: 0
1. LITTLE INTEREST OR PLEASURE IN DOING THINGS: NOT AT ALL
2. FEELING DOWN, DEPRESSED OR HOPELESS: NOT AT ALL

## 2024-04-17 PROCEDURE — G0180 MD CERTIFICATION HHA PATIENT: HCPCS

## 2024-04-25 NOTE — DISCHARGE SUMMARY
"Discharge Diagnosis  Generalized weakness    Issues Requiring Follow-Up    PAD,  diabetes management    Discharge Meds     Your medication list        START taking these medications        Instructions Last Dose Given Next Dose Due   cephalexin 500 mg capsule  Commonly known as: Keflex      Take 1 capsule (500 mg) by mouth 2 times a day for 10 days.              CHANGE how you take these medications        Instructions Last Dose Given Next Dose Due   amLODIPine 10 mg tablet  Commonly known as: Norvasc  What changed: Another medication with the same name was removed. Continue taking this medication, and follow the directions you see here.           lisinopril 40 mg tablet  What changed: additional instructions      Take 1 tablet (40 mg) by mouth once daily. Discuss with pcp before restarting       NovoLOG Mix 70-30FlexPen U-100 100 unit/mL (70-30) injection  Generic drug: insulin asp prt-insulin aspart  What changed:   See the new instructions.  Another medication with the same name was removed. Continue taking this medication, and follow the directions you see here.      Inject 20 Units under the skin 2 times a day with meals.              CONTINUE taking these medications        Instructions Last Dose Given Next Dose Due   aspirin 81 mg EC tablet           atorvastatin 40 mg tablet  Commonly known as: Lipitor      Take 1 tablet (40 mg) by mouth once daily at bedtime.       BD Ultra-Fine Lin Pen Needle 32 gauge x 5/32\" needle  Generic drug: pen needle, diabetic           carvedilol 25 mg tablet  Commonly known as: Coreg           clopidogrel 75 mg tablet  Commonly known as: Plavix           FreeStyle Lite Strips strip  Generic drug: blood sugar diagnostic           insulin lispro 100 unit/mL injection  Commonly known as: HumaLOG      Inject 0-0.1 mL (0-10 Units) under the skin 3 times a day with meals. Take as directed per insulin instructions.       metFORMIN 1,000 mg tablet  Commonly known as: Glucophage         "   nitroglycerin 0.4 mg SL tablet  Commonly known as: Nitrostat           omega-3 fatty acids-fish oil 300-1,000 mg capsule           VITAMIN B-12 ORAL                  STOP taking these medications      insulin glargine 100 unit/mL injection  Commonly known as: Lantus                  Where to Get Your Medications        These medications were sent to [id number] - [place], [place] - [address]  [address] [place] [address]      Phone: [phone]   cephalexin 500 mg capsule  NovoLOG Mix 70-30FlexPen U-100 100 unit/mL (70-30) injection       Information about where to get these medications is not yet available    Ask your nurse or doctor about these medications  lisinopril 40 mg tablet         Test Results Pending At Discharge  Pending Labs       No current pending labs.            Hospital Course  PAD  -CTA on admission showed near complete occlusion of b/l CFA, and complete occlusion of L SFA  -per ED physician, findings were reviewed from ED by Dr. Owens, who felt likely chronic in nature, no need for hep gtt right now  -b/l pedal pulses Dopplerable, no dusky discoloration, no rest pain, no ulcers  Plan:  -vascular surgery consulted  -CARROL pending  -cont PTA ASA, Plavix, high intensity statin     GPC bacteremia  -1/2 sets from 3/20 growing GPC in clusters  -possibly contaminant, but given ill presentation and leukocytosis (albeit chronic) on admission, will   Continue vancomycin and follow-up speciation.  -rpt bcx  show no growth at this point in time.     Presyncope  Generalized weakness  -feeling much better after IVF, cont     FENG  -Scr 1.6 on admission, previously was 1-1.3 late last year  -improving with IVF     Leukocytosis  -unclear etiology, has been chronically elevated since 2020  -down trended with IVF alone, may have had some component of hemoconcentration on admission     RUL pulmonary lesion  -chronic appearing, has been present since at least 10/2023, similar in  size on rpt imaging  -outpt fu with pulm     Hx of embolic stroke  HTN  HLD  -home meds, hold ACE-I for now d/t FENG, resume as tolerated     IDDM2  -home insulin at reduced dose, ssi, titrate prn  -  Patient had an episode of hypoglycemia overnight with blood glucose dropping down to 35 and patient was symptomatic with diaphoresis lethargy and hypoxia.  Patient was given D50 and placed on D5 LR with improvement in his mental status as well as blood glucose level.  Will need to adjust insulin.      Obesity     Vte ppx sqh      Patient discharged home with plans to follow-up for physical therapy as well was optimization of  PAD treatment with vascular surgery as outpatient.    Pertinent Physical Exam At Time of Discharge  Physical Exam    GEN: Healthy appearing, well-developed, NAD.  PSYCH: Good Judgment. AOx3. Normal memory, mood, and affect.  HEENT:NC/AT; No discharge or redness; external ears are normal; Normal nares; Normal gums, mucosa, palate  CV: RRR, no m/r/g.  LUNGS: CTAB, no w/r/c.  ABD: Soft, NT/ND, NBS, no masses or organomegaly.  : N/A  SKIN:   Nonpalpable pulses in lower extremities.  MSK: Normal gait. No deformities.  EXT: No clubbing, cyanosis, or edema.  NEURO: Ambulating with no limitations. No focal deficits.    Outpatient Follow-Up  Future Appointments   Date Time Provider Department [facility]   5/2/2024 12:00 PM ELY RESPTHER1 PFT RM2 [email or web address] [place]   5/2/2024 12:30 PM ELY RESPTHER1 PFT RM2 ELYRES1 [place]   5/9/2024 10:45 AM Anthony Owens MD [id number] [place]   5/10/2024  8:45 AM ELY AVON[facility] MOBILE ADMIN ROOM PET CT ELY[facility]CPETMOB [facility]   5/10/2024  9:45 AM ELY [facility] MOBILE PET CT [facility]CPETMOB [facility]   6/17/2024 12:00 PM Bonifacio Quinones DO [id number] [place]   7/3/2024 11:30 AM MALAIKA Maier-CNP [id number] [place]         Nahid Cancino MD

## 2024-05-02 ENCOUNTER — APPOINTMENT (OUTPATIENT)
Dept: RESPIRATORY THERAPY | Facility: HOSPITAL | Age: 69
End: 2024-05-02
Payer: COMMERCIAL

## 2024-05-09 ENCOUNTER — OFFICE VISIT (OUTPATIENT)
Dept: VASCULAR SURGERY | Facility: CLINIC | Age: 69
End: 2024-05-09
Payer: COMMERCIAL

## 2024-05-09 VITALS
RESPIRATION RATE: 16 BRPM | DIASTOLIC BLOOD PRESSURE: 55 MMHG | BODY MASS INDEX: 33.95 KG/M2 | HEART RATE: 66 BPM | OXYGEN SATURATION: 93 % | WEIGHT: 224 LBS | HEIGHT: 68 IN | TEMPERATURE: 97.1 F | SYSTOLIC BLOOD PRESSURE: 120 MMHG

## 2024-05-09 DIAGNOSIS — I73.9 PERIPHERAL VASCULAR DISEASE, UNSPECIFIED (CMS-HCC): Primary | ICD-10-CM

## 2024-05-09 PROCEDURE — 4010F ACE/ARB THERAPY RXD/TAKEN: CPT | Performed by: SURGERY

## 2024-05-09 PROCEDURE — 3078F DIAST BP <80 MM HG: CPT | Performed by: SURGERY

## 2024-05-09 PROCEDURE — 1159F MED LIST DOCD IN RCRD: CPT | Performed by: SURGERY

## 2024-05-09 PROCEDURE — 99205 OFFICE O/P NEW HI 60 MIN: CPT | Performed by: SURGERY

## 2024-05-09 PROCEDURE — 99215 OFFICE O/P EST HI 40 MIN: CPT | Performed by: SURGERY

## 2024-05-09 PROCEDURE — 3074F SYST BP LT 130 MM HG: CPT | Performed by: SURGERY

## 2024-05-09 PROCEDURE — 1160F RVW MEDS BY RX/DR IN RCRD: CPT | Performed by: SURGERY

## 2024-05-09 NOTE — PROGRESS NOTES
"Vascular Surgery Clinic Note    CC: PAD    HPI:  Amandeep Lara is 69 y.o. male with history of DM, HTN, stroke. He smoked for many years and quit last year in October. He presnted to the ER last month with weakness, bacteremia, FENG. CTA incidentally noted significant iliofemoral occlusive disease. He walks with a cane. He feels that he gets around much slower lately. He does not have specific complaints related to his legs. No rest pain or wounds. He and his wife recently got back from a cruise to the [facility].     Medical History:   has a past medical history of Diabetes mellitus (Multi), Hypertension, Personal history of other diseases of the circulatory system, Personal history of other endocrine, nutritional and metabolic disease, and Stroke (Multi).    Meds:   Current Outpatient Medications on File Prior to Visit   Medication Sig Dispense Refill    amLODIPine (Norvasc) 10 mg tablet Take 0.5 tablets (5 mg) by mouth once daily.      acetaminophen (Tylenol) 325 mg tablet Take 2 tablets (650 mg) by mouth every 4 hours if needed.      aspirin 81 mg chewable tablet Chew.      aspirin 81 mg EC tablet Take 1 tablet (81 mg) by mouth once daily.      aspirin 81 mg EC tablet Take 1 tablet (81 mg) by mouth once daily.      atorvastatin (Lipitor) 40 mg tablet Take 1 tablet (40 mg) by mouth once daily at bedtime. 30 tablet 0    BD Ultra-Fine Lin Pen Needle 32 gauge x 5/32\" needle use 1 each 2 times daily      carvedilol (Coreg) 12.5 mg tablet Take 1 tablet (12.5 mg) by mouth 2 times a day.      carvedilol (Coreg) 25 mg tablet Take 1 tablet (25 mg) by mouth 2 times a day.      carvedilol (Coreg) 25 mg tablet Take 1 tablet (25 mg) by mouth 2 times a day.      clopidogrel (Plavix) 75 mg tablet Take 1 tablet (75 mg) by mouth once daily.      clopidogrel (Plavix) 75 mg tablet Take 1 tablet (75 mg) by mouth once daily. 90 tablet 0    Contour Next Gen Meter misc Dispense one meter that insurance will cover.      cyanocobalamin, " vitamin B-12, (VITAMIN B-12 ORAL) Take 1 tablet by mouth once daily.      dextrose 50 % injection Infuse 25 mL (12.5 g) into a venous catheter.      DOCOSAHEXAENOIC ACID ORAL Take 1,000 mg by mouth once daily.      evolocumab with infusor (Repatha Pushtronex) 420 mg/3.5 mL injection Inject 3.5 mL (420 mg) under the skin every 28 (twenty-eight) days.      fish oil concentrate (Omega-3) 120-180 mg capsule Take 1 capsule (1 g) by mouth once daily.      FreeStyle Lite Strips strip 3 times a day.      FreeStyle Lite Strips strip FreeStyle Lite Test In Vitro Strip   Quantity: 1  Refills: 0        Start : 12-Sep-2021   Active      glipiZIDE (Glucotrol) 10 mg tablet Take by mouth twice a day.      glucagon 1 mg injection Inject 1 mg into the muscle.      heparin sodium,porcine (heparin, porcine,) 5,000 unit/mL injection Inject 1 mL (5,000 Units) under the skin every 8 hours.      hydrALAZINE (Apresoline) 20 mg/mL injection Infuse 0.5 mL (10 mg) into a venous catheter every 4 hours if needed.      insulin lispro (HumaLOG) 100 unit/mL injection Inject 0-0.1 mL (0-10 Units) under the skin 3 times a day with meals. Take as directed per insulin instructions. 9 mL 0    insulin NPH and regular human (HumuLIN 70-30, NovoLIN 70-30) 100 unit/mL (70-30) injection Inject 20 Units under the skin.      ketorolac (Acular) 0.5 % ophthalmic solution Use one drop in operative eye as directed by Dr. Mobley starting tomorrow.      lisinopril 40 mg tablet Take 1 tablet (40 mg) by mouth once daily.      melatonin 5 mg tablet Take 1 tablet (5 mg) by mouth once daily as needed.      metFORMIN (Glucophage) 1,000 mg tablet Take 1 tablet (1,000 mg) by mouth 2 times a day with meals.      metFORMIN (Glucophage) 1,000 mg tablet Take 1 tablet (1,000 mg) by mouth twice a day.      metFORMIN (Glucophage) 1,000 mg tablet Take 1 tablet (1,000 mg) by mouth 2 times a day with meals.      Microlet Lancet misc USE AS DIRECTED      nitroglycerin (Nitrostat) 0.4  mg SL tablet Place 1 tablet (0.4 mg) under the tongue every 5 minutes if needed for chest pain (up to 3 doses).      nitroglycerin (Nitrostat) 0.4 mg SL tablet DISSOLVE 1 TABLET UNDER THE TONGUE AS NEEDED FOR CHEST PAIN- MAY REPEAT EVERY 5 MINUTES IF NEEDED (MAX 3 DOSES.- IF NO RELIEF CALL 911)      NovoLOG Mix 70-30FlexPen U-100 100 unit/mL (70-30) injection Inject 20 Units under the skin 2 times a day with meals. 12 mL 1    ofloxacin (Ocuflox) 0.3 % ophthalmic solution PLACE One drop in the OPERATIVE EYE only four times a day starting the day before surgery      omega-3 1,000 mg capsule capsule Take 2 capsules (2,000 mg) by mouth once daily.      omega-3 fatty acids-fish oil 300-1,000 mg capsule Take by mouth.      polymyxin B sulf-trimethoprim (Polytrim) ophthalmic solution Administer 1 drop into affected eye(s) 4 times a day.      prednisoLONE acetate (Pred-Forte) 1 % ophthalmic suspension Use one drop in operative eye as directed by Dr. Mobley starting tomorrow.      Ringer's solution,lactated (lactated Ringer's) infusion Infuse 75 mL into a venous catheter.      Semglee,insulin glarg-yfgn,Pen 100 unit/mL (3 mL) Pen Inject 20 Units under the skin once daily at bedtime.      vancomycin (Vancocin) IVPB Infuse 200 mL (1,000 mg) into a venous catheter every 12 hours.       No current facility-administered medications on file prior to visit.        Allergies:   Allergies   Allergen Reactions    Ether Unknown, Nausea And Vomiting and Other    Other Unknown     STATINS    Statins-Hmg-Coa Reductase Inhibitors Other    Sulfa (Sulfonamide Antibiotics) Hives    Sulfamethoxazole-Trimethoprim Rash and Other       SH:    Social Determinants of Health     Tobacco Use: Medium Risk (5/9/2024)    Patient History     Smoking Tobacco Use: Former     Smokeless Tobacco Use: Never     Passive Exposure: Never   Alcohol Use: Not At Risk (3/20/2024)    AUDIT-C     Frequency of Alcohol Consumption: Monthly or less     Average Number of  Drinks: 1 or 2     Frequency of Binge Drinking: Never   Financial Resource Strain: Low Risk  (3/20/2024)    Overall Financial Resource Strain (CARDIA)     Difficulty of Paying Living Expenses: Not very hard   Food Insecurity: Patient Declined (10/20/2023)    Hunger Vital Sign     Worried About Running Out of Food in the Last Year: Patient declined     Ran Out of Food in the Last Year: Patient declined   Transportation Needs: No Transportation Needs (3/20/2024)    PRAPARE - Transportation     Lack of Transportation (Medical): No     Lack of Transportation (Non-Medical): No   Physical Activity: Inactive (10/20/2023)    Exercise Vital Sign     Days of Exercise per Week: 0 days     Minutes of Exercise per Session: 0 min   Stress: No Stress Concern Present (10/20/2023)    [employer] - Occupational Stress Questionnaire     Feeling of Stress : Not at all   Social Connections: Feeling Socially Integrated (11/15/2023)    OASIS : Social Isolation     Frequency of experiencing loneliness or isolation: Never   Recent Concern: Social Connections - Moderately Isolated (10/20/2023)    Social Connection and Isolation Panel [NHANES]     Frequency of Communication with Friends and Family: Three times a week     Frequency of Social Gatherings with Friends and Family: Once a week     Attends Anabaptist Services: Never     Active Member of Clubs or Organizations: No     Attends Club or Organization Meetings: Never     Marital Status:    Intimate Partner Violence: Not At Risk (10/20/2023)    Humiliation, Afraid, Rape, and Kick questionnaire     Fear of Current or Ex-Partner: No     Emotionally Abused: No     Physically Abused: No     Sexually Abused: No   Depression: Not at risk (4/4/2024)    PHQ-2     PHQ-2 Score: 0   Housing Stability: Low Risk  (3/20/2024)    Housing Stability Vital Sign     Unable to Pay for Housing in the Last Year: No     Number of Places Lived in the Last Year: 1      Unstable Housing in the Last Year: No   Utilities: Not At Risk (10/20/2023)    [facility] Utilities     Threatened with loss of utilities: No   Digital Equity: Not on file   Health Literacy: Adequate Health Literacy (11/15/2023)    OASIS : Health Literacy     Frequency of needing help to read materials from doctor or pharmacy: Rarely   Recent Concern: Health Literacy - Inadequate Health Literacy (11/4/2023)    OASIS : Health Literacy     Frequency of needing help to read materials from doctor or pharmacy: Sometimes        FH:  Family History   Problem Relation Name Age of Onset    Other (malignant neoplasm of urinary bladder) Mother      Other (arteriosclerotic cardiovascular disease) Father      Other (malignant neoplasm of prostate) Father      Other (arteriosclerotic cardiovascular disease) Brother          ROS:  All systems were reviewed and are negative except as per HPI.    Objective:  Vitals:  Vitals:    05/09/24 1113   BP: 120/55   Pulse: 66   Resp: 16   Temp: 36.2 °C (97.1 °F)   SpO2: 93%        Exam:  In NAD, well appearing  Abd Soft, ND/NT  Vascular examination:  No femoral or pedal pulses  Feet are warm, no wounds    Assessment & Plan:  Amandeep Lara is 69 y.o. male with significant iliofemoral occlusive disease bilaterally. CARROL are 0.89 on the right, 0.72 on the left indicating that he is well collateralized. Minimal symptoms. RTC yearly for CARROL.      I spent a total of 60 minutes on the day of the visit.         Anthony Owens M.D.

## 2024-05-10 ENCOUNTER — HOSPITAL ENCOUNTER (OUTPATIENT)
Dept: RADIOLOGY | Facility: CLINIC | Age: 69
Discharge: HOME | End: 2024-05-10
Payer: COMMERCIAL

## 2024-05-10 DIAGNOSIS — R91.1 LUNG NODULE SEEN ON IMAGING STUDY: ICD-10-CM

## 2024-05-10 PROCEDURE — A9552 F18 FDG: HCPCS | Performed by: INTERNAL MEDICINE

## 2024-05-10 PROCEDURE — 78815 PET IMAGE W/CT SKULL-THIGH: CPT | Mod: PET TUMOR INIT TX STRAT | Performed by: RADIOLOGY

## 2024-05-10 PROCEDURE — 78815 PET IMAGE W/CT SKULL-THIGH: CPT | Mod: PI

## 2024-05-10 PROCEDURE — 3430000001 HC RX 343 DIAGNOSTIC RADIOPHARMACEUTICALS: Performed by: INTERNAL MEDICINE

## 2024-05-10 RX ORDER — FLUDEOXYGLUCOSE F 18 200 MCI/ML
11.9 INJECTION, SOLUTION INTRAVENOUS
Status: COMPLETED | OUTPATIENT
Start: 2024-05-10 | End: 2024-05-10

## 2024-05-10 RX ADMIN — FLUDEOXYGLUCOSE F 18 11.9 MILLICURIE: 200 INJECTION, SOLUTION INTRAVENOUS at 08:53

## 2024-05-22 ENCOUNTER — OFFICE VISIT (OUTPATIENT)
Dept: ENDOCRINOLOGY | Age: 69
End: 2024-05-22
Payer: COMMERCIAL

## 2024-05-22 VITALS
WEIGHT: 224 LBS | BODY MASS INDEX: 33.18 KG/M2 | OXYGEN SATURATION: 93 % | SYSTOLIC BLOOD PRESSURE: 116 MMHG | HEIGHT: 69 IN | DIASTOLIC BLOOD PRESSURE: 66 MMHG | HEART RATE: 68 BPM

## 2024-05-22 DIAGNOSIS — Z79.4 TYPE 2 DIABETES MELLITUS WITH OTHER SPECIFIED COMPLICATION, WITH LONG-TERM CURRENT USE OF INSULIN (HCC): Primary | ICD-10-CM

## 2024-05-22 DIAGNOSIS — E11.69 TYPE 2 DIABETES MELLITUS WITH OTHER SPECIFIED COMPLICATION, WITH LONG-TERM CURRENT USE OF INSULIN (HCC): Primary | ICD-10-CM

## 2024-05-22 LAB
CHP ED QC CHECK: ABNORMAL
GLUCOSE BLD-MCNC: 203 MG/DL
HBA1C MFR BLD: 8.4 %

## 2024-05-22 PROCEDURE — 1123F ACP DISCUSS/DSCN MKR DOCD: CPT | Performed by: INTERNAL MEDICINE

## 2024-05-22 PROCEDURE — 2022F DILAT RTA XM EVC RTNOPTHY: CPT | Performed by: INTERNAL MEDICINE

## 2024-05-22 PROCEDURE — 1036F TOBACCO NON-USER: CPT | Performed by: INTERNAL MEDICINE

## 2024-05-22 PROCEDURE — 3017F COLORECTAL CA SCREEN DOC REV: CPT | Performed by: INTERNAL MEDICINE

## 2024-05-22 PROCEDURE — G8417 CALC BMI ABV UP PARAM F/U: HCPCS | Performed by: INTERNAL MEDICINE

## 2024-05-22 PROCEDURE — 3052F HG A1C>EQUAL 8.0%<EQUAL 9.0%: CPT | Performed by: INTERNAL MEDICINE

## 2024-05-22 PROCEDURE — G8427 DOCREV CUR MEDS BY ELIG CLIN: HCPCS | Performed by: INTERNAL MEDICINE

## 2024-05-22 PROCEDURE — 83036 HEMOGLOBIN GLYCOSYLATED A1C: CPT | Performed by: INTERNAL MEDICINE

## 2024-05-22 PROCEDURE — 99213 OFFICE O/P EST LOW 20 MIN: CPT | Performed by: INTERNAL MEDICINE

## 2024-05-22 PROCEDURE — 82962 GLUCOSE BLOOD TEST: CPT | Performed by: INTERNAL MEDICINE

## 2024-05-22 RX ORDER — IBUPROFEN 100 MG/5ML
SUSPENSION ORAL
COMMUNITY
Start: 2024-02-21

## 2024-05-22 RX ORDER — ATORVASTATIN CALCIUM 40 MG/1
40 TABLET, FILM COATED ORAL NIGHTLY
COMMUNITY

## 2024-05-22 RX ORDER — AMLODIPINE BESYLATE 10 MG/1
10 TABLET ORAL DAILY
COMMUNITY

## 2024-05-22 RX ORDER — INSULIN LISPRO 100 [IU]/ML
0-10 INJECTION, SOLUTION INTRAVENOUS; SUBCUTANEOUS
COMMUNITY
Start: 2023-10-25 | End: 2024-05-22

## 2024-05-22 NOTE — PROGRESS NOTES
5/22/2024    Assessment:       Diagnosis Orders   1. Type 2 diabetes mellitus with other specified complication, with long-term current use of insulin (HCC)  POCT Glucose    POCT glycosylated hemoglobin (Hb A1C)            PLAN:     Orders Placed This Encounter   Procedures    Basic Metabolic Panel     Standing Status:   Future     Standing Expiration Date:   5/22/2025    Hemoglobin A1C     Standing Status:   Future     Standing Expiration Date:   5/22/2025    Basic Metabolic Panel     Standing Status:   Future     Standing Expiration Date:   5/22/2025    Hemoglobin A1C     Standing Status:   Future     Standing Expiration Date:   5/22/2025    POCT Glucose    POCT glycosylated hemoglobin (Hb A1C)     Continue current dose of NovoLog 7030+ metformin A1c goal of less than 7        Orders Placed This Encounter   Procedures    POCT Glucose    POCT glycosylated hemoglobin (Hb A1C)     No orders of the defined types were placed in this encounter.    No follow-ups on file.  Subjective:     Chief Complaint   Patient presents with    Diabetes     Vitals:    05/22/24 1418   BP: 116/66   Pulse: 68   SpO2: 93%   Weight: 101.6 kg (224 lb)   Height: 1.753 m (5' 9.02\")     Wt Readings from Last 3 Encounters:   05/22/24 101.6 kg (224 lb)   02/21/24 99.8 kg (220 lb)   12/13/23 95.9 kg (211 lb 6.4 oz)     BP Readings from Last 3 Encounters:   05/22/24 116/66   02/21/24 (!) 140/69   12/13/23 138/75       Follow-up on type 2 diabetes patient on lab NovoLog 70/30 35 units twice daily plus metformin hemoglobin A1c has improved from 10.1-8.4  Overall blood sugars are averaging close to 180/200  Hemoglobin A1C       Date                     Value               Ref Range           Status                05/22/2024               8.4 (H)             %                   Final            ----------      Diabetes  He presents for his follow-up diabetic visit. He has type 2 diabetes mellitus. There are no hypoglycemic associated symptoms.

## 2024-05-27 DIAGNOSIS — E11.69 TYPE 2 DIABETES MELLITUS WITH OTHER SPECIFIED COMPLICATION, WITH LONG-TERM CURRENT USE OF INSULIN (HCC): ICD-10-CM

## 2024-05-27 DIAGNOSIS — Z79.4 TYPE 2 DIABETES MELLITUS WITH OTHER SPECIFIED COMPLICATION, WITH LONG-TERM CURRENT USE OF INSULIN (HCC): ICD-10-CM

## 2024-05-27 ASSESSMENT — ENCOUNTER SYMPTOMS: EYES NEGATIVE: 1

## 2024-06-12 ENCOUNTER — APPOINTMENT (OUTPATIENT)
Dept: CARDIOLOGY | Facility: HOSPITAL | Age: 69
End: 2024-06-12
Payer: COMMERCIAL

## 2024-06-12 ENCOUNTER — HOSPITAL ENCOUNTER (OUTPATIENT)
Dept: RESPIRATORY THERAPY | Facility: HOSPITAL | Age: 69
Discharge: HOME | End: 2024-06-12
Payer: COMMERCIAL

## 2024-06-12 ENCOUNTER — HOSPITAL ENCOUNTER (EMERGENCY)
Facility: HOSPITAL | Age: 69
Discharge: HOME | End: 2024-06-12
Attending: STUDENT IN AN ORGANIZED HEALTH CARE EDUCATION/TRAINING PROGRAM
Payer: COMMERCIAL

## 2024-06-12 VITALS
BODY MASS INDEX: 33.04 KG/M2 | DIASTOLIC BLOOD PRESSURE: 69 MMHG | RESPIRATION RATE: 14 BRPM | HEIGHT: 68 IN | WEIGHT: 218 LBS | HEART RATE: 60 BPM | SYSTOLIC BLOOD PRESSURE: 151 MMHG | TEMPERATURE: 97.5 F | OXYGEN SATURATION: 96 %

## 2024-06-12 DIAGNOSIS — R06.00 DYSPNEA, UNSPECIFIED TYPE: ICD-10-CM

## 2024-06-12 DIAGNOSIS — R55 SYNCOPE, UNSPECIFIED SYNCOPE TYPE: Primary | ICD-10-CM

## 2024-06-12 DIAGNOSIS — J44.9 CHRONIC OBSTRUCTIVE PULMONARY DISEASE, UNSPECIFIED COPD TYPE (MULTI): ICD-10-CM

## 2024-06-12 LAB
ALBUMIN SERPL BCP-MCNC: 3.3 G/DL (ref 3.4–5)
ALP SERPL-CCNC: 85 U/L (ref 33–136)
ALT SERPL W P-5'-P-CCNC: 10 U/L (ref 10–52)
ANION GAP BLDA CALCULATED.4IONS-SCNC: 12 MMO/L (ref 10–25)
ANION GAP SERPL CALC-SCNC: 11 MMOL/L (ref 10–20)
ARTERIAL PATENCY WRIST A: POSITIVE
AST SERPL W P-5'-P-CCNC: 10 U/L (ref 9–39)
BASE EXCESS BLDA CALC-SCNC: -1.2 MMOL/L (ref -2–3)
BASOPHILS # BLD AUTO: 0.1 X10*3/UL (ref 0–0.1)
BASOPHILS NFR BLD AUTO: 0.8 %
BILIRUB SERPL-MCNC: 0.4 MG/DL (ref 0–1.2)
BNP SERPL-MCNC: 181 PG/ML (ref 0–99)
BODY TEMPERATURE: ABNORMAL
BUN SERPL-MCNC: 16 MG/DL (ref 6–23)
CA-I BLDA-SCNC: 1.17 MMOL/L (ref 1.1–1.33)
CALCIUM SERPL-MCNC: 8.4 MG/DL (ref 8.6–10.3)
CARDIAC TROPONIN I PNL SERPL HS: 13 NG/L (ref 0–20)
CHLORIDE BLDA-SCNC: 103 MMOL/L (ref 98–107)
CHLORIDE SERPL-SCNC: 105 MMOL/L (ref 98–107)
CO2 SERPL-SCNC: 24 MMOL/L (ref 21–32)
CREAT SERPL-MCNC: 1.48 MG/DL (ref 0.5–1.3)
EGFRCR SERPLBLD CKD-EPI 2021: 51 ML/MIN/1.73M*2
EOSINOPHIL # BLD AUTO: 0.51 X10*3/UL (ref 0–0.7)
EOSINOPHIL NFR BLD AUTO: 4.1 %
ERYTHROCYTE [DISTWIDTH] IN BLOOD BY AUTOMATED COUNT: 12.6 % (ref 11.5–14.5)
GLUCOSE BLDA-MCNC: 127 MG/DL (ref 74–99)
GLUCOSE SERPL-MCNC: 138 MG/DL (ref 74–99)
HCO3 BLDA-SCNC: 23.6 MMOL/L (ref 22–26)
HCT VFR BLD AUTO: 35.9 % (ref 41–52)
HCT VFR BLD EST: 36 % (ref 41–52)
HGB BLD-MCNC: 12 G/DL (ref 13.5–17.5)
HGB BLDA-MCNC: 12.1 G/DL (ref 13.5–17.5)
HOLD SPECIMEN: NORMAL
IMM GRANULOCYTES # BLD AUTO: 0.04 X10*3/UL (ref 0–0.7)
IMM GRANULOCYTES NFR BLD AUTO: 0.3 % (ref 0–0.9)
INHALED O2 CONCENTRATION: 21 %
LACTATE BLDA-SCNC: 1.6 MMOL/L (ref 0.4–2)
LACTATE SERPL-SCNC: 1.8 MMOL/L (ref 0.4–2)
LACTATE SERPL-SCNC: 2.4 MMOL/L (ref 0.4–2)
LYMPHOCYTES # BLD AUTO: 3.14 X10*3/UL (ref 1.2–4.8)
LYMPHOCYTES NFR BLD AUTO: 25.3 %
MAGNESIUM SERPL-MCNC: 1.68 MG/DL (ref 1.6–2.4)
MCH RBC QN AUTO: 29.9 PG (ref 26–34)
MCHC RBC AUTO-ENTMCNC: 33.4 G/DL (ref 32–36)
MCV RBC AUTO: 89 FL (ref 80–100)
MONOCYTES # BLD AUTO: 0.98 X10*3/UL (ref 0.1–1)
MONOCYTES NFR BLD AUTO: 7.9 %
NEUTROPHILS # BLD AUTO: 7.65 X10*3/UL (ref 1.2–7.7)
NEUTROPHILS NFR BLD AUTO: 61.6 %
NRBC BLD-RTO: 0 /100 WBCS (ref 0–0)
OXYHGB MFR BLDA: 95.6 % (ref 94–98)
PCO2 BLDA: 39 MM HG (ref 38–42)
PH BLDA: 7.39 PH (ref 7.38–7.42)
PLATELET # BLD AUTO: 342 X10*3/UL (ref 150–450)
PO2 BLDA: 73 MM HG (ref 85–95)
POTASSIUM BLDA-SCNC: 4.3 MMOL/L (ref 3.5–5.3)
POTASSIUM SERPL-SCNC: 4.1 MMOL/L (ref 3.5–5.3)
PROT SERPL-MCNC: 5.9 G/DL (ref 6.4–8.2)
RBC # BLD AUTO: 4.02 X10*6/UL (ref 4.5–5.9)
SAO2 % BLDA: 98 % (ref 94–100)
SODIUM BLDA-SCNC: 134 MMOL/L (ref 136–145)
SODIUM SERPL-SCNC: 136 MMOL/L (ref 136–145)
SPECIMEN DRAWN FROM PATIENT: ABNORMAL
WBC # BLD AUTO: 12.4 X10*3/UL (ref 4.4–11.3)

## 2024-06-12 PROCEDURE — 36415 COLL VENOUS BLD VENIPUNCTURE: CPT | Performed by: STUDENT IN AN ORGANIZED HEALTH CARE EDUCATION/TRAINING PROGRAM

## 2024-06-12 PROCEDURE — 96360 HYDRATION IV INFUSION INIT: CPT

## 2024-06-12 PROCEDURE — 99283 EMERGENCY DEPT VISIT LOW MDM: CPT | Mod: 25

## 2024-06-12 PROCEDURE — 84484 ASSAY OF TROPONIN QUANT: CPT | Performed by: STUDENT IN AN ORGANIZED HEALTH CARE EDUCATION/TRAINING PROGRAM

## 2024-06-12 PROCEDURE — 83880 ASSAY OF NATRIURETIC PEPTIDE: CPT | Performed by: STUDENT IN AN ORGANIZED HEALTH CARE EDUCATION/TRAINING PROGRAM

## 2024-06-12 PROCEDURE — 94010 BREATHING CAPACITY TEST: CPT

## 2024-06-12 PROCEDURE — 82330 ASSAY OF CALCIUM: CPT | Performed by: INTERNAL MEDICINE

## 2024-06-12 PROCEDURE — 94726 PLETHYSMOGRAPHY LUNG VOLUMES: CPT

## 2024-06-12 PROCEDURE — 83605 ASSAY OF LACTIC ACID: CPT | Performed by: STUDENT IN AN ORGANIZED HEALTH CARE EDUCATION/TRAINING PROGRAM

## 2024-06-12 PROCEDURE — 2500000004 HC RX 250 GENERAL PHARMACY W/ HCPCS (ALT 636 FOR OP/ED): Performed by: STUDENT IN AN ORGANIZED HEALTH CARE EDUCATION/TRAINING PROGRAM

## 2024-06-12 PROCEDURE — 83735 ASSAY OF MAGNESIUM: CPT | Performed by: STUDENT IN AN ORGANIZED HEALTH CARE EDUCATION/TRAINING PROGRAM

## 2024-06-12 PROCEDURE — 84132 ASSAY OF SERUM POTASSIUM: CPT | Performed by: STUDENT IN AN ORGANIZED HEALTH CARE EDUCATION/TRAINING PROGRAM

## 2024-06-12 PROCEDURE — 36600 WITHDRAWAL OF ARTERIAL BLOOD: CPT

## 2024-06-12 PROCEDURE — 85025 COMPLETE CBC W/AUTO DIFF WBC: CPT | Performed by: STUDENT IN AN ORGANIZED HEALTH CARE EDUCATION/TRAINING PROGRAM

## 2024-06-12 PROCEDURE — 93005 ELECTROCARDIOGRAM TRACING: CPT

## 2024-06-12 RX ADMIN — SODIUM CHLORIDE 500 ML: 9 INJECTION, SOLUTION INTRAVENOUS at 13:50

## 2024-06-12 ASSESSMENT — LIFESTYLE VARIABLES
HAVE PEOPLE ANNOYED YOU BY CRITICIZING YOUR DRINKING: NO
TOTAL SCORE: 0
HAVE YOU EVER FELT YOU SHOULD CUT DOWN ON YOUR DRINKING: NO
EVER HAD A DRINK FIRST THING IN THE MORNING TO STEADY YOUR NERVES TO GET RID OF A HANGOVER: NO
EVER FELT BAD OR GUILTY ABOUT YOUR DRINKING: NO

## 2024-06-12 ASSESSMENT — PAIN - FUNCTIONAL ASSESSMENT: PAIN_FUNCTIONAL_ASSESSMENT: 0-10

## 2024-06-12 ASSESSMENT — COLUMBIA-SUICIDE SEVERITY RATING SCALE - C-SSRS
6. HAVE YOU EVER DONE ANYTHING, STARTED TO DO ANYTHING, OR PREPARED TO DO ANYTHING TO END YOUR LIFE?: NO
2. HAVE YOU ACTUALLY HAD ANY THOUGHTS OF KILLING YOURSELF?: NO
1. IN THE PAST MONTH, HAVE YOU WISHED YOU WERE DEAD OR WISHED YOU COULD GO TO SLEEP AND NOT WAKE UP?: NO

## 2024-06-12 ASSESSMENT — PAIN SCALES - GENERAL: PAINLEVEL_OUTOF10: 0 - NO PAIN

## 2024-06-12 NOTE — ED PROVIDER NOTES
HPI   Chief Complaint   Patient presents with    Syncope     Passed out at his PFT test appointment and was brought to ED immediately.       69-year-old male presenting for evaluation of syncope.  Patient was at outpatient pulmonary appointment getting pulmonary function test when he had a syncopal event prior to completion of testing.  Wife reports that patient became pale prior to this event and was out for approximately 2 minutes before coming to and returning to baseline.  No seizure activity.  Patient has had multiple syncopal events in the past that have been worked up without obvious source.  No reported history of arrhythmias.  Denies any chest discomfort or shortness of breath.  Patient does not recall the event itself.  Blood glucose was normal for EMS.  Patient reports that he did not eat yet this morning.      History provided by:  Patient and spouse                      Karlo Coma Scale Score: 15                     Patient History   Past Medical History:   Diagnosis Date    Cancer (Multi)     Coronary artery disease     Diabetes mellitus (Multi)     Hypertension     Immune deficiency disorder (Multi)     Personal history of other diseases of the circulatory system     History of hypertension    Personal history of other endocrine, nutritional and metabolic disease     History of diabetes mellitus    Stroke (Multi)      Past Surgical History:   Procedure Laterality Date    CARPAL TUNNEL RELEASE  04/28/2015    Neuroplasty Decompression Median Nerve At Carpal Tunnel    CT ANGIO NECK  06/03/2021    CT NECK ANGIO W AND WO IV CONTRAST 6/3/2021 [facility]    CT HEAD ANGIO W AND WO IV CONTRAST  06/03/2021    CT HEAD ANGIO W AND WO IV CONTRAST 6/3/2021 [facility]    CT HEAD ANGIO W AND WO IV CONTRAST  06/03/2021    CT HEAD ANGIO W AND WO IV CONTRAST    MR HEAD ANGIO WO IV CONTRAST  04/01/2021    MR HEAD ANGIO WO IV CONTRAST 4/1/2021 [facility]    MR HEAD ANGIO WO IV CONTRAST   04/01/2021    MR HEAD ANGIO WO IV CONTRAST    NECK SURGERY  12/27/2021    Neck Surgery    OTHER SURGICAL HISTORY  12/27/2021    Surgery    OTHER SURGICAL HISTORY  12/27/2021    Cardiac catheterization    OTHER SURGICAL HISTORY  12/27/2021    Finger amputation    OTHER SURGICAL HISTORY  12/27/2021    Colonoscopy    OTHER SURGICAL HISTORY  12/27/2021    Wrist surgery    OTHER SURGICAL HISTORY      Tailbone    ROTATOR CUFF REPAIR  12/27/2021    Rotator Cuff Repair    TONSILLECTOMY  04/28/2015    Tonsillectomy     Family History   Problem Relation Name Age of Onset    Other (malignant neoplasm of urinary bladder) Mother      Other (arteriosclerotic cardiovascular disease) Father      Other (malignant neoplasm of prostate) Father      Other (arteriosclerotic cardiovascular disease) Brother       Social History     Tobacco Use    Smoking status: Former     Current packs/day: 1.00     Average packs/day: 1 pack/day for 49.4 years (49.4 ttl pk-yrs)     Types: Cigarettes     Start date: 1975     Passive exposure: Never    Smokeless tobacco: Never   Vaping Use    Vaping status: Never Used   Substance Use Topics    Alcohol use: Not Currently    Drug use: Never       Physical Exam   ED Triage Vitals   Temp Pulse Resp BP   -- -- -- --      SpO2 Temp src Heart Rate Source Patient Position   -- -- -- --      BP Location FiO2 (%)     -- --       Physical Exam  Vitals and nursing note reviewed.   Constitutional:       General: He is not in acute distress.  HENT:      Head: Atraumatic.      Mouth/Throat:      Mouth: Mucous membranes are moist.      Pharynx: Oropharynx is clear.   Eyes:      Extraocular Movements: Extraocular movements intact.      Conjunctiva/sclera: Conjunctivae normal.      Pupils: Pupils are equal, round, and reactive to light.   Cardiovascular:      Rate and Rhythm: Normal rate and regular rhythm.      Pulses: Normal pulses.   Pulmonary:      Effort: Pulmonary effort is normal. No respiratory distress.      Breath  sounds: Normal breath sounds.   Abdominal:      General: There is no distension.   Musculoskeletal:         General: No deformity.      Cervical back: Neck supple.   Skin:     General: Skin is warm and dry.   Neurological:      Mental Status: He is alert and oriented to person, place, and time. Mental status is at baseline.      Cranial Nerves: No cranial nerve deficit.      Sensory: No sensory deficit.      Motor: No weakness.   Psychiatric:         Mood and Affect: Mood normal.         Behavior: Behavior normal.         ED Course & MDM   Diagnoses as of 06/12/24 1721   Syncope, unspecified syncope type     Labs Reviewed   CBC WITH AUTO DIFFERENTIAL - Abnormal       Result Value    WBC 12.4 (*)     nRBC 0.0      RBC 4.02 (*)     Hemoglobin 12.0 (*)     Hematocrit 35.9 (*)     MCV 89      MCH 29.9      MCHC 33.4      RDW 12.6      Platelets 342      Neutrophils % 61.6      Immature Granulocytes %, Automated 0.3      Lymphocytes % 25.3      Monocytes % 7.9      Eosinophils % 4.1      Basophils % 0.8      Neutrophils Absolute 7.65      Immature Granulocytes Absolute, Automated 0.04      Lymphocytes Absolute 3.14      Monocytes Absolute 0.98      Eosinophils Absolute 0.51      Basophils Absolute 0.10     COMPREHENSIVE METABOLIC PANEL - Abnormal    Glucose 138 (*)     Sodium 136      Potassium 4.1      Chloride 105      Bicarbonate 24      Anion Gap 11      Urea Nitrogen 16      Creatinine 1.48 (*)     eGFR 51 (*)     Calcium 8.4 (*)     Albumin 3.3 (*)     Alkaline Phosphatase 85      Total Protein 5.9 (*)     AST 10      Bilirubin, Total 0.4      ALT 10     LACTATE - Abnormal    Lactate 2.4 (*)     Narrative:     Venipuncture immediately after or during the administration of Metamizole may lead to falsely low results. Testing should be performed immediately  prior to Metamizole dosing.   B-TYPE NATRIURETIC PEPTIDE - Abnormal     (*)     Narrative:        <100 pg/mL - Heart failure unlikely  100-299 pg/mL -  Intermediate probability of acute heart                  failure exacerbation. Correlate with clinical                  context and patient history.    >=300 pg/mL - Heart Failure likely. Correlate with clinical                  context and patient history.    BNP testing is performed using different testing methodology at [facility] than at other [facility]. Direct result comparisons should only be made within the same method.      MAGNESIUM - Normal    Magnesium 1.68     TROPONIN I, HIGH SENSITIVITY - Normal    Troponin I, High Sensitivity 13      Narrative:     Less than 99th percentile of normal range cutoff-  Female and children under 18 years old <14 ng/L; Male <21 ng/L: Negative  Repeat testing should be performed if clinically indicated.     Female and children under 18 years old 14-50 ng/L; Male 21-50 ng/L:  Consistent with possible cardiac damage and possible increased clinical   risk. Serial measurements may help to assess extent of myocardial damage.     >50 ng/L: Consistent with cardiac damage, increased clinical risk and  myocardial infarction. Serial measurements may help assess extent of   myocardial damage.      NOTE: Children less than 1 year old may have higher baseline troponin   levels and results should be interpreted in conjunction with the overall   clinical context.     NOTE: Troponin I testing is performed using a different   testing methodology at [facility] than at other   [facility]. Direct result comparisons should only   be made within the same method.   LACTATE - Normal    Lactate 1.8      Narrative:     Venipuncture immediately after or during the administration of Metamizole may lead to falsely low results. Testing should be performed immediately  prior to Metamizole dosing.   GREEN TOP    Extra Tube Hold for add-ons.       No orders to display         Medical Decision Making  61-year-old male with history of prior syncopal events presenting  after syncopal event at outpatient doctors appointment.  On evaluation, patient is awake and alert, GCS 15.  Lungs are clear and equal.  Normal pulses.  No focal neurologic deficits.  Diagnostic evaluation performed to evaluate for possible causes of syncope.     Patient's lab work reviewed.  White count of 12.4, hemoglobin of 12.  This appears to be baseline for patient.  Initial lactic acid mildly elevated.  This normalized after IV fluids.  Troponin within normal limits.  Metabolic panel with baseline renal function with creatinine of 1.48 and GFR 51.  No major electrolyte derangements.  On reevaluation after IV fluids, patient reports feeling at baseline, would like to go home.  Given that patient has had previous workup for the syncopal events, I do not feel that further inpatient studies are warranted at this time.  Patient was encouraged to continue with outpatient follow-up, return to the ED for any new or worsening symptoms.    Amount and/or Complexity of Data Reviewed  ECG/medicine tests: ordered and independent interpretation performed. Decision-making details documented in ED Course.     Details: Twelve-lead ECG obtained at 1401 by my interpretation demonstrates sinus bradycardia with a rate of 59, no acute ST elevation or depression.  Right bundle branch block.  Similar to prior ECG.        Procedure  Procedures     Bonifacio Marshall MD  06/12/24 1933

## 2024-06-13 LAB
MGC ASCENT PFT - FEV1 - PRE: 2.79
MGC ASCENT PFT - FEV1 - PRE: 2.79
MGC ASCENT PFT - FEV1 - PREDICTED: 2.91
MGC ASCENT PFT - FEV1 - PREDICTED: 2.91
MGC ASCENT PFT - FVC - PRE: 3.97
MGC ASCENT PFT - FVC - PRE: 3.97
MGC ASCENT PFT - FVC - PREDICTED: 3.79
MGC ASCENT PFT - FVC - PREDICTED: 3.79

## 2024-06-16 LAB
ATRIAL RATE: 59 BPM
P AXIS: 24 DEGREES
P OFFSET: 195 MS
P ONSET: 146 MS
PR INTERVAL: 152 MS
Q ONSET: 222 MS
QRS COUNT: 10 BEATS
QRS DURATION: 150 MS
QT INTERVAL: 500 MS
QTC CALCULATION(BAZETT): 495 MS
QTC FREDERICIA: 497 MS
R AXIS: 5 DEGREES
T AXIS: 60 DEGREES
T OFFSET: 472 MS
VENTRICULAR RATE: 59 BPM

## 2024-06-17 ENCOUNTER — APPOINTMENT (OUTPATIENT)
Dept: CARDIOLOGY | Facility: CLINIC | Age: 69
End: 2024-06-17
Payer: COMMERCIAL

## 2024-06-17 VITALS
HEART RATE: 72 BPM | BODY MASS INDEX: 33.77 KG/M2 | SYSTOLIC BLOOD PRESSURE: 108 MMHG | HEIGHT: 69 IN | DIASTOLIC BLOOD PRESSURE: 58 MMHG | WEIGHT: 228 LBS

## 2024-06-17 DIAGNOSIS — F17.200 SMOKES TOBACCO DAILY: ICD-10-CM

## 2024-06-17 DIAGNOSIS — R01.1 HEART MURMUR: ICD-10-CM

## 2024-06-17 DIAGNOSIS — Z86.79 HISTORY OF HYPERTENSION: ICD-10-CM

## 2024-06-17 DIAGNOSIS — I25.10 CORONARY ARTERY DISEASE INVOLVING NATIVE CORONARY ARTERY OF NATIVE HEART WITHOUT ANGINA PECTORIS: ICD-10-CM

## 2024-06-17 DIAGNOSIS — E78.2 MIXED HYPERLIPIDEMIA: ICD-10-CM

## 2024-06-17 DIAGNOSIS — I25.10 ARTERIOSCLEROSIS OF CORONARY ARTERY: ICD-10-CM

## 2024-06-17 DIAGNOSIS — I10 ESSENTIAL HYPERTENSION: ICD-10-CM

## 2024-06-17 DIAGNOSIS — R01.1 MURMUR: ICD-10-CM

## 2024-06-17 PROCEDURE — 3074F SYST BP LT 130 MM HG: CPT | Performed by: INTERNAL MEDICINE

## 2024-06-17 PROCEDURE — 1036F TOBACCO NON-USER: CPT | Performed by: INTERNAL MEDICINE

## 2024-06-17 PROCEDURE — 1159F MED LIST DOCD IN RCRD: CPT | Performed by: INTERNAL MEDICINE

## 2024-06-17 PROCEDURE — 99215 OFFICE O/P EST HI 40 MIN: CPT | Performed by: INTERNAL MEDICINE

## 2024-06-17 PROCEDURE — 3078F DIAST BP <80 MM HG: CPT | Performed by: INTERNAL MEDICINE

## 2024-06-17 RX ORDER — CARVEDILOL 25 MG/1
25 TABLET ORAL 2 TIMES DAILY
Qty: 180 TABLET | Refills: 3 | Status: SHIPPED | OUTPATIENT
Start: 2024-06-17 | End: 2025-06-17

## 2024-06-17 RX ORDER — AMLODIPINE BESYLATE 5 MG/1
5 TABLET ORAL DAILY
Qty: 90 TABLET | Refills: 3 | Status: SHIPPED | OUTPATIENT
Start: 2024-06-17 | End: 2025-06-17

## 2024-06-17 NOTE — PATIENT INSTRUCTIONS
Continue same medications/treatment.  Patient educated on proper medication use.  Patient educated on risk factor modification.  Please bring any lab results from other providers/physicians to your next appointment.    Please bring all medicines, vitamins, and herbal supplements with you when you come to the office.    Prescriptions will not be filled unless you are compliant with your follow up appointments or have a follow up appointment scheduled as per instruction of your physician. Refills should be requested at the time of your visit.    [facility]  Referral to EP for loop recorder    I, HAILEY LASSITER RN, AM SCRIBING FOR AND IN THE PRESENCE OF DR. MAGNOLIA GREEN, DO, FACC
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
## 2024-06-21 ENCOUNTER — OFFICE VISIT (OUTPATIENT)
Dept: CARDIOLOGY | Facility: CLINIC | Age: 69
End: 2024-06-21
Payer: COMMERCIAL

## 2024-06-21 VITALS
HEART RATE: 72 BPM | WEIGHT: 226.5 LBS | HEIGHT: 68 IN | BODY MASS INDEX: 34.33 KG/M2 | DIASTOLIC BLOOD PRESSURE: 76 MMHG | SYSTOLIC BLOOD PRESSURE: 140 MMHG

## 2024-06-21 DIAGNOSIS — R01.1 HEART MURMUR: ICD-10-CM

## 2024-06-21 DIAGNOSIS — I25.10 CORONARY ARTERY DISEASE INVOLVING NATIVE CORONARY ARTERY OF NATIVE HEART WITHOUT ANGINA PECTORIS: Primary | ICD-10-CM

## 2024-06-21 DIAGNOSIS — R55 SYNCOPE AND COLLAPSE: ICD-10-CM

## 2024-06-21 DIAGNOSIS — I63.9 CRYPTOGENIC STROKE (MULTI): ICD-10-CM

## 2024-06-21 DIAGNOSIS — Z86.79 HISTORY OF HYPERTENSION: ICD-10-CM

## 2024-06-21 DIAGNOSIS — R55 NEAR SYNCOPE: ICD-10-CM

## 2024-06-21 DIAGNOSIS — R01.1 MURMUR: ICD-10-CM

## 2024-06-21 DIAGNOSIS — Z76.89 ESTABLISHING CARE WITH NEW DOCTOR, ENCOUNTER FOR: ICD-10-CM

## 2024-06-21 RX ORDER — CHLORHEXIDINE GLUCONATE 40 MG/ML
SOLUTION TOPICAL ONCE
OUTPATIENT
Start: 2024-06-21 | End: 2024-06-21

## 2024-06-21 RX ORDER — MUPIROCIN 20 MG/G
1 OINTMENT TOPICAL ONCE
OUTPATIENT
Start: 2024-06-21 | End: 2024-06-21

## 2024-06-21 RX ORDER — SODIUM CHLORIDE 9 MG/ML
100 INJECTION, SOLUTION INTRAVENOUS CONTINUOUS
OUTPATIENT
Start: 2024-06-21

## 2024-06-21 RX ORDER — CEFAZOLIN SODIUM 2 G/100ML
2 INJECTION, SOLUTION INTRAVENOUS ONCE
OUTPATIENT
Start: 2024-06-21 | End: 2024-06-21

## 2024-06-21 ASSESSMENT — ENCOUNTER SYMPTOMS
PALPITATIONS: 0
SYNCOPE: 1
DYSPNEA ON EXERTION: 0
NEAR-SYNCOPE: 1

## 2024-06-21 NOTE — PATIENT INSTRUCTIONS
Continue same medications/treatment.  Patient educated on proper medication use.  Patient educated on risk factor modification.  Please bring any lab results from other providers/physicians to your next appointment.    Please bring all medicines, vitamins, and herbal supplements with you when you come to the office.    Prescriptions will not be filled unless you are compliant with your follow up appointments or have a follow up appointment scheduled as per instruction of your physician. Refills should be requested at the time of your visit.    SCHEDULE loop recorder insertion. Obtain labs 1 week prior to procedure. Orders are in the system.  HOLD Aspirin and Plavix for 7 days prior to procedure if Ok'd by neurology.   HOLD fish oil for 7 days prior to procedure.   HOLD metformin for 24 hours prior to procedure.   Follow up 7 days after procedure for wound check.     CAMILLA FENG RN, AM SCRIBING FOR AND IN THE PRESENCE OF DR. CLEM RAMIREZ MD, FACC, FACP, RS

## 2024-06-21 NOTE — PROGRESS NOTES
"Referred by Dr. Quinones for New Patient Visit (Pt is here today following up PER  for evaluation )     History Of Present Illness:    Amandeep Lara is a 69 y.o. male presenting with syncope.  He is accompanied by his wife.  He is having recurrent syncope.  He reports that all the evaluations are negative when he goes to the emergency room.  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   He is referred for loop recorder.  Past Medical History:  See List     Past Surgical History:  See List      Social History:  He reports that he quit smoking about 8 months ago. His smoking use included cigarettes. He has never been exposed to tobacco smoke. He has never used smokeless tobacco. He reports current alcohol use. He reports that he does not use drugs.    Family History:  Family History   Problem Relation Name Age of Onset    Other (malignant neoplasm of urinary bladder) Mother      Other (arteriosclerotic cardiovascular disease) Father      Other (malignant neoplasm of prostate) Father      Other (arteriosclerotic cardiovascular disease) Brother          Allergies:  Ether, Statins-hmg-coa reductase inhibitors, Sulfa (sulfonamide antibiotics), and Sulfamethoxazole-trimethoprim    Outpatient Medications:  Current Outpatient Medications   Medication Instructions    acetaminophen (TYLENOL) 650 mg, oral, Every 4 hours PRN    amLODIPine (NORVASC) 5 mg, oral, Daily    aspirin 81 mg, oral, Daily    atorvastatin (LIPITOR) 40 mg, oral, Nightly    BD Ultra-Fine Lin Pen Needle 32 gauge x 5/32\" needle use 1 each 2 times daily    carvedilol (COREG) 25 mg, oral, 2 times daily    clopidogrel (PLAVIX) 75 mg, oral, Daily    Contour Next Gen Meter misc Dispense one meter that insurance will cover.    cyanocobalamin, vitamin B-12, (VITAMIN B-12 ORAL) 1 tablet, oral, Daily    fish oil concentrate (Omega-3) 120-180 mg capsule 1 capsule, oral, Daily    insulin lispro (HUMALOG) 0-10 Units, subcutaneous, 3 times daily (morning, midday, late " "afternoon), Take as directed per insulin instructions.    metFORMIN (GLUCOPHAGE) 1,000 mg, oral, 2 times daily (morning and late afternoon)    nitroglycerin (NITROSTAT) 0.4 mg, sublingual, Every 5 min PRN    NovoLOG Mix 70-30FlexPen U-100 100 unit/mL (70-30) injection 20 Units, subcutaneous, 2 times daily (morning and late afternoon)        Last Recorded Vitals:  Vitals:    06/21/24 1453   BP: 140/76   BP Location: Right arm   Patient Position: Sitting   Pulse: 72   Weight: 103 kg (226 lb 8 oz)   Height: 1.727 m (5' 8\")   Review of Systems   Cardiovascular:  Positive for near-syncope and syncope. Negative for chest pain, dyspnea on exertion and palpitations.   All other systems reviewed and are negative.        Physical Exam:  Constitutional:       General: Awake.      Appearance: Normal and healthy appearance. Well-developed and not in distress.   Neck:      Vascular: No JVR. JVD normal.   Pulmonary:      Effort: Pulmonary effort is normal.      Breath sounds: Normal breath sounds. No wheezing. No rhonchi. No rales.   Chest:      Chest wall: Not tender to palpatation.   Cardiovascular:      PMI at left midclavicular line. Normal rate. Regular rhythm. Normal S1. Normal S2.       Murmurs: There is no murmur.      No gallop.  No click. No rub.   Pulses:     Intact distal pulses.   Edema:     Peripheral edema absent.   Abdominal:      Tenderness: There is no abdominal tenderness.   Musculoskeletal: Normal range of motion.         General: No tenderness. Skin:     General: Skin is warm and dry.   Neurological:      General: No focal deficit present.      Mental Status: Alert and oriented to person, place and time.              Last Labs:  CBC -  Lab Results   Component Value Date    WBC 12.4 (H) 06/12/2024    HGB 12.0 (L) 06/12/2024    HCT 35.9 (L) 06/12/2024    MCV 89 06/12/2024     06/12/2024       CMP -  Lab Results   Component Value Date    CALCIUM 8.4 (L) 06/12/2024    PHOS 3.5 05/26/2021    PROT 5.9 (L) " 06/12/2024    ALBUMIN 3.3 (L) 06/12/2024    AST 10 06/12/2024    ALT 10 06/12/2024    ALKPHOS 85 06/12/2024    BILITOT 0.4 06/12/2024       LIPID PANEL -   Lab Results   Component Value Date    CHOL 204 (H) 10/20/2023    TRIG 144 10/20/2023    HDL 45.9 10/20/2023    CHHDL 4.4 10/20/2023    LDLF 127 (H) 11/02/2022    VLDL 29 10/20/2023    NHDL 158 (H) 10/20/2023       RENAL FUNCTION PANEL -   Lab Results   Component Value Date    GLUCOSE 138 (H) 06/12/2024     06/12/2024    K 4.1 06/12/2024     06/12/2024    CO2 24 06/12/2024    ANIONGAP 11 06/12/2024    BUN 16 06/12/2024    CREATININE 1.48 (H) 06/12/2024    GFRMALE 62 07/13/2023    CALCIUM 8.4 (L) 06/12/2024    PHOS 3.5 05/26/2021    ALBUMIN 3.3 (L) 06/12/2024        Lab Results   Component Value Date     (H) 06/12/2024    HGBA1C 8.0 (H) 10/20/2023       Last Cardiology Tests:  ECG:  ECG 12 lead 06/12/2024    Today.  Normal sinus rhythm.  Normal axis.  Corrected QT interval 520 ms.  PVCs.  Nonspecific ST abnormality.  Previous infarct.  Right bundle branch block  Echo:  Transesophageal Echo (JOSE) 10/25/2023  Normal LV function    Stress Test:  Nuclear Stress Test 06/08/2022        Lab review: I have personally reviewed the laboratory result(s) see above    Assessment/Plan   Diagnoses and all orders for this visit:  Cryptogenic stroke (Multi)  Coronary artery disease involving native coronary artery of native heart without angina pectoris  Heart murmur  History of hypertension  Murmur  Establishing care with new doctor, encounter for  Syncope and collapse  Near syncope          Alejandra Watt RN    Cryptogenic CVA and recurrent syncope.  Negative evaluation in hospital.  Discussed loop recorder for evaluation for arrhythmia as cause of cryptogenic CVA.  Shared decision making performed.  Model of loop recorder was reviewed with patient and his wife in detail.  All questions answered.  Preoperative cardiac evaluation performed.  All questions  answered.  E consent obtained.    Counseling greater than 50% of visit performed.  Patient, wife, and I discussed arrhythmia, syncope, cryptogenic CVA, no documented arrhythmia to date, preoperative cardiac evaluation, treatment options, risk, benefits, imponderables.  [place] decision tool, and remote monitoring device.  Will likely need portable remote monitor as patient's personal cell phone is older model . All questions answered.  E consent obtained.

## 2024-07-03 ENCOUNTER — APPOINTMENT (OUTPATIENT)
Dept: NEUROLOGY | Facility: CLINIC | Age: 69
End: 2024-07-03
Payer: COMMERCIAL

## 2024-07-09 NOTE — PROGRESS NOTES
Subjective   Amandeep Lara is a 69 y.o.   male. 3 month stroke follow up  HPI  The patient is being seen after being hospitalized for bilateral strokes on 10-21-23.? They have right-sided residual deficit. They are currently taking Plavix, aspirin, and statin and are doing well. Patient denies any extensive bruising or bleeding while taking this medication. Recent hospitalizations: 6-12-24 for syncope during a PFT at hospital. He became pale, and sweaty and BP was low as well and then he passed out. He is scheduled for a loop recorder insertion in September.   Today, he is doing well. Continues with numbness/tingling on entire right side of body. No weakness. No falls at home.   Neuro exam is normal. I have reviewed the medications and the chart.  Review of systems are negative unless otherwise specified in HPI.    Objective   Neurological Exam  Mental Status  Awake, alert and oriented to person, place and time. Recent and remote memory are intact. Speech is normal. Language is fluent with no aphasia. Attention and concentration are normal.    Cranial Nerves  CN II: Right visual acuity: Counts fingers. Left visual acuity: Counts fingers. Right normal visual field. Left normal visual field.  CN III, IV, VI: Extraocular movements intact bilaterally. No nystagmus.   Right pupil: 3 mm. Round. Reactive to light. Reactive to accommodation.   Left pupil: 3 mm. Round. Reactive to light. Reactive to accommodation.  CN V:  Right: Facial sensation is normal.  Left: Facial sensation is normal on the left.  CN VII:  Right: There is no facial weakness.  Left: There is no facial weakness.  CN VIII:  Right: Hearing is normal.  Left: Hearing is normal.  CN IX, X:  Right: Palate is normal.  Left: Palate is normal.  CN XI:  Right: Trapezius strength is normal.  Left: Trapezius strength is normal.  CN XII: Tongue midline without atrophy or fasciculations.    Motor  Normal muscle bulk throughout. Normal muscle tone. Strength is 5/5  throughout all four extremities.    Sensory  Light touch is normal in upper and lower extremities.   Numbness/tingling, decreased sensation entire right side of body.    Reflexes  Deep tendon reflexes are 2+ and symmetric in all four extremities.    Coordination  Right: Finger-to-nose normal.Left: Finger-to-nose normal.    Gait  Casual gait is normal including stance, stride, and arm swing.    Physical Exam  HENT:      Right Ear: Hearing normal.      Left Ear: Hearing normal.   Eyes:      Extraocular Movements: EOM normal. No nystagmus.   Neurological:      Motor: Motor strength is normal.     Deep Tendon Reflexes: Reflexes are normal and symmetric.   Psychiatric:         Speech: Speech normal.           Assessment/Plan   Continue with current regimen. No driving until full cardiac work up for syncope. Will follow up in 6 months.   Discussed bleeding precautions with patient while on anti platelet and/or anticoagulation therapy. Discussed stroke prevention such as: HgbA1c <7, tight blood pressure control <130/<80, LDL <70 with statin therapy (if indicated), CPAP/BiPAP compliance (if indicated) and lifestyle modification (Mediterranean diet, daily exercise, nicotine cessation & limiting alcohol use).   Discussed s/sx of stroke such as: face drooping, arm/leg weakness, speech difficulty; sudden numbness of face/extremity, sudden confusion, sudden trouble seeing, sudden trouble walking, sudden severe headache, dizziness, loss of balance or coordination and to call 911 immediately.?

## 2024-07-11 ENCOUNTER — HOSPITAL ENCOUNTER (OUTPATIENT)
Dept: RADIOLOGY | Facility: CLINIC | Age: 69
Discharge: HOME | End: 2024-07-11
Payer: COMMERCIAL

## 2024-07-11 ENCOUNTER — APPOINTMENT (OUTPATIENT)
Dept: NEUROLOGY | Facility: CLINIC | Age: 69
End: 2024-07-11
Payer: COMMERCIAL

## 2024-07-11 ENCOUNTER — HOSPITAL ENCOUNTER (OUTPATIENT)
Dept: CARDIOLOGY | Facility: CLINIC | Age: 69
Discharge: HOME | End: 2024-07-11
Payer: COMMERCIAL

## 2024-07-11 VITALS
BODY MASS INDEX: 34.56 KG/M2 | DIASTOLIC BLOOD PRESSURE: 68 MMHG | HEART RATE: 80 BPM | HEIGHT: 68 IN | WEIGHT: 228 LBS | SYSTOLIC BLOOD PRESSURE: 90 MMHG

## 2024-07-11 DIAGNOSIS — I10 ESSENTIAL HYPERTENSION: ICD-10-CM

## 2024-07-11 DIAGNOSIS — R01.1 MURMUR: ICD-10-CM

## 2024-07-11 DIAGNOSIS — F17.200 SMOKES TOBACCO DAILY: ICD-10-CM

## 2024-07-11 DIAGNOSIS — I25.10 ARTERIOSCLEROSIS OF CORONARY ARTERY: ICD-10-CM

## 2024-07-11 DIAGNOSIS — Z86.79 HISTORY OF HYPERTENSION: ICD-10-CM

## 2024-07-11 DIAGNOSIS — R01.1 HEART MURMUR: ICD-10-CM

## 2024-07-11 DIAGNOSIS — I63.9 CEREBROVASCULAR ACCIDENT (CVA), UNSPECIFIED MECHANISM (MULTI): Primary | ICD-10-CM

## 2024-07-11 DIAGNOSIS — I25.10 CORONARY ARTERY DISEASE INVOLVING NATIVE CORONARY ARTERY OF NATIVE HEART WITHOUT ANGINA PECTORIS: ICD-10-CM

## 2024-07-11 DIAGNOSIS — I25.10 CAD (CORONARY ARTERY DISEASE): Primary | ICD-10-CM

## 2024-07-11 PROCEDURE — 3430000001 HC RX 343 DIAGNOSTIC RADIOPHARMACEUTICALS: Performed by: INTERNAL MEDICINE

## 2024-07-11 PROCEDURE — A9502 TC99M TETROFOSMIN: HCPCS | Performed by: INTERNAL MEDICINE

## 2024-07-11 PROCEDURE — 1036F TOBACCO NON-USER: CPT

## 2024-07-11 PROCEDURE — 93017 CV STRESS TEST TRACING ONLY: CPT

## 2024-07-11 PROCEDURE — 2500000004 HC RX 250 GENERAL PHARMACY W/ HCPCS (ALT 636 FOR OP/ED): Performed by: INTERNAL MEDICINE

## 2024-07-11 PROCEDURE — 1159F MED LIST DOCD IN RCRD: CPT

## 2024-07-11 PROCEDURE — 99214 OFFICE O/P EST MOD 30 MIN: CPT

## 2024-07-11 PROCEDURE — 78452 HT MUSCLE IMAGE SPECT MULT: CPT

## 2024-07-11 PROCEDURE — 3074F SYST BP LT 130 MM HG: CPT

## 2024-07-11 PROCEDURE — 3078F DIAST BP <80 MM HG: CPT

## 2024-07-11 RX ORDER — REGADENOSON 0.08 MG/ML
0.4 INJECTION, SOLUTION INTRAVENOUS ONCE
Status: COMPLETED | OUTPATIENT
Start: 2024-07-11 | End: 2024-07-11

## 2024-07-12 ENCOUNTER — OFFICE VISIT (OUTPATIENT)
Dept: SURGERY | Facility: CLINIC | Age: 69
End: 2024-07-12
Payer: COMMERCIAL

## 2024-07-12 VITALS
HEART RATE: 68 BPM | WEIGHT: 225 LBS | TEMPERATURE: 97.7 F | OXYGEN SATURATION: 95 % | SYSTOLIC BLOOD PRESSURE: 121 MMHG | HEIGHT: 68 IN | DIASTOLIC BLOOD PRESSURE: 68 MMHG | BODY MASS INDEX: 34.1 KG/M2

## 2024-07-12 DIAGNOSIS — Z01.810 PREOPERATIVE CARDIOVASCULAR EXAMINATION: ICD-10-CM

## 2024-07-12 DIAGNOSIS — R91.1 LUNG NODULE, SOLITARY: Primary | ICD-10-CM

## 2024-07-12 PROCEDURE — 1160F RVW MEDS BY RX/DR IN RCRD: CPT | Performed by: STUDENT IN AN ORGANIZED HEALTH CARE EDUCATION/TRAINING PROGRAM

## 2024-07-12 PROCEDURE — 99215 OFFICE O/P EST HI 40 MIN: CPT | Performed by: STUDENT IN AN ORGANIZED HEALTH CARE EDUCATION/TRAINING PROGRAM

## 2024-07-12 PROCEDURE — 99205 OFFICE O/P NEW HI 60 MIN: CPT | Performed by: STUDENT IN AN ORGANIZED HEALTH CARE EDUCATION/TRAINING PROGRAM

## 2024-07-12 PROCEDURE — 3078F DIAST BP <80 MM HG: CPT | Performed by: STUDENT IN AN ORGANIZED HEALTH CARE EDUCATION/TRAINING PROGRAM

## 2024-07-12 PROCEDURE — 3074F SYST BP LT 130 MM HG: CPT | Performed by: STUDENT IN AN ORGANIZED HEALTH CARE EDUCATION/TRAINING PROGRAM

## 2024-07-12 PROCEDURE — 1159F MED LIST DOCD IN RCRD: CPT | Performed by: STUDENT IN AN ORGANIZED HEALTH CARE EDUCATION/TRAINING PROGRAM

## 2024-07-12 PROCEDURE — 1036F TOBACCO NON-USER: CPT | Performed by: STUDENT IN AN ORGANIZED HEALTH CARE EDUCATION/TRAINING PROGRAM

## 2024-07-12 ASSESSMENT — PATIENT HEALTH QUESTIONNAIRE - PHQ9
1. LITTLE INTEREST OR PLEASURE IN DOING THINGS: NOT AT ALL
SUM OF ALL RESPONSES TO PHQ9 QUESTIONS 1 AND 2: 0
2. FEELING DOWN, DEPRESSED OR HOPELESS: NOT AT ALL

## 2024-07-12 ASSESSMENT — VISUAL ACUITY: METHOD_CF: 1

## 2024-07-12 NOTE — LETTER
July 12, 2024     Nicholas Rutherford MD  [address] [facility] [address]    Patient: Amandeep Lara   YOB: 1955   Date of Visit: 7/12/2024       Dear Dr. Nicholas Rutherford MD:    Thank you for referring Amandeep Lara to me for evaluation.  I am concerned he has primary lung cancer based on the workup so far.  The best way to proceed is minimal invasive wedge resection followed by lobectomy if cancer confirmed multiple infection. If you have questions, please do not hesitate to call me. Thank you for involving me in the care of this patient.       Sincerely,     Siena Momin MD      CC: Jasmine Zamudio MD  ______________________________________________________________________________________    HPI:   Amandeep Lara is a 69 y.o. male with a pmhx of CAD, HTN, HLD, DM, PAD, stroke, COPD and former smoker who is referred to me by Dr Rutherford for evaluation and treatment of lung nodule.  This nodule was identified incidentally during workup for his stroke.  He suffered ischemic stroke last year in October.  He is still having residue tingling and numbness in the right side of his body.  He denies any weakness.  Clinically he denies any shortness of breath at rest but he has some dyspnea during exercise.  He has a dry cough.  He denies any weight change, chest pain, fever, chill, and night sweats.  He never had an MI before.  He is being followed by cardiology outpatient.  He has a recent nuclear stress test which showed reduced EF to 35%.  He was a former smoker quit October last year.  He has been smoking for 60 years with 1 pack/day.    PMHx: per HPI  PSHx: carpal tunnel surgery, rotator cuff repair, tonsillectomy, neck surgery  SHx: former smoker (quit 9month ago, 60ppy), social ETOH use, deny illicit drugs   FMHx: mother has ovarian cancer and lung cancer, DM in the family    Current Outpatient Medications:   •  acetaminophen (Tylenol) 325 mg tablet, Take 2 tablets (650 mg) by mouth every 4  "hours if needed., Disp: , Rfl:   •  amLODIPine (Norvasc) 5 mg tablet, Take 1 tablet (5 mg) by mouth once daily., Disp: 90 tablet, Rfl: 3  •  aspirin 81 mg chewable tablet, Chew 1 tablet (81 mg) once daily., Disp: , Rfl:   •  atorvastatin (Lipitor) 40 mg tablet, Take 1 tablet (40 mg) by mouth once daily at bedtime., Disp: 30 tablet, Rfl: 0  •  BD Ultra-Fine Lin Pen Needle 32 gauge x 5/32\" needle, use 1 each 2 times daily, Disp: , Rfl:   •  carvedilol (Coreg) 25 mg tablet, Take 1 tablet (25 mg) by mouth 2 times a day., Disp: 180 tablet, Rfl: 3  •  clopidogrel (Plavix) 75 mg tablet, Take 1 tablet (75 mg) by mouth once daily., Disp: 90 tablet, Rfl: 0  •  Contour Next Gen Meter misc, Dispense one meter that insurance will cover., Disp: , Rfl:   •  cyanocobalamin, vitamin B-12, (VITAMIN B-12 ORAL), Take 1 tablet by mouth once daily., Disp: , Rfl:   •  fish oil concentrate (Omega-3) 120-180 mg capsule, Take 1 capsule (1 g) by mouth once daily., Disp: , Rfl:   •  insulin lispro (HumaLOG) 100 unit/mL injection, Inject 0-0.1 mL (0-10 Units) under the skin 3 times a day with meals. Take as directed per insulin instructions., Disp: 9 mL, Rfl: 0  •  metFORMIN (Glucophage) 1,000 mg tablet, Take 1 tablet (1,000 mg) by mouth 2 times daily (morning and late afternoon)., Disp: , Rfl:   •  nitroglycerin (Nitrostat) 0.4 mg SL tablet, Place 1 tablet (0.4 mg) under the tongue every 5 minutes if needed for chest pain (up to 3 doses)., Disp: , Rfl:   •  NovoLOG Mix 70-30FlexPen U-100 100 unit/mL (70-30) injection, Inject 20 Units under the skin 2 times a day with meals., Disp: 12 mL, Rfl: 1   Allergies   Allergen Reactions   • Ether Unknown, Nausea And Vomiting and Other   • Statins-Hmg-Coa Reductase Inhibitors Other   • Sulfa (Sulfonamide Antibiotics) Hives   • Sulfamethoxazole-Trimethoprim Other and Rash          ROS  General: negative for fever, chills, weight loss, night sweat  Head: negative for severe headache, vision change, " blurred vision,   CV: negative for chest pain, dizziness, lightheadedness   Pulm: negative for shortness of breath, dyspnea on exertion, hemoptysis  GI: negative for diarrhea, constipation, abdominal pain, nausea or vomiting, BRBPR  : negative for dysuria, hematuria, incontinence  Skin: negative for rash  Heme: negative for blood thinner, bleeding disorder or clotting disorder  Endo: negative for heat or cold intolerance, weight gain or weight loss  MSK: negative for rash, edema, weakness    PHYSICAL EXAM  Visit Vitals  /68 (BP Location: Left arm, Patient Position: Sitting)   Pulse 68   Temp 36.5 °C (97.7 °F) (Temporal)     Constitution: well-developed well-nourished male sitting in chair in no acute distress  HEENT: NCAT, moist mucosal membrane, neck supple, no crepitus, sclera anicteric  Lymph nodes: no cervical or supraclavicular lymphadenopathy  Cardiac: RRR, normal S1, S2, no mrg  Pulmonary: normal air movement, CTAP, there was expiratory wheezing throughout  Abdomen: soft, non-distended, non-tender, no rigidity, guarding or rebound tenderness, no splenohepatomegaly  Neuro: AOx3, CNII-XII grossly normal  Ext: warm, dry, no edema noted  Skin: dry, clean and intact  Psych: mood and affect wnl    Assessment and Plan:  This is a 69 y.o. male former smoker with lung nodule  I reviewed the CT scan from 3/20/24, 3/13/24 and 10/22/23 the PET/CT from 5/10/24. It showed spiculated peripheral RUL lung nodule about 24mm in greatest dimension slightly increased in size comparing to 5 month ago.  This nodule was PET avid with max SUV of 7.6.  There is no mediastinal lymphadenopathy or activity anywhere else in the body.  I reviewed the PFT from 6/12/24. It showed FEV1 95% predicted and DLCO  63% predicted   I reviewed the nuclear stress test from 7/11/24. It showed EF of 35%, no myocardial ischemia.   I reviewed the labs from 6/12/24. It showed mildly elevated creatinine to 1.48. Anemia with hgb of 12. BNP elevated  to 181.     In my opinion, this high risk patient with PET avid spiculated lung nodule is highly concerning for early staged primary lung cancer.  Imaging does not suggest metastatic disease.  I discussed with him the next step is to obtain tissue biopsy to confirm malignancy.  If cancer is confirmed, this patient will benefit from surgery resection for curative intent if he can tolerate surgery.  The best way to do this is minimally invasive surgical wedge resection followed by frozen section examination and if cancer confirmed I will proceed with right upper lobectomy in the same setting.    Given his stroke and the reduced EF recently, I will need him to see his cardiologist Dr. Quinones for cardiac clearance and any further workup needed.    I will obtain an updated CT chest and echocardiogram in the meantime.    I had a candid discussion with the patient and his wife. I outlined the treatment plan as above. The patient consented to proceed.     Siena Momin MD  Thoracic Surgeon  [facility]   of Medicine  [facility]  Office phone: [phone]  Fax: [phone]  Pager: [phone]    Amount of time spent:  -Prep time on date of patient encounter: 30 min  -Time spend with patient/family/caregiver: 30 min  -Additional time spent on patient care activities: 20 min  -Documentation time in medical record: 15 min  -Other time spent on date of patient encounter: 0 min  Total time: 95 min

## 2024-07-12 NOTE — PROGRESS NOTES
"HPI:   Amandeep Lara is a 69 y.o. male with a pmhx of CAD, HTN, HLD, DM, PAD, stroke, COPD and former smoker who is referred to me by Dr Rutherford for evaluation and treatment of lung nodule.  This nodule was identified incidentally during workup for his stroke.  He suffered ischemic stroke last year in October.  He is still having residue tingling and numbness in the right side of his body.  He denies any weakness.  Clinically he denies any shortness of breath at rest but he has some dyspnea during exercise.  He has a dry cough.  He denies any weight change, chest pain, fever, chill, and night sweats.  He never had an MI before.  He is being followed by cardiology outpatient.  He has a recent nuclear stress test which showed reduced EF to 35%.  He was a former smoker quit October last year.  He has been smoking for 60 years with 1 pack/day.    PMHx: per HPI  PSHx: carpal tunnel surgery, rotator cuff repair, tonsillectomy, neck surgery  SHx: former smoker (quit 9month ago, 60ppy), social ETOH use, deny illicit drugs   FMHx: mother has ovarian cancer and lung cancer, DM in the family    Current Outpatient Medications:     acetaminophen (Tylenol) 325 mg tablet, Take 2 tablets (650 mg) by mouth every 4 hours if needed., Disp: , Rfl:     amLODIPine (Norvasc) 5 mg tablet, Take 1 tablet (5 mg) by mouth once daily., Disp: 90 tablet, Rfl: 3    aspirin 81 mg chewable tablet, Chew 1 tablet (81 mg) once daily., Disp: , Rfl:     atorvastatin (Lipitor) 40 mg tablet, Take 1 tablet (40 mg) by mouth once daily at bedtime., Disp: 30 tablet, Rfl: 0    BD Ultra-Fine Lin Pen Needle 32 gauge x 5/32\" needle, use 1 each 2 times daily, Disp: , Rfl:     carvedilol (Coreg) 25 mg tablet, Take 1 tablet (25 mg) by mouth 2 times a day., Disp: 180 tablet, Rfl: 3    clopidogrel (Plavix) 75 mg tablet, Take 1 tablet (75 mg) by mouth once daily., Disp: 90 tablet, Rfl: 0    Contour Next Gen Meter misc, Dispense one meter that insurance will cover., " Disp: , Rfl:     cyanocobalamin, vitamin B-12, (VITAMIN B-12 ORAL), Take 1 tablet by mouth once daily., Disp: , Rfl:     fish oil concentrate (Omega-3) 120-180 mg capsule, Take 1 capsule (1 g) by mouth once daily., Disp: , Rfl:     insulin lispro (HumaLOG) 100 unit/mL injection, Inject 0-0.1 mL (0-10 Units) under the skin 3 times a day with meals. Take as directed per insulin instructions., Disp: 9 mL, Rfl: 0    metFORMIN (Glucophage) 1,000 mg tablet, Take 1 tablet (1,000 mg) by mouth 2 times daily (morning and late afternoon)., Disp: , Rfl:     nitroglycerin (Nitrostat) 0.4 mg SL tablet, Place 1 tablet (0.4 mg) under the tongue every 5 minutes if needed for chest pain (up to 3 doses)., Disp: , Rfl:     NovoLOG Mix 70-30FlexPen U-100 100 unit/mL (70-30) injection, Inject 20 Units under the skin 2 times a day with meals., Disp: 12 mL, Rfl: 1   Allergies   Allergen Reactions    Ether Unknown, Nausea And Vomiting and Other    Statins-Hmg-Coa Reductase Inhibitors Other    Sulfa (Sulfonamide Antibiotics) Hives    Sulfamethoxazole-Trimethoprim Other and Rash          ROS  General: negative for fever, chills, weight loss, night sweat  Head: negative for severe headache, vision change, blurred vision,   CV: negative for chest pain, dizziness, lightheadedness   Pulm: negative for shortness of breath, dyspnea on exertion, hemoptysis  GI: negative for diarrhea, constipation, abdominal pain, nausea or vomiting, BRBPR  : negative for dysuria, hematuria, incontinence  Skin: negative for rash  Heme: negative for blood thinner, bleeding disorder or clotting disorder  Endo: negative for heat or cold intolerance, weight gain or weight loss  MSK: negative for rash, edema, weakness    PHYSICAL EXAM  Visit Vitals  /68 (BP Location: Left arm, Patient Position: Sitting)   Pulse 68   Temp 36.5 °C (97.7 °F) (Temporal)     Constitution: well-developed well-nourished male sitting in chair in no acute distress  HEENT: NCAT, moist  mucosal membrane, neck supple, no crepitus, sclera anicteric  Lymph nodes: no cervical or supraclavicular lymphadenopathy  Cardiac: RRR, normal S1, S2, no mrg  Pulmonary: normal air movement, CTAP, there was expiratory wheezing throughout  Abdomen: soft, non-distended, non-tender, no rigidity, guarding or rebound tenderness, no splenohepatomegaly  Neuro: AOx3, CNII-XII grossly normal  Ext: warm, dry, no edema noted  Skin: dry, clean and intact  Psych: mood and affect wnl    Assessment and Plan:  This is a 69 y.o. male former smoker with lung nodule  I reviewed the CT scan from 3/20/24, 3/13/24 and 10/22/23 the PET/CT from 5/10/24. It showed spiculated peripheral RUL lung nodule about 24mm in greatest dimension slightly increased in size comparing to 5 month ago.  This nodule was PET avid with max SUV of 7.6.  There is no mediastinal lymphadenopathy or activity anywhere else in the body.  I reviewed the PFT from 6/12/24. It showed FEV1 95% predicted and DLCO  63% predicted   I reviewed the nuclear stress test from 7/11/24. It showed EF of 35%, no myocardial ischemia.   I reviewed the labs from 6/12/24. It showed mildly elevated creatinine to 1.48. Anemia with hgb of 12. BNP elevated to 181.     In my opinion, this high risk patient with PET avid spiculated lung nodule is highly concerning for early staged primary lung cancer.  Imaging does not suggest metastatic disease.  I discussed with him the next step is to obtain tissue biopsy to confirm malignancy.  If cancer is confirmed, this patient will benefit from surgery resection for curative intent if he can tolerate surgery.  The best way to do this is minimally invasive surgical wedge resection followed by frozen section examination and if cancer confirmed I will proceed with right upper lobectomy in the same setting.    Given his stroke and the reduced EF recently, I will need him to see his cardiologist Dr. Quinones for cardiac clearance and any further workup  needed.    I will obtain an updated CT chest and echocardiogram in the meantime.    I had a candid discussion with the patient and his wife. I outlined the treatment plan as above. The patient consented to proceed.     Siena Momin MD  Thoracic Surgeon  [facility]   of Medicine  [facility]  Office phone: [phone]  Fax: [phone]  Pager: [phone]    Amount of time spent:  -Prep time on date of patient encounter: 30 min  -Time spend with patient/family/caregiver: 30 min  -Additional time spent on patient care activities: 20 min  -Documentation time in medical record: 15 min  -Other time spent on date of patient encounter: 0 min  Total time: 95 min

## 2024-07-17 ENCOUNTER — TELEPHONE (OUTPATIENT)
Dept: CARDIOLOGY | Facility: CLINIC | Age: 69
End: 2024-07-17
Payer: COMMERCIAL

## 2024-07-18 ENCOUNTER — TELEPHONE (OUTPATIENT)
Dept: CARDIOLOGY | Facility: CLINIC | Age: 69
End: 2024-07-18
Payer: COMMERCIAL

## 2024-07-18 NOTE — TELEPHONE ENCOUNTER
----- Message from Stanley Olivarez sent at 7/17/2024  5:10 PM EDT -----  Stress test reviewed and looks fine.  No suggestion of any new heart artery blockages.  Overall pumping function is improved.  Can discuss in detail at his next visit.  Thanks.

## 2024-07-18 NOTE — TELEPHONE ENCOUNTER
Called and no voicemail available to give normal stress test results per Dr. Stanley Olivarez.  Patient has an appointment on 8/8/24 to discuss results with Dr. Bonifacio Quinones, DO

## 2024-07-22 ENCOUNTER — HOSPITAL ENCOUNTER (OUTPATIENT)
Dept: CARDIOLOGY | Facility: CLINIC | Age: 69
Discharge: HOME | End: 2024-07-22
Payer: COMMERCIAL

## 2024-07-22 DIAGNOSIS — Z01.810 PREOPERATIVE CARDIOVASCULAR EXAMINATION: ICD-10-CM

## 2024-07-22 LAB
AORTIC VALVE MEAN GRADIENT: 2 MMHG
AORTIC VALVE PEAK VELOCITY: 0.85 M/S
AV PEAK GRADIENT: 2.9 MMHG
AVA (PEAK VEL): 4.05 CM2
AVA (VTI): 3.49 CM2
EJECTION FRACTION APICAL 4 CHAMBER: 46.3
EJECTION FRACTION: 48 %
LEFT VENTRICLE INTERNAL DIMENSION DIASTOLE: 5.4 CM (ref 3.5–6)
LEFT VENTRICULAR OUTFLOW TRACT DIAMETER: 2.5 CM
LV EJECTION FRACTION BIPLANE: 35 %
MITRAL VALVE E/A RATIO: 0.55
RIGHT VENTRICLE PEAK SYSTOLIC PRESSURE: 19.6 MMHG

## 2024-07-22 PROCEDURE — 93306 TTE W/DOPPLER COMPLETE: CPT

## 2024-07-22 PROCEDURE — 93306 TTE W/DOPPLER COMPLETE: CPT | Performed by: INTERNAL MEDICINE

## 2024-07-25 NOTE — TELEPHONE ENCOUNTER
Per Dr. Bonifacio Quinones, DO patient is a low risk at this time for right upper wedge with possible lobectomy.  Faxed POC to thoracic & esophageal surgery at [phone].

## 2024-08-08 ENCOUNTER — APPOINTMENT (OUTPATIENT)
Dept: CARDIOLOGY | Facility: CLINIC | Age: 69
End: 2024-08-08
Payer: COMMERCIAL

## 2024-08-08 ENCOUNTER — CLINICAL SUPPORT (OUTPATIENT)
Dept: SLEEP MEDICINE | Facility: HOSPITAL | Age: 69
End: 2024-08-08
Payer: COMMERCIAL

## 2024-08-08 VITALS — WEIGHT: 227 LBS | BODY MASS INDEX: 34.4 KG/M2 | HEIGHT: 68 IN

## 2024-08-08 VITALS
BODY MASS INDEX: 34.59 KG/M2 | HEART RATE: 68 BPM | SYSTOLIC BLOOD PRESSURE: 118 MMHG | WEIGHT: 227.5 LBS | DIASTOLIC BLOOD PRESSURE: 58 MMHG

## 2024-08-08 DIAGNOSIS — Z01.810 PRE-OPERATIVE CARDIOVASCULAR EXAMINATION: ICD-10-CM

## 2024-08-08 DIAGNOSIS — E11.9 TYPE 2 DIABETES MELLITUS WITHOUT RETINOPATHY (MULTI): ICD-10-CM

## 2024-08-08 DIAGNOSIS — E78.2 MIXED HYPERLIPIDEMIA: ICD-10-CM

## 2024-08-08 DIAGNOSIS — F17.200 CURRENT EVERY DAY SMOKER: ICD-10-CM

## 2024-08-08 DIAGNOSIS — I10 ESSENTIAL HYPERTENSION: ICD-10-CM

## 2024-08-08 DIAGNOSIS — I25.10 CORONARY ARTERY DISEASE INVOLVING NATIVE CORONARY ARTERY OF NATIVE HEART WITHOUT ANGINA PECTORIS: ICD-10-CM

## 2024-08-08 DIAGNOSIS — G47.30 SLEEP APNEA, UNSPECIFIED: ICD-10-CM

## 2024-08-08 DIAGNOSIS — Z01.818 PRE-OP EXAM: ICD-10-CM

## 2024-08-08 PROCEDURE — 3078F DIAST BP <80 MM HG: CPT | Performed by: INTERNAL MEDICINE

## 2024-08-08 PROCEDURE — 99215 OFFICE O/P EST HI 40 MIN: CPT | Performed by: INTERNAL MEDICINE

## 2024-08-08 PROCEDURE — 93000 ELECTROCARDIOGRAM COMPLETE: CPT | Performed by: INTERNAL MEDICINE

## 2024-08-08 PROCEDURE — 3074F SYST BP LT 130 MM HG: CPT | Performed by: INTERNAL MEDICINE

## 2024-08-08 PROCEDURE — 1036F TOBACCO NON-USER: CPT | Performed by: INTERNAL MEDICINE

## 2024-08-08 PROCEDURE — 1159F MED LIST DOCD IN RCRD: CPT | Performed by: INTERNAL MEDICINE

## 2024-08-08 NOTE — PATIENT INSTRUCTIONS
November office visit  Continue same medications and treatments.   Patient educated on proper medication use.   Patient educated on risk factor modification.   Please bring any lab results from other providers / physicians to your next appointment.     Please bring all medicines, vitamins, and herbal supplements with you when you come to the office.     Prescriptions will not be filled unless you are compliant with your follow up appointments or have a follow up appointment scheduled as per instruction of your physician. Refills should be requested at the time of your visit.  Cleared for procedure with Dr Momin
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
## 2024-08-09 ASSESSMENT — SLEEP AND FATIGUE QUESTIONNAIRES
HOW LIKELY ARE YOU TO NOD OFF OR FALL ASLEEP WHILE SITTING QUIETLY AFTER LUNCH WITHOUT ALCOHOL: WOULD NEVER DOZE
ESS-CHAD TOTAL SCORE: 2
HOW LIKELY ARE YOU TO NOD OFF OR FALL ASLEEP IN A CAR, WHILE STOPPED FOR A FEW MINUTES IN TRAFFIC: WOULD NEVER DOZE
HOW LIKELY ARE YOU TO NOD OFF OR FALL ASLEEP WHILE SITTING AND TALKING TO SOMEONE: WOULD NEVER DOZE
HOW LIKELY ARE YOU TO NOD OFF OR FALL ASLEEP WHEN YOU ARE A PASSENGER IN A CAR FOR AN HOUR WITHOUT A BREAK: WOULD NEVER DOZE
HOW LIKELY ARE YOU TO NOD OFF OR FALL ASLEEP WHILE WATCHING TV: SLIGHT CHANCE OF DOZING
HOW LIKELY ARE YOU TO NOD OFF OR FALL ASLEEP WHILE SITTING AND READING: WOULD NEVER DOZE
SITING INACTIVE IN A PUBLIC PLACE LIKE A CLASS ROOM OR A MOVIE THEATER: WOULD NEVER DOZE
HOW LIKELY ARE YOU TO NOD OFF OR FALL ASLEEP WHILE LYING DOWN TO REST IN THE AFTERNOON WHEN CIRCUMSTANCES PERMIT: SLIGHT CHANCE OF DOZING

## 2024-08-09 NOTE — PROGRESS NOTES
[facility] TECH NOTE:     Patient: Amandeep Lraa   MRN//AGE: [id number]  1955  69 y.o.   Technologist: Rosalba Calvo   Room: 402   Service Date: 2024        Sleep Testing Location: [facility]    Summit: 2    TECHNOLOGIST SLEEP STUDY PROCEDURE NOTE:   This sleep study is being conducted according to the policies and procedures outlined by the AAS accreditation standards.  The sleep study procedure and processes involved during this appointment was explained to the patient/patient’s family, questions were answered. The patient/family verbalized understanding.      The patient is a 69 y.o. year old male scheduled for a Diagnostic PSG Split night with montage of:  Standard PSG . He arrived for his appointment.      The study that was ultimately completed was a Diagnostic PSG  with montage of:  Standard PSG .    The full study Was completed.  Patient questionnaires completed?: yes     Consents signed? yes    Initial Fall Risk Screening:     Amandeep has fallen in the last 6 months. It did not result in injury. Amandeep does have a fear of falling. He does not need assistance with sitting, standing, or walking. He does not need assistance walking in his home. he does not need assistance in an unfamiliar setting. The patient is using an assistive device. (Cane)    Brief Study observations: Supine sleep encouraged. Patient educated on CPAP prior to study. Patient did not meet split criteria due to lack of respiratory events by 2AM.     Deviation to order/protocol and reason: Study remained as a diagnostic PSG     If PAP, which was preferred mask/pressure/mode: N/A     Other:None    After the procedure, the patient/family was informed to ensure follow up with ordering clinician for testing results.      Technologist: Rosalba Calvo

## 2024-08-14 DIAGNOSIS — I10 ESSENTIAL HYPERTENSION: ICD-10-CM

## 2024-08-14 DIAGNOSIS — E11.69 TYPE 2 DIABETES MELLITUS WITH OTHER SPECIFIED COMPLICATION, WITH LONG-TERM CURRENT USE OF INSULIN (HCC): ICD-10-CM

## 2024-08-14 DIAGNOSIS — I63.9 CEREBROVASCULAR ACCIDENT (CVA), UNSPECIFIED MECHANISM (MULTI): ICD-10-CM

## 2024-08-14 DIAGNOSIS — R91.8 LUNG NODULES: Primary | ICD-10-CM

## 2024-08-14 DIAGNOSIS — Z79.4 TYPE 2 DIABETES MELLITUS WITH OTHER SPECIFIED COMPLICATION, WITH LONG-TERM CURRENT USE OF INSULIN (HCC): ICD-10-CM

## 2024-08-14 RX ORDER — ATORVASTATIN CALCIUM 40 MG/1
40 TABLET, FILM COATED ORAL NIGHTLY
Qty: 90 TABLET | Refills: 3 | Status: SHIPPED | OUTPATIENT
Start: 2024-08-14 | End: 2025-08-14

## 2024-08-14 RX ORDER — CLOPIDOGREL BISULFATE 75 MG/1
75 TABLET ORAL DAILY
Qty: 90 TABLET | Refills: 3 | Status: SHIPPED | OUTPATIENT
Start: 2024-08-14 | End: 2025-08-14

## 2024-08-14 NOTE — TELEPHONE ENCOUNTER
Received request for prescription refill for patient.  Patient follows with Dr. Bonifacio Quinones, DO, [facility]     Request is for atorvastatin and Plavix   Is patient currently on medication- yes    Last OV- 8/8/24  Next OV- 11/14/24    Pended for signing and sent to provider.

## 2024-08-14 NOTE — TELEPHONE ENCOUNTER
Rx request   Requested Prescriptions     Pending Prescriptions Disp Refills    CONTOUR NEXT TEST strip 100 strip 2     Sig: USE TO TEST THREE TIMES DAILY     LOV 5/22/2024  Next Visit Date:  Future Appointments   Date Time Provider Department Center   8/22/2024  1:30 PM Guanako Carrillo MD [facility]

## 2024-08-19 DIAGNOSIS — I10 ESSENTIAL HYPERTENSION: ICD-10-CM

## 2024-08-19 DIAGNOSIS — E78.2 MIXED HYPERLIPIDEMIA: ICD-10-CM

## 2024-08-19 DIAGNOSIS — I63.9 CEREBROVASCULAR ACCIDENT (CVA), UNSPECIFIED MECHANISM (MULTI): ICD-10-CM

## 2024-08-19 NOTE — TELEPHONE ENCOUNTER
Received request for prescription refill for patient.  Patient follows with Dr. Bonifacio Quinones, DO, [facility]     Request is for Norvasc, Plavix, and Lipitor   Is patient currently on medication- yes    Last OV- 8/8/24  Next OV- 11/14/24    Pended for signing and sent to provider.

## 2024-08-19 NOTE — TELEPHONE ENCOUNTER
Pt spouse called in and stated that patient needs a refill of his Norvasc, Plavix, and Lipitor sent to his pharmacy which is [facility] in [place].

## 2024-08-20 RX ORDER — ATORVASTATIN CALCIUM 40 MG/1
40 TABLET, FILM COATED ORAL NIGHTLY
Qty: 90 TABLET | Refills: 3 | Status: SHIPPED | OUTPATIENT
Start: 2024-08-20 | End: 2025-08-20

## 2024-08-20 RX ORDER — AMLODIPINE BESYLATE 5 MG/1
5 TABLET ORAL DAILY
Qty: 90 TABLET | Refills: 3 | Status: SHIPPED | OUTPATIENT
Start: 2024-08-20 | End: 2025-08-20

## 2024-08-20 RX ORDER — CLOPIDOGREL BISULFATE 75 MG/1
75 TABLET ORAL DAILY
Qty: 90 TABLET | Refills: 3 | Status: SHIPPED | OUTPATIENT
Start: 2024-08-20 | End: 2025-08-20

## 2024-08-21 DIAGNOSIS — I10 ESSENTIAL HYPERTENSION: ICD-10-CM

## 2024-08-21 RX ORDER — CARVEDILOL 25 MG/1
25 TABLET ORAL 2 TIMES DAILY
Qty: 180 TABLET | Refills: 3 | Status: SHIPPED | OUTPATIENT
Start: 2024-08-21

## 2024-08-21 NOTE — TELEPHONE ENCOUNTER
Received request for prescription refills for patient.   Patient follows with Dr. Quinones    Request is for Coreg  Is patient currently on medication yes    Last OV 6/17/2024  Next OV 11/14/2024    Pended for signing and sent to provider

## 2024-08-22 DIAGNOSIS — E11.69 TYPE 2 DIABETES MELLITUS WITH OTHER SPECIFIED COMPLICATION, WITH LONG-TERM CURRENT USE OF INSULIN (HCC): ICD-10-CM

## 2024-08-22 DIAGNOSIS — Z79.4 TYPE 2 DIABETES MELLITUS WITH OTHER SPECIFIED COMPLICATION, WITH LONG-TERM CURRENT USE OF INSULIN (HCC): ICD-10-CM

## 2024-08-23 ENCOUNTER — HOSPITAL ENCOUNTER (OUTPATIENT)
Facility: HOSPITAL | Age: 69
Setting detail: SURGERY ADMIT
End: 2024-08-23
Attending: STUDENT IN AN ORGANIZED HEALTH CARE EDUCATION/TRAINING PROGRAM | Admitting: STUDENT IN AN ORGANIZED HEALTH CARE EDUCATION/TRAINING PROGRAM
Payer: COMMERCIAL

## 2024-08-23 ENCOUNTER — HOSPITAL ENCOUNTER (OUTPATIENT)
Dept: RADIOLOGY | Facility: HOSPITAL | Age: 69
Discharge: HOME | End: 2024-08-23
Payer: COMMERCIAL

## 2024-08-23 ENCOUNTER — OFFICE VISIT (OUTPATIENT)
Dept: SURGERY | Facility: CLINIC | Age: 69
End: 2024-08-23
Payer: COMMERCIAL

## 2024-08-23 VITALS
HEART RATE: 80 BPM | RESPIRATION RATE: 16 BRPM | TEMPERATURE: 97.8 F | HEIGHT: 68 IN | BODY MASS INDEX: 34.4 KG/M2 | WEIGHT: 227 LBS | SYSTOLIC BLOOD PRESSURE: 118 MMHG | OXYGEN SATURATION: 98 % | DIASTOLIC BLOOD PRESSURE: 51 MMHG

## 2024-08-23 DIAGNOSIS — R91.8 LUNG NODULES: ICD-10-CM

## 2024-08-23 DIAGNOSIS — R91.1 LUNG NODULE, SOLITARY: Primary | ICD-10-CM

## 2024-08-23 PROCEDURE — 3008F BODY MASS INDEX DOCD: CPT | Performed by: STUDENT IN AN ORGANIZED HEALTH CARE EDUCATION/TRAINING PROGRAM

## 2024-08-23 PROCEDURE — 1036F TOBACCO NON-USER: CPT | Performed by: STUDENT IN AN ORGANIZED HEALTH CARE EDUCATION/TRAINING PROGRAM

## 2024-08-23 PROCEDURE — 99215 OFFICE O/P EST HI 40 MIN: CPT | Performed by: STUDENT IN AN ORGANIZED HEALTH CARE EDUCATION/TRAINING PROGRAM

## 2024-08-23 PROCEDURE — 71250 CT THORAX DX C-: CPT

## 2024-08-23 PROCEDURE — 3074F SYST BP LT 130 MM HG: CPT | Performed by: STUDENT IN AN ORGANIZED HEALTH CARE EDUCATION/TRAINING PROGRAM

## 2024-08-23 PROCEDURE — 3078F DIAST BP <80 MM HG: CPT | Performed by: STUDENT IN AN ORGANIZED HEALTH CARE EDUCATION/TRAINING PROGRAM

## 2024-08-23 PROCEDURE — 99215 OFFICE O/P EST HI 40 MIN: CPT | Mod: 57 | Performed by: STUDENT IN AN ORGANIZED HEALTH CARE EDUCATION/TRAINING PROGRAM

## 2024-08-23 PROCEDURE — 1159F MED LIST DOCD IN RCRD: CPT | Performed by: STUDENT IN AN ORGANIZED HEALTH CARE EDUCATION/TRAINING PROGRAM

## 2024-08-23 NOTE — PROGRESS NOTES
Subjective   Amandeep Lara  is a 69 y.o. male with a pmhx of CAD, HTN, HLD, DM, PAD, stroke, COPD and former smoker who presents today for lung nodule workup follow up. Hi cardiologist Dr Quinones has cleared him for surgery from cardiac standpoint. He is otherwise doing well. Deny SOB or GONZALEZ.    There is no significant change in patient's past medical history, past surgical history, social history, family history, or review of systems since last visit.   Past Medical History:   Diagnosis Date    Cancer (Multi)     Coronary artery disease     Diabetes mellitus (Multi)     Hypertension     Immune deficiency disorder (Multi)     Personal history of other diseases of the circulatory system     History of hypertension    Personal history of other endocrine, nutritional and metabolic disease     History of diabetes mellitus    Stroke (Multi)      Past Surgical History:   Procedure Laterality Date    CARPAL TUNNEL RELEASE  04/28/2015    Neuroplasty Decompression Median Nerve At Carpal Tunnel    CT ANGIO NECK  06/03/2021    CT NECK ANGIO W AND WO IV CONTRAST 6/3/2021 [facility]    CT HEAD ANGIO W AND WO IV CONTRAST  06/03/2021    CT HEAD ANGIO W AND WO IV CONTRAST 6/3/2021 [facility]    MR HEAD ANGIO WO IV CONTRAST  04/01/2021    MR HEAD ANGIO WO IV CONTRAST 4/1/2021 [facility]    MR HEAD ANGIO WO IV CONTRAST  04/01/2021    MR HEAD ANGIO WO IV CONTRAST    NECK SURGERY  12/27/2021    Neck Surgery    OTHER SURGICAL HISTORY  12/27/2021    Surgery    OTHER SURGICAL HISTORY  12/27/2021    Cardiac catheterization    OTHER SURGICAL HISTORY  12/27/2021    Finger amputation    OTHER SURGICAL HISTORY  12/27/2021    Colonoscopy    OTHER SURGICAL HISTORY  12/27/2021    Wrist surgery    OTHER SURGICAL HISTORY      Tailbone    ROTATOR CUFF REPAIR  12/27/2021    Rotator Cuff Repair    TONSILLECTOMY  04/28/2015    Tonsillectomy     Family History   Problem Relation Name Age of Onset    Other (malignant  neoplasm of urinary bladder) Mother      Other (arteriosclerotic cardiovascular disease) Father      Other (malignant neoplasm of prostate) Father      Other (arteriosclerotic cardiovascular disease) Brother       Social History     Socioeconomic History    Marital status:      Spouse name: Not on file    Number of children: Not on file    Years of education: Not on file    Highest education level: Not on file   Occupational History    Not on file   Tobacco Use    Smoking status: Former     Current packs/day: 0.00     Types: Cigarettes     Quit date: 10/2023     Years since quittin.8     Passive exposure: Never    Smokeless tobacco: Never   Vaping Use    Vaping status: Never Used   Substance and Sexual Activity    Alcohol use: Yes     Comment: 2-3x a year    Drug use: Never    Sexual activity: Yes     Partners: Female     Birth control/protection: None   Other Topics Concern    Not on file   Social History Narrative    Not on file     Social Determinants of Health     Financial Resource Strain: Low Risk  (3/20/2024)    Overall Financial Resource Strain (CARDIA)     Difficulty of Paying Living Expenses: Not very hard   Food Insecurity: Patient Declined (10/20/2023)    Hunger Vital Sign     Worried About Running Out of Food in the Last Year: Patient declined     Ran Out of Food in the Last Year: Patient declined   Transportation Needs: No Transportation Needs (3/20/2024)    PRAPARE - Transportation     Lack of Transportation (Medical): No     Lack of Transportation (Non-Medical): No   Physical Activity: Inactive (10/20/2023)    Exercise Vital Sign     Days of Exercise per Week: 0 days     Minutes of Exercise per Session: 0 min   Stress: No Stress Concern Present (10/20/2023)    [employer] [place] [employer] - Occupational Stress Questionnaire     Feeling of Stress : Not at all   Social Connections: Feeling Socially Integrated (11/15/2023)    OASIS : Social Isolation     Frequency of  experiencing loneliness or isolation: Never   Recent Concern: Social Connections - Moderately Isolated (10/20/2023)    Social Connection and Isolation Panel [NHANES]     Frequency of Communication with Friends and Family: Three times a week     Frequency of Social Gatherings with Friends and Family: Once a week     Attends Caodaism Services: Never     Active Member of Clubs or Organizations: No     Attends Club or Organization Meetings: Never     Marital Status:    Intimate Partner Violence: Not At Risk (10/20/2023)    Humiliation, Afraid, Rape, and Kick questionnaire     Fear of Current or Ex-Partner: No     Emotionally Abused: No     Physically Abused: No     Sexually Abused: No   Housing Stability: Low Risk  (3/20/2024)    Housing Stability Vital Sign     Unable to Pay for Housing in the Last Year: No     Number of Places Lived in the Last Year: 1     Unstable Housing in the Last Year: No       Objective   There were no vitals taken for this visit.  Physical Exam  Constitutional:       General: He is not in acute distress.     Appearance: Normal appearance. He is not ill-appearing, toxic-appearing or diaphoretic.   HENT:      Head: Normocephalic and atraumatic.      Mouth/Throat:      Mouth: Mucous membranes are moist.      Pharynx: Oropharynx is clear.   Eyes:      General: No scleral icterus.     Extraocular Movements: Extraocular movements intact.      Conjunctiva/sclera: Conjunctivae normal.      Pupils: Pupils are equal, round, and reactive to light.   Cardiovascular:      Rate and Rhythm: Normal rate and regular rhythm.      Pulses: Normal pulses.      Heart sounds: Normal heart sounds.   Pulmonary:      Effort: Pulmonary effort is normal. No respiratory distress.      Breath sounds: Normal breath sounds. No stridor. No wheezing, rhonchi or rales.   Chest:      Chest wall: No tenderness.   Abdominal:      General: There is no distension.      Palpations: Abdomen is soft.      Tenderness: There is no  abdominal tenderness. There is no guarding or rebound.   Musculoskeletal:         General: No swelling or tenderness. Normal range of motion.      Cervical back: Normal range of motion and neck supple. No rigidity or tenderness.      Right lower leg: No edema.      Left lower leg: No edema.   Skin:     General: Skin is warm and dry.      Coloration: Skin is not jaundiced.   Neurological:      General: No focal deficit present.      Mental Status: He is alert and oriented to person, place, and time. Mental status is at baseline.   Psychiatric:         Mood and Affect: Mood normal.         Behavior: Behavior normal.         Diagnostic Review  I reviewed the CT scan from 8/23/24, 3/20/24, 3/13/24 and 10/22/23 the PET/CT from 5/10/24. It showed spiculated peripheral RUL lung nodule now 33mm which was 24mm in 3//20/24.  This nodule was PET avid with max SUV of 7.6.  There is no mediastinal lymphadenopathy or activity anywhere else in the body.  I reviewed the PFT from 6/12/24. It showed FEV1 95% predicted and DLCO  63% predicted   I reviewed the nuclear stress test from 7/11/24. It showed EF of 35%, no myocardial ischemia.      Assessment/Plan   Amandeep Lara  has completely staging workup. The RUL nodule is highly suspicious for primary lung malignancy. I recommend minimal invasive RUL wedge resection followed by lobectomy and mediastinal lymph node dissection if cancer was identified intra-op on frozen section.     I discussed with him and his wife regarding risk of surgery including blood, infection, air leak, arrhythmia, DVT, and postop pain.  The alternative is definitive chemoradiation.  Patient consent to proceed with surgery.  I will plan to do this in September 10 at [facility] [place].    Also discussed about postop recovery time, he was spent 2-3 nights in the hospital after surgery.  The full recovery time is about 2 weeks to 2 months.    Siena Momin MD  Thoracic Surgeon  [facility]   [facility]   of Medicine  [facility]  Office phone: [phone]  Fax: [phone]  Pager: [phone]    Amount of time spent:  -Prep time on date of patient encounter: 15 min  -Time spend with patient/family/caregiver: 15 min  -Additional time spent on patient care activities: 0 min  -Documentation time in medical record: 15 min  -Other time spent on date of patient encounter: 0 min  Total time: 45 min

## 2024-08-30 ENCOUNTER — TELEPHONE (OUTPATIENT)
Dept: CARDIOLOGY | Facility: CLINIC | Age: 69
End: 2024-08-30
Payer: COMMERCIAL

## 2024-08-30 NOTE — TELEPHONE ENCOUNTER
Per Dr. Maryellen Hung MD, FACC, FACP, [facility] hold loop until later. Routed to Maddi Carias in procedure scheduling and Porsche Huynh in procedure scheduling for rescheduling

## 2024-08-30 NOTE — TELEPHONE ENCOUNTER
Copy of communication with Dr. Maryellen Hung MD  emailed to procedure schedulers to cancel Loop insertion and reschedule at later date.   Will discuss with Dr. Maryellen Hung MD  on Tuesday upon her return to clinic.

## 2024-08-30 NOTE — TELEPHONE ENCOUNTER
Patient spouse called and LM stating he is scheduled for a loop recorder insertion on 9/4/24. Patient has not started to hold Plavix yet and has not been instructed. How many days should he hold Plavix?     Dr. Momin would like to speak to Dr. Maryellen Hung MD, FACC, FACP, [facility] about the loop recorder procedure and the lung biopsy procedure. Dr. Momin office number [phone]

## 2024-09-06 ENCOUNTER — PRE-ADMISSION TESTING (OUTPATIENT)
Dept: PREADMISSION TESTING | Facility: HOSPITAL | Age: 69
End: 2024-09-06
Payer: COMMERCIAL

## 2024-09-06 ENCOUNTER — DOCUMENTATION (OUTPATIENT)
Dept: PREADMISSION TESTING | Facility: HOSPITAL | Age: 69
End: 2024-09-06

## 2024-09-06 ENCOUNTER — LAB REQUISITION (OUTPATIENT)
Dept: LAB | Facility: HOSPITAL | Age: 69
End: 2024-09-06
Payer: COMMERCIAL

## 2024-09-06 ENCOUNTER — LAB (OUTPATIENT)
Dept: LAB | Facility: LAB | Age: 69
End: 2024-09-06
Payer: COMMERCIAL

## 2024-09-06 VITALS
OXYGEN SATURATION: 97 % | RESPIRATION RATE: 18 BRPM | BODY MASS INDEX: 34.08 KG/M2 | HEART RATE: 65 BPM | WEIGHT: 224.87 LBS | SYSTOLIC BLOOD PRESSURE: 121 MMHG | TEMPERATURE: 97.6 F | DIASTOLIC BLOOD PRESSURE: 59 MMHG | HEIGHT: 68 IN

## 2024-09-06 DIAGNOSIS — R73.9 ELEVATED BLOOD SUGAR: ICD-10-CM

## 2024-09-06 DIAGNOSIS — Z01.818 PRE-OP TESTING: Primary | ICD-10-CM

## 2024-09-06 DIAGNOSIS — Z01.818 ENCOUNTER FOR OTHER PREPROCEDURAL EXAMINATION: ICD-10-CM

## 2024-09-06 DIAGNOSIS — Z01.818 PRE-OP TESTING: ICD-10-CM

## 2024-09-06 LAB
ANION GAP SERPL CALC-SCNC: 14 MMOL/L (ref 10–20)
BASOPHILS # BLD AUTO: 0.09 X10*3/UL (ref 0–0.1)
BASOPHILS NFR BLD AUTO: 0.6 %
BUN SERPL-MCNC: 24 MG/DL (ref 6–23)
CALCIUM SERPL-MCNC: 8.2 MG/DL (ref 8.6–10.3)
CHLORIDE SERPL-SCNC: 98 MMOL/L (ref 98–107)
CO2 SERPL-SCNC: 24 MMOL/L (ref 21–32)
CREAT SERPL-MCNC: 1.86 MG/DL (ref 0.5–1.3)
EGFRCR SERPLBLD CKD-EPI 2021: 39 ML/MIN/1.73M*2
EOSINOPHIL # BLD AUTO: 0.52 X10*3/UL (ref 0–0.7)
EOSINOPHIL NFR BLD AUTO: 3.5 %
ERYTHROCYTE [DISTWIDTH] IN BLOOD BY AUTOMATED COUNT: 13.1 % (ref 11.5–14.5)
EST. AVERAGE GLUCOSE BLD GHB EST-MCNC: 209 MG/DL
GLUCOSE SERPL-MCNC: 184 MG/DL (ref 74–99)
HBA1C MFR BLD: 8.9 %
HCT VFR BLD AUTO: 37.1 % (ref 41–52)
HGB BLD-MCNC: 12.2 G/DL (ref 13.5–17.5)
IMM GRANULOCYTES # BLD AUTO: 0.06 X10*3/UL (ref 0–0.7)
IMM GRANULOCYTES NFR BLD AUTO: 0.4 % (ref 0–0.9)
LYMPHOCYTES # BLD AUTO: 3.35 X10*3/UL (ref 1.2–4.8)
LYMPHOCYTES NFR BLD AUTO: 22.7 %
MCH RBC QN AUTO: 29.5 PG (ref 26–34)
MCHC RBC AUTO-ENTMCNC: 32.9 G/DL (ref 32–36)
MCV RBC AUTO: 90 FL (ref 80–100)
MONOCYTES # BLD AUTO: 1.3 X10*3/UL (ref 0.1–1)
MONOCYTES NFR BLD AUTO: 8.8 %
NEUTROPHILS # BLD AUTO: 9.46 X10*3/UL (ref 1.2–7.7)
NEUTROPHILS NFR BLD AUTO: 64 %
NRBC BLD-RTO: 0 /100 WBCS (ref 0–0)
PLATELET # BLD AUTO: 348 X10*3/UL (ref 150–450)
POTASSIUM SERPL-SCNC: 5 MMOL/L (ref 3.5–5.3)
RBC # BLD AUTO: 4.13 X10*6/UL (ref 4.5–5.9)
SODIUM SERPL-SCNC: 131 MMOL/L (ref 136–145)
WBC # BLD AUTO: 14.8 X10*3/UL (ref 4.4–11.3)

## 2024-09-06 PROCEDURE — 83036 HEMOGLOBIN GLYCOSYLATED A1C: CPT

## 2024-09-06 PROCEDURE — 86900 BLOOD TYPING SEROLOGIC ABO: CPT

## 2024-09-06 PROCEDURE — 85025 COMPLETE CBC W/AUTO DIFF WBC: CPT

## 2024-09-06 PROCEDURE — 86850 RBC ANTIBODY SCREEN: CPT

## 2024-09-06 PROCEDURE — 99205 OFFICE O/P NEW HI 60 MIN: CPT | Performed by: NURSE PRACTITIONER

## 2024-09-06 PROCEDURE — 36415 COLL VENOUS BLD VENIPUNCTURE: CPT

## 2024-09-06 PROCEDURE — 80048 BASIC METABOLIC PNL TOTAL CA: CPT

## 2024-09-06 PROCEDURE — 86901 BLOOD TYPING SEROLOGIC RH(D): CPT

## 2024-09-06 PROCEDURE — 87081 CULTURE SCREEN ONLY: CPT | Mod: AHULAB | Performed by: STUDENT IN AN ORGANIZED HEALTH CARE EDUCATION/TRAINING PROGRAM

## 2024-09-06 RX ORDER — CHLORHEXIDINE GLUCONATE ORAL RINSE 1.2 MG/ML
SOLUTION DENTAL
Qty: 475 ML | Refills: 0 | Status: ON HOLD | OUTPATIENT
Start: 2024-09-06 | End: 2024-09-09 | Stop reason: ALTCHOICE

## 2024-09-06 ASSESSMENT — ENCOUNTER SYMPTOMS
GASTROINTESTINAL NEGATIVE: 1
VOMITING: 0
ABDOMINAL PAIN: 0
CONSTITUTIONAL NEGATIVE: 1
DYSPNEA WITH EXERTION: 0
PALPITATIONS: 0
CONSTIPATION: 0
MUSCULOSKELETAL NEGATIVE: 1
NAUSEA: 0
DIARRHEA: 0
NECK NEGATIVE: 1
BRUISES/BLEEDS EASILY: 1

## 2024-09-06 NOTE — CPM/PAT NURSE NOTE
CPM/PAT Nurse Note      Name: Amandeep Lara (Amandeep Lara)  /Age: 1955/69 y.o.       Past Medical History:   Diagnosis Date    Cancer (Multi)     Coronary artery disease     Diabetes mellitus (Multi)     Hypertension     Immune deficiency disorder (Multi)     Personal history of other diseases of the circulatory system     History of hypertension    Personal history of other endocrine, nutritional and metabolic disease     History of diabetes mellitus    Stroke (Multi)        Past Surgical History:   Procedure Laterality Date    CARPAL TUNNEL RELEASE  2015    Neuroplasty Decompression Median Nerve At Carpal Tunnel    CT ANGIO NECK  2021    CT NECK ANGIO W AND WO IV CONTRAST 6/3/2021 [facility]    CT HEAD ANGIO W AND WO IV CONTRAST  2021    CT HEAD ANGIO W AND WO IV CONTRAST 6/3/2021 [facility]    MR HEAD ANGIO WO IV CONTRAST  2021    MR HEAD ANGIO WO IV CONTRAST 2021 [facility]    MR HEAD ANGIO WO IV CONTRAST  2021    MR HEAD ANGIO WO IV CONTRAST    NECK SURGERY  2021    Neck Surgery    OTHER SURGICAL HISTORY  2021    Surgery    OTHER SURGICAL HISTORY  2021    Cardiac catheterization    OTHER SURGICAL HISTORY  2021    Finger amputation    OTHER SURGICAL HISTORY  2021    Colonoscopy    OTHER SURGICAL HISTORY  2021    Wrist surgery    OTHER SURGICAL HISTORY      Tailbone    ROTATOR CUFF REPAIR  2021    Rotator Cuff Repair    TONSILLECTOMY  2015    Tonsillectomy       Patient  reports being sexually active and has had partner(s) who are female. He reports using the following method of birth control/protection: None.    Family History   Problem Relation Name Age of Onset    Other (malignant neoplasm of urinary bladder) Mother      Other (arteriosclerotic cardiovascular disease) Father      Other (malignant neoplasm of prostate) Father      Other (arteriosclerotic cardiovascular disease) Brother    "      Allergies   Allergen Reactions    Ether Unknown, Nausea And Vomiting and Other    Statins-Hmg-Coa Reductase Inhibitors Other    Sulfa (Sulfonamide Antibiotics) Hives    Sulfamethoxazole-Trimethoprim Other and Rash       Prior to Admission medications    Medication Sig Start Date End Date Taking? Authorizing Provider   acetaminophen (Tylenol) 325 mg tablet Take 2 tablets (650 mg) by mouth every 4 hours if needed. 3/20/24   Historical Provider, MD   amLODIPine (Norvasc) 5 mg tablet Take 1 tablet (5 mg) by mouth once daily. 8/20/24 8/20/25  Bonifacio Quinnoes DO   aspirin 81 mg chewable tablet Chew 1 tablet (81 mg) once daily.    Historical Provider, MD   atorvastatin (Lipitor) 40 mg tablet Take 1 tablet (40 mg) by mouth once daily at bedtime. 8/20/24 8/20/25  Bonifacio Quinones DO   BD Ultra-Fine Lin Pen Needle 32 gauge x 5/32\" needle use 1 each 2 times daily 9/7/23   Historical Provider, MD   carvedilol (Coreg) 25 mg tablet TAKE 1 TABLET TWICE DAILY 8/21/24   Bonifacio Quinones DO   clopidogrel (Plavix) 75 mg tablet Take 1 tablet (75 mg) by mouth once daily. 8/20/24 8/20/25  Bonifacio Quinones DO   Contour Next Gen Meter misc Dispense one meter that insurance will cover. 2/21/24   Historical Provider, MD   cyanocobalamin, vitamin B-12, (VITAMIN B-12 ORAL) Take 1 tablet by mouth once daily.    Historical Provider, MD   fish oil concentrate (Omega-3) 120-180 mg capsule Take 1 capsule (1 g) by mouth once daily. 4/28/15   Historical Provider, MD   insulin lispro (HumaLOG) 100 unit/mL injection Inject 0-0.1 mL (0-10 Units) under the skin 3 times a day with meals. Take as directed per insulin instructions. 10/25/23 8/8/24  Victor Hugo Oliva MD   metFORMIN (Glucophage) 1,000 mg tablet Take 1 tablet (1,000 mg) by mouth 2 times daily (morning and late afternoon).    Historical Provider, MD   nitroglycerin (Nitrostat) 0.4 mg SL tablet Place 1 tablet (0.4 mg) under the tongue every 5 minutes if needed for chest pain (up to 3 " doses).    Historical Provider, MD   NovoLOG Mix 70-30FlexPen U-100 100 unit/mL (70-30) injection Inject 20 Units under the skin 2 times a day with meals. 3/23/24 9/6/24  Nahid Cancino MD        PAT ROS     DASI Risk Score    No data to display       Caprini DVT Assessment    No data to display       Modified Frailty Index    No data to display       CHADS2 Stroke Risk  Current as of 46 minutes ago        N/A 3 to 100%: High Risk   2 to < 3%: Medium Risk   0 to < 2%: Low Risk     Last Change: N/A          This score determines the patient's risk of having a stroke if the patient has atrial fibrillation.        This score is not applicable to this patient. Components are not calculated.          Revised Cardiac Risk Index    No data to display       Apfel Simplified Score    No data to display       Risk Analysis Index Results This Encounter    No data found in the last 1 encounters.       After Visit Summary (AVS) reviewed and patient verbalized good understanding of medications and NPO instructions.     Nurse Plan of Action:

## 2024-09-06 NOTE — CPM/PAT NURSE NOTE
CPM/PAT Nurse Note      Name: Amandeep Lara (Amandeep Lara)  /Age: 1955/69 y.o.       Past Medical History:   Diagnosis Date    Cancer (Multi)     Coronary artery disease     Diabetes mellitus (Multi)     Hypertension     Immune deficiency disorder (Multi)     Personal history of other diseases of the circulatory system     History of hypertension    Personal history of other endocrine, nutritional and metabolic disease     History of diabetes mellitus    Stroke (Multi)        Past Surgical History:   Procedure Laterality Date    CARPAL TUNNEL RELEASE  2015    Neuroplasty Decompression Median Nerve At Carpal Tunnel    CT ANGIO NECK  2021    CT NECK ANGIO W AND WO IV CONTRAST 6/3/2021 [facility]    CT HEAD ANGIO W AND WO IV CONTRAST  2021    CT HEAD ANGIO W AND WO IV CONTRAST 6/3/2021 [facility]    MR HEAD ANGIO WO IV CONTRAST  2021    MR HEAD ANGIO WO IV CONTRAST 2021 [facility]    MR HEAD ANGIO WO IV CONTRAST  2021    MR HEAD ANGIO WO IV CONTRAST    NECK SURGERY  2021    Neck Surgery    OTHER SURGICAL HISTORY  2021    Surgery    OTHER SURGICAL HISTORY  2021    Cardiac catheterization    OTHER SURGICAL HISTORY  2021    Finger amputation    OTHER SURGICAL HISTORY  2021    Colonoscopy    OTHER SURGICAL HISTORY  2021    Wrist surgery    OTHER SURGICAL HISTORY      Tailbone    ROTATOR CUFF REPAIR  2021    Rotator Cuff Repair    TONSILLECTOMY  2015    Tonsillectomy       Patient  reports being sexually active and has had partner(s) who are female. He reports using the following method of birth control/protection: None.    Family History   Problem Relation Name Age of Onset    Other (malignant neoplasm of urinary bladder) Mother      Other (arteriosclerotic cardiovascular disease) Father      Other (malignant neoplasm of prostate) Father      Other (arteriosclerotic cardiovascular disease) Brother    "      Allergies   Allergen Reactions    Ether Unknown, Nausea And Vomiting and Other    Statins-Hmg-Coa Reductase Inhibitors Other    Sulfa (Sulfonamide Antibiotics) Hives    Sulfamethoxazole-Trimethoprim Other and Rash       Prior to Admission medications    Medication Sig Start Date End Date Taking? Authorizing Provider   acetaminophen (Tylenol) 325 mg tablet Take 2 tablets (650 mg) by mouth every 4 hours if needed. 3/20/24   Historical Provider, MD   amLODIPine (Norvasc) 5 mg tablet Take 1 tablet (5 mg) by mouth once daily. 8/20/24 8/20/25  Bonifacio Quinones DO   aspirin 81 mg chewable tablet Chew 1 tablet (81 mg) once daily.    Historical Provider, MD   atorvastatin (Lipitor) 40 mg tablet Take 1 tablet (40 mg) by mouth once daily at bedtime. 8/20/24 8/20/25  Bonifacio Quinones DO   BD Ultra-Fine Lin Pen Needle 32 gauge x 5/32\" needle use 1 each 2 times daily 9/7/23   Historical Provider, MD   carvedilol (Coreg) 25 mg tablet TAKE 1 TABLET TWICE DAILY 8/21/24   Bonifacio Quinones DO   clopidogrel (Plavix) 75 mg tablet Take 1 tablet (75 mg) by mouth once daily. 8/20/24 8/20/25  Bonifacio Quinones DO   Contour Next Gen Meter misc Dispense one meter that insurance will cover. 2/21/24   Historical Provider, MD   cyanocobalamin, vitamin B-12, (VITAMIN B-12 ORAL) Take 1 tablet by mouth once daily.    Historical Provider, MD   fish oil concentrate (Omega-3) 120-180 mg capsule Take 1 capsule (1 g) by mouth once daily. 4/28/15   Historical Provider, MD   insulin lispro (HumaLOG) 100 unit/mL injection Inject 0-0.1 mL (0-10 Units) under the skin 3 times a day with meals. Take as directed per insulin instructions. 10/25/23 8/8/24  Victor Hugo Oliva MD   metFORMIN (Glucophage) 1,000 mg tablet Take 1 tablet (1,000 mg) by mouth 2 times daily (morning and late afternoon).    Historical Provider, MD   nitroglycerin (Nitrostat) 0.4 mg SL tablet Place 1 tablet (0.4 mg) under the tongue every 5 minutes if needed for chest pain (up to 3 " doses).    Historical Provider, MD   NovoLOG Mix 70-30FlexPen U-100 100 unit/mL (70-30) injection Inject 20 Units under the skin 2 times a day with meals. 3/23/24 9/6/24  Nahid Cancino MD        PAT ROS     DASI Risk Score    No data to display       Caprini DVT Assessment    No data to display       Modified Frailty Index    No data to display       CHADS2 Stroke Risk  Current as of 45 minutes ago        N/A 3 to 100%: High Risk   2 to < 3%: Medium Risk   0 to < 2%: Low Risk     Last Change: N/A          This score determines the patient's risk of having a stroke if the patient has atrial fibrillation.        This score is not applicable to this patient. Components are not calculated.          Revised Cardiac Risk Index    No data to display       Apfel Simplified Score    No data to display       Risk Analysis Index Results This Encounter    No data found in the last 1 encounters.       After Visit Summary (AVS) reviewed and patient verbalized good understanding of medications and NPO instructions.  Pre-op infection prevention measures:  CHG showers and mouthwash reviewed, understanding voiced.  CHG soap given and patient verbalized need to pick CHG mouthwash at their preferred local pharmacy.     Nurse Plan of Action:

## 2024-09-06 NOTE — PREPROCEDURE INSTRUCTIONS
"   Medication List            Accurate as of September 6, 2024  2:35 PM. Always use your most recent med list.                acetaminophen 325 mg tablet  Commonly known as: Tylenol  Notes to patient: May take on day of surgery if needed     amLODIPine 5 mg tablet  Commonly known as: Norvasc  Take 1 tablet (5 mg) by mouth once daily.  Medication Adjustments for Surgery: Take on the morning of surgery     aspirin 81 mg chewable tablet  Medication Adjustments for Surgery: Take on the morning of surgery     atorvastatin 40 mg tablet  Commonly known as: Lipitor  Take 1 tablet (40 mg) by mouth once daily at bedtime.  Notes to patient: Ok to take the night before surgery.     BD Ultra-Fine Lin Pen Needle 32 gauge x 5/32\" needle  Generic drug: pen needle, diabetic     carvedilol 25 mg tablet  Commonly known as: Coreg  TAKE 1 TABLET TWICE DAILY  Medication Adjustments for Surgery: Take on the morning of surgery     chlorhexidine 0.12 % solution  Commonly known as: Peridex  Swish for 30 seconds and spit 15mL of solution the night before and morning of surgery     clopidogrel 75 mg tablet  Commonly known as: Plavix  Take 1 tablet (75 mg) by mouth once daily.  Additional Medication Adjustments for Surgery: Take last dose 7 days before surgery     Contour Next Gen Meter misc  Generic drug: blood-glucose meter     fish oil concentrate 120-180 mg capsule  Commonly known as: Omega-3  Additional Medication Adjustments for Surgery: Take last dose 7 days before surgery     insulin lispro 100 unit/mL injection  Commonly known as: HumaLOG  Inject 0-0.1 mL (0-10 Units) under the skin 3 times a day with meals. Take as directed per insulin instructions.  Medication Adjustments for Surgery: Do Not take on the morning of surgery  Notes to patient: Patient states not taking.     metFORMIN 1,000 mg tablet  Commonly known as: Glucophage  Medication Adjustments for Surgery: Do Not take on the morning of surgery     nitroglycerin 0.4 mg SL " tablet  Commonly known as: Nitrostat  Notes to patient: May take on day of surgery if needed     NovoLOG Mix 70-30FlexPen U-100 100 unit/mL (70-30) injection  Generic drug: insulin asp prt-insulin aspart  Inject 20 Units under the skin 2 times a day with meals.  Notes to patient: With evening dose take half the dose (10 units). Hold the day of surgery.     VITAMIN B-12 ORAL  Medication Adjustments for Surgery: Do Not take on the morning of surgery                        **Concerning above medication instructions, if medication is normally taken at night, continue normal schedule.**  **DO NOT TAKE NIGHT PRIOR AND MORNING OF SURGERY**    CONTACT SURGEON'S OFFICE IF YOU DEVELOP:  * Fever = 100.4 F   * New respiratory symptoms (e.g. cough, shortness of breath, respiratory distress, sore throat)  * Recent loss of taste or smell  *Flu like symptoms such as headache, fatigue or gastrointestinal symptoms  * You develop any open sores, shingles, burning or painful urination   AND/OR:  * You no longer wish to have the surgery.  * Any other personal circumstances change that may lead to the need to cancel or defer this surgery.  *You were admitted to any hospital within one week of your planned procedure.    SMOKING:  *Quitting smoking can make a huge difference to your health and recovery from surgery.    *If you need help with quitting, call 1-368-QUIT-NOW.    THE DAY BEFORE SURGERY:   Nothing by mouth (no solids or liquids) after midnight.   If diabetic, please check fasting blood sugar upon waking up.    If fasting sugar is <80 mg/dl, please drink 100ml/3oz of apple juice no later than 2 hours prior to arrival time.      SURGICAL TIME  *You will be contacted between 2 p.m. and 6 p.m. the business day before your surgery with your arrival time.  *If you haven't received a call by 6pm, call [phone].  *Scheduled surgery times may change and you will be notified if this occurs-check your personal voicemail for any  updates.    ON THE MORNING OF SURGERY:  *Wear comfortable, loose fitting clothing.   *Do not use moisturizers, creams, lotions or perfume.  *All jewelry and valuables should be left at home.  *Prosthetic devices such as contact lenses, hearing aids, dentures, eyelash extensions, hairpins and body piercing must be removed before surgery.    BRING WITH YOU:  *Photo ID and insurance card  *Current list of medicines and allergies  *Pacemaker/Defibrillator/Heart stent cards  *CPAP machine and mask  *Slings/splints/crutches  *Copy of your complete Advanced Directive/DHPOA-if applicable  *Neurostimulator implant remote    PARKING AND ARRIVAL:  *Check in at the Main Entrance desk and let them know you are here for surgery.  *You will be directed to the 2nd floor surgical waiting area.    AFTER OUTPATIENT SURGERY:  *A responsible adult MUST accompany you at the time of discharge and stay with you for 24 hours after your surgery.  *You may NOT drive yourself home after surgery.  *You may use a taxi or ride sharing service ([employer], Uber) to return home ONLY if you are accompanied by a friend or family member.  *Instructions for resuming your medications will be provided by your surgeon.         Patient Information: Oral/Dental Rinse  **This is a prescription; pick it up at your preferred local pharmacy **  What is oral/dental rinse?   It is a mouthwash. It is a way of cleaning the mouth with a germ killing solution before your surgery.  The solution contains chlorhexidine, commonly known as CHG.   It is used inside the mouth to kill a bacteria known as Staphylococcus aureus.  Let your doctor know if you are allergic to Chlorhexidine.    Why do I need to use CHG oral/dental rinse?  The CHG oral/dental rinse helps to kill a bacteria in your mouth known a Staphylococcus aureus.     This reduces the risk of infection at the surgical site.      Using your CHG oral/dental rinse  STEPS:  Use your CHG oral/dental rinse after you brush  your teeth the night before (at bedtime) and the morning of your surgery.  Follow all directions on your prescription label.    Use the cap on the container to measure 15ml (fill cap to fill line)  Swish (gargle if you can) the mouthwash in your mouth for at least 30 seconds, (do not to swallow) spit out  After you use your CHG rinse, do not rinse your mouth with water, drink or eat.  Please refer to prescription label for the appropriate time to resume oral intake  Dental rinse comes in one size bottle: 473ml ~16oz.  You will have leftover    rinse, discard after this use.    What side effects might I have using the CHG oral/dental rinse?  CHG rinse will stick to plaque on the teeth.  Brush and floss just before use.  Teeth brushing will help avoid staining of plaque during use.    Who should I contact if I have questions about the CHG oral/dental rinse?  Please call [facility], Preadmission Testing at [phone] if you have any questions    Home Preoperative Antibacterial Shower     What is a home preoperative antibacterial shower?  This shower is a way of cleaning the skin with a germ killing soap before surgery.  The soap contains chlorhexidine, commonly known as CHG.  CHG is a soap for your skin with germ killing ability.  Let your doctor know if you are allergic to chlorhexidine.    Why do I need to take a preoperative antibacterial shower?  Skin is not sterile.  It is best to try to make your skin as free of germs as possible before surgery.  Proper cleansing with a germ killing soap before surgery can lower the number of germs on your skin.  This helps to reduce the risk of infection at the surgical site.  Following the instructions listed below will help you prepare your skin for surgery.      How do I use the CHG skin cleanser?  Steps:  Begin using your CHG soap five days before your scheduled surgery on ________________________.    Days 1-4 Shower before bed:  Wash your  face and genitals with your normal soap and rinse.    Wash and rinse your hair using the CHG soap. Rinse completely, do not condition your   Hair.          3.    Apply the CHG soap to a clean wet washcloth.  Turn the water off or move away                From the water spray to avoid premature rinsing of the CHG soap as you are applying.     4.   Lather your entire body from the neck down.  Do not use on your face or genitals.   Pay special attention to the area(s) where your incision(s) will be located unless they are on your face.  Avoid scrubbing your skin too hard.  The important point is to have the CHG soap sit on your skin for 3 minutes.    When the 3 minutes are up, turn on the water and rinse the CHG soap off your body completely.   Pat yourself dry with a clean, freshly-laundered towel.  Dress in clean, freshly laundered night clothes.    Be sure to sleep with clean, freshly laundered sheets.  Day 5:  Last shower is the morning before surgery: Follow above Instructions.    NOTE:    *Hair extensions should be removed    *Keep CHG soap out of eyes and ear canals   *DO NOT wash with regular soap on your body after you have used the CHG        soap solution  *DO NOT apply powders, lotions, or perfume.  *Deodorant may be used days 1-4, BUT NOT the day of surgery    Who should I contact if I have any questions regarding the use of CHG soap?  Call the [facility], Preadmission Testing at [phone] if you have any questions.              Patient Information: Pre-Operative Infection Prevention Measures     Why did I have my nose, under my arms and groin swabbed?  The purpose of the swab is to identify Staphylococcus aureus inside your nose or on your skin.  The swab was sent to the laboratory for culture.  A positive swab/culture for Staphylococcus aureus is called colonization or carriage.      What is Staphylococcus aureus?  Staphylococcus aureus, also known as “staph”, is a germ  found on the skin or in the nose of healthy people.  Sometimes Staphylococcus aureus can get into the body and cause an infection.  This can be minor (such as pimples, boils or other skin problems).  It might also be serious (such as blood infection, pneumonia or a surgical site infection).    What is Staphylococcus aureus colonization or carriage?  Colonization or carriage means that a person has the germ but is not sick from it.  These bacteria can be spread on the hands or when breathing or sneezing.    How is Staphylococcus aureus spread?  It is most often spread by close contact with a person or item that carries it.    What happens if my culture is positive for Staphylococcus aureus?  Your doctor/medical team will use this information to guide any antibiotic treatment which may be necessary.  Regardless of the culture results, we will clean the inside of your nose with a betadine swab just before you have your surgery.      Will I get an infection if I have Staphylococcus aureus in my nose or on my skin?  Anyone can get an infection with Staphylococcus aureus.  However, the best way to reduce your risk of infection is to follow the instructions provided to you for the use of your CHG soap and dental rinse.        Who should I contact if I have any questions?  Call the [facility], Preadmission Testing at [phone] if you have any questions.

## 2024-09-06 NOTE — CPM/PAT NURSE NOTE
CPM/PAT Nurse Note      Name: Amandeep Lara (Amandeep Lara)  /Age: 1955/69 y.o.       Past Medical History:   Diagnosis Date    Cancer (Multi)     Coronary artery disease     Diabetes mellitus (Multi)     Hypertension     Immune deficiency disorder (Multi)     Personal history of other diseases of the circulatory system     History of hypertension    Personal history of other endocrine, nutritional and metabolic disease     History of diabetes mellitus    Stroke (Multi)        Past Surgical History:   Procedure Laterality Date    CARPAL TUNNEL RELEASE  2015    Neuroplasty Decompression Median Nerve At Carpal Tunnel    CT ANGIO NECK  2021    CT NECK ANGIO W AND WO IV CONTRAST 6/3/2021 [facility]    CT HEAD ANGIO W AND WO IV CONTRAST  2021    CT HEAD ANGIO W AND WO IV CONTRAST 6/3/2021 [facility]    MR HEAD ANGIO WO IV CONTRAST  2021    MR HEAD ANGIO WO IV CONTRAST 2021 [facility]    MR HEAD ANGIO WO IV CONTRAST  2021    MR HEAD ANGIO WO IV CONTRAST    NECK SURGERY  2021    Neck Surgery    OTHER SURGICAL HISTORY  2021    Surgery    OTHER SURGICAL HISTORY  2021    Cardiac catheterization    OTHER SURGICAL HISTORY  2021    Finger amputation    OTHER SURGICAL HISTORY  2021    Colonoscopy    OTHER SURGICAL HISTORY  2021    Wrist surgery    OTHER SURGICAL HISTORY      Tailbone    ROTATOR CUFF REPAIR  2021    Rotator Cuff Repair    TONSILLECTOMY  2015    Tonsillectomy       Patient  reports being sexually active and has had partner(s) who are female. He reports using the following method of birth control/protection: None.    Family History   Problem Relation Name Age of Onset    Other (malignant neoplasm of urinary bladder) Mother      Other (arteriosclerotic cardiovascular disease) Father      Other (malignant neoplasm of prostate) Father      Other (arteriosclerotic cardiovascular disease) Brother    "      Allergies   Allergen Reactions    Ether Unknown, Nausea And Vomiting and Other    Statins-Hmg-Coa Reductase Inhibitors Other    Sulfa (Sulfonamide Antibiotics) Hives    Sulfamethoxazole-Trimethoprim Other and Rash       Prior to Admission medications    Medication Sig Start Date End Date Taking? Authorizing Provider   acetaminophen (Tylenol) 325 mg tablet Take 2 tablets (650 mg) by mouth every 4 hours if needed. 3/20/24   Historical Provider, MD   amLODIPine (Norvasc) 5 mg tablet Take 1 tablet (5 mg) by mouth once daily. 8/20/24 8/20/25  Bonifacio Quinones, DO   aspirin 81 mg chewable tablet Chew 1 tablet (81 mg) once daily.    Historical Provider, MD   atorvastatin (Lipitor) 40 mg tablet Take 1 tablet (40 mg) by mouth once daily at bedtime. 8/20/24 8/20/25  Bonifacio Quinones DO   BD Ultra-Fine Lin Pen Needle 32 gauge x 5/32\" needle use 1 each 2 times daily 9/7/23   Historical Provider, MD   carvedilol (Coreg) 25 mg tablet TAKE 1 TABLET TWICE DAILY 8/21/24   Bonifacio Quinones, DO   chlorhexidine (Peridex) 0.12 % solution Swish for 30 seconds and spit 15mL of solution the night before and morning of surgery 9/6/24   Adriana Oviedo, APRN-CNP   clopidogrel (Plavix) 75 mg tablet Take 1 tablet (75 mg) by mouth once daily. 8/20/24 8/20/25  Bonifacio Quinones DO   Contour Next Gen Meter misc Dispense one meter that insurance will cover. 2/21/24   Historical Provider, MD   cyanocobalamin, vitamin B-12, (VITAMIN B-12 ORAL) Take 1 tablet by mouth once daily.    Historical Provider, MD   fish oil concentrate (Omega-3) 120-180 mg capsule Take 1 capsule (1 g) by mouth once daily. 4/28/15   Historical Provider, MD   insulin lispro (HumaLOG) 100 unit/mL injection Inject 0-0.1 mL (0-10 Units) under the skin 3 times a day with meals. Take as directed per insulin instructions. 10/25/23 8/8/24  Victor Hugo Oliva MD   metFORMIN (Glucophage) 1,000 mg tablet Take 1 tablet (1,000 mg) by mouth 2 times daily (morning and late afternoon).    " Historical Provider, MD   nitroglycerin (Nitrostat) 0.4 mg SL tablet Place 1 tablet (0.4 mg) under the tongue every 5 minutes if needed for chest pain (up to 3 doses).    Historical Provider, MD   NovoLOG Mix 70-30FlexPen U-100 100 unit/mL (70-30) injection Inject 20 Units under the skin 2 times a day with meals. 3/23/24 9/6/24  Nahid Cancino MD        PAT ROS     DASI Risk Score    No data to display       Caprini DVT Assessment    No data to display       Modified Frailty Index    No data to display       CHADS2 Stroke Risk  Current as of 57 minutes ago        N/A 3 to 100%: High Risk   2 to < 3%: Medium Risk   0 to < 2%: Low Risk     Last Change: N/A          This score determines the patient's risk of having a stroke if the patient has atrial fibrillation.        This score is not applicable to this patient. Components are not calculated.          Revised Cardiac Risk Index    No data to display       Apfel Simplified Score    No data to display       Risk Analysis Index Results This Encounter    No data found in the last 1 encounters.     After Visit Summary (AVS) reviewed and patient verbalized good understanding of medications and NPO instructions.  Pre-op infection prevention measures:  CHG showers and mouthwash reviewed, understanding voiced.  CHG soap given and patient verbalized need to pick CHG mouthwash at their preferred local pharmacy.     Nurse Plan of Action:

## 2024-09-06 NOTE — CPM/PAT NURSE NOTE
CPM/PAT Nurse Note      Name: Amandeep Lara (Amandeep Lara)  /Age: 1955/69 y.o.       Past Medical History:   Diagnosis Date    Cancer (Multi)     Coronary artery disease     Diabetes mellitus (Multi)     Hypertension     Immune deficiency disorder (Multi)     Personal history of other diseases of the circulatory system     History of hypertension    Personal history of other endocrine, nutritional and metabolic disease     History of diabetes mellitus    Stroke (Multi)        Past Surgical History:   Procedure Laterality Date    CARPAL TUNNEL RELEASE  2015    Neuroplasty Decompression Median Nerve At Carpal Tunnel    CT ANGIO NECK  2021    CT NECK ANGIO W AND WO IV CONTRAST 6/3/2021 [facility]    CT HEAD ANGIO W AND WO IV CONTRAST  2021    CT HEAD ANGIO W AND WO IV CONTRAST 6/3/2021 [facility]    MR HEAD ANGIO WO IV CONTRAST  2021    MR HEAD ANGIO WO IV CONTRAST 2021 [facility]    MR HEAD ANGIO WO IV CONTRAST  2021    MR HEAD ANGIO WO IV CONTRAST    NECK SURGERY  2021    Neck Surgery    OTHER SURGICAL HISTORY  2021    Surgery    OTHER SURGICAL HISTORY  2021    Cardiac catheterization    OTHER SURGICAL HISTORY  2021    Finger amputation    OTHER SURGICAL HISTORY  2021    Colonoscopy    OTHER SURGICAL HISTORY  2021    Wrist surgery    OTHER SURGICAL HISTORY      Tailbone    ROTATOR CUFF REPAIR  2021    Rotator Cuff Repair    TONSILLECTOMY  2015    Tonsillectomy       Patient  reports being sexually active and has had partner(s) who are female. He reports using the following method of birth control/protection: None.    Family History   Problem Relation Name Age of Onset    Other (malignant neoplasm of urinary bladder) Mother      Other (arteriosclerotic cardiovascular disease) Father      Other (malignant neoplasm of prostate) Father      Other (arteriosclerotic cardiovascular disease) Brother    "      Allergies   Allergen Reactions    Ether Unknown, Nausea And Vomiting and Other    Statins-Hmg-Coa Reductase Inhibitors Other    Sulfa (Sulfonamide Antibiotics) Hives    Sulfamethoxazole-Trimethoprim Other and Rash       Prior to Admission medications    Medication Sig Start Date End Date Taking? Authorizing Provider   acetaminophen (Tylenol) 325 mg tablet Take 2 tablets (650 mg) by mouth every 4 hours if needed. 3/20/24   Historical Provider, MD   amLODIPine (Norvasc) 5 mg tablet Take 1 tablet (5 mg) by mouth once daily. 8/20/24 8/20/25  Bonifacio Quinones, DO   aspirin 81 mg chewable tablet Chew 1 tablet (81 mg) once daily.    Historical Provider, MD   atorvastatin (Lipitor) 40 mg tablet Take 1 tablet (40 mg) by mouth once daily at bedtime. 8/20/24 8/20/25  Bonifacio Quinones DO   BD Ultra-Fine Lin Pen Needle 32 gauge x 5/32\" needle use 1 each 2 times daily 9/7/23   Historical Provider, MD   carvedilol (Coreg) 25 mg tablet TAKE 1 TABLET TWICE DAILY 8/21/24   Bonifacio Quinones, DO   chlorhexidine (Peridex) 0.12 % solution Swish for 30 seconds and spit 15mL of solution the night before and morning of surgery 9/6/24   Adriana Oviedo, APRN-CNP   clopidogrel (Plavix) 75 mg tablet Take 1 tablet (75 mg) by mouth once daily. 8/20/24 8/20/25  Bonifacio Quinones DO   Contour Next Gen Meter misc Dispense one meter that insurance will cover. 2/21/24   Historical Provider, MD   cyanocobalamin, vitamin B-12, (VITAMIN B-12 ORAL) Take 1 tablet by mouth once daily.    Historical Provider, MD   fish oil concentrate (Omega-3) 120-180 mg capsule Take 1 capsule (1 g) by mouth once daily. 4/28/15   Historical Provider, MD   insulin lispro (HumaLOG) 100 unit/mL injection Inject 0-0.1 mL (0-10 Units) under the skin 3 times a day with meals. Take as directed per insulin instructions. 10/25/23 8/8/24  Victor Hugo Oliva MD   metFORMIN (Glucophage) 1,000 mg tablet Take 1 tablet (1,000 mg) by mouth 2 times daily (morning and late afternoon).    " Historical Provider, MD   nitroglycerin (Nitrostat) 0.4 mg SL tablet Place 1 tablet (0.4 mg) under the tongue every 5 minutes if needed for chest pain (up to 3 doses).    Historical Provider, MD   NovoLOG Mix 70-30FlexPen U-100 100 unit/mL (70-30) injection Inject 20 Units under the skin 2 times a day with meals. 3/23/24 9/6/24  Nahid Cancino MD        PAT ROS     DASI Risk Score    No data to display       Caprini DVT Assessment    No data to display       Modified Frailty Index    No data to display       CHADS2 Stroke Risk  Current as of 57 minutes ago        N/A 3 to 100%: High Risk   2 to < 3%: Medium Risk   0 to < 2%: Low Risk     Last Change: N/A          This score determines the patient's risk of having a stroke if the patient has atrial fibrillation.        This score is not applicable to this patient. Components are not calculated.          Revised Cardiac Risk Index    No data to display       Apfel Simplified Score    No data to display       Risk Analysis Index Results This Encounter    No data found in the last 1 encounters.       After Visit Summary (AVS) reviewed and patient verbalized good understanding of medications and NPO instructions.     Nurse Plan of Action:

## 2024-09-06 NOTE — PREPROCEDURE INSTRUCTIONS
"   Medication List            Accurate as of September 6, 2024  2:41 PM. Always use your most recent med list.                acetaminophen 325 mg tablet  Commonly known as: Tylenol  Notes to patient: May take on day of surgery if needed     amLODIPine 5 mg tablet  Commonly known as: Norvasc  Take 1 tablet (5 mg) by mouth once daily.  Medication Adjustments for Surgery: Take on the morning of surgery     aspirin 81 mg chewable tablet  Medication Adjustments for Surgery: Take on the morning of surgery     atorvastatin 40 mg tablet  Commonly known as: Lipitor  Take 1 tablet (40 mg) by mouth once daily at bedtime.  Notes to patient: Ok to take the night before surgery.     BD Ultra-Fine Lin Pen Needle 32 gauge x 5/32\" needle  Generic drug: pen needle, diabetic     carvedilol 25 mg tablet  Commonly known as: Coreg  TAKE 1 TABLET TWICE DAILY  Medication Adjustments for Surgery: Take on the morning of surgery     chlorhexidine 0.12 % solution  Commonly known as: Peridex  Swish for 30 seconds and spit 15mL of solution the night before and morning of surgery     clopidogrel 75 mg tablet  Commonly known as: Plavix  Take 1 tablet (75 mg) by mouth once daily.  Additional Medication Adjustments for Surgery: Take last dose 7 days before surgery  Notes to patient: Last dose 09/02/24     Contour Next Gen Meter misc  Generic drug: blood-glucose meter     fish oil concentrate 120-180 mg capsule  Commonly known as: Omega-3  Additional Medication Adjustments for Surgery: Take last dose 7 days before surgery     insulin lispro 100 unit/mL injection  Commonly known as: HumaLOG  Inject 0-0.1 mL (0-10 Units) under the skin 3 times a day with meals. Take as directed per insulin instructions.  Medication Adjustments for Surgery: Do Not take on the morning of surgery  Notes to patient: Patient states not taking.     metFORMIN 1,000 mg tablet  Commonly known as: Glucophage  Medication Adjustments for Surgery: Do Not take on the morning of " surgery     nitroglycerin 0.4 mg SL tablet  Commonly known as: Nitrostat  Notes to patient: May take on day of surgery if needed     NovoLOG Mix 70-30FlexPen U-100 100 unit/mL (70-30) injection  Generic drug: insulin asp prt-insulin aspart  Inject 20 Units under the skin 2 times a day with meals.  Notes to patient: With evening dose take half the dose (10 units). Hold the day of surgery.     VITAMIN B-12 ORAL  Medication Adjustments for Surgery: Do Not take on the morning of surgery                      **Concerning above medication instructions, if medication is normally taken at night, continue normal schedule.**  **DO NOT TAKE NIGHT PRIOR AND MORNING OF SURGERY**    CONTACT SURGEON'S OFFICE IF YOU DEVELOP:  * Fever = 100.4 F   * New respiratory symptoms (e.g. cough, shortness of breath, respiratory distress, sore throat)  * Recent loss of taste or smell  *Flu like symptoms such as headache, fatigue or gastrointestinal symptoms  * You develop any open sores, shingles, burning or painful urination   AND/OR:  * You no longer wish to have the surgery.  * Any other personal circumstances change that may lead to the need to cancel or defer this surgery.  *You were admitted to any hospital within one week of your planned procedure.    SMOKING:  *Quitting smoking can make a huge difference to your health and recovery from surgery.    *If you need help with quitting, call 8-914-QUIT-NOW.    THE DAY BEFORE SURGERY:   Nothing by mouth (no solids or liquids) after midnight.   If diabetic, please check fasting blood sugar upon waking up.    If fasting sugar is <80 mg/dl, please drink 100ml/3oz of apple juice no later than 2 hours prior to arrival time.      SURGICAL TIME  *You will be contacted between 2 p.m. and 6 p.m. the business day before your surgery with your arrival time.  *If you haven't received a call by 6pm, call [phone].  *Scheduled surgery times may change and you will be notified if this occurs-check your  personal voicemail for any updates.    ON THE MORNING OF SURGERY:  *Wear comfortable, loose fitting clothing.   *Do not use moisturizers, creams, lotions or perfume.  *All jewelry and valuables should be left at home.  *Prosthetic devices such as contact lenses, hearing aids, dentures, eyelash extensions, hairpins and body piercing must be removed before surgery.    BRING WITH YOU:  *Photo ID and insurance card  *Current list of medicines and allergies  *Pacemaker/Defibrillator/Heart stent cards  *CPAP machine and mask  *Slings/splints/crutches  *Copy of your complete Advanced Directive/DHPOA-if applicable  *Neurostimulator implant remote    PARKING AND ARRIVAL:  *Check in at the Main Entrance desk and let them know you are here for surgery.  *You will be directed to the 2nd floor surgical waiting area.    AFTER OUTPATIENT SURGERY:  *A responsible adult MUST accompany you at the time of discharge and stay with you for 24 hours after your surgery.  *You may NOT drive yourself home after surgery.  *You may use a taxi or ride sharing service (geolad, Uber) to return home ONLY if you are accompanied by a friend or family member.  *Instructions for resuming your medications will be provided by your surgeon.       Patient Information: Oral/Dental Rinse  **This is a prescription; pick it up at your preferred local pharmacy **  What is oral/dental rinse?   It is a mouthwash. It is a way of cleaning the mouth with a germ killing solution before your surgery.  The solution contains chlorhexidine, commonly known as CHG.   It is used inside the mouth to kill a bacteria known as Staphylococcus aureus.  Let your doctor know if you are allergic to Chlorhexidine.    Why do I need to use CHG oral/dental rinse?  The CHG oral/dental rinse helps to kill a bacteria in your mouth known a Staphylococcus aureus.     This reduces the risk of infection at the surgical site.      Using your CHG oral/dental rinse  STEPS:  Use your CHG  oral/dental rinse after you brush your teeth the night before (at bedtime) and the morning of your surgery.  Follow all directions on your prescription label.    Use the cap on the container to measure 15ml (fill cap to fill line)  Swish (gargle if you can) the mouthwash in your mouth for at least 30 seconds, (do not to swallow) spit out  After you use your CHG rinse, do not rinse your mouth with water, drink or eat.  Please refer to prescription label for the appropriate time to resume oral intake  Dental rinse comes in one size bottle: 473ml ~16oz.  You will have leftover    rinse, discard after this use.    What side effects might I have using the CHG oral/dental rinse?  CHG rinse will stick to plaque on the teeth.  Brush and floss just before use.  Teeth brushing will help avoid staining of plaque during use.    Who should I contact if I have questions about the CHG oral/dental rinse?  Please call [facility], Preadmission Testing at [phone] if you have any questions    Home Preoperative Antibacterial Shower     What is a home preoperative antibacterial shower?  This shower is a way of cleaning the skin with a germ killing soap before surgery.  The soap contains chlorhexidine, commonly known as CHG.  CHG is a soap for your skin with germ killing ability.  Let your doctor know if you are allergic to chlorhexidine.    Why do I need to take a preoperative antibacterial shower?  Skin is not sterile.  It is best to try to make your skin as free of germs as possible before surgery.  Proper cleansing with a germ killing soap before surgery can lower the number of germs on your skin.  This helps to reduce the risk of infection at the surgical site.  Following the instructions listed below will help you prepare your skin for surgery.      How do I use the CHG skin cleanser?  Steps:  Begin using your CHG soap five days before your scheduled surgery on ________________________.    Days  1-4 Shower before bed:  Wash your face and genitals with your normal soap and rinse.    Wash and rinse your hair using the CHG soap. Rinse completely, do not condition your   Hair.          3.    Apply the CHG soap to a clean wet washcloth.  Turn the water off or move away                From the water spray to avoid premature rinsing of the CHG soap as you are applying.     4.   Lather your entire body from the neck down.  Do not use on your face or genitals.   Pay special attention to the area(s) where your incision(s) will be located unless they are on your face.  Avoid scrubbing your skin too hard.  The important point is to have the CHG soap sit on your skin for 3 minutes.    When the 3 minutes are up, turn on the water and rinse the CHG soap off your body completely.   Pat yourself dry with a clean, freshly-laundered towel.  Dress in clean, freshly laundered night clothes.    Be sure to sleep with clean, freshly laundered sheets.  Day 5:  Last shower is the morning before surgery: Follow above Instructions.    NOTE:    *Hair extensions should be removed    *Keep CHG soap out of eyes and ear canals   *DO NOT wash with regular soap on your body after you have used the CHG        soap solution  *DO NOT apply powders, lotions, or perfume.  *Deodorant may be used days 1-4, BUT NOT the day of surgery    Who should I contact if I have any questions regarding the use of CHG soap?  Call the [facility], Preadmission Testing at [phone] if you have any questions.              Patient Information: Pre-Operative Infection Prevention Measures     Why did I have my nose, under my arms and groin swabbed?  The purpose of the swab is to identify Staphylococcus aureus inside your nose or on your skin.  The swab was sent to the laboratory for culture.  A positive swab/culture for Staphylococcus aureus is called colonization or carriage.      What is Staphylococcus aureus?  Staphylococcus aureus,  also known as “staph”, is a germ found on the skin or in the nose of healthy people.  Sometimes Staphylococcus aureus can get into the body and cause an infection.  This can be minor (such as pimples, boils or other skin problems).  It might also be serious (such as blood infection, pneumonia or a surgical site infection).    What is Staphylococcus aureus colonization or carriage?  Colonization or carriage means that a person has the germ but is not sick from it.  These bacteria can be spread on the hands or when breathing or sneezing.    How is Staphylococcus aureus spread?  It is most often spread by close contact with a person or item that carries it.    What happens if my culture is positive for Staphylococcus aureus?  Your doctor/medical team will use this information to guide any antibiotic treatment which may be necessary.  Regardless of the culture results, we will clean the inside of your nose with a betadine swab just before you have your surgery.      Will I get an infection if I have Staphylococcus aureus in my nose or on my skin?  Anyone can get an infection with Staphylococcus aureus.  However, the best way to reduce your risk of infection is to follow the instructions provided to you for the use of your CHG soap and dental rinse.        Who should I contact if I have any questions?  Call the [facility], Preadmission Testing at [phone] if you have any questions.

## 2024-09-07 LAB
ABO GROUP (TYPE) IN BLOOD: NORMAL
ANTIBODY SCREEN: NORMAL
RH FACTOR (ANTIGEN D): NORMAL

## 2024-09-08 LAB — STAPHYLOCOCCUS SPEC CULT: NORMAL

## 2024-09-09 ENCOUNTER — APPOINTMENT (OUTPATIENT)
Dept: CARDIOLOGY | Facility: HOSPITAL | Age: 69
DRG: 164 | End: 2024-09-09
Payer: COMMERCIAL

## 2024-09-09 ENCOUNTER — HOSPITAL ENCOUNTER (INPATIENT)
Facility: HOSPITAL | Age: 69
LOS: 3 days | Discharge: HOME HEALTH | DRG: 164 | End: 2024-09-12
Attending: STUDENT IN AN ORGANIZED HEALTH CARE EDUCATION/TRAINING PROGRAM | Admitting: STUDENT IN AN ORGANIZED HEALTH CARE EDUCATION/TRAINING PROGRAM
Payer: COMMERCIAL

## 2024-09-09 DIAGNOSIS — I63.9 CEREBROVASCULAR ACCIDENT (CVA), UNSPECIFIED MECHANISM (MULTI): ICD-10-CM

## 2024-09-09 DIAGNOSIS — R91.1 LUNG NODULE: Primary | ICD-10-CM

## 2024-09-09 DIAGNOSIS — Z86.39 H/O INSULIN DEPENDENT DIABETES MELLITUS: ICD-10-CM

## 2024-09-09 DIAGNOSIS — R55 NEAR SYNCOPE: ICD-10-CM

## 2024-09-09 DIAGNOSIS — I63.9 CRYPTOGENIC STROKE (MULTI): ICD-10-CM

## 2024-09-09 DIAGNOSIS — R91.1 LUNG NODULE, SOLITARY: ICD-10-CM

## 2024-09-09 DIAGNOSIS — R55 SYNCOPE AND COLLAPSE: ICD-10-CM

## 2024-09-09 LAB
ERYTHROCYTE [DISTWIDTH] IN BLOOD BY AUTOMATED COUNT: 13 % (ref 11.5–14.5)
GLUCOSE BLD MANUAL STRIP-MCNC: 148 MG/DL (ref 74–99)
HCT VFR BLD AUTO: 35.3 % (ref 41–52)
HGB BLD-MCNC: 11.4 G/DL (ref 13.5–17.5)
MCH RBC QN AUTO: 29.2 PG (ref 26–34)
MCHC RBC AUTO-ENTMCNC: 32.3 G/DL (ref 32–36)
MCV RBC AUTO: 91 FL (ref 80–100)
NRBC BLD-RTO: 0 /100 WBCS (ref 0–0)
PLATELET # BLD AUTO: 353 X10*3/UL (ref 150–450)
RBC # BLD AUTO: 3.9 X10*6/UL (ref 4.5–5.9)
WBC # BLD AUTO: 12.6 X10*3/UL (ref 4.4–11.3)

## 2024-09-09 PROCEDURE — 1200000002 HC GENERAL ROOM WITH TELEMETRY DAILY

## 2024-09-09 PROCEDURE — 85610 PROTHROMBIN TIME: CPT | Performed by: PHYSICIAN ASSISTANT

## 2024-09-09 PROCEDURE — 2500000004 HC RX 250 GENERAL PHARMACY W/ HCPCS (ALT 636 FOR OP/ED): Performed by: PHYSICIAN ASSISTANT

## 2024-09-09 PROCEDURE — 85027 COMPLETE CBC AUTOMATED: CPT | Performed by: PHYSICIAN ASSISTANT

## 2024-09-09 PROCEDURE — 86901 BLOOD TYPING SEROLOGIC RH(D): CPT | Performed by: PHYSICIAN ASSISTANT

## 2024-09-09 PROCEDURE — 82947 ASSAY GLUCOSE BLOOD QUANT: CPT

## 2024-09-09 PROCEDURE — 99222 1ST HOSP IP/OBS MODERATE 55: CPT | Performed by: PHYSICIAN ASSISTANT

## 2024-09-09 PROCEDURE — 36415 COLL VENOUS BLD VENIPUNCTURE: CPT | Performed by: PHYSICIAN ASSISTANT

## 2024-09-09 PROCEDURE — 80048 BASIC METABOLIC PNL TOTAL CA: CPT | Performed by: PHYSICIAN ASSISTANT

## 2024-09-09 PROCEDURE — 83735 ASSAY OF MAGNESIUM: CPT | Performed by: PHYSICIAN ASSISTANT

## 2024-09-09 PROCEDURE — 93005 ELECTROCARDIOGRAM TRACING: CPT

## 2024-09-09 RX ORDER — ACETAMINOPHEN 325 MG/1
650 TABLET ORAL EVERY 4 HOURS PRN
Status: DISCONTINUED | OUTPATIENT
Start: 2024-09-09 | End: 2024-09-11

## 2024-09-09 RX ORDER — INSULIN LISPRO 100 [IU]/ML
0-5 INJECTION, SOLUTION INTRAVENOUS; SUBCUTANEOUS EVERY 4 HOURS
Status: DISCONTINUED | OUTPATIENT
Start: 2024-09-10 | End: 2024-09-11

## 2024-09-09 RX ORDER — NALOXONE HYDROCHLORIDE 0.4 MG/ML
0.2 INJECTION, SOLUTION INTRAMUSCULAR; INTRAVENOUS; SUBCUTANEOUS EVERY 5 MIN PRN
Status: DISCONTINUED | OUTPATIENT
Start: 2024-09-09 | End: 2024-09-10

## 2024-09-09 RX ORDER — CARVEDILOL 25 MG/1
25 TABLET ORAL 2 TIMES DAILY
Status: DISCONTINUED | OUTPATIENT
Start: 2024-09-09 | End: 2024-09-10

## 2024-09-09 RX ORDER — AMLODIPINE BESYLATE 5 MG/1
5 TABLET ORAL DAILY
Status: DISCONTINUED | OUTPATIENT
Start: 2024-09-10 | End: 2024-09-10

## 2024-09-09 RX ORDER — ATORVASTATIN CALCIUM 40 MG/1
40 TABLET, FILM COATED ORAL NIGHTLY
Status: DISCONTINUED | OUTPATIENT
Start: 2024-09-09 | End: 2024-09-12 | Stop reason: HOSPADM

## 2024-09-09 RX ORDER — PNV NO.95/FERROUS FUM/FOLIC AC 28MG-0.8MG
100 TABLET ORAL DAILY
Status: DISCONTINUED | OUTPATIENT
Start: 2024-09-10 | End: 2024-09-10

## 2024-09-09 RX ORDER — NAPROXEN SODIUM 220 MG/1
81 TABLET, FILM COATED ORAL DAILY
Status: DISCONTINUED | OUTPATIENT
Start: 2024-09-10 | End: 2024-09-12 | Stop reason: HOSPADM

## 2024-09-09 RX ORDER — SODIUM CHLORIDE, SODIUM LACTATE, POTASSIUM CHLORIDE, CALCIUM CHLORIDE 600; 310; 30; 20 MG/100ML; MG/100ML; MG/100ML; MG/100ML
50 INJECTION, SOLUTION INTRAVENOUS CONTINUOUS
Status: DISCONTINUED | OUTPATIENT
Start: 2024-09-09 | End: 2024-09-10

## 2024-09-09 RX ORDER — SODIUM CHLORIDE 9 MG/ML
100 INJECTION, SOLUTION INTRAVENOUS CONTINUOUS
Status: DISCONTINUED | OUTPATIENT
Start: 2024-09-09 | End: 2024-09-09

## 2024-09-09 RX ORDER — HEPARIN SODIUM 5000 [USP'U]/ML
5000 INJECTION, SOLUTION INTRAVENOUS; SUBCUTANEOUS EVERY 8 HOURS
Status: DISCONTINUED | OUTPATIENT
Start: 2024-09-09 | End: 2024-09-10

## 2024-09-09 RX ORDER — CLOPIDOGREL BISULFATE 75 MG/1
75 TABLET ORAL DAILY
Status: DISCONTINUED | OUTPATIENT
Start: 2024-09-10 | End: 2024-09-11

## 2024-09-09 RX ORDER — NITROGLYCERIN 0.4 MG/1
0.4 TABLET SUBLINGUAL EVERY 5 MIN PRN
Status: DISCONTINUED | OUTPATIENT
Start: 2024-09-09 | End: 2024-09-10

## 2024-09-09 RX ORDER — METFORMIN HYDROCHLORIDE 1000 MG/1
1000 TABLET ORAL
Status: DISCONTINUED | OUTPATIENT
Start: 2024-09-10 | End: 2024-09-11

## 2024-09-09 ASSESSMENT — PAIN - FUNCTIONAL ASSESSMENT: PAIN_FUNCTIONAL_ASSESSMENT: 0-10

## 2024-09-09 ASSESSMENT — PAIN SCALES - GENERAL: PAINLEVEL_OUTOF10: 0 - NO PAIN

## 2024-09-09 NOTE — PROGRESS NOTES
Pharmacy Medication History Review    Amandeep Lara is a 69 y.o. male who is planned to be admitted for Lung nodule, solitary. Pharmacy called the patient prior to their scheduled procedure and reviewed the patient's [id number] medications for accuracy.    Medications ADDED:  none  Medications CHANGED:  none  Medications REMOVED:   none    Please review updated prior to admission medication list and comments regarding how patient may be taking medications differently by going to Admission tab --> Admission Orders --> Admit Orders / Review prior to admission medications.     Preferred pharmacy and allergies to be confirmed with patient by nursing the day of procedure.     Sources used to complete the med history include spoke with patient's spouse (toña), surescripts, oarrs, care everywhere    Below are additional concerns with the patient's PTA list.  Patient states they stopped supplements/vitamins in preparation of procedure    Sanaz Jovel    [facility]  Please reach out via Secure Chat for questions

## 2024-09-10 ENCOUNTER — APPOINTMENT (OUTPATIENT)
Dept: RADIOLOGY | Facility: HOSPITAL | Age: 69
DRG: 164 | End: 2024-09-10
Payer: COMMERCIAL

## 2024-09-10 ENCOUNTER — ANESTHESIA (OUTPATIENT)
Dept: OPERATING ROOM | Facility: HOSPITAL | Age: 69
End: 2024-09-10
Payer: COMMERCIAL

## 2024-09-10 ENCOUNTER — ANESTHESIA EVENT (OUTPATIENT)
Dept: OPERATING ROOM | Facility: HOSPITAL | Age: 69
End: 2024-09-10
Payer: COMMERCIAL

## 2024-09-10 PROBLEM — J44.9 CHRONIC OBSTRUCTIVE PULMONARY DISEASE (MULTI): Status: ACTIVE | Noted: 2024-09-10

## 2024-09-10 PROBLEM — G47.33 OSA (OBSTRUCTIVE SLEEP APNEA): Status: ACTIVE | Noted: 2024-09-10

## 2024-09-10 LAB
ABO GROUP (TYPE) IN BLOOD: NORMAL
ANION GAP SERPL CALC-SCNC: 11 MMOL/L (ref 10–20)
ANTIBODY SCREEN: NORMAL
APTT PPP: 28 SECONDS (ref 27–38)
BUN SERPL-MCNC: 22 MG/DL (ref 6–23)
CALCIUM SERPL-MCNC: 8.7 MG/DL (ref 8.6–10.6)
CHLORIDE SERPL-SCNC: 101 MMOL/L (ref 98–107)
CO2 SERPL-SCNC: 27 MMOL/L (ref 21–32)
CREAT SERPL-MCNC: 1.62 MG/DL (ref 0.5–1.3)
EGFRCR SERPLBLD CKD-EPI 2021: 46 ML/MIN/1.73M*2
GLUCOSE BLD MANUAL STRIP-MCNC: 103 MG/DL (ref 74–99)
GLUCOSE BLD MANUAL STRIP-MCNC: 185 MG/DL (ref 74–99)
GLUCOSE BLD MANUAL STRIP-MCNC: 215 MG/DL (ref 74–99)
GLUCOSE BLD MANUAL STRIP-MCNC: 264 MG/DL (ref 74–99)
GLUCOSE BLD MANUAL STRIP-MCNC: 270 MG/DL (ref 74–99)
GLUCOSE BLD MANUAL STRIP-MCNC: 307 MG/DL (ref 74–99)
GLUCOSE BLD MANUAL STRIP-MCNC: 76 MG/DL (ref 74–99)
GLUCOSE SERPL-MCNC: 239 MG/DL (ref 74–99)
INR PPP: 0.9 (ref 0.9–1.1)
MAGNESIUM SERPL-MCNC: 1.81 MG/DL (ref 1.6–2.4)
POTASSIUM SERPL-SCNC: 4.6 MMOL/L (ref 3.5–5.3)
PROTHROMBIN TIME: 10 SECONDS (ref 9.8–12.8)
RH FACTOR (ANTIGEN D): NORMAL
SODIUM SERPL-SCNC: 134 MMOL/L (ref 136–145)

## 2024-09-10 PROCEDURE — 32663 THORACOSCOPY W/LOBECTOMY: CPT | Performed by: STUDENT IN AN ORGANIZED HEALTH CARE EDUCATION/TRAINING PROGRAM

## 2024-09-10 PROCEDURE — 1200000002 HC GENERAL ROOM WITH TELEMETRY DAILY

## 2024-09-10 PROCEDURE — 3700000001 HC GENERAL ANESTHESIA TIME - INITIAL BASE CHARGE: Performed by: STUDENT IN AN ORGANIZED HEALTH CARE EDUCATION/TRAINING PROGRAM

## 2024-09-10 PROCEDURE — 07T74ZZ RESECTION OF THORAX LYMPHATIC, PERCUTANEOUS ENDOSCOPIC APPROACH: ICD-10-PCS | Performed by: STUDENT IN AN ORGANIZED HEALTH CARE EDUCATION/TRAINING PROGRAM

## 2024-09-10 PROCEDURE — 71045 X-RAY EXAM CHEST 1 VIEW: CPT | Performed by: RADIOLOGY

## 2024-09-10 PROCEDURE — 2500000004 HC RX 250 GENERAL PHARMACY W/ HCPCS (ALT 636 FOR OP/ED): Mod: JZ | Performed by: PHYSICIAN ASSISTANT

## 2024-09-10 PROCEDURE — 88381 MICRODISSECTION MANUAL: CPT | Performed by: PATHOLOGY

## 2024-09-10 PROCEDURE — 2500000004 HC RX 250 GENERAL PHARMACY W/ HCPCS (ALT 636 FOR OP/ED): Performed by: PHYSICIAN ASSISTANT

## 2024-09-10 PROCEDURE — G0452 MOLECULAR PATHOLOGY INTERPR: HCPCS | Performed by: STUDENT IN AN ORGANIZED HEALTH CARE EDUCATION/TRAINING PROGRAM

## 2024-09-10 PROCEDURE — 0BBC4ZZ EXCISION OF RIGHT UPPER LUNG LOBE, PERCUTANEOUS ENDOSCOPIC APPROACH: ICD-10-PCS | Performed by: STUDENT IN AN ORGANIZED HEALTH CARE EDUCATION/TRAINING PROGRAM

## 2024-09-10 PROCEDURE — 2500000002 HC RX 250 W HCPCS SELF ADMINISTERED DRUGS (ALT 637 FOR MEDICARE OP, ALT 636 FOR OP/ED): Performed by: PHYSICIAN ASSISTANT

## 2024-09-10 PROCEDURE — 88332 PATH CONSLTJ SURG EA ADD BLK: CPT | Performed by: STUDENT IN AN ORGANIZED HEALTH CARE EDUCATION/TRAINING PROGRAM

## 2024-09-10 PROCEDURE — 88331 PATH CONSLTJ SURG 1 BLK 1SPC: CPT | Mod: TC,SUR | Performed by: STUDENT IN AN ORGANIZED HEALTH CARE EDUCATION/TRAINING PROGRAM

## 2024-09-10 PROCEDURE — 2720000007 HC OR 272 NO HCPCS: Performed by: STUDENT IN AN ORGANIZED HEALTH CARE EDUCATION/TRAINING PROGRAM

## 2024-09-10 PROCEDURE — 88307 TISSUE EXAM BY PATHOLOGIST: CPT | Performed by: PATHOLOGY

## 2024-09-10 PROCEDURE — A32666 PR THORACOSCOPY W/THERA WEDGE RESEXN INITIAL UNILAT: Performed by: ANESTHESIOLOGIST ASSISTANT

## 2024-09-10 PROCEDURE — 2500000005 HC RX 250 GENERAL PHARMACY W/O HCPCS: Performed by: ANESTHESIOLOGIST ASSISTANT

## 2024-09-10 PROCEDURE — 2500000005 HC RX 250 GENERAL PHARMACY W/O HCPCS: Performed by: STUDENT IN AN ORGANIZED HEALTH CARE EDUCATION/TRAINING PROGRAM

## 2024-09-10 PROCEDURE — 32668 THORACOSCOPY W/W RESECT DIAG: CPT | Performed by: STUDENT IN AN ORGANIZED HEALTH CARE EDUCATION/TRAINING PROGRAM

## 2024-09-10 PROCEDURE — 2780000003 HC OR 278 NO HCPCS: Performed by: STUDENT IN AN ORGANIZED HEALTH CARE EDUCATION/TRAINING PROGRAM

## 2024-09-10 PROCEDURE — 3700000002 HC GENERAL ANESTHESIA TIME - EACH INCREMENTAL 1 MINUTE: Performed by: STUDENT IN AN ORGANIZED HEALTH CARE EDUCATION/TRAINING PROGRAM

## 2024-09-10 PROCEDURE — 82947 ASSAY GLUCOSE BLOOD QUANT: CPT

## 2024-09-10 PROCEDURE — 32674 THORACOSCOPY LYMPH NODE EXC: CPT | Performed by: STUDENT IN AN ORGANIZED HEALTH CARE EDUCATION/TRAINING PROGRAM

## 2024-09-10 PROCEDURE — A32666 PR THORACOSCOPY W/THERA WEDGE RESEXN INITIAL UNILAT: Performed by: STUDENT IN AN ORGANIZED HEALTH CARE EDUCATION/TRAINING PROGRAM

## 2024-09-10 PROCEDURE — 88305 TISSUE EXAM BY PATHOLOGIST: CPT | Performed by: PATHOLOGY

## 2024-09-10 PROCEDURE — 88309 TISSUE EXAM BY PATHOLOGIST: CPT | Performed by: PATHOLOGY

## 2024-09-10 PROCEDURE — 3600000017 HC OR TIME - EACH INCREMENTAL 1 MINUTE - PROCEDURE LEVEL SIX: Performed by: STUDENT IN AN ORGANIZED HEALTH CARE EDUCATION/TRAINING PROGRAM

## 2024-09-10 PROCEDURE — 88360 TUMOR IMMUNOHISTOCHEM/MANUAL: CPT | Performed by: PATHOLOGY

## 2024-09-10 PROCEDURE — 8E0W4CZ ROBOTIC ASSISTED PROCEDURE OF TRUNK REGION, PERCUTANEOUS ENDOSCOPIC APPROACH: ICD-10-PCS | Performed by: STUDENT IN AN ORGANIZED HEALTH CARE EDUCATION/TRAINING PROGRAM

## 2024-09-10 PROCEDURE — 2500000001 HC RX 250 WO HCPCS SELF ADMINISTERED DRUGS (ALT 637 FOR MEDICARE OP): Performed by: PHYSICIAN ASSISTANT

## 2024-09-10 PROCEDURE — 3600000018 HC OR TIME - INITIAL BASE CHARGE - PROCEDURE LEVEL SIX: Performed by: STUDENT IN AN ORGANIZED HEALTH CARE EDUCATION/TRAINING PROGRAM

## 2024-09-10 PROCEDURE — 0BJ08ZZ INSPECTION OF TRACHEOBRONCHIAL TREE, VIA NATURAL OR ARTIFICIAL OPENING ENDOSCOPIC: ICD-10-PCS | Performed by: STUDENT IN AN ORGANIZED HEALTH CARE EDUCATION/TRAINING PROGRAM

## 2024-09-10 PROCEDURE — 81458 SO GSAP DNA CPY NMBR&MCRSTL: CPT | Performed by: STUDENT IN AN ORGANIZED HEALTH CARE EDUCATION/TRAINING PROGRAM

## 2024-09-10 PROCEDURE — 7100000002 HC RECOVERY ROOM TIME - EACH INCREMENTAL 1 MINUTE: Performed by: STUDENT IN AN ORGANIZED HEALTH CARE EDUCATION/TRAINING PROGRAM

## 2024-09-10 PROCEDURE — 7100000001 HC RECOVERY ROOM TIME - INITIAL BASE CHARGE: Performed by: STUDENT IN AN ORGANIZED HEALTH CARE EDUCATION/TRAINING PROGRAM

## 2024-09-10 PROCEDURE — 2500000004 HC RX 250 GENERAL PHARMACY W/ HCPCS (ALT 636 FOR OP/ED): Performed by: ANESTHESIOLOGIST ASSISTANT

## 2024-09-10 PROCEDURE — 0BTC0ZZ RESECTION OF RIGHT UPPER LUNG LOBE, OPEN APPROACH: ICD-10-PCS | Performed by: STUDENT IN AN ORGANIZED HEALTH CARE EDUCATION/TRAINING PROGRAM

## 2024-09-10 PROCEDURE — 88305 TISSUE EXAM BY PATHOLOGIST: CPT | Mod: TC,SUR | Performed by: STUDENT IN AN ORGANIZED HEALTH CARE EDUCATION/TRAINING PROGRAM

## 2024-09-10 PROCEDURE — 2500000002 HC RX 250 W HCPCS SELF ADMINISTERED DRUGS (ALT 637 FOR MEDICARE OP, ALT 636 FOR OP/ED): Performed by: STUDENT IN AN ORGANIZED HEALTH CARE EDUCATION/TRAINING PROGRAM

## 2024-09-10 PROCEDURE — 36620 INSERTION CATHETER ARTERY: CPT | Performed by: STUDENT IN AN ORGANIZED HEALTH CARE EDUCATION/TRAINING PROGRAM

## 2024-09-10 PROCEDURE — 71045 X-RAY EXAM CHEST 1 VIEW: CPT

## 2024-09-10 RX ORDER — SODIUM CHLORIDE 9 MG/ML
INJECTION, SOLUTION INTRAVENOUS
Status: DISCONTINUED
Start: 2024-09-10 | End: 2024-09-12 | Stop reason: HOSPADM

## 2024-09-10 RX ORDER — HYDRALAZINE HYDROCHLORIDE 20 MG/ML
INJECTION INTRAMUSCULAR; INTRAVENOUS AS NEEDED
Status: DISCONTINUED | OUTPATIENT
Start: 2024-09-10 | End: 2024-09-10

## 2024-09-10 RX ORDER — HYDROMORPHONE HYDROCHLORIDE 1 MG/ML
0.2 INJECTION, SOLUTION INTRAMUSCULAR; INTRAVENOUS; SUBCUTANEOUS EVERY 5 MIN PRN
Status: DISCONTINUED | OUTPATIENT
Start: 2024-09-10 | End: 2024-09-10 | Stop reason: HOSPADM

## 2024-09-10 RX ORDER — ONDANSETRON HYDROCHLORIDE 2 MG/ML
4 INJECTION, SOLUTION INTRAVENOUS ONCE AS NEEDED
Status: DISCONTINUED | OUTPATIENT
Start: 2024-09-10 | End: 2024-09-10 | Stop reason: HOSPADM

## 2024-09-10 RX ORDER — PHENYLEPHRINE HCL IN 0.9% NACL 0.4MG/10ML
SYRINGE (ML) INTRAVENOUS
Status: DISCONTINUED
Start: 2024-09-10 | End: 2024-09-12 | Stop reason: HOSPADM

## 2024-09-10 RX ORDER — SODIUM CHLORIDE, SODIUM LACTATE, POTASSIUM CHLORIDE, CALCIUM CHLORIDE 600; 310; 30; 20 MG/100ML; MG/100ML; MG/100ML; MG/100ML
INJECTION, SOLUTION INTRAVENOUS
Status: DISCONTINUED
Start: 2024-09-10 | End: 2024-09-12 | Stop reason: HOSPADM

## 2024-09-10 RX ORDER — ONDANSETRON HYDROCHLORIDE 2 MG/ML
INJECTION, SOLUTION INTRAVENOUS AS NEEDED
Status: DISCONTINUED | OUTPATIENT
Start: 2024-09-10 | End: 2024-09-10

## 2024-09-10 RX ORDER — CEFAZOLIN 1 G/1
INJECTION, POWDER, FOR SOLUTION INTRAVENOUS AS NEEDED
Status: DISCONTINUED | OUTPATIENT
Start: 2024-09-10 | End: 2024-09-10

## 2024-09-10 RX ORDER — FENTANYL CITRATE 50 UG/ML
INJECTION, SOLUTION INTRAMUSCULAR; INTRAVENOUS
Status: DISCONTINUED
Start: 2024-09-10 | End: 2024-09-12 | Stop reason: HOSPADM

## 2024-09-10 RX ORDER — DEXTROSE 50 % IN WATER (D50W) INTRAVENOUS SYRINGE
12.5
Status: DISCONTINUED | OUTPATIENT
Start: 2024-09-10 | End: 2024-09-12 | Stop reason: HOSPADM

## 2024-09-10 RX ORDER — HEPARIN SODIUM 5000 [USP'U]/ML
INJECTION, SOLUTION INTRAVENOUS; SUBCUTANEOUS AS NEEDED
Status: DISCONTINUED | OUTPATIENT
Start: 2024-09-10 | End: 2024-09-10

## 2024-09-10 RX ORDER — CARVEDILOL 25 MG/1
25 TABLET ORAL 2 TIMES DAILY
Status: DISCONTINUED | OUTPATIENT
Start: 2024-09-10 | End: 2024-09-12 | Stop reason: HOSPADM

## 2024-09-10 RX ORDER — PROPOFOL 10 MG/ML
INJECTION, EMULSION INTRAVENOUS
Status: COMPLETED
Start: 2024-09-10 | End: 2024-09-10

## 2024-09-10 RX ORDER — HYDROMORPHONE HYDROCHLORIDE 1 MG/ML
INJECTION, SOLUTION INTRAMUSCULAR; INTRAVENOUS; SUBCUTANEOUS AS NEEDED
Status: DISCONTINUED | OUTPATIENT
Start: 2024-09-10 | End: 2024-09-10

## 2024-09-10 RX ORDER — DROPERIDOL 2.5 MG/ML
0.62 INJECTION, SOLUTION INTRAMUSCULAR; INTRAVENOUS ONCE AS NEEDED
Status: DISCONTINUED | OUTPATIENT
Start: 2024-09-10 | End: 2024-09-10 | Stop reason: HOSPADM

## 2024-09-10 RX ORDER — PHENYLEPHRINE HYDROCHLORIDE 10 MG/ML
INJECTION INTRAVENOUS
Status: DISCONTINUED
Start: 2024-09-10 | End: 2024-09-12 | Stop reason: HOSPADM

## 2024-09-10 RX ORDER — AMLODIPINE BESYLATE 5 MG/1
5 TABLET ORAL DAILY
Status: DISCONTINUED | OUTPATIENT
Start: 2024-09-11 | End: 2024-09-12 | Stop reason: HOSPADM

## 2024-09-10 RX ORDER — LIDOCAINE HYDROCHLORIDE 20 MG/ML
INJECTION, SOLUTION INFILTRATION; PERINEURAL AS NEEDED
Status: DISCONTINUED | OUTPATIENT
Start: 2024-09-10 | End: 2024-09-10

## 2024-09-10 RX ORDER — NALOXONE HYDROCHLORIDE 0.4 MG/ML
0.2 INJECTION, SOLUTION INTRAMUSCULAR; INTRAVENOUS; SUBCUTANEOUS AS NEEDED
Status: DISCONTINUED | OUTPATIENT
Start: 2024-09-10 | End: 2024-09-12 | Stop reason: HOSPADM

## 2024-09-10 RX ORDER — CEFAZOLIN 1 G/1
INJECTION, POWDER, FOR SOLUTION INTRAVENOUS
Status: DISCONTINUED
Start: 2024-09-10 | End: 2024-09-12 | Stop reason: HOSPADM

## 2024-09-10 RX ORDER — INDOCYANINE GREEN AND WATER 25 MG
KIT INJECTION AS NEEDED
Status: DISCONTINUED | OUTPATIENT
Start: 2024-09-10 | End: 2024-09-10

## 2024-09-10 RX ORDER — MIDAZOLAM HYDROCHLORIDE 1 MG/ML
INJECTION INTRAMUSCULAR; INTRAVENOUS
Status: DISCONTINUED
Start: 2024-09-10 | End: 2024-09-12 | Stop reason: HOSPADM

## 2024-09-10 RX ORDER — HYDRALAZINE HYDROCHLORIDE 20 MG/ML
INJECTION INTRAMUSCULAR; INTRAVENOUS
Status: COMPLETED
Start: 2024-09-10 | End: 2024-09-10

## 2024-09-10 RX ORDER — CEFAZOLIN SODIUM 2 G/100ML
2 INJECTION, SOLUTION INTRAVENOUS EVERY 8 HOURS
Status: COMPLETED | OUTPATIENT
Start: 2024-09-10 | End: 2024-09-11

## 2024-09-10 RX ORDER — DEXTROSE 50 % IN WATER (D50W) INTRAVENOUS SYRINGE
25
Status: DISCONTINUED | OUTPATIENT
Start: 2024-09-10 | End: 2024-09-11

## 2024-09-10 RX ORDER — HYDROMORPHONE HYDROCHLORIDE 1 MG/ML
0.5 INJECTION, SOLUTION INTRAMUSCULAR; INTRAVENOUS; SUBCUTANEOUS EVERY 5 MIN PRN
Status: DISCONTINUED | OUTPATIENT
Start: 2024-09-10 | End: 2024-09-10 | Stop reason: HOSPADM

## 2024-09-10 RX ORDER — GLYCOPYRROLATE 0.2 MG/ML
INJECTION INTRAMUSCULAR; INTRAVENOUS AS NEEDED
Status: DISCONTINUED | OUTPATIENT
Start: 2024-09-10 | End: 2024-09-10

## 2024-09-10 RX ORDER — HYDROMORPHONE HCL/0.9% NACL/PF 15 MG/30ML
PATIENT CONTROLLED ANALGESIA SYRINGE INTRAVENOUS CONTINUOUS
Status: DISCONTINUED | OUTPATIENT
Start: 2024-09-10 | End: 2024-09-11

## 2024-09-10 RX ORDER — PHENYLEPHRINE 10 MG/250 ML(40 MCG/ML)IN 0.9 % SOD.CHLORIDE INTRAVENOUS
CONTINUOUS PRN
Status: DISCONTINUED | OUTPATIENT
Start: 2024-09-10 | End: 2024-09-10

## 2024-09-10 RX ORDER — LABETALOL HYDROCHLORIDE 5 MG/ML
5 INJECTION, SOLUTION INTRAVENOUS ONCE AS NEEDED
Status: DISCONTINUED | OUTPATIENT
Start: 2024-09-10 | End: 2024-09-10 | Stop reason: HOSPADM

## 2024-09-10 RX ORDER — ROCURONIUM BROMIDE 10 MG/ML
INJECTION, SOLUTION INTRAVENOUS
Status: DISCONTINUED
Start: 2024-09-10 | End: 2024-09-12 | Stop reason: HOSPADM

## 2024-09-10 RX ORDER — GLYCOPYRROLATE 0.2 MG/ML
INJECTION INTRAMUSCULAR; INTRAVENOUS
Status: DISCONTINUED
Start: 2024-09-10 | End: 2024-09-12 | Stop reason: HOSPADM

## 2024-09-10 RX ORDER — ROCURONIUM BROMIDE 10 MG/ML
INJECTION, SOLUTION INTRAVENOUS AS NEEDED
Status: DISCONTINUED | OUTPATIENT
Start: 2024-09-10 | End: 2024-09-10

## 2024-09-10 RX ORDER — FENTANYL CITRATE 50 UG/ML
12.5 INJECTION, SOLUTION INTRAMUSCULAR; INTRAVENOUS EVERY 5 MIN PRN
Status: DISCONTINUED | OUTPATIENT
Start: 2024-09-10 | End: 2024-09-10 | Stop reason: HOSPADM

## 2024-09-10 RX ORDER — HYDROMORPHONE HYDROCHLORIDE 1 MG/ML
INJECTION, SOLUTION INTRAMUSCULAR; INTRAVENOUS; SUBCUTANEOUS
Status: COMPLETED
Start: 2024-09-10 | End: 2024-09-10

## 2024-09-10 RX ORDER — ALBUTEROL SULFATE 0.83 MG/ML
2.5 SOLUTION RESPIRATORY (INHALATION) ONCE AS NEEDED
Status: COMPLETED | OUTPATIENT
Start: 2024-09-10 | End: 2024-09-10

## 2024-09-10 RX ORDER — PHENYLEPHRINE HCL IN 0.9% NACL 0.4MG/10ML
SYRINGE (ML) INTRAVENOUS AS NEEDED
Status: DISCONTINUED | OUTPATIENT
Start: 2024-09-10 | End: 2024-09-10

## 2024-09-10 RX ORDER — SEVOFLURANE 250 ML/250ML
LIQUID RESPIRATORY (INHALATION)
Status: DISCONTINUED
Start: 2024-09-10 | End: 2024-09-12 | Stop reason: HOSPADM

## 2024-09-10 RX ORDER — HYDRALAZINE HYDROCHLORIDE 20 MG/ML
10 INJECTION INTRAMUSCULAR; INTRAVENOUS ONCE
Status: COMPLETED | OUTPATIENT
Start: 2024-09-10 | End: 2024-09-10

## 2024-09-10 RX ORDER — ACETAMINOPHEN 10 MG/ML
INJECTION, SOLUTION INTRAVENOUS AS NEEDED
Status: DISCONTINUED | OUTPATIENT
Start: 2024-09-10 | End: 2024-09-10

## 2024-09-10 RX ORDER — ROCURONIUM BROMIDE 10 MG/ML
INJECTION, SOLUTION INTRAVENOUS
Status: COMPLETED
Start: 2024-09-10 | End: 2024-09-10

## 2024-09-10 RX ORDER — PROPOFOL 10 MG/ML
INJECTION, EMULSION INTRAVENOUS CONTINUOUS PRN
Status: DISCONTINUED | OUTPATIENT
Start: 2024-09-10 | End: 2024-09-10

## 2024-09-10 RX ORDER — IPRATROPIUM BROMIDE AND ALBUTEROL SULFATE 2.5; .5 MG/3ML; MG/3ML
3 SOLUTION RESPIRATORY (INHALATION) EVERY 6 HOURS PRN
Status: DISCONTINUED | OUTPATIENT
Start: 2024-09-10 | End: 2024-09-11

## 2024-09-10 RX ORDER — FENTANYL CITRATE 50 UG/ML
INJECTION, SOLUTION INTRAMUSCULAR; INTRAVENOUS AS NEEDED
Status: DISCONTINUED | OUTPATIENT
Start: 2024-09-10 | End: 2024-09-10

## 2024-09-10 RX ORDER — AMOXICILLIN 250 MG
2 CAPSULE ORAL 2 TIMES DAILY
Status: DISCONTINUED | OUTPATIENT
Start: 2024-09-10 | End: 2024-09-12 | Stop reason: HOSPADM

## 2024-09-10 RX ORDER — LIDOCAINE HCL/PF 100 MG/5ML
SYRINGE (ML) INTRAVENOUS
Status: DISCONTINUED
Start: 2024-09-10 | End: 2024-09-12 | Stop reason: HOSPADM

## 2024-09-10 RX ORDER — HEPARIN SODIUM 5000 [USP'U]/ML
5000 INJECTION, SOLUTION INTRAVENOUS; SUBCUTANEOUS EVERY 8 HOURS
Status: DISCONTINUED | OUTPATIENT
Start: 2024-09-10 | End: 2024-09-12 | Stop reason: HOSPADM

## 2024-09-10 RX ORDER — INDOCYANINE GREEN AND WATER 25 MG
KIT INJECTION
Status: COMPLETED
Start: 2024-09-10 | End: 2024-09-10

## 2024-09-10 RX ORDER — SODIUM CHLORIDE, SODIUM LACTATE, POTASSIUM CHLORIDE, CALCIUM CHLORIDE 600; 310; 30; 20 MG/100ML; MG/100ML; MG/100ML; MG/100ML
100 INJECTION, SOLUTION INTRAVENOUS CONTINUOUS
Status: DISCONTINUED | OUTPATIENT
Start: 2024-09-10 | End: 2024-09-10 | Stop reason: HOSPADM

## 2024-09-10 RX ORDER — MIDAZOLAM HYDROCHLORIDE 1 MG/ML
INJECTION INTRAMUSCULAR; INTRAVENOUS AS NEEDED
Status: DISCONTINUED | OUTPATIENT
Start: 2024-09-10 | End: 2024-09-10

## 2024-09-10 RX ORDER — LIDOCAINE HYDROCHLORIDE 10 MG/ML
0.1 INJECTION, SOLUTION INFILTRATION; PERINEURAL ONCE
Status: DISCONTINUED | OUTPATIENT
Start: 2024-09-10 | End: 2024-09-10 | Stop reason: HOSPADM

## 2024-09-10 RX ORDER — ONDANSETRON HYDROCHLORIDE 2 MG/ML
INJECTION, SOLUTION INTRAVENOUS
Status: COMPLETED
Start: 2024-09-10 | End: 2024-09-11

## 2024-09-10 RX ORDER — BUPIVACAINE HCL/EPINEPHRINE 0.25-.0005
VIAL (ML) INJECTION AS NEEDED
Status: DISCONTINUED | OUTPATIENT
Start: 2024-09-10 | End: 2024-09-10 | Stop reason: HOSPADM

## 2024-09-10 RX ORDER — ONDANSETRON HYDROCHLORIDE 2 MG/ML
4 INJECTION, SOLUTION INTRAVENOUS EVERY 8 HOURS PRN
Status: DISCONTINUED | OUTPATIENT
Start: 2024-09-10 | End: 2024-09-12 | Stop reason: HOSPADM

## 2024-09-10 SDOH — HEALTH STABILITY: MENTAL HEALTH: CURRENT SMOKER: 0

## 2024-09-10 ASSESSMENT — COGNITIVE AND FUNCTIONAL STATUS - GENERAL
PERSONAL GROOMING: A LITTLE
PERSONAL GROOMING: A LITTLE
EATING MEALS: A LITTLE
TURNING FROM BACK TO SIDE WHILE IN FLAT BAD: A LITTLE
TOILETING: A LITTLE
DAILY ACTIVITIY SCORE: 18
WALKING IN HOSPITAL ROOM: A LITTLE
HELP NEEDED FOR BATHING: A LITTLE
CLIMB 3 TO 5 STEPS WITH RAILING: A LITTLE
CLIMB 3 TO 5 STEPS WITH RAILING: A LITTLE
DRESSING REGULAR UPPER BODY CLOTHING: A LITTLE
MOVING TO AND FROM BED TO CHAIR: A LITTLE
EATING MEALS: A LITTLE
DRESSING REGULAR LOWER BODY CLOTHING: A LITTLE
HELP NEEDED FOR BATHING: A LITTLE
MOBILITY SCORE: 18
TOILETING: A LITTLE
STANDING UP FROM CHAIR USING ARMS: A LITTLE
DAILY ACTIVITIY SCORE: 18
MOVING FROM LYING ON BACK TO SITTING ON SIDE OF FLAT BED WITH BEDRAILS: A LITTLE
MOVING FROM LYING ON BACK TO SITTING ON SIDE OF FLAT BED WITH BEDRAILS: A LITTLE
WALKING IN HOSPITAL ROOM: A LITTLE
DRESSING REGULAR LOWER BODY CLOTHING: A LITTLE
MOVING TO AND FROM BED TO CHAIR: A LITTLE
DRESSING REGULAR UPPER BODY CLOTHING: A LITTLE
STANDING UP FROM CHAIR USING ARMS: A LITTLE
MOBILITY SCORE: 18
TURNING FROM BACK TO SIDE WHILE IN FLAT BAD: A LITTLE

## 2024-09-10 ASSESSMENT — PAIN - FUNCTIONAL ASSESSMENT
PAIN_FUNCTIONAL_ASSESSMENT: 0-10
PAIN_FUNCTIONAL_ASSESSMENT: UNABLE TO SELF-REPORT
PAIN_FUNCTIONAL_ASSESSMENT: 0-10
PAIN_FUNCTIONAL_ASSESSMENT: UNABLE TO SELF-REPORT
PAIN_FUNCTIONAL_ASSESSMENT: UNABLE TO SELF-REPORT

## 2024-09-10 ASSESSMENT — PAIN SCALES - GENERAL
PAINLEVEL_OUTOF10: 0 - NO PAIN
PAIN_LEVEL: 7
PAINLEVEL_OUTOF10: 7
PAINLEVEL_OUTOF10: 7
PAINLEVEL_OUTOF10: 0 - NO PAIN
PAINLEVEL_OUTOF10: 0 - NO PAIN
PAINLEVEL_OUTOF10: 7
PAINLEVEL_OUTOF10: 0 - NO PAIN

## 2024-09-10 ASSESSMENT — COLUMBIA-SUICIDE SEVERITY RATING SCALE - C-SSRS
1. IN THE PAST MONTH, HAVE YOU WISHED YOU WERE DEAD OR WISHED YOU COULD GO TO SLEEP AND NOT WAKE UP?: NO
2. HAVE YOU ACTUALLY HAD ANY THOUGHTS OF KILLING YOURSELF?: NO
6. HAVE YOU EVER DONE ANYTHING, STARTED TO DO ANYTHING, OR PREPARED TO DO ANYTHING TO END YOUR LIFE?: NO

## 2024-09-10 NOTE — H&P
History Of Present Illness  Amandeep Lara is a 69 y.o. male presenting with a lung nodule, prior to surgery tomorrow.     Past Medical History  Past Medical History:   Diagnosis Date    Cataract     Cerebral vascular accident (Multi)     10/2023    Cervical disc disease     COPD (chronic obstructive pulmonary disease) (Multi)     Coronary artery disease     Diabetes mellitus (Multi)     Hyperlipidemia     Hypertension     Immune deficiency disorder (Multi)     Lung nodules     Personal history of other diseases of the circulatory system     History of hypertension    Personal history of other endocrine, nutritional and metabolic disease     History of diabetes mellitus    Skin cancer     right index finger top of it was removed    Sleep apnea     sleep study done/unknown results    Stroke (Multi)     october 2023    Type 2 diabetes mellitus (Multi)        Surgical History  Past Surgical History:   Procedure Laterality Date    CARPAL TUNNEL RELEASE  04/28/2015    Neuroplasty Decompression Median Nerve At Carpal Tunnel    CATARACT EXTRACTION Right     01/2024    CT ANGIO NECK  06/03/2021    CT NECK ANGIO W AND WO IV CONTRAST 6/3/2021 [facility]    CT HEAD ANGIO W AND WO IV CONTRAST  06/03/2021    CT HEAD ANGIO W AND WO IV CONTRAST 6/3/2021 [facility]    MR HEAD ANGIO WO IV CONTRAST  04/01/2021    MR HEAD ANGIO WO IV CONTRAST 4/1/2021 [facility]    MR HEAD ANGIO WO IV CONTRAST  04/01/2021    MR HEAD ANGIO WO IV CONTRAST    NECK SURGERY  12/27/2021    Neck Surgery    OTHER SURGICAL HISTORY  12/27/2021    Surgery    OTHER SURGICAL HISTORY  12/27/2021    Cardiac catheterization    OTHER SURGICAL HISTORY  12/27/2021    Finger amputation    OTHER SURGICAL HISTORY  12/27/2021    Colonoscopy    OTHER SURGICAL HISTORY  12/27/2021    Wrist surgery    OTHER SURGICAL HISTORY      Tailbone    ROTATOR CUFF REPAIR  12/27/2021    Rotator Cuff Repair    TONSILLECTOMY  04/28/2015    Tonsillectomy         Social History  He reports that he quit smoking about 11 months ago. His smoking use included cigarettes. He has never been exposed to tobacco smoke. He has never used smokeless tobacco. He reports current alcohol use. He reports that he does not use drugs.    Family History  Family History   Problem Relation Name Age of Onset    Other (malignant neoplasm of urinary bladder) Mother      Other (arteriosclerotic cardiovascular disease) Father      Other (malignant neoplasm of prostate) Father      Other (arteriosclerotic cardiovascular disease) Brother          Allergies  Ether, Statins-hmg-coa reductase inhibitors, Sulfa (sulfonamide antibiotics), and Sulfamethoxazole-trimethoprim    Review of Systems  + SOB, cough; denies weight loss  Remaining ROS negative     Physical Exam  General: He is a pleasant male currently in no distress.  Body mass index is 34.20 kg/m².   HEENT: Normocephalic and atraumatic.   NECK: Supple. Trach midline. No JVD.   CHEST: Breathing comfortably on room air. Lungs diminished in bases bilat    HEART: Regular rate and rhythm. NSR with rate in 60s per tele review.   ABDOMEN: Soft, ND, nontender, +BS. obese   NEUROLOGIC: Alert and oriented. Grossly intact.   EXTREMITIES: Moves all extremities equally. Trace lower extremity edema bilat. No calf tenderness.     Last Recorded Vitals  Blood pressure 148/73, pulse 67, temperature 37.2 °C (99 °F), temperature source Temporal, resp. rate 18, SpO2 96%.    Relevant Results  Scheduled medications  [START ON 9/10/2024] amLODIPine, 5 mg, oral, Daily  [START ON 9/10/2024] aspirin, 81 mg, oral, Daily  atorvastatin, 40 mg, oral, Nightly  carvedilol, 25 mg, oral, BID  [Held by provider] clopidogrel, 75 mg, oral, Daily  [START ON 9/10/2024] cyanocobalamin, 100 mcg, oral, Daily  heparin (porcine), 5,000 Units, subcutaneous, q8h  [Held by provider] metFORMIN, 1,000 mg, oral, BID      Continuous medications  lactated Ringer's, 50 mL/hr      PRN medications  PRN  medications: acetaminophen, naloxone, [Held by provider] nitroglycerin     Results for orders placed or performed in visit on 09/06/24 (from the past 96 hour(s))   Type And Screen   Result Value Ref Range    ABO TYPE A     Rh TYPE POS     ANTIBODY SCREEN NEG         CT chest reviewed as shown below:  CT chest wo IV contrast 08/23/2024  Narrative  Interpreted By:  Berna Fonseca,  STUDY:  CT CHEST WO IV CONTRAST;  8/23/2024 12:34 pm    INDICATION:  68 y/o   M with  Signs/Symptoms:lung nodules.    ,R91.8 Other nonspecific abnormal finding of lung field    COMPARISON:  03/20/2024    ACCESSION NUMBER(S):  [id number]    ORDERING CLINICIAN:  LAWRENCE SOLIMAN    TECHNIQUE:  Helical unenhanced axial images are taken from the thoracic inlet  through the upper abdomen with sagittal and coronal reformations  completed by the technologist at the acquisition scanner.    FINDINGS:  LIMITATIONS/LINES:   None.    HEART:   Heart is within normal limits of size. There is a small  pericardial effusion seen which measures up to 11 mm in depth and  appears minimally larger. Coronary artery calcifications are seen.    MEDIASTINUM/RAFFI:   There is no change in the number or size of the  mediastinal lymph nodes with the these lymph nodes within  normal-sized limits. There is no obvious hilar mass or  lymphadenopathy on this unenhanced study.    PLEURA:   Unremarkable.    LUNG PARENCHYMA:   Pulmonary hyperinflation is noted with hyperlucent  lungs identified secondary to emphysematous change. There is chronic  airspace consolidation within the medial segment of the right middle  lobe with mild traction bronchiectasis unchanged. There is also a  discoid area of chronic atelectasis within the lingula. There is  however a 1.9 x 2.8 x 3.1 cm lobulated mass with irregular spiculated  margins identified within the anterior segment of the right upper  lobe which extends to the visceral pleural surface. This has  increased in size since the prior  examination at which time it  measured 1.4 x 2.3 x 2.3 cm in size. This should be considered  bronchogenic carcinoma until proven otherwise. Within the left upper  lobe, there is a 5 mm ground-glass opacity observed on series 203  axial image 81 which is unchanged. There are some tiny calcified  granuloma noted bilaterally as well which are stable. There is septal  thickening at each lung base unchanged.    BONES:   No osteolytic or osteoblastic bony lesions are seen.    CHEST WALL/OTHER:   Unremarkable.    On images taken through upper abdomen, there is a 5.2 cm right renal  cyst containing a very small and thin calcification within its wall.  A 9 mm left renal cyst is seen. Nonobstructing calculi are present  within each kidney. Once again, note is made of a 2 cm cystic lesion  within the pancreatic tail.    Impression  Increased size of the lobulated right upper lobe mass seen within the  anterior segment with this mass now measuring 1.9 x 2.8 x 3.1 cm in  size consistent with bronchogenic carcinoma extending to the visceral  pleural surface.    Stable mediastinal lymph nodes which are within normal-sized limits.    Stable 5 mm left upper lobe ground-glass opacity.    No evidence for hepatic, adrenal, or bony metastases within the  field-of-view.    Pulmonary emphysema with septal thickening at each lung base  unchanged.    Stable 2 cm cystic lesion within the pancreatic tail with 5.2 cm  right renal cyst and with bilateral nonobstructing intrarenal calculi.    MACRO:  None    Signed by: Berna Fonseca 8/27/2024 6:09 PM  Dictation workstation:   [id number]      Assessment/Plan   Assessment & Plan  Lung nodule, solitary    Lung nodule      Assessment:  Pt is a 68y/o M who presents for a robotic right upper lobe wedge possible lobectomy tomorrow.     Plan:  Neurology:   -PO analgesics PRN  -Bowel regimen available for constipation   -Out of bed to the chair throughout the day  -Encourage ambulation as tolerated      Cardiovascular: h/o HTN, HLD, PAD, CAD, stroke  -Continue telemetry  -cont Q4h VS  -hold home plavix  -Continue home regimen of statin  -cont home aspirin, carvedilol  -Replace electrolytes as needed for K >4, Mg >2     Pulmonology:  h/o COPD, lung nodule  -Encourage incentive spirometer use every hour  -OR tomorrow for lung nodule resection     Gastrointestinal:   -NPO for OR tomorrow  -IV fluids ordered at 50ml/h  -Zofran available for nausea  -PRN bowel regimen     Genitourinary:   -Monitor daily electrolytes with routine BMPs   -monitor I's and O's closely     Infectious Disease: afebrile; leukocytosis per labs on 9/6; await repeat labs  -Q4h VS  -daily CBC     Hematology/DVT Prophylaxis:   -Continue subcutaneous heparin and SCDs,   -encourage ambulation:     Endocrine: h/o DM  -cont Accuchecks  -cont SSI  -hold Metformin in grady-op setting     Disposition:  -Plan for discharge to home once stable from a surgical standpoint.  -Continue to assess for home-going needs      Patient seen and examined by this provider.     I spent 60 minutes in the professional and overall care of this patient.      LA Way-C  Thoracic Surgery, #[id number]

## 2024-09-10 NOTE — ANESTHESIA PREPROCEDURE EVALUATION
Patient: Amandeep Lara    Procedure Information       Date/Time: 09/10/24 0645    Procedure: Robotic assisted RUL wedge resection with possible lobectomy, MLND (Right: Chest)    Location: [facility] OR 14 / [facility] OR    Surgeons: Siena Momin MD          ALLERGIES:  Allergies   Allergen Reactions    Ether Unknown, Nausea And Vomiting and Other    Statins-Hmg-Coa Reductase Inhibitors Other    Sulfa (Sulfonamide Antibiotics) Hives    Sulfamethoxazole-Trimethoprim Other and Rash        MEDICAL HISTORY:  Past Medical History:   Diagnosis Date    Cataract     Cerebral vascular accident (Multi)     10/2023    Cervical disc disease     COPD (chronic obstructive pulmonary disease) (Multi)     Coronary artery disease     Diabetes mellitus (Multi)     Hyperlipidemia     Hypertension     Immune deficiency disorder (Multi)     Lung nodules     Personal history of other diseases of the circulatory system     History of hypertension    Personal history of other endocrine, nutritional and metabolic disease     History of diabetes mellitus    Skin cancer     right index finger top of it was removed    Sleep apnea     sleep study done/unknown results    Stroke (Multi)     october 2023    Type 2 diabetes mellitus (Multi)         Relevant Problems   Anesthesia   + Pt has allergy to 'ether'. Unsure of reaction; happened as a child.       Cardiac   (+) Arteriosclerosis of coronary artery   (+) CAD (coronary artery disease)   (+) Chest pain   (+) Essential hypertension   (+) Heart murmur   (+) Hyperlipidemia   (+) Peripheral arterial disease (CMS-HCC)   (+) Primary hypertension      Pulmonary   (+) Chronic obstructive pulmonary disease (Multi)   (+) LUIS FELIPE (obstructive sleep apnea)      Neuro   (+) Cerebrovascular accident (CVA) (Multi)   (+) Cryptogenic stroke (Multi)   (+) 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 2 diabetes mellitus (Multi)   (+) 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   (+) Stroke (Multi)     "  Endocrine   (+) Class 1 obesity with body mass index (BMI) of 33.0 to 33.9 in adult   (+) Obesity   (+) 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 2 diabetes mellitus (Multi)   (+) Type 2 diabetes mellitus without retinopathy (Multi)      Respiratory   (+) Lung nodule      Tobacco   (+) Smokes tobacco daily        SURGICAL HISTORY:  Past Surgical History:   Procedure Laterality Date    CARPAL TUNNEL RELEASE  04/28/2015    Neuroplasty Decompression Median Nerve At Carpal Tunnel    CATARACT EXTRACTION Right     01/2024    CT ANGIO NECK  06/03/2021    CT NECK ANGIO W AND WO IV CONTRAST 6/3/2021 [facility]    CT HEAD ANGIO W AND WO IV CONTRAST  06/03/2021    CT HEAD ANGIO W AND WO IV CONTRAST 6/3/2021 [facility]    MR HEAD ANGIO WO IV CONTRAST  04/01/2021    MR HEAD ANGIO WO IV CONTRAST 4/1/2021 [facility]    MR HEAD ANGIO WO IV CONTRAST  04/01/2021    MR HEAD ANGIO WO IV CONTRAST    NECK SURGERY  12/27/2021    Neck Surgery    OTHER SURGICAL HISTORY  12/27/2021    Surgery    OTHER SURGICAL HISTORY  12/27/2021    Cardiac catheterization    OTHER SURGICAL HISTORY  12/27/2021    Finger amputation    OTHER SURGICAL HISTORY  12/27/2021    Colonoscopy    OTHER SURGICAL HISTORY  12/27/2021    Wrist surgery    OTHER SURGICAL HISTORY      Tailbone    ROTATOR CUFF REPAIR  12/27/2021    Rotator Cuff Repair    TONSILLECTOMY  04/28/2015    Tonsillectomy        MEDICATIONS:  Current Outpatient Medications   Medication Instructions    acetaminophen (TYLENOL) 650 mg, oral, Every 4 hours PRN    amLODIPine (NORVASC) 5 mg, oral, Daily    aspirin 81 mg, oral, Daily    atorvastatin (LIPITOR) 40 mg, oral, Nightly    BD Ultra-Fine Lin Pen Needle 32 gauge x 5/32\" needle use 1 each 2 times daily    carvedilol (COREG) 25 mg, oral, 2 times daily    clopidogrel (PLAVIX) 75 mg, oral, Daily    Contour Next Gen Meter misc Dispense one meter that insurance will cover.    cyanocobalamin, vitamin B-12, (VITAMIN B-12 ORAL) 1 tablet, " oral, Daily    fish oil concentrate (Omega-3) 120-180 mg capsule 1 capsule, oral, Daily    insulin lispro (HUMALOG) 0-10 Units, subcutaneous, 3 times daily (morning, midday, late afternoon), Take as directed per insulin instructions.    metFORMIN (GLUCOPHAGE) 1,000 mg, oral, 2 times daily (morning and late afternoon)    nitroglycerin (NITROSTAT) 0.4 mg, sublingual, Every 5 min PRN    NovoLOG Mix 70-30FlexPen U-100 100 unit/mL (70-30) injection 20 Units, subcutaneous, 2 times daily (morning and late afternoon)        VITALS:      9/10/2024     6:33 AM 9/10/2024     4:28 AM 9/10/2024     2:38 AM   Vitals   Systolic 169 173 166   Diastolic 77 74 70   Heart Rate 68 65 69   Temp 36.9 °C (98.4 °F) 36.9 °C (98.4 °F) 36.9 °C (98.4 °F)   Resp 19 18 18   Weight (lb) 226.9     BMI 34.5 kg/m2     BSA (m2) 2.22 m2         LABS:   BMP   Lab Results   Component Value Date    GLUCOSE 239 (H) 09/09/2024    CALCIUM 8.7 09/09/2024     (L) 09/09/2024    K 4.6 09/09/2024    CO2 27 09/09/2024     09/09/2024    BUN 22 09/09/2024    CREATININE 1.62 (H) 09/09/2024   , LFT   Lab Results   Component Value Date    ALT 10 06/12/2024    AST 10 06/12/2024    ALKPHOS 85 06/12/2024    BILITOT 0.4 06/12/2024   , CBC  Lab Results   Component Value Date    WBC 12.6 (H) 09/09/2024    HGB 11.4 (L) 09/09/2024    HCT 35.3 (L) 09/09/2024    MCV 91 09/09/2024     09/09/2024      Lab Results   Component Value Date/Time    PROTIME 10.0 09/09/2024 2332    INR 0.9 09/09/2024 2332    APTT 28 09/09/2024 2332        IMAGES:  ECHO         Transthoracic Echo (TTE) Complete 07/22/2024    [facility]  [address], [place] [address]  Tel [phone] Fax [phone]    TRANSTHORACIC ECHOCARDIOGRAM REPORT    Patient Name:      HOLLEY Holland Physician:    [id number] Stanley Olivarez MD, [facility]  Study Date:        7/22/2024            Ordering Provider:    [id number] LAWRENCE SOILMAN  MRN/PID:            [id number]             Fellow:  Accession#:        [id number]         Nurse:  Date of Birth/Age: 1955 / 69 years Sonographer:          Colleen Fine RDCS, RDMS, RVT  Gender:            M                    Additional Staff:  Height:            172.72 cm            Admit Date:  Weight:            102.06 kg            Admission Status:     Outpatient  BSA / BMI:         2.15 m2 / 34.21      Department Location:  [facility]  kg/m2                                      [facility]    Study Type:    TRANSTHORACIC ECHO (TTE) COMPLETE  Diagnosis/ICD: Encounter for preprocedural cardiovascular examination-Z01.810  Indication:    POC- Lung, CAD, CVA, COPD, DM, HTN  CPT Codes:     Echo Complete w Full Doppler-19879  Study Detail: The following Echo studies were performed: 2D, M-Mode, Doppler and  color flow.      PHYSICIAN INTERPRETATION:  Left Ventricle: Left ventricular ejection fraction is mildly decreased, by visual estimate at 45-50%. There are no regional wall motion abnormalities. The left ventricular cavity size is normal. The left ventricular septal wall thickness is severely increased. There is moderately increased left ventricular posterior wall thickness. Spectral Doppler shows an impaired relaxation pattern of left ventricular diastolic filling.  Left Atrium: The left atrium is normal in size. Left atrial volume index 27.3 mL/m2.  Right Ventricle: The right ventricle is normal in size. There is normal right ventricular global systolic function.  Right Atrium: The right atrium is normal in size.  Aortic Valve: The aortic valve appears structurally normal. The aortic valve dimensionless index is 0.71. There is no evidence of aortic valve regurgitation. The peak instantaneous gradient of the aortic valve is 2.9 mmHg. The mean gradient of the aortic valve is 2.0 mmHg.  Mitral Valve: The mitral valve is mildly thickened. There is mild mitral annular calcification. There is mild to moderate mitral  valve regurgitation. 2+ mitral regurgitation.  Tricuspid Valve: The tricuspid valve is structurally normal. No evidence of tricuspid regurgitation. Mild (1+) tricuspid regurgitation with estimated RVSP of 20 mmHg.  Pulmonic Valve: The pulmonic valve is structurally normal. There is trace pulmonic valve regurgitation.  Pericardium: There is a trivial pericardial effusion. The effusion is circumferential.  Aorta: The aortic root is normal. There is mild dilatation of the ascending aorta. The ascending aorta measures 3.6 cm.  Systemic Veins: The inferior vena cava appears to be of normal size. There is IVC inspiratory collapse greater than 50%.      CONCLUSIONS:  1. Left ventricular ejection fraction is mildly decreased, by visual estimate at 45-50%.  2. Spectral Doppler shows an impaired relaxation pattern of left ventricular diastolic filling.  3. Severely increased left ventricular septal thickness.  4. The left ventricular posterior wall thickness is moderately increased.  5. There is normal right ventricular global systolic function.  6. Mild to moderate mitral valve regurgitation.  7. Mild (1+) tricuspid regurgitation with estimated RVSP of 20 mmHg.  8. The ascending aorta measures 3.6 cm.  9. Comparison study dated 10/23/23 showed an LVEF 50%. 1+ MR and trivial TR. Negative bubble study. Moderate LVH. JOSE on 10/25/23 showed moderate MR.    QUANTITATIVE DATA SUMMARY:  2D MEASUREMENTS:  Normal Ranges:  Ao Root d:     3.60 cm    (2.0-3.7cm)  LAs:           4.30 cm    (2.7-4.0cm)  RVIDd:         3.01 cm    (0.9-3.6cm)  IVSd:          1.88 cm    (0.6-1.1cm)  LVPWd:         1.70 cm    (0.6-1.1cm)  LVIDd:         5.40 cm    (3.9-5.9cm)  LVIDs:         4.23 cm  LV Mass Index: 219.7 g/m2  LV % FS        21.7 %    AORTA MEASUREMENTS:  Normal Ranges:  Asc Ao, d: 3.60 cm (2.1-3.4cm)    LV SYSTOLIC FUNCTION BY 2D PLANIMETRY (MOD):  Normal Ranges:  EF-A4C View:    46 % (>=55%)  EF-A2C View:    24 %  EF-Biplane:     35  %  EF-Visual:      48 %  LV EF Reported: 48 %    LV DIASTOLIC FUNCTION:  Normal Ranges:  MV Peak E:    0.51 m/s  (0.7-1.2 m/s)  MV Peak A:    0.92 m/s  (0.42-0.7 m/s)  E/A Ratio:    0.55      (1.0-2.2)  MV e'         0.032 m/s (>8.0)  MV lateral e' 0.04 m/s  MV medial e'  0.03 m/s  E/e' Ratio:   15.80     (<8.0)    MITRAL VALVE:  Normal Ranges:  MV DT: 211 msec (150-240msec)    MITRAL INSUFFICIENCY:  Normal Ranges:  MR Vmax: 535.50 cm/s  dP/dt:   1010 mmHg/s (>1200mmHg/sec)    AORTIC VALVE:  Normal Ranges:  AoV Vmax:                0.85 m/s (<=1.7m/s)  AoV Peak P.9 mmHg (<20mmHg)  AoV Mean P.0 mmHg (1.7-11.5mmHg)  LVOT Max Geovani:            0.70 m/s (<=1.1m/s)  AoV VTI:                 18.00 cm (18-25cm)  LVOT VTI:                12.80 cm  LVOT Diameter:           2.50 cm  (1.8-2.4cm)  AoV Area, VTI:           3.49 cm2 (2.5-5.5cm2)  AoV Area,Vmax:           4.05 cm2 (2.5-4.5cm2)  AoV Dimensionless Index: 0.71    TRICUSPID VALVE/RVSP:  Normal Ranges:  Peak TR Velocity: 2.04 m/s  RV Syst Pressure: 19.6 mmHg (< 30mmHg)    PULMONIC VALVE:  Normal Ranges:  PV Max Geovani: 0.7 m/s  (0.6-0.9m/s)  PV Max P.8 mmHg      [id number] Stanley Olivarez MD, FACC  Electronically signed on 2024 at 9:14:13 PM        ** Final **    , CARDIAC CATH      No results found for this or any previous visit from the past 730 days.       SOCIAL:  Social History     Tobacco Use   Smoking Status Former    Current packs/day: 0.00    Types: Cigarettes    Quit date: 10/2023    Years since quittin.9    Passive exposure: Never   Smokeless Tobacco Never      Social History     Substance and Sexual Activity   Alcohol Use Yes    Comment: 2-3x a year      Social History     Substance and Sexual Activity   Drug Use Never        NPO STATUS:  No data recorded    Clinical Areas Reviewed:    Allergies                Anesthesia Assessment:    Physical Exam    Airway  Mallampati: II  TM distance: >3 FB  Neck ROM: full    "  Cardiovascular    Dental   (+) lower dentures, upper dentures     Pulmonary    Abdominal            Anesthesia Plan    History of general anesthesia?: yes  History of complications of general anesthesia?: unknown/emergency    ASA 3     general     The patient is not a current smoker.    intravenous induction   Postoperative administration of opioids is intended.  Anesthetic plan and risks discussed with patient.  Use of blood products discussed with patient who consented to blood products.    Plan discussed with attending.      Pt states he has an allergy to ether. When asking him to describe this allergy he states he was a child and had \"burning in his back\" during a tonsillectomy. He denies being hospitalized or having fevers but admits he does not remember exactly due to it being so long ago. He also denies having any blood relatives that have had an allergy to inhaled anesthesia. While this does not entirely describe malignant hyperthermia, we are opting for a TIVA out of an abundance of caution. Additionally we will utilize charcoal filters on the anesthesia machine.     "

## 2024-09-10 NOTE — OP NOTE
Date: 9/10/2024  OR Location: [facility] OR    Name: Amanedep Lara, : 1955, Age: 69 y.o., MRN: [id number], Sex: male    Preop Diagnosis: Lung nodule  Postop Diagnosis: Squamous cell carcinoma of lung    Procedures:  Robotic assisted RUL wedge resection  Robotic assisted RUL lobectomy  Robotic assisted mediastinal lymph node dissection  Intercostal nerve block  Bronchoscopy    Surgeons:  Siena Momin MD    Resident/Fellow/Other Assistant:  Surgeons and Role:     * Bridgett Guthrie DO - Resident - Assisting     * JAVON DowlingC - TERRIE First Assist    Anesthesia: General  ASA: III  Anesthesia Staff: Anesthesiologist: Frankie Joe MD  C-AA: FERMIN Barlow  Estimated Blood Loss: 50mL  Intra-op Medications:   Administrations occurring from 0645 to 1115 on 09/10/24:   Medication Name Total Dose   acetaminophen (Tylenol) tablet 650 mg Cannot be calculated   aspirin chewable tablet 81 mg Cannot be calculated   atorvastatin (Lipitor) tablet 40 mg Cannot be calculated   clopidogrel (Plavix) tablet 75 mg Cannot be calculated   insulin lispro (HumaLOG) injection 0-5 Units Cannot be calculated   metFORMIN (Glucophage) tablet 1,000 mg Cannot be calculated   indocyanine green (IC-Green) injection  - Omnicell Override Pull Cannot be calculated   propofol (Diprivan) injection  - Omnicell Override Pull Cannot be calculated   propofol (Diprivan) injection  - Omnicell Override Pull Cannot be calculated   propofol (Diprivan) injection  - Omnicell Override Pull Cannot be calculated   propofol (Diprivan) injection  - Omnicell Override Pull Cannot be calculated   rocuronium (ZeMuron) injection  - Omnicell Override Pull Cannot be calculated   amLODIPine (Norvasc) tablet 5 mg Cannot be calculated   carvedilol (Coreg) tablet 25 mg Cannot be calculated   cyanocobalamin (Vitamin B-12) tablet 100 mcg Cannot be calculated   nitroglycerin (Nitrostat) SL tablet 0.4 mg Cannot be calculated            Anesthesia Record                Intraprocedure I/O Totals          Intake    Remifentanil Drip 0.00 mL    The total shown is the total volume documented since Anesthesia Start was filed.    Phenylephrine Drip 0.00 mL    The total shown is the total volume documented since Anesthesia Start was filed.    Total Intake 0 mL       Output    Urine 475 mL    Total Output 475 mL       Net    Net Volume -475 mL          Specimen:   ID Type Source Tests Collected by Time   1 : RIGHT UPPER LOBE WEDGE Tissue LUNG WEDGE RESECTION RIGHT (SPECIFY SITE) SURGICAL PATHOLOGY EXAM Siena Momin MD 9/10/2024 0858   2 : level 7 lymph node Tissue LYMPH NODE PULMONARY (SPECIFY SITE) SURGICAL PATHOLOGY EXAM Siena Momin MD 9/10/2024 0917   3 : level 8 lymph node Tissue LYMPH NODE PULMONARY (SPECIFY SITE) SURGICAL PATHOLOGY EXAM Siena Momin MD 9/10/2024 0928   4 : level 11 Rs lymph node Tissue LYMPH NODE PULMONARY (SPECIFY SITE) SURGICAL PATHOLOGY EXAM Siena Momin MD 9/10/2024 0932   5 : level 10 lymph node Tissue LYMPH NODE PULMONARY (SPECIFY SITE) SURGICAL PATHOLOGY EXAM Siena Momin MD 9/10/2024 0948   6 : level 12R lymph node Tissue LYMPH NODE PULMONARY (SPECIFY SITE) SURGICAL PATHOLOGY EXAM Siena Momin MD 9/10/2024 1005   7 : level 11R lymph node #2 Tissue LYMPH NODE PULMONARY (SPECIFY SITE) SURGICAL PATHOLOGY EXAM Siena Momin MD 9/10/2024 1101   8 : level 4R lymph node Tissue LYMPH NODE PULMONARY (SPECIFY SITE) SURGICAL PATHOLOGY EXAM Siena Momin MD 9/10/2024 1118   9 : right upper lobe Tissue LUNG LOBECTOMY/SEGMENTECTOMY LEFT (SPECIFY SITE) SURGICAL PATHOLOGY EXAM Siena Momin MD 9/10/2024 1122        Staff:   Circulator: Delmy Romero RN  Relief Circulator: Sanaz Castaneda RN  Scrub Person: Ada Martinez; Lauro Eli    Findings: Right upper lobe nodule frozen section confirm squamous cell carcinoma.  No pleural implants or invasion noted.  Thickened right upper lobe airway.    Indication:  This is a 69-year-old gentleman with  past medical history of coronary artery disease, congestive heart failure with EF 40%, diabetes, peripheral vascular disease, previous stroke, COPD, and former smoker who presented to me for an enlarging spiculated 2.4 cm right upper lobe nodule.  PET/CT showed metabolic activity of this nodule but no mediastinal activity or distant disease.  He has seen his cardiologist for his heart failure who has cleared him for surgery.  His pulmonary function is adequate.  I offer him minimally invasive wedge resection of right upper lobe nodule and if malignancy confirmed on frozen section, I will proceed with right upper lobectomy.  The risk of surgery including bleeding, infection, air leak, arrhythmia, DVT, PE, stroke, MI and postop pain.  The alternative is SBRT.  Patient consent to proceed with surgery    Operation Details:  Patient was brought to operation room. A time-out was performed. Patient name, MRN and procedure were confirmed and all staff were in agreement. Patient received subcutaneous heparin. SCDs were placed and working. Ancef was given prior to incision. Patient was induced and intubated with a double lumen tube without issue. We then performed bronchoscopy through the double lumen tube. The bronchoscopy examination showed thick secretions in bilateral airway which was suctioned out. No endobronchial pathology seen. The RUL airway has normal trifurcation anatomy. A Agudelo bladder catheter was placed with clear urine. Then we turned the patient to left decubitus position. All pressure points were padded. Double lumen was confirmed again in correct position. The right chest was prepped and draped in sterile fashion. A pre-incision time out was performed. All staff are in agreement to proceed.     I placed my 8 mm camera port in the eighth intercostal space anteriorly.  I then performed intercostal nerve block with quarter percent Marcaine 5 cc per intercostal space from the 4th-10th.  I placed one 8mm and one  12mm port posteriorly in the same interspace and then I placed one 12 mm port in the seventh intercostal space anteriorly.  My assistant port is at 10th intercostal space.  The airseal was used and the patient tolerated well. The robot was then docked and instruments were introduced into the chest under direct visual guidance. I then scrubbed out to the robot console. The tumor was easily palpated in the peripheral lateral RUL and did not invade the pleura.  I then used multiple black load stapler to wedge out this nodule.  The specimen was passed off the field through the Endo Catch bag.  I palpated and confirmed the hard nodule nodule in the specimen.  The specimen was sent for frozen section.    I then turned my attention to mediastinal lymph node dissection.  I started taking down the inferior pulmonary ligament and the harvest station 8 and 9 lymph nodes.  I then dissected on the subcarinal space to obtain the station 7 lymph node packet.  Station 7 lymph node is large lymph node.  I then turned my attention to the 11R sump node between the RUL airway and BI. I harvested the 11R node without issue.  I then continued to release the pleura around the RUL bronchus towards anterior hilum. I ensured hemostasis of the lymph node harvesting bed and I left topical hemostatic agents in the subcrinal area. I then turned to the anterior hilum. I harvest station 4R lymph nodes posterior to the SVC and 10R lymph node on the main PA.  I took a look at the fissure which is benign.  The frozen section come back as squamous cell carcinoma.  I decided to proceed with right upper lobectomy.    I then entered the fissure at the junction of major and minor fissure.  I easily identified the ongoing PA.  I then dissected on the PA posteriorly to reach the prior dissection plane of the 11 R sump node.  I then used 1 fire of the blue load stapler to complete the posterior major fissure.  I then dissected out the posterior ascending  artery which is a small artery.  I encircled it with the vessel loop and took it with a white load staple fire.  There were more 11R lymph nodes around the airway which I cleared out.  I then retracted the lung posteriorly.  I identified and preserved the right phrenic nerve.  I released the pleura covering the anterior hilum.  I identified the middle lobe vein and the upper lobe vein.  I continued to release adhesions here to identify the truncus anterior branch of the main PA.  I dissect superiorly to harvest station 10R lymph node.  The truncus arterius was a sizable artery.  I carefully dissected around and encircled it with a vessel loop and then used one white load stapler to take it.  Then I continued to work on the superior pulmonary vein.  I encircled the right upper lobe vein and preserved right middle lobe vein.  I took the right upper lobe vein with one white load stapler.   The minor fissure was incomplete.  I had my anesthesiologist inject IgG green through the IV.  Under firefly mode, the right upper lobe and right middle lobe demarcated very well.  I marked the demarcation line with cautery.  I used 4 fires of black load staplers to complete the minor fissure.  At this point, only the right upper lobe bronchus remained.  I cleaned all the soft tissue off the right upper lobe bronchus.  There were multiple enlarged lymph nodes around the airway.  The airway itself was thickened.  I harvest 11 R and 12 R lymph nodes here.  I used a green load stapler to try to clamp on the upper lobe bronchus.  However the bronchus was too thick for the green load stapler.  I switched out to a black load stapler to clamp on the upper lobe bronchus.  I then asked anesthesiologist to give a small breath to the right lung.  I saw that the right middle lobe and right lower lobe started to inflate.  I then took the right upper lobe bronchus with the stapler.  The specimen was removed from the chest with the Endo Catch bag.   I irrigated and washed out the hilum.  I ensured hemostasis of the port sites.  Then I removed all instruments and ports and I undocked the robot.  I scrubbed back into the bedside.  We confirmed hemostasis and the correct orientation of the remaining lobes.  I left an 28 [place] chest tube posteriorly.  Then the right lung was reinflated without issue under visualization.  The right middle lobe inflated nicely pink without torsion.  All the ports were closed with 2-0 Vicryl and 3-0 Monocryl.  The final counts were correct.  Patient was allowed to be awaken from general anesthesia and brought to recovery area in stable condition.    I was present for the entire duration of the procedure.    Tanya Perrin is required at bedside as first assist for robotic portion of the case including docking, instrumentation, de-docking, troubleshooting, dissection, and specimen retrieval. Since there is no qualified resident to assist at bedside, her role is critical to ensure the safety of the complex operation.     Siena Momin MD  Thoracic Surgeon  [facility]   of Medicine  [facility]  Office phone: [phone]  Fax: [phone]  Pager: [phone]

## 2024-09-10 NOTE — BRIEF OP NOTE
Date: 9/10/2024  OR Location: [facility] OR    Name: Amandeep Lara, : 1955, Age: 69 y.o., MRN: [id number], Sex: male    Diagnosis  Pre-op Diagnosis      * Lung nodule, solitary [R91.1] Post-op Diagnosis     * Lung nodule, solitary [R91.1]     Procedures  Bronchoscopy, right robotic assisted upper lobe wedge resection, right upper lobectomy, mediastinal lymph node dissection, and intercostal nerve blocks    Surgeons      * Siena Momin - Primary    Resident/Fellow/Other Assistant:  Surgeons and Role:     * Bridgett Guthrie DO - Resident - Assisting     * LA Dowling-C - TERRIE First Assist    Procedure Summary  Anesthesia: Anesthesia type not filed in the log.  ASA: III  Anesthesia Staff: Anesthesiologist: Frankie Joe MD  C-AA: FERMIN Barlow  Estimated Blood Loss: 50mL  Intra-op Medications:   Administrations occurring from 0645 to 1115 on 09/10/24:   Medication Name Total Dose   acetaminophen (Tylenol) tablet 650 mg Cannot be calculated   aspirin chewable tablet 81 mg Cannot be calculated   atorvastatin (Lipitor) tablet 40 mg Cannot be calculated   clopidogrel (Plavix) tablet 75 mg Cannot be calculated   insulin lispro (HumaLOG) injection 0-5 Units Cannot be calculated   metFORMIN (Glucophage) tablet 1,000 mg Cannot be calculated   indocyanine green (IC-Green) injection  - Omnicell Override Pull Cannot be calculated   propofol (Diprivan) injection  - Omnicell Override Pull Cannot be calculated   propofol (Diprivan) injection  - Omnicell Override Pull Cannot be calculated   propofol (Diprivan) injection  - Omnicell Override Pull Cannot be calculated   propofol (Diprivan) injection  - Omnicell Override Pull Cannot be calculated   rocuronium (ZeMuron) injection  - Omnicell Override Pull Cannot be calculated   amLODIPine (Norvasc) tablet 5 mg Cannot be calculated   carvedilol (Coreg) tablet 25 mg Cannot be calculated   cyanocobalamin (Vitamin B-12) tablet 100 mcg Cannot be calculated    nitroglycerin (Nitrostat) SL tablet 0.4 mg Cannot be calculated            Anesthesia Record               Intraprocedure I/O Totals          Intake    Remifentanil Drip 0.00 mL    The total shown is the total volume documented since Anesthesia Start was filed.    Phenylephrine Drip 0.00 mL    The total shown is the total volume documented since Anesthesia Start was filed.    Total Intake 0 mL       Output    Urine 475 mL    Total Output 475 mL       Net    Net Volume -475 mL          Specimen:   ID Type Source Tests Collected by Time   1 : RIGHT UPPER LOBE WEDGE Tissue LUNG WEDGE RESECTION RIGHT (SPECIFY SITE) SURGICAL PATHOLOGY EXAM Siena Momin MD 9/10/2024 0858   2 : level 7 lymph node Tissue LYMPH NODE PULMONARY (SPECIFY SITE) SURGICAL PATHOLOGY EXAM Siena Momin MD 9/10/2024 0917   3 : level 8 lymph node Tissue LYMPH NODE PULMONARY (SPECIFY SITE) SURGICAL PATHOLOGY EXAM Siena Momin MD 9/10/2024 0928   4 : level 11 Rs lymph node Tissue LYMPH NODE PULMONARY (SPECIFY SITE) SURGICAL PATHOLOGY EXAM Siena Momin MD 9/10/2024 0932   5 : level 10 lymph node Tissue LYMPH NODE PULMONARY (SPECIFY SITE) SURGICAL PATHOLOGY EXAM Siena Momin MD 9/10/2024 0948   6 : level 12R lymph node Tissue LYMPH NODE PULMONARY (SPECIFY SITE) SURGICAL PATHOLOGY EXAM Siena Momin MD 9/10/2024 1005   7 : level 11R lymph node #2 Tissue LYMPH NODE PULMONARY (SPECIFY SITE) SURGICAL PATHOLOGY EXAM Siena Momin MD 9/10/2024 1101   8 : level 4R lymph node Tissue LYMPH NODE PULMONARY (SPECIFY SITE) SURGICAL PATHOLOGY EXAM Siena Momin MD 9/10/2024 1118   9 : right upper lobe Tissue LUNG LOBECTOMY/SEGMENTECTOMY LEFT (SPECIFY SITE) SURGICAL PATHOLOGY EXAM Siena Momin MD 9/10/2024 1122      Staff:   Circulator: Delmy Aguirre Person: Lauro Aguirre Person: Ada Fisher Circulator: Sanaz    Findings: Frozen pathology of the right upper lobe wedge resection suggestive of squamous cell carcinoma.    Complications:  None; patient  tolerated the procedure well.     Disposition: PACU - hemodynamically stable.  Condition: stable  Specimens Collected:   ID Type Source Tests Collected by Time   1 : RIGHT UPPER LOBE WEDGE Tissue LUNG WEDGE RESECTION RIGHT (SPECIFY SITE) SURGICAL PATHOLOGY EXAM Siena Momin MD 9/10/2024 0858   2 : level 7 lymph node Tissue LYMPH NODE PULMONARY (SPECIFY SITE) SURGICAL PATHOLOGY EXAM Siena Momin MD 9/10/2024 0917   3 : level 8 lymph node Tissue LYMPH NODE PULMONARY (SPECIFY SITE) SURGICAL PATHOLOGY EXAM Siena Momin MD 9/10/2024 0928   4 : level 11 Rs lymph node Tissue LYMPH NODE PULMONARY (SPECIFY SITE) SURGICAL PATHOLOGY EXAM Siena Momin MD 9/10/2024 0932   5 : level 10 lymph node Tissue LYMPH NODE PULMONARY (SPECIFY SITE) SURGICAL PATHOLOGY EXAM Siena Momin MD 9/10/2024 0948   6 : level 12R lymph node Tissue LYMPH NODE PULMONARY (SPECIFY SITE) SURGICAL PATHOLOGY EXAM Siena Momin MD 9/10/2024 1005   7 : level 11R lymph node #2 Tissue LYMPH NODE PULMONARY (SPECIFY SITE) SURGICAL PATHOLOGY EXAM Siena Momin MD 9/10/2024 1101   8 : level 4R lymph node Tissue LYMPH NODE PULMONARY (SPECIFY SITE) SURGICAL PATHOLOGY EXAM Siena Momin MD 9/10/2024 1118   9 : right upper lobe Tissue LUNG LOBECTOMY/SEGMENTECTOMY LEFT (SPECIFY SITE) SURGICAL PATHOLOGY EXAM Siena Momin MD 9/10/2024 1122     I was the bedside assistant in Dr. Momin's right robotic assisted upper lobectomy and mediastinal lymph node dissection.     Tanya Perrin PA-C

## 2024-09-10 NOTE — ANESTHESIA PROCEDURE NOTES
Arterial Line:    Date/Time: 9/10/2024 8:24 AM    Staffing  Performed: FERMIN   Authorized by: Frankie Joe MD    Performed by: FERMIN Barlow    An arterial line was placed. Procedure performed using surface landmarks.in the OR for the following indication(s): continuous blood pressure monitoring.    A 20 gauge (size) (length), Angiocath (type) catheter was placed into the Left radial artery, secured by tape and and tegaderm,   Seldinger technique not used.  Events:  patient tolerated procedure well with no complications.      Additional notes:  2 attempts; on first unable to pass wire or micropuncture wire. 2nd attempt successful. Good blood return and waveform. Sterile. Micropuncture wire used.

## 2024-09-10 NOTE — ANESTHESIA PROCEDURE NOTES
Airway  Date/Time: 9/10/2024 7:55 AM  Urgency: elective      Staffing  Performed: FERMIN   Authorized by: Frankie Joe MD    Performed by: FERMIN Barlow  Patient location during procedure: OR    Indications and Patient Condition  Indications for airway management: anesthesia  Spontaneous Ventilation: absent  Sedation level: deep  Preoxygenated: yes  Patient position: sniffing  MILS maintained throughout  Mask difficulty assessment: 2 - vent by mask + OA or adjuvant +/- NMBA  Planned trial extubation    Final Airway Details  Final airway type: endotracheal airway      Successful airway: ETT - double lumen left  Cuffed: yes   Successful intubation technique: direct laryngoscopy  Endotracheal tube insertion site: oral  Blade: Ko  Blade size: #4  ETT DL size (fr): 39  Cormack-Lehane Classification: grade I - full view of glottis  Placement verified by: bronchoscopy and capnometry   Measured from: lips  ETT to lips (cm): 31  Number of attempts at approach: 1  Ventilation between attempts: none  Number of other approaches attempted: 0    Additional Comments  Lips and gums in preanesthetic condition. Cloth tie. Positioning confirmed after turning lateral. 100 oral airway used.

## 2024-09-10 NOTE — ANESTHESIA PROCEDURE NOTES
Peripheral IV  Date/Time: 9/10/2024 7:56 AM      Placement  Needle size: 16 G  Laterality: left  Location: hand  Local anesthetic: none  Site prep: alcohol  Technique: anatomical landmarks  Attempts: 1

## 2024-09-10 NOTE — ANESTHESIA POSTPROCEDURE EVALUATION
Patient: Amandeep Lara    Procedure Summary       Date: 09/10/24 Room / Location: [facility] OR 14 / [facility] OR    Anesthesia Start: 0727 Anesthesia Stop:     Procedure: Robotic assisted RUL wedge resection with possible lobectomy, MLND (Right: Chest) Diagnosis:       Lung nodule, solitary      (Lung nodule, solitary [R91.1])    Surgeons: Siena Momin MD Responsible Provider: Frankie Joe MD    Anesthesia Type: general ASA Status: 3            Anesthesia Type: general    Vitals Value Taken Time   /70 09/10/24 1400   Temp 36 °C (96.8 °F) 09/10/24 1400   Pulse 62 09/10/24 1408   Resp 16 09/10/24 1408   SpO2 93 % 09/10/24 1408   Vitals shown include unfiled device data.    Anesthesia Post Evaluation    Patient location during evaluation: PACU  Patient participation: complete - patient participated  Level of consciousness: awake and alert  Pain score: 7  Pain management: adequate  Multimodal analgesia pain management approach  Airway patency: patent  Cardiovascular status: acceptable  Respiratory status: acceptable  Hydration status: acceptable  Postoperative Nausea and Vomiting: none        No notable events documented.

## 2024-09-11 ENCOUNTER — APPOINTMENT (OUTPATIENT)
Dept: RADIOLOGY | Facility: HOSPITAL | Age: 69
DRG: 164 | End: 2024-09-11
Payer: COMMERCIAL

## 2024-09-11 LAB
ANION GAP SERPL CALC-SCNC: 14 MMOL/L (ref 10–20)
BUN SERPL-MCNC: 25 MG/DL (ref 6–23)
CALCIUM SERPL-MCNC: 8 MG/DL (ref 8.6–10.6)
CHLORIDE SERPL-SCNC: 98 MMOL/L (ref 98–107)
CO2 SERPL-SCNC: 24 MMOL/L (ref 21–32)
CREAT SERPL-MCNC: 1.9 MG/DL (ref 0.5–1.3)
EGFRCR SERPLBLD CKD-EPI 2021: 38 ML/MIN/1.73M*2
ERYTHROCYTE [DISTWIDTH] IN BLOOD BY AUTOMATED COUNT: 13.2 % (ref 11.5–14.5)
GLUCOSE BLD MANUAL STRIP-MCNC: 182 MG/DL (ref 74–99)
GLUCOSE BLD MANUAL STRIP-MCNC: 188 MG/DL (ref 74–99)
GLUCOSE BLD MANUAL STRIP-MCNC: 207 MG/DL (ref 74–99)
GLUCOSE BLD MANUAL STRIP-MCNC: 209 MG/DL (ref 74–99)
GLUCOSE BLD MANUAL STRIP-MCNC: 236 MG/DL (ref 74–99)
GLUCOSE BLD MANUAL STRIP-MCNC: 285 MG/DL (ref 74–99)
GLUCOSE SERPL-MCNC: 247 MG/DL (ref 74–99)
HCT VFR BLD AUTO: 30.8 % (ref 41–52)
HGB BLD-MCNC: 10.1 G/DL (ref 13.5–17.5)
MAGNESIUM SERPL-MCNC: 1.83 MG/DL (ref 1.6–2.4)
MCH RBC QN AUTO: 29.2 PG (ref 26–34)
MCHC RBC AUTO-ENTMCNC: 32.8 G/DL (ref 32–36)
MCV RBC AUTO: 89 FL (ref 80–100)
NRBC BLD-RTO: 0 /100 WBCS (ref 0–0)
PLATELET # BLD AUTO: 302 X10*3/UL (ref 150–450)
POTASSIUM SERPL-SCNC: 4.7 MMOL/L (ref 3.5–5.3)
RBC # BLD AUTO: 3.46 X10*6/UL (ref 4.5–5.9)
SODIUM SERPL-SCNC: 131 MMOL/L (ref 136–145)
WBC # BLD AUTO: 18.1 X10*3/UL (ref 4.4–11.3)

## 2024-09-11 PROCEDURE — 36415 COLL VENOUS BLD VENIPUNCTURE: CPT | Performed by: PHYSICIAN ASSISTANT

## 2024-09-11 PROCEDURE — 2500000004 HC RX 250 GENERAL PHARMACY W/ HCPCS (ALT 636 FOR OP/ED): Mod: JZ | Performed by: PHYSICIAN ASSISTANT

## 2024-09-11 PROCEDURE — 2500000005 HC RX 250 GENERAL PHARMACY W/O HCPCS: Performed by: NURSE PRACTITIONER

## 2024-09-11 PROCEDURE — 2500000001 HC RX 250 WO HCPCS SELF ADMINISTERED DRUGS (ALT 637 FOR MEDICARE OP): Performed by: PHYSICIAN ASSISTANT

## 2024-09-11 PROCEDURE — 2500000002 HC RX 250 W HCPCS SELF ADMINISTERED DRUGS (ALT 637 FOR MEDICARE OP, ALT 636 FOR OP/ED): Performed by: PHYSICIAN ASSISTANT

## 2024-09-11 PROCEDURE — 97165 OT EVAL LOW COMPLEX 30 MIN: CPT | Mod: GO

## 2024-09-11 PROCEDURE — 71045 X-RAY EXAM CHEST 1 VIEW: CPT

## 2024-09-11 PROCEDURE — 80048 BASIC METABOLIC PNL TOTAL CA: CPT | Performed by: PHYSICIAN ASSISTANT

## 2024-09-11 PROCEDURE — 2500000004 HC RX 250 GENERAL PHARMACY W/ HCPCS (ALT 636 FOR OP/ED)

## 2024-09-11 PROCEDURE — 2500000002 HC RX 250 W HCPCS SELF ADMINISTERED DRUGS (ALT 637 FOR MEDICARE OP, ALT 636 FOR OP/ED): Performed by: NURSE PRACTITIONER

## 2024-09-11 PROCEDURE — 2500000004 HC RX 250 GENERAL PHARMACY W/ HCPCS (ALT 636 FOR OP/ED): Performed by: NURSE PRACTITIONER

## 2024-09-11 PROCEDURE — 71045 X-RAY EXAM CHEST 1 VIEW: CPT | Performed by: RADIOLOGY

## 2024-09-11 PROCEDURE — 83735 ASSAY OF MAGNESIUM: CPT | Performed by: PHYSICIAN ASSISTANT

## 2024-09-11 PROCEDURE — 85027 COMPLETE CBC AUTOMATED: CPT | Performed by: PHYSICIAN ASSISTANT

## 2024-09-11 PROCEDURE — 82947 ASSAY GLUCOSE BLOOD QUANT: CPT

## 2024-09-11 PROCEDURE — 97161 PT EVAL LOW COMPLEX 20 MIN: CPT | Mod: GP

## 2024-09-11 PROCEDURE — 1200000002 HC GENERAL ROOM WITH TELEMETRY DAILY

## 2024-09-11 PROCEDURE — 2500000001 HC RX 250 WO HCPCS SELF ADMINISTERED DRUGS (ALT 637 FOR MEDICARE OP): Performed by: NURSE PRACTITIONER

## 2024-09-11 PROCEDURE — 99232 SBSQ HOSP IP/OBS MODERATE 35: CPT | Performed by: NURSE PRACTITIONER

## 2024-09-11 RX ORDER — IPRATROPIUM BROMIDE AND ALBUTEROL SULFATE 2.5; .5 MG/3ML; MG/3ML
3 SOLUTION RESPIRATORY (INHALATION)
Status: DISCONTINUED | OUTPATIENT
Start: 2024-09-11 | End: 2024-09-12 | Stop reason: HOSPADM

## 2024-09-11 RX ORDER — KETOROLAC TROMETHAMINE 15 MG/ML
15 INJECTION, SOLUTION INTRAMUSCULAR; INTRAVENOUS ONCE
Status: DISCONTINUED | OUTPATIENT
Start: 2024-09-11 | End: 2024-09-11

## 2024-09-11 RX ORDER — LANOLIN ALCOHOL/MO/W.PET/CERES
400 CREAM (GRAM) TOPICAL ONCE
Status: COMPLETED | OUTPATIENT
Start: 2024-09-11 | End: 2024-09-11

## 2024-09-11 RX ORDER — DEXTROSE 50 % IN WATER (D50W) INTRAVENOUS SYRINGE
12.5
Status: DISCONTINUED | OUTPATIENT
Start: 2024-09-11 | End: 2024-09-11

## 2024-09-11 RX ORDER — INSULIN GLARGINE 100 [IU]/ML
10 INJECTION, SOLUTION SUBCUTANEOUS EVERY 24 HOURS
Status: DISCONTINUED | OUTPATIENT
Start: 2024-09-11 | End: 2024-09-12 | Stop reason: HOSPADM

## 2024-09-11 RX ORDER — ACETAMINOPHEN 325 MG/1
650 TABLET ORAL EVERY 6 HOURS
Status: DISCONTINUED | OUTPATIENT
Start: 2024-09-11 | End: 2024-09-12 | Stop reason: HOSPADM

## 2024-09-11 RX ORDER — OXYCODONE HYDROCHLORIDE 10 MG/1
10 TABLET ORAL EVERY 4 HOURS PRN
Status: DISCONTINUED | OUTPATIENT
Start: 2024-09-11 | End: 2024-09-12 | Stop reason: HOSPADM

## 2024-09-11 RX ORDER — DEXTROSE 50 % IN WATER (D50W) INTRAVENOUS SYRINGE
25
Status: DISCONTINUED | OUTPATIENT
Start: 2024-09-11 | End: 2024-09-12 | Stop reason: HOSPADM

## 2024-09-11 RX ORDER — OXYCODONE HYDROCHLORIDE 5 MG/1
5 TABLET ORAL EVERY 4 HOURS PRN
Status: DISCONTINUED | OUTPATIENT
Start: 2024-09-11 | End: 2024-09-12 | Stop reason: HOSPADM

## 2024-09-11 RX ORDER — GUAIFENESIN 600 MG/1
600 TABLET, EXTENDED RELEASE ORAL 2 TIMES DAILY
Status: DISCONTINUED | OUTPATIENT
Start: 2024-09-11 | End: 2024-09-12 | Stop reason: HOSPADM

## 2024-09-11 RX ORDER — INSULIN LISPRO 100 [IU]/ML
4 INJECTION, SOLUTION INTRAVENOUS; SUBCUTANEOUS
Status: DISCONTINUED | OUTPATIENT
Start: 2024-09-11 | End: 2024-09-12 | Stop reason: HOSPADM

## 2024-09-11 RX ORDER — LIDOCAINE 560 MG/1
1 PATCH PERCUTANEOUS; TOPICAL; TRANSDERMAL DAILY
Status: DISCONTINUED | OUTPATIENT
Start: 2024-09-11 | End: 2024-09-12 | Stop reason: HOSPADM

## 2024-09-11 RX ORDER — INSULIN LISPRO 100 [IU]/ML
0-5 INJECTION, SOLUTION INTRAVENOUS; SUBCUTANEOUS
Status: DISCONTINUED | OUTPATIENT
Start: 2024-09-11 | End: 2024-09-12 | Stop reason: HOSPADM

## 2024-09-11 ASSESSMENT — ENCOUNTER SYMPTOMS
DYSURIA: 0
ABDOMINAL PAIN: 0
APPETITE CHANGE: 0
SPEECH DIFFICULTY: 0
SORE THROAT: 0
COUGH: 0
DIAPHORESIS: 0
CHEST TIGHTNESS: 0
FREQUENCY: 0
NAUSEA: 0
JOINT SWELLING: 0
ACTIVITY CHANGE: 0
EYES NEGATIVE: 1
SHORTNESS OF BREATH: 0
DIARRHEA: 0
POLYPHAGIA: 0
AGITATION: 0
CONFUSION: 0
UNEXPECTED WEIGHT CHANGE: 0
FATIGUE: 1
HEADACHES: 0
NUMBNESS: 0
POLYDIPSIA: 0

## 2024-09-11 ASSESSMENT — COGNITIVE AND FUNCTIONAL STATUS - GENERAL
HELP NEEDED FOR BATHING: A LOT
DAILY ACTIVITIY SCORE: 17
DRESSING REGULAR LOWER BODY CLOTHING: A LOT
TOILETING: A LITTLE
TURNING FROM BACK TO SIDE WHILE IN FLAT BAD: A LOT
DRESSING REGULAR UPPER BODY CLOTHING: A LITTLE
MOVING TO AND FROM BED TO CHAIR: A LITTLE
MOVING FROM LYING ON BACK TO SITTING ON SIDE OF FLAT BED WITH BEDRAILS: A LITTLE
WALKING IN HOSPITAL ROOM: A LITTLE
STANDING UP FROM CHAIR USING ARMS: A LITTLE
MOBILITY SCORE: 16
PERSONAL GROOMING: A LITTLE
CLIMB 3 TO 5 STEPS WITH RAILING: A LOT

## 2024-09-11 ASSESSMENT — PAIN - FUNCTIONAL ASSESSMENT
PAIN_FUNCTIONAL_ASSESSMENT: 0-10

## 2024-09-11 ASSESSMENT — PAIN SCALES - GENERAL
PAINLEVEL_OUTOF10: 8
PAINLEVEL_OUTOF10: 2
PAINLEVEL_OUTOF10: 2
PAINLEVEL_OUTOF10: 3
PAINLEVEL_OUTOF10: 8
PAINLEVEL_OUTOF10: 8

## 2024-09-11 ASSESSMENT — PAIN SCALES - WONG BAKER: WONGBAKER_NUMERICALRESPONSE: HURTS LITTLE BIT

## 2024-09-11 ASSESSMENT — ACTIVITIES OF DAILY LIVING (ADL): ADL_ASSISTANCE: INDEPENDENT

## 2024-09-11 ASSESSMENT — PAIN DESCRIPTION - DESCRIPTORS
DESCRIPTORS: ACHING;SHARP
DESCRIPTORS: ACHING;SHARP
DESCRIPTORS: ACHING

## 2024-09-11 NOTE — PROGRESS NOTES
Met with patient and introduced myself as Care Coordinator and member of the discharge planning team.  Pt is s/p RUL wedge, lobectomy, LND. He plans to return home at time of discharge with his wife. He is independent in ADL's . His PCP is Dr. Zamudio. He uses [facility]. No home care needs were identified at this time. Care coordinator will continue to follow for home going needs.  Jessica Spain RN

## 2024-09-11 NOTE — PROGRESS NOTES
Occupational Therapy    Evaluation    Patient Name: Amandeep Lara  MRN: [id number]  Department: [facility] 3  Room: [address] [place] [address]  Today's Date: 9/11/2024  Time Calculation  Start Time: 1433  Stop Time: 1450  Time Calculation (min): 17 min    Assessment  IP OT Assessment  OT Assessment: difficulty I/ADLs, safety, fxnl mob  Prognosis: Good  Barriers to Discharge: None  Evaluation/Treatment Tolerance: Patient tolerated treatment well  Medical Staff Made Aware: Yes  End of Session Communication: Bedside nurse  End of Session Patient Position: Bed, 3 rail up, Alarm off, not on at start of session  Plan:  Treatment Interventions: ADL retraining, Functional transfer training, UE strengthening/ROM, Endurance training, Patient/family training, Equipment evaluation/education, Compensatory technique education  OT Frequency: 2 times per week  OT Discharge Recommendations: Low intensity level of continued care  Equipment Recommended upon Discharge:  (hip kit)  OT Recommended Transfer Status: Assist of 1  OT - OK to Discharge: Yes    Subjective   Current Problem:  1. Lung nodule        2. Syncope and collapse  Full code    Insert and maintain peripheral IV    Saline lock IV    Full code    Insert and maintain peripheral IV    Saline lock IV    DISCONTINUED: sodium chloride 0.9% infusion      3. Near syncope  Full code    Insert and maintain peripheral IV    Saline lock IV    Full code    Insert and maintain peripheral IV    Saline lock IV    DISCONTINUED: sodium chloride 0.9% infusion      4. Cryptogenic stroke (Multi)  Full code    Insert and maintain peripheral IV    Saline lock IV    Full code    Insert and maintain peripheral IV    Saline lock IV    DISCONTINUED: sodium chloride 0.9% infusion      5. Lung nodule, solitary  Surgical Pathology Exam    Surgical Pathology Exam        General:  General  Reason for Referral: status post:  1. Robotic assisted RUL wedge resection  2. Robotic assisted RUL lobectomy  3. Robotic assisted  mediastinal lymph node dissection  4. Intercostal nerve block  5. Bronchoscopy  Past Medical History Relevant to Rehab: HTN, HLD, CAD, remote inferior infarct, DM2 (HgA1c = 8.9 on 9/6/2024), PVD, CVA and current tobacco use who presents with a PET avid, enlarging spiculated 2.4 cm right upper lobe nodule.  Family/Caregiver Present: No  Prior to Session Communication: Bedside nurse  Patient Position Received: Bed, 3 rail up, Alarm off, not on at start of session  Precautions:  Medical Precautions: Fall precautions, Abdominal precautions, Cardiac precautions    Vital Sign (Past 2hrs)        Date/Time Vitals Session Patient Position Pulse Resp SpO2 BP MAP (mmHg)    09/11/24 1403 --  --  65  18  97 %  147/71  96     09/11/24 1433 During OT  --  70  --  --  --  --                        Pain:  Pain Assessment  Pain Assessment: 0-10  0-10 (Numeric) Pain Score: 8  Pain Location:  (R abd)    Objective   Cognition:  Overall Cognitive Status: Within Functional Limits  Orientation Level: Oriented X4  Following Commands: Follows all commands and directions without difficulty  Safety Judgment: Good awareness of safety precautions  Insight: Within function limits           Home Living:  Type of Home: House  Lives With: Spouse (kids all work)  Home Adaptive Equipment:  (WhW, cane)  Home Layout:  (8 JOSELUIS HR, flight to bed/bathroom)  Bathroom Shower/Tub: Tub/shower unit  Bathroom Toilet: Standard   Prior Function:  Level of [place]: Independent with ADLs and functional transfers, Independent with homemaking with ambulation  Vocational: Retired ()  Leisure: tv  Hand Dominance: Right  IADL History:  IADL Comments: I I/ADLs, A driving, - pets  ADL:  Eating Assistance: Independent  Grooming Assistance:  (min A anticipated standing)  Bathing Assistance:  (mod A anticipated)  UE Dressing Assistance: Minimal  LE Dressing Assistance:  (mod A anticipated)  Toileting Assistance with Device:  (min A anticipated WhW)  Functional  Deficit: Setup, Steadying, Verbal cueing, Supervision/safety, Increased time to complete  Activity Tolerance:  Endurance:  (good)  Bed Mobility/Transfers: Bed Mobility  Bed Mobility:  (sup/sit min A log roll, boost in bed mod A x2)    Transfers  Transfer:  (sit/stand WhW min A)      Functional Mobility: Pt performed fxnl mob to/from bed/in room WhW min A      Sitting Balance:  Dynamic Sitting Balance  Dynamic Sitting-Level of Assistance: Close supervision  Standing Balance:  Dynamic Standing Balance  Dynamic Standing-Level of Assistance: Minimum assistance (WhW)       IADL's:   IADL Comments: I I/ADLs, A driving, - pets  Vision: Vision - Basic Assessment  Current Vision: Wears glasses all the time  Sensation:  Light Touch:  (RUE/LE N/T)  Strength:  Strength Comments: BUE WFL    Coordination:  Movements are Fluid and Coordinated: Yes   Hand Function:  Hand Function  Gross Grasp: Functional  Extremities: RUE   RUE : Within Functional Limits and LUE   LUE: Within Functional Limits      Outcome Measures: [facility] Daily Activity  Putting on and taking off regular lower body clothing: A lot  Bathing (including washing, rinsing, drying): A lot  Putting on and taking off regular upper body clothing: A little  Toileting, which includes using toilet, bedpan or urinal: A little  Taking care of personal grooming such as brushing teeth: A little  Eating Meals: None  Daily Activity - Total Score: 17         and OT Adult Other Outcome Measures  4AT: -  Education Documentation  Body Mechanics, taught by Florencia Julio OT at 9/11/2024  3:10 PM.  Learner: Patient  Readiness: Acceptance  Method: Explanation  Response: Verbalizes Understanding, Needs Reinforcement    ADL Training, taught by Florencia Julio OT at 9/11/2024  3:10 PM.  Learner: Patient  Readiness: Acceptance  Method: Explanation  Response: Verbalizes Understanding, Needs Reinforcement    Precautions, taught by Florencia Julio OT at 9/11/2024  3:10 PM.  Learner:  Patient  Readiness: Acceptance  Method: Explanation  Response: Verbalizes Understanding, Needs Reinforcement    Education Comments  No comments found.      Goals:   Encounter Problems       Encounter Problems (Active)       ADLs       Patient will perform UB and LB bathing  with modified independent level of assistance and AE. (Progressing)       Start:  09/11/24    Expected End:  10/02/24            Patient with complete upper body dressing with independent level of assistance  (Progressing)       Start:  09/11/24    Expected End:  10/02/24            Patient with complete lower body dressing with modified independent level of assistance donning and doffing all LE clothes  with PRN adaptive equipment  (Progressing)       Start:  09/11/24    Expected End:  10/02/24            Patient will complete daily grooming tasks  with independent level of assistance  (Progressing)       Start:  09/11/24    Expected End:  10/02/24            Patient will complete toileting including hygiene clothing management/hygiene with stand by assist level of assistance and LRD. (Progressing)       Start:  09/11/24    Expected End:  10/02/24               MOBILITY       Patient will perform Functional mobility mod  Household distances/Community Distances with modified independent level of assistance and least restrictive device in order to improve safety and functional mobility. (Progressing)       Start:  09/11/24    Expected End:  10/02/24               TRANSFERS       Patient will perform bed mobility independent level of assistance (Progressing)       Start:  09/11/24    Expected End:  10/02/24            Patient will complete functional transfer least restrictive device with modified independent level of assistance. (Progressing)       Start:  09/11/24    Expected End:  10/02/24

## 2024-09-11 NOTE — PROGRESS NOTES
Physical Therapy    Physical Therapy Evaluation    Patient Name: Amandeep Lara  MRN: [id number]  Department: [place] [address]  Room: [facility]  Today's Date: 9/11/2024   Time Calculation  Start Time: 1124  Stop Time: 1152  Time Calculation (min): 28 min      Assessment/Plan     PT Assessment  PT Assessment Results: Decreased strength, Decreased endurance, Impaired balance, Decreased mobility, Pain  Rehab Prognosis: Good  Barriers to Discharge: Chest tubes  Evaluation/Treatment Tolerance: Patient tolerated treatment well, Patient limited by fatigue  Medical Staff Made Aware: Yes  Strengths: Ability to acquire knowledge, Attitude of self  Barriers to Participation: Comorbidities  End of Session Communication: Bedside nurse    Assessment Comment: Amandeep Lara is a 69 y.o. male who was referred to inpatient physical therapy s/p right upper lobe wedge resection and lobectomy on 9/10/24. The patient presents with the current impairments of decreased strength, decreased endurance, impaired balance, impaired functional mobility, and pain. These impairments currently limit their ability to perform functional mobility safely without assistance. Currently requires assistance for bed moblity, transfers, and gait with FWW. Prior to hospital admission, pt was independent with functional mobility using cane. Due to the limitations listed above, the patient is currently at a decreased functional level compared to baseline, and they would benefit from skilled physical therapy to improve functional mobility, strength, endurance, pain levels, decrease risk of falls, and facilitate a safe and efficient return to functional baseline. Patient's prognosis for improvement with therapy is Rehab Prognosis: Good at this time.    End of Session Patient Position: Bed, 3 rail up, Alarm off, not on at start of session  IP OR SWING BED PT PLAN  Inpatient or Swing Bed: Inpatient  PT Plan  Treatment/Interventions: Bed mobility, Transfer training, Gait  training, Stair training, Balance training, Strengthening, Endurance training, Therapeutic exercise, Therapeutic activity, Home exercise program  PT Plan: Ongoing PT  PT Frequency: 4 times per week  PT Discharge Recommendations: Low intensity level of continued care  Equipment Recommended upon Discharge: Wheeled walker  PT Recommended Transfer Status: Contact guard  PT - OK to Discharge: Yes      Subjective     General Visit Information:    General  Reason for Referral: Pt referred for inpatient PT s/p right upper lobe wedge resection and lobectomy.  Referred By: Siena Momin MD  Past Medical History Relevant to Rehab: Pt has past medical history of CVA 10/2023, COPD, T2D, CAD, HTN, HLD, CHF, PVD.  Family/Caregiver Present: No  Prior to Session Communication: Bedside nurse  Patient Position Received: Up in chair, Alarm off, not on at start of session  Preferred Learning Style: auditory, verbal, visual  General Comment: Pt awake, alert, and willing to participate in PT session. Reports that he has been feeling kind of nauseated and did not sleep well last night. (Chest tube x1, 3L NC)    Home Living:    Home Living  Type of Home: House  Lives With: Spouse, Adult children (26 y.o. son, 38 y.o. daughter)  Home Adaptive Equipment: Cane  Home Layout: Multi-level, Full bath main level, Able to live on main level with bedroom/bathroom, Stairs to alternate level with rails  Alternate Level Stairs-Rails: Left  Alternate Level Stairs-Number of Steps: 7  Home Access: Stairs to enter without rails  Entrance Stairs-Rails: None  Entrance Stairs-Number of Steps: 1  Bathroom Shower/Tub: Tub/shower unit  Bathroom Toilet: Standard  Bathroom Equipment: Grab bars in shower    Prior Level of Function:    Prior Function Per Pt/Caregiver Report  Level of [place]: Independent with ADLs and functional transfers  ADL Assistance: Independent  Homemaking Assistance: Independent  Ambulatory Assistance: Independent (with  cane)  Vocational: Retired ( and )  Hand Dominance: Right  Prior Function Comments: Pt reports that he had fall in March 2024 at hospital where he was lowered to the ground. History of syncope. No longer drives.    Precautions:    Precautions  Hearing/Visual Limitations: WFL  Medical Precautions: Fall precautions, Oxygen therapy device and L/min, Chest tube     09/11/24 1123   Vital Signs   Vitals Session Pre PT   Heart Rate 64   Heart Rate Source Monitor   /72   MAP (mmHg) 92   BP Location Right arm   BP Method Automatic   Patient Position Standing   Vital Signs Comment Pt reported increased dizziness with standing. Vitals all stable.      09/11/24 1135   Vital Signs   Vitals Session During PT   SpO2 98 %   Vital Signs Comment Following walk to end of hallway       Objective     Pain:    Pain Assessment  Pain Assessment: 0-10  0-10 (Numeric) Pain Score: 8  Pain Type: Acute pain, Surgical pain  Pain Location: Chest  Pain Orientation: Right, Anterior    Cognition:    Cognition  Overall Cognitive Status: Within Functional Limits  Orientation Level: Oriented X4  Insight: Within function limits  Impulsive: Mildly  Processing Speed: Delayed    General Assessments:    Activity Tolerance  Endurance: Tolerates 10 - 20 min exercise with multiple rests    Sensation  Light Touch: Not tested    Coordination  Movements are Fluid and Coordinated: Yes    Postural Control  Postural Control: Within Functional Limits    Static Sitting Balance  Static Sitting-Balance Support: Feet supported, No upper extremity supported  Static Sitting-Level of Assistance: Close supervision    Static Standing Balance  Static Standing-Balance Support: Bilateral upper extremity supported  Static Standing-Level of Assistance: Contact guard    Functional Assessments:    Bed Mobility  Bed Mobility: Yes  Bed Mobility 1  Bed Mobility 1: Sitting to supine, Log roll  Level of Assistance 1: Moderate assistance  Bed Mobility  Comments 1: Completed log roll technique for pain management with modA to help lower trunk and lift legs into bed    Transfers  Transfer: Yes  Transfer 1  Transfer From 1: Chair with arms to, Stand to  Transfer to 1: Stand, Chair with arms  Technique 1: Sit to stand, Stand to sit  Transfer Device 1: Walker  Transfer Level of Assistance 1: Contact guard  Trials/Comments 1: Able to complete with verbal cues for hand placement. Increased pain.  Transfers 2  Transfer From 2: Sit to, Stand to  Transfer to 2: Stand, Sit  Technique 2: Sit to stand, Stand pivot, Stand to sit  Transfer Device 2: Walker  Transfer Level of Assistance 2: Contact guard  Trials/Comments 2: Transfer from recliner chair to bed    Ambulation/Gait Training  Ambulation/Gait Training Performed: Yes  Ambulation/Gait Training 1  Surface 1: Level tile  Device 1: Rolling walker  Assistance 1: Contact guard, Minimal verbal cues  Quality of Gait 1: Decreased step length, Narrow base of support, Shuffling gait (slow antonio)  Comments/Distance (ft) 1: ~80 ft with chair follow (Pt reported some dizziness upon standing. Vitals normal. Verbal cues for management of FWW.)    Stairs  Stairs: No    Extremity/Trunk Assessments:    RLE   RLE : Within Functional Limits  Strength RLE  RLE Overall Strength: Greater than or equal to 3/5 as evidenced by functional mobility  LLE   LLE : Within Functional Limits  Strength LLE  LLE Overall Strength: Greater than or equal to 3/5 as evidenced by functional mobility    Outcome Measures:    [facility] Basic Mobility  Turning from your back to your side while in a flat bed without using bedrails: A little  Moving from lying on your back to sitting on the side of a flat bed without using bedrails: A lot  Moving to and from bed to chair (including a wheelchair): A little  Standing up from a chair using your arms (e.g. wheelchair or bedside chair): A little  To walk in hospital room: A little  Climbing 3-5 steps with railing: A  lot  Basic Mobility - Total Score: 16      Encounter Problems       Encounter Problems (Active)       PT Problem       Pt will be able to walk >/= 250 feet with LRAD and supervision assist       Start:  09/11/24    Expected End:  09/25/24            Pt will be able to perform bed mobility including rolling, sit<->supine, and supine<->sit at supervision level        Start:  09/11/24    Expected End:  09/25/24            Pt will perform transfers at supervision level       Start:  09/11/24    Expected End:  09/25/24            Pt will be able to ascend/descend one flight of stairs at CGA level or less to promote safety with stairs at home       Start:  09/11/24    Expected End:  09/25/24            Pt will score at least 24/28 on Tinetti Balance Assessment to decrease risk of falls       Start:  09/11/24    Expected End:  09/25/24                   Education Documentation  Body Mechanics, taught by LOUIS Bass at 9/11/2024  3:34 PM.  Learner: Patient  Readiness: Acceptance  Method: Explanation  Response: Verbalizes Understanding  Comment: mobility with FWW, log roll technique for pain management    Precautions, taught by LOUIS Bass at 9/11/2024  3:34 PM.  Learner: Patient  Readiness: Acceptance  Method: Explanation  Response: Verbalizes Understanding  Comment: mobility with FWW, log roll technique for pain management    Mobility Training, taught by LOUIS Bass at 9/11/2024  3:34 PM.  Learner: Patient  Readiness: Acceptance  Method: Explanation  Response: Verbalizes Understanding  Comment: mobility with FWW, log roll technique for pain management    Education Comments  No comments found.

## 2024-09-11 NOTE — PROGRESS NOTES
"Thoracic Surgery Progress Note  9/11/2024    Amandeep Lara is a 69 y.o. male with a history of HTN, HLD, CAD, remote inferior infarct, DM2 (HgA1c = 8.9 on 9/6/2024), PVD, CVA and current tobacco use who presents with a PET avid, enlarging spiculated 2.4 cm right upper lobe nodule.   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  No pleural implants or invasion noted.  Thickened right upper lobe airway.     Overnight issues: NAEON.  Still requiring 3L NC.  CT with 400cc sero-sang drg in [facility] since OR, chest tube placed to waterseal on AM rounds.     Physical Exam:  General: He is a pleasant male currently in no distress, seen resting comfortably in bed and again when sitting up in the chair.  Visit Vitals  /80   Pulse 68   Temp 36.5 °C (97.7 °F)   Resp 17   Ht 1.727 m (5' 8\")   Wt 105 kg (231 lb 12.8 oz)   SpO2 98%   BMI 35.25 kg/m²   Smoking Status Former   BSA 2.24 m²     Body mass index is 35.25 kg/m².   HEENT: Normocephalic and atraumatic.   NECK: Supple. Trach midline. No JVD.   CHEST: Breathing comfortably on 3L NC.  Lungs with audible wheezes.  Chest tube placed to WS, sero sang drg, no airleak appreciated.   HEART: Regular rate and rhythm. NSR per tele review.   ABDOMEN: Soft, flat, nontender.   : manley to gd, removed on AM rounds.   NEUROLOGIC: Alert and oriented. Grossly intact.   EXTREMITIES: Moves all extremities equally.  Pedal pulses are palpable. No lower extremity edema. No calf tenderness.     Diagnostics:     Intake/Output Summary (Last 24 hours) at 9/11/2024 1049  Last data filed at 9/11/2024 1022  Gross per 24 hour   Intake 291.67 ml   Output 1050 ml   Net -758.33 ml     Results from last 7 days   Lab Units 09/11/24  0418 09/09/24  2332 " 09/06/24  1457   WBC AUTO x10*3/uL 18.1* 12.6* 14.8*   HEMOGLOBIN g/dL 10.1* 11.4* 12.2*   HEMATOCRIT % 30.8* 35.3* 37.1*   PLATELETS AUTO x10*3/uL 302 353 348     Results from last 7 days   Lab Units 09/11/24 0418 09/09/24 2332 09/06/24  1457   SODIUM mmol/L 131* 134* 131*   POTASSIUM mmol/L 4.7 4.6 5.0   CHLORIDE mmol/L 98 101 98   CO2 mmol/L 24 27 24   BUN mg/dL 25* 22 24*   CREATININE mg/dL 1.90* 1.62* 1.86*   GLUCOSE mg/dL 247* 239* 184*   CALCIUM mg/dL 8.0* 8.7 8.2*     Results from last 7 days   Lab Units 09/11/24 0418 09/09/24 2332 09/06/24  1457   SODIUM mmol/L 131* 134* 131*   POTASSIUM mmol/L 4.7 4.6 5.0   CHLORIDE mmol/L 98 101 98   CO2 mmol/L 24 27 24   BUN mg/dL 25* 22 24*   CREATININE mg/dL 1.90* 1.62* 1.86*   GLUCOSE mg/dL 247* 239* 184*   CALCIUM mg/dL 8.0* 8.7 8.2*     Scheduled medications  acetaminophen, 650 mg, oral, q6h  amLODIPine, 5 mg, oral, Daily  aspirin, 81 mg, oral, Daily  atorvastatin, 40 mg, oral, Nightly  carvedilol, 25 mg, oral, BID  ceFAZolin, , ,   ceFAZolin, , ,   dexAMETHasone, , ,   fentaNYL PF, , ,   glycopyrrolate, , ,   heparin (porcine), 5,000 Units, subcutaneous, q8h  insulin glargine, 10 Units, subcutaneous, q24h  insulin lispro, 0-5 Units, subcutaneous, Before meals & nightly  insulin lispro, 4 Units, subcutaneous, TID  ketamine in NaCl, iso-osmotic, , ,   lactated Ringer's, , ,   lactated Ringer's, , ,   lidocaine (cardiac), , ,   midazolam, , ,   phenylephrine, , ,   phenylephrine HCl in 0.9% NaCl, , ,   rocuronium, , ,   rocuronium, , ,   sennosides-docusate sodium, 2 tablet, oral, BID  sevoflurane, , ,   sodium chloride 0.9%, , ,   sodium chloride 0.9%, , ,   sodium chloride 0.9%, , ,       Continuous medications     PRN medications  PRN medications: ceFAZolin, ceFAZolin, dexAMETHasone, dextrose, dextrose, fentaNYL PF, glucagon, glycopyrrolate, ipratropium-albuteroL, ketamine in NaCl, iso-osmotic, lactated Ringer's, lactated Ringer's, lidocaine (cardiac),  midazolam, naloxone, ondansetron, oxyCODONE, oxyCODONE, oxygen, phenylephrine, phenylephrine HCl in 0.9% NaCl, rocuronium, rocuronium, sevoflurane, sodium chloride 0.9%, sodium chloride 0.9%, sodium chloride 0.9%    Imaging:   AM CXR reviewed. Expected post op changes and chest tube placement. No clinically significant pneumothorax. No formal report at this time.     Assessment:  Amandeep Lara is a 69 y.o. male with a history of HTN, HLD, CAD, remote inferior infarct, DM2 (HgA1c = 8.9 on 9/6/2024), PVD, CVA and current tobacco use who presents with a PET avid, enlarging spiculated 2.4 cm right upper lobe nodule. He is now status post: Robotic assisted RUL wedge resection, Robotic assisted RUL lobectomy, robotic assisted mediastinal lymph node dissection, intercostal nerve block and bronchoscopy. Satisfactory post op course. Chest tube placed to WS on Am rounds.    Plan:  Neurology: post operative pain, CVA   -Discontinue PCA pump  -Begin oral regimen of pain control with oxycodone and Tylenol.  - add lidocaine patches   -Bowel regimen available for constipation secondary to pain medication   -Out of bed to the chair throughout the day  -Encourage ambulation as tolerated  - continue asa and plavix    Cardiovascular: remote inferior infarct, HTN, CAD, PVD, EF 45-50%  -Continue telemetry  -Vital signs every four hours  -Continue home regimen of  coreg 25mg BID, also as arrhythmia ppx  -Replace electrolytes as needed for K >4, Mg >2  - continue asa and plavix   - norvasc 5mg daily     Pulmonology: post op atelectasis, chest tube management, tobacco dependence   -Encourage incentive spirometer use every hour  -Continue pulmonary hygiene  -Wean oxygen as tolerated   -Maintain chest tube to WS  -add mucinex  -Obtain daily CXR  -Monitor and record chest tube drainage every shift    Gastrointestinal:  BMI 35  - carb controlled diet as tolerated  - Zofran available for nausea  - scheduled colace, PRN bowel  regimen    Genitourinary:   -Remove manley  -Await spontaneous void trial  -Continue to monitor daily electrolytes with routine BMPs   -Adequate urine output    Infectious Disease:   -Continue to trend daily temperatures and WBC count to monitor for signs of post-operative infection   -Monitor surgical incisions for signs of infection   -Perioperative antibiotics completed     Hematology:   -Monitor for signs of acute blood loss  -Trend CBCs     Endocrine: hx DM2, HgA1c 8.9 on 9/6 on insulin at home (home meds: metformin 1g BID, Novolog 70/30 20u BID with meals, SSI)  - endocrine consult, appreciate prelim recs: Recommend starting 10 units of glargine daily and 4 units of lispro with meals. Please continue the lispro #1 scale, but can adjust to AC and HS     DVT Prophylaxis:   -Continue subcutaneous heparin and SCDs, early ambulation    Disposition:  -Plan for discharge to home once stable from a surgical standpoint.  -Continue to assess for home-going needs     Patient seen and examined by this provider. Plan of care discussed with attending of record.     Sheila Blackwell, APRN-CNP

## 2024-09-11 NOTE — CONSULTS
Inpatient consult to Endocrinology  Consult performed by: MANDO Marie  Consult ordered by: MANDO De León      Reason For Consult  s/p lobetomy, hx DM2. hga1c 8.9 on 9/6. eval/tx recs.     History Of Present Illness  Amandeep Lara is a 69 y.o. male with a past medical history of type 2 diabetes who is s/p lobectomy 9/10/24.     Patient seen and interviewed at the bedside. He is eating fairly well.     Diabetes History  Type of diabetes: Type 2  Year diagnosed or age: 21 yrs +  Hospitalizations for DKA or HHS:  no  Complications: neuropathy, CAD, CVA  Seen by PCP or Endocrinology: PCP  Frequency of glucose checks: 2-3 times per day  Glucose review: mostly in the 200, higher in the afternoons.   Frequency of Hypoglycemia: rare  Hypoglycemia unawareness:  can sense in the 60's  Severe hypoglycemia requiring assistance from others: no    Home Medications  Basal/Prandial: Novolog 70/30 according to his rather complicated scale:  If glucose is in the 200's, he takes 20 units. If his glucose is less than 180, he subtracts 60 pts from his glucose (his target BG) and then subtracts a unit for every 10 pts. For example, if glucose is 180, he would take 12 units.  If glucose is 300, he would take 30 units  If glucose is around 100, he would hold the dose  Orals: metformin 1000 mg BID  GLP-1: no  CGM: no. Patient will think about using CGM at discharge. He is uncomfortable with using his phone as a , is overwhelmed with technology.     Results from Most Recent A1C  Hemoglobin A1C   Date/Time Value Ref Range Status   09/06/2024 02:57 PM 8.9 (H) see below % Final   Lab Review  Lab Results   Component Value Date    BILITOT 0.4 06/12/2024    CALCIUM 8.0 (L) 09/11/2024    CO2 24 09/11/2024    CL 98 09/11/2024    CREATININE 1.90 (H) 09/11/2024    GLUCOSE 247 (H) 09/11/2024    ALKPHOS 85 06/12/2024    K 4.7 09/11/2024    PROT 5.9 (L) 06/12/2024     (L) 09/11/2024    AST 10 06/12/2024    ALT  10 06/12/2024    BUN 25 (H) 09/11/2024    ANIONGAP 14 09/11/2024    MG 1.83 09/11/2024    PHOS 3.5 05/26/2021    ALBUMIN 3.3 (L) 06/12/2024    LIPASE 12 06/21/2020    GFRMALE 62 07/13/2023     Lab Results   Component Value Date    TRIG 144 10/20/2023    CHOL 204 (H) 10/20/2023    LDLCALC 129 (H) 10/20/2023    HDL 45.9 10/20/2023     Lab Results   Component Value Date    HGBA1C 8.9 (H) 09/06/2024    HGBA1C 8.0 (H) 10/20/2023    HGBA1C 8.0 (A) 07/13/2023     The ASCVD Risk score (Ton RIVERS, et al., 2019) failed to calculate for the following reasons:    The patient has a prior MI or stroke diagnosis     Diabetes Problem List Entries with Dates  Problem List:  2023-12: Type 2 diabetes mellitus without retinopathy (Multi)  2023-10: Diabetic polyneuropathy associated with type 2 diabetes   mellitus (Multi)  2023-10: 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 2 diabetes mellitus (Multi)  2020-09: Uncontrolled type 2 diabetes mellitus with hyperglycemia   (Multi)  2018-11: Diabetes mellitus (Multi)    Past Medical History  He has a past medical history of Cataract, Cerebral vascular accident (Multi), Cervical disc disease, COPD (chronic obstructive pulmonary disease) (Multi), Coronary artery disease, Diabetes mellitus (Multi), Hyperlipidemia, Hypertension, Immune deficiency disorder (Multi), Lung nodules, Personal history of other diseases of the circulatory system, Personal history of other endocrine, nutritional and metabolic disease, Skin cancer, Sleep apnea, Stroke (Multi), and Type 2 diabetes mellitus (Multi).    He has no past medical history of Reactive airway disease ([facility]).    Surgical History  He has a past surgical history that includes Carpal tunnel release (04/28/2015); Tonsillectomy (04/28/2015); Other surgical history (12/27/2021); Other surgical history (12/27/2021); Other surgical history (12/27/2021); Other surgical history (12/27/2021); Other surgical history (12/27/2021); Neck surgery (12/27/2021); Rotator cuff  repair (12/27/2021); MR angio head wo IV contrast (04/01/2021); CT angio head w and wo IV contrast (06/03/2021); CT angio neck (06/03/2021); MR angio head wo IV contrast (04/01/2021); Other surgical history; and Cataract extraction (Right).     Social History  He reports that he quit smoking about a year ago. His smoking use included cigarettes. He has never been exposed to tobacco smoke. He has never used smokeless tobacco. He reports current alcohol use. He reports that he does not use drugs.    Family History  Family History   Problem Relation Name Age of Onset    Other (malignant neoplasm of urinary bladder) Mother      Other (arteriosclerotic cardiovascular disease) Father      Other (malignant neoplasm of prostate) Father      Other (arteriosclerotic cardiovascular disease) Brother     Mother, brother, sister with diabetes  Allergies  Ether, Statins-hmg-coa reductase inhibitors, Sulfa (sulfonamide antibiotics), and Sulfamethoxazole-trimethoprim    Review of Systems   Constitutional:  Positive for fatigue. Negative for activity change, appetite change, diaphoresis and unexpected weight change.   HENT: Negative.  Negative for sore throat.    Eyes: Negative.    Respiratory:  Negative for cough, chest tightness and shortness of breath.    Cardiovascular:  Negative for chest pain.   Gastrointestinal:  Negative for abdominal pain, diarrhea and nausea.   Endocrine: Negative for cold intolerance, heat intolerance, polydipsia, polyphagia and polyuria.   Genitourinary:  Negative for dysuria and frequency.   Musculoskeletal:  Negative for gait problem and joint swelling.   Neurological:  Negative for speech difficulty, numbness and headaches.   Psychiatric/Behavioral:  Negative for agitation, behavioral problems and confusion.       Physical Exam  Constitutional:       General: He is not in acute distress.     Appearance: Normal appearance.   HENT:      Nose: Nose normal.      Mouth/Throat:      Mouth: Mucous membranes  "are moist.      Pharynx: Oropharynx is clear.   Cardiovascular:      Rate and Rhythm: Normal rate and regular rhythm.   Pulmonary:      Effort: Pulmonary effort is normal. No respiratory distress.   Abdominal:      General: There is no distension.      Palpations: Abdomen is soft.   Musculoskeletal:         General: Normal range of motion.   Skin:     General: Skin is warm and dry.   Neurological:      Mental Status: He is alert and oriented to person, place, and time.   Psychiatric:         Mood and Affect: Mood normal.         Behavior: Behavior normal.        ROS, PMH, FH/SH, surgical history and allergies have been reviewed.    Last Recorded Vitals  Blood pressure 166/72, pulse 77, temperature 37.2 °C (99 °F), temperature source Temporal, resp. rate 18, height 1.727 m (5' 8\"), weight 105 kg (231 lb 12.8 oz), SpO2 95%.    Relevant Results  Results from last 7 days   Lab Units 09/11/24  0803 09/11/24  0418 09/11/24  0402 09/11/24  0036 09/10/24  2049 09/10/24  2040 09/10/24  0425 09/09/24  2332 09/06/24  1457   POCT GLUCOSE mg/dL 188*  --  236* 285* 307* 270*   < > 148*  --    GLUCOSE mg/dL  --  247*  --   --   --   --   --  239* 184*    < > = values in this interval not displayed.   Scheduled medications  acetaminophen, 650 mg, oral, q6h  amLODIPine, 5 mg, oral, Daily  aspirin, 81 mg, oral, Daily  atorvastatin, 40 mg, oral, Nightly  carvedilol, 25 mg, oral, BID  ceFAZolin, , ,   ceFAZolin, , ,   [Held by provider] clopidogrel, 75 mg, oral, Daily  dexAMETHasone, , ,   fentaNYL PF, , ,   glycopyrrolate, , ,   heparin (porcine), 5,000 Units, subcutaneous, q8h  insulin lispro, 0-5 Units, subcutaneous, q4h  ketamine in NaCl, iso-osmotic, , ,   lactated Ringer's, , ,   lactated Ringer's, , ,   lidocaine (cardiac), , ,   [Held by provider] metFORMIN, 1,000 mg, oral, BID  midazolam, , ,   phenylephrine, , ,   phenylephrine HCl in 0.9% NaCl, , ,   rocuronium, , ,   rocuronium, , ,   sennosides-docusate sodium, 2 tablet, " oral, BID  sevoflurane, , ,   sodium chloride 0.9%, , ,   sodium chloride 0.9%, , ,   sodium chloride 0.9%, , ,     Continuous medications     PRN medications  PRN medications: ceFAZolin, ceFAZolin, dexAMETHasone, dextrose, dextrose, fentaNYL PF, glucagon, glycopyrrolate, ipratropium-albuteroL, ketamine in NaCl, iso-osmotic, lactated Ringer's, lactated Ringer's, lidocaine (cardiac), midazolam, naloxone, ondansetron, oxyCODONE, oxyCODONE, oxygen, phenylephrine, phenylephrine HCl in 0.9% NaCl, rocuronium, rocuronium, sevoflurane, sodium chloride 0.9%, sodium chloride 0.9%, sodium chloride 0.9%     Assessment/Plan   Assessment & Plan  Lung nodule, solitary    Lung nodule    LUIS FELIPE (obstructive sleep apnea)    Chronic obstructive pulmonary disease (Multi)    Diabetic polyneuropathy associated with type 2 diabetes mellitus (Multi)    Amandeep Lara is a 69 y.o. male with a past medical history of type 2 diabetes who is s/p lobectomy 9/10/24.     Patient seen and interviewed at the bedside. He is eating fairly well.     Diabetes History  Type of diabetes: Type 2  Year diagnosed or age: 21 yrs +  Hospitalizations for DKA or HHS:  no  Complications: neuropathy, CAD, CVA  Seen by PCP or Endocrinology: PCP  Frequency of glucose checks: 2-3 times per day  Glucose review: mostly in the 200, higher in the afternoons.   Frequency of Hypoglycemia: rare  Hypoglycemia unawareness:  can sense in the 60's  Severe hypoglycemia requiring assistance from others: no    Home Medications  Basal/Prandial: Novolog 70/30 according to his rather complicated scale:  If glucose is in the 200's, he takes 20 units. If his glucose is less than 180, he subtracts 60 pts from his glucose (his target BG) and then subtracts a unit for every 10 pts. For example, if glucose is 180, he would take 12 units.  If glucose is 300, he would take 30 units  If glucose is around 100, he would hold the dose  Orals: metformin 1000 mg BID  GLP-1: no  CGM: no. Patient  will think about using CGM at discharge. He is uncomfortable with using his phone as a , is overwhelmed with technology.      PLAN  Steroids: none  Nutrition: carb consistent 60 grams/meal  - start glargine 10 units daily in the morning       If NPO: reduce to 5 units  - start continue lispro 4 with meals plus scale       If NPO: hold  - continue lispro corrective scale #2 with meals and HS, adjust to q4h if NPO or if persistently hyperglycemic   = 0u  151-200 = 2u  201-250 = 4u  251-300 = 6u  301-350 = 8u  351-400 = 10u    -Accuchecks (not BMP) TIDAC and QHS- kindly ensure QHS Accucheck is drawn; it is often missed   - Goal -180  -Hypoglycemia protocol  -Diabetes Diet- low carb 60 CHO  -Will continue to follow and titrate insulin accordingly     Discharge planning:   [] patient may expect to discharge home on glargine and lispro rather than 70/30 as he does not eat regular meals which places his at risk for hypoglycemia, final doses TBD by titration  [] likely continue metformin at discharge  []will provide CGM sample prior to discharge if patient is interested  []will enroll pt in  pharmacy platinum plan program  []follow up with     I spent 80 minutes in the professional and overall care of this patient.    MALAIKA Marie-CNP

## 2024-09-12 ENCOUNTER — APPOINTMENT (OUTPATIENT)
Dept: RADIOLOGY | Facility: HOSPITAL | Age: 69
DRG: 164 | End: 2024-09-12
Payer: COMMERCIAL

## 2024-09-12 ENCOUNTER — HOME HEALTH ADMISSION (OUTPATIENT)
Dept: HOME HEALTH SERVICES | Facility: HOME HEALTH | Age: 69
End: 2024-09-12
Payer: COMMERCIAL

## 2024-09-12 VITALS
WEIGHT: 231.4 LBS | OXYGEN SATURATION: 92 % | RESPIRATION RATE: 18 BRPM | HEIGHT: 68 IN | HEART RATE: 68 BPM | TEMPERATURE: 97.3 F | BODY MASS INDEX: 35.07 KG/M2 | DIASTOLIC BLOOD PRESSURE: 65 MMHG | SYSTOLIC BLOOD PRESSURE: 102 MMHG

## 2024-09-12 LAB
ANION GAP SERPL CALC-SCNC: 13 MMOL/L (ref 10–20)
BUN SERPL-MCNC: 27 MG/DL (ref 6–23)
CALCIUM SERPL-MCNC: 8 MG/DL (ref 8.6–10.6)
CHLORIDE SERPL-SCNC: 102 MMOL/L (ref 98–107)
CO2 SERPL-SCNC: 26 MMOL/L (ref 21–32)
CREAT SERPL-MCNC: 1.98 MG/DL (ref 0.5–1.3)
EGFRCR SERPLBLD CKD-EPI 2021: 36 ML/MIN/1.73M*2
ERYTHROCYTE [DISTWIDTH] IN BLOOD BY AUTOMATED COUNT: 13.2 % (ref 11.5–14.5)
GLUCOSE BLD MANUAL STRIP-MCNC: 117 MG/DL (ref 74–99)
GLUCOSE BLD MANUAL STRIP-MCNC: 142 MG/DL (ref 74–99)
GLUCOSE BLD MANUAL STRIP-MCNC: 168 MG/DL (ref 74–99)
GLUCOSE SERPL-MCNC: 150 MG/DL (ref 74–99)
HCT VFR BLD AUTO: 32.6 % (ref 41–52)
HGB BLD-MCNC: 10.4 G/DL (ref 13.5–17.5)
MAGNESIUM SERPL-MCNC: 2.08 MG/DL (ref 1.6–2.4)
MCH RBC QN AUTO: 29 PG (ref 26–34)
MCHC RBC AUTO-ENTMCNC: 31.9 G/DL (ref 32–36)
MCV RBC AUTO: 91 FL (ref 80–100)
NRBC BLD-RTO: 0 /100 WBCS (ref 0–0)
PLATELET # BLD AUTO: 328 X10*3/UL (ref 150–450)
POTASSIUM SERPL-SCNC: 4.6 MMOL/L (ref 3.5–5.3)
RBC # BLD AUTO: 3.59 X10*6/UL (ref 4.5–5.9)
SODIUM SERPL-SCNC: 136 MMOL/L (ref 136–145)
WBC # BLD AUTO: 15 X10*3/UL (ref 4.4–11.3)

## 2024-09-12 PROCEDURE — 82947 ASSAY GLUCOSE BLOOD QUANT: CPT

## 2024-09-12 PROCEDURE — 71045 X-RAY EXAM CHEST 1 VIEW: CPT | Performed by: RADIOLOGY

## 2024-09-12 PROCEDURE — 97116 GAIT TRAINING THERAPY: CPT | Mod: GP,CQ

## 2024-09-12 PROCEDURE — 97530 THERAPEUTIC ACTIVITIES: CPT | Mod: GP,CQ

## 2024-09-12 PROCEDURE — 2500000004 HC RX 250 GENERAL PHARMACY W/ HCPCS (ALT 636 FOR OP/ED): Performed by: NURSE PRACTITIONER

## 2024-09-12 PROCEDURE — 2500000002 HC RX 250 W HCPCS SELF ADMINISTERED DRUGS (ALT 637 FOR MEDICARE OP, ALT 636 FOR OP/ED): Performed by: NURSE PRACTITIONER

## 2024-09-12 PROCEDURE — 80048 BASIC METABOLIC PNL TOTAL CA: CPT | Performed by: PHYSICIAN ASSISTANT

## 2024-09-12 PROCEDURE — 99239 HOSP IP/OBS DSCHRG MGMT >30: CPT | Performed by: NURSE PRACTITIONER

## 2024-09-12 PROCEDURE — 2500000001 HC RX 250 WO HCPCS SELF ADMINISTERED DRUGS (ALT 637 FOR MEDICARE OP): Performed by: PHYSICIAN ASSISTANT

## 2024-09-12 PROCEDURE — 71045 X-RAY EXAM CHEST 1 VIEW: CPT

## 2024-09-12 PROCEDURE — 83735 ASSAY OF MAGNESIUM: CPT | Performed by: PHYSICIAN ASSISTANT

## 2024-09-12 PROCEDURE — 2500000001 HC RX 250 WO HCPCS SELF ADMINISTERED DRUGS (ALT 637 FOR MEDICARE OP): Performed by: NURSE PRACTITIONER

## 2024-09-12 PROCEDURE — 85027 COMPLETE CBC AUTOMATED: CPT | Performed by: PHYSICIAN ASSISTANT

## 2024-09-12 PROCEDURE — 2500000005 HC RX 250 GENERAL PHARMACY W/O HCPCS: Performed by: NURSE PRACTITIONER

## 2024-09-12 PROCEDURE — 2500000004 HC RX 250 GENERAL PHARMACY W/ HCPCS (ALT 636 FOR OP/ED): Performed by: PHYSICIAN ASSISTANT

## 2024-09-12 PROCEDURE — 36415 COLL VENOUS BLD VENIPUNCTURE: CPT | Performed by: PHYSICIAN ASSISTANT

## 2024-09-12 PROCEDURE — 94640 AIRWAY INHALATION TREATMENT: CPT

## 2024-09-12 RX ORDER — LIDOCAINE 560 MG/1
1 PATCH PERCUTANEOUS; TOPICAL; TRANSDERMAL DAILY
Start: 2024-09-13

## 2024-09-12 RX ORDER — CLOPIDOGREL BISULFATE 75 MG/1
75 TABLET ORAL DAILY
Start: 2024-09-12

## 2024-09-12 RX ORDER — OXYCODONE HYDROCHLORIDE 5 MG/1
5 TABLET ORAL EVERY 6 HOURS PRN
Qty: 25 TABLET | Refills: 0 | Status: SHIPPED | OUTPATIENT
Start: 2024-09-12

## 2024-09-12 RX ORDER — INSULIN ASPART 100 [IU]/ML
INJECTION, SUSPENSION SUBCUTANEOUS
Start: 2024-09-12

## 2024-09-12 RX ORDER — GUAIFENESIN 600 MG/1
600 TABLET, EXTENDED RELEASE ORAL 2 TIMES DAILY
Qty: 28 TABLET | Refills: 0 | Status: SHIPPED | OUTPATIENT
Start: 2024-09-12 | End: 2024-09-26

## 2024-09-12 ASSESSMENT — ENCOUNTER SYMPTOMS
JOINT SWELLING: 0
FREQUENCY: 0
FATIGUE: 1
ABDOMINAL PAIN: 0
DIAPHORESIS: 0
DYSURIA: 0
SHORTNESS OF BREATH: 0
POLYDIPSIA: 0
UNEXPECTED WEIGHT CHANGE: 0
ACTIVITY CHANGE: 0
COUGH: 0
DIARRHEA: 0
NUMBNESS: 0
SORE THROAT: 0
CONFUSION: 0
EYES NEGATIVE: 1
CHEST TIGHTNESS: 0
POLYPHAGIA: 0
NAUSEA: 0
SPEECH DIFFICULTY: 0
HEADACHES: 0
APPETITE CHANGE: 0
AGITATION: 0

## 2024-09-12 ASSESSMENT — PAIN DESCRIPTION - ORIENTATION
ORIENTATION: RIGHT

## 2024-09-12 ASSESSMENT — COGNITIVE AND FUNCTIONAL STATUS - GENERAL
MOBILITY SCORE: 17
MOVING TO AND FROM BED TO CHAIR: A LITTLE
STANDING UP FROM CHAIR USING ARMS: A LITTLE
TURNING FROM BACK TO SIDE WHILE IN FLAT BAD: A LITTLE
CLIMB 3 TO 5 STEPS WITH RAILING: A LOT
WALKING IN HOSPITAL ROOM: A LITTLE
MOVING FROM LYING ON BACK TO SITTING ON SIDE OF FLAT BED WITH BEDRAILS: A LITTLE

## 2024-09-12 ASSESSMENT — PAIN - FUNCTIONAL ASSESSMENT
PAIN_FUNCTIONAL_ASSESSMENT: 0-10

## 2024-09-12 ASSESSMENT — PAIN SCALES - GENERAL
PAINLEVEL_OUTOF10: 9
PAINLEVEL_OUTOF10: 5 - MODERATE PAIN
PAINLEVEL_OUTOF10: 8
PAINLEVEL_OUTOF10: 8
PAINLEVEL_OUTOF10: 2
PAINLEVEL_OUTOF10: 0 - NO PAIN

## 2024-09-12 ASSESSMENT — PAIN DESCRIPTION - LOCATION
LOCATION: CHEST

## 2024-09-12 ASSESSMENT — PAIN SCALES - WONG BAKER
WONGBAKER_NUMERICALRESPONSE: HURTS LITTLE BIT
WONGBAKER_NUMERICALRESPONSE: HURTS LITTLE BIT

## 2024-09-12 NOTE — PROGRESS NOTES
Physical Therapy    Physical Therapy Treatment    Patient Name: Amandeep Lara  MRN: [id number]  Department: [place] [address]  Room: [address] [place] [address]  Today's Date: 9/12/2024  Time Calculation  Start Time: 1055  Stop Time: 1122  Time Calculation (min): 27 min         Assessment/Plan   PT Assessment  PT Assessment Results: Decreased strength, Decreased endurance, Impaired balance, Decreased mobility  Rehab Prognosis: Good  Evaluation/Treatment Tolerance: Patient tolerated treatment well  Medical Staff Made Aware: Yes  End of Session Communication: Bedside nurse  Assessment Comment: Pt offers good participation. Is progressing with functional mobility. Remains appropriate for low intensity therapy, with family support  End of Session Patient Position: Up in chair, Alarm off, not on at start of session     PT Plan  Treatment/Interventions: Bed mobility, Transfer training, Gait training, Stair training, Balance training, Strengthening, Endurance training, Therapeutic exercise, Therapeutic activity, Home exercise program  PT Plan: Ongoing PT  PT Frequency: 4 times per week  PT Discharge Recommendations: Low intensity level of continued care  Equipment Recommended upon Discharge: Wheeled walker  PT Recommended Transfer Status: Contact guard  PT - OK to Discharge: Yes      General Visit Information:   PT  Visit  PT Received On: 09/12/24  Response to Previous Treatment: Patient with no complaints from previous session.  General  Prior to Session Communication: Bedside nurse  Patient Position Received: Up in chair, Alarm off, not on at start of session  General Comment: Pt sitting up in chair, pleasant and motivated  Per handoff with RN, pt is appropriate for therapy, vitals are stable and pain is controlled. Other concerns prior to tx are: none    Subjective   Precautions:  Precautions  Medical Precautions: Fall precautions, Abdominal precautions, Cardiac precautions, Oxygen therapy device and L/min    Vital Signs (Past 2hrs)         Date/Time Vitals Session Patient Position Pulse Resp SpO2 BP MAP (mmHg)               09/12/24 1055 During PT  --  73  --  95 %  --  --                         Objective   Pain:  Pain Assessment  Pain Assessment: 0-10  0-10 (Numeric) Pain Score: 5 - Moderate pain  Pain Type: Surgical pain  Pain Location: Chest  Pain Interventions:  (Pt medicated prior to therapy)  Cognition:  Cognition  Overall Cognitive Status: Within Functional Limits  Orientation Level: Oriented X4    Treatments:  Therapeutic Exercise  Therapeutic Exercise Performed: Yes  Therapeutic Exercise Activity 1: seated B LE AP 1x10, standing single LE marching 10x/LE    Ambulation/Gait Training  Ambulation/Gait Training Performed: Yes  Ambulation/Gait Training 1  Surface 1: Level tile  Device 1: Rolling walker  Assistance 1: Close supervision  Quality of Gait 1: Diminished heel strike, Decreased step length, Shuffling gait (minimal toe-off)  Comments/Distance (ft) 1: 80'x1, 150'x1  Transfers  Transfer: Yes  Transfer 1  Transfer From 1: Sit to, Stand to  Transfer to 1: Sit  Technique 1: Sit to stand, Stand to sit  Transfer Device 1: Walker  Transfer Level of Assistance 1: Close supervision, Minimal verbal cues (min cues for UE placement and sequencing)    Stairs  Stairs: No (pt declined)    Outcome Measures:     [facility] Basic Mobility  Turning from your back to your side while in a flat bed without using bedrails: A little  Moving from lying on your back to sitting on the side of a flat bed without using bedrails: A little  Moving to and from bed to chair (including a wheelchair): A little  Standing up from a chair using your arms (e.g. wheelchair or bedside chair): A little  To walk in hospital room: A little  Climbing 3-5 steps with railing: A lot  Basic Mobility - Total Score: 17    Education Documentation  Body Mechanics, taught by Opal Le PTA at 9/12/2024 12:02 PM.  Learner: Patient  Readiness: Acceptance  Method: Explanation,  Demonstration  Response: Verbalizes Understanding, Demonstrated Understanding    Precautions, taught by Opal Le PTA at 9/12/2024 12:02 PM.  Learner: Patient  Readiness: Acceptance  Method: Explanation, Demonstration  Response: Verbalizes Understanding, Demonstrated Understanding    Mobility Training, taught by Opal Le PTA at 9/12/2024 12:02 PM.  Learner: Patient  Readiness: Acceptance  Method: Explanation, Demonstration  Response: Verbalizes Understanding, Demonstrated Understanding    Education Comments  No comments found.        OP EDUCATION:       Encounter Problems       Encounter Problems (Active)       PT Problem       Pt will be able to walk >/= 250 feet with LRAD and supervision assist (Progressing)       Start:  09/11/24    Expected End:  09/25/24            Pt will be able to perform bed mobility including rolling, sit<->supine, and supine<->sit at supervision level  (Progressing)       Start:  09/11/24    Expected End:  09/25/24            Pt will perform transfers at supervision level (Progressing)       Start:  09/11/24    Expected End:  09/25/24            Pt will be able to ascend/descend one flight of stairs at CGA level or less to promote safety with stairs at home (Progressing)       Start:  09/11/24    Expected End:  09/25/24            Pt will score at least 24/28 on Tinetti Balance Assessment to decrease risk of falls (Progressing)       Start:  09/11/24    Expected End:  09/25/24                 DALY Atkins

## 2024-09-12 NOTE — DISCHARGE SUMMARY
Discharge Diagnosis  Lung nodule, solitary    Issues Requiring Follow-Up  Final pathology    Test Results Pending At Discharge  Pending Labs       Order Current Status    Surgical Pathology Exam In process            Hospital Course  Amandeep Lara is a 69 y.o. male with a history of HTN, HLD, CAD, remote inferior infarct, DM2 (HgA1c = 8.9 on 9/6/2024), PVD, CVA and current tobacco use who presents with a PET avid, enlarging spiculated 2.4 cm right upper lobe nodule. He is now status post: Robotic assisted RUL wedge resection, Robotic assisted RUL lobectomy, robotic assisted mediastinal lymph node dissection, intercostal nerve block and bronchoscopy. Satisfactory post op course. Chest tube placed to  on Am rounds on pod #1.  His chest tube was removed on pod #2 and post pull imaging was without evidence of clinically significant  pneumothorax.     At the time of discharge, his pain was controlled on an oral pain regimen, He was breathing comfortably on room air.  Hewas ambulating independently with the use of front wheeled walker.  He was tolerating a diabetic diet without nausea. He was voiding regularly.      The patient was educated on post operative activity restrictions, dietary guidelines, and pain management. He will follow up with Dr. Momin in the outpatient setting in one week with a CXR just prior to this visit. The patient has been instructed to add an OTC stool softeners or laxative while taking oral analgesia.  Deep breathing, coughing, and walking at home encouraged. All questions have been answered and concerns addressed until there were none.       He has specifically  been counseled to 1) obtain a CXR just prior to his appointment with Dr. Momin, 2) hold his Plavix until Sunday, 3) make changes to his insulin as directed and 4) follow up with endocrinologist in the next 2 weeks. Keep both appointments with Dr. Momin.  [facility] has been ordered for cardiopulmonary evaluations, physical therapy and  "diabetes assessment.     Pertinent Physical Exam At Time of Discharge  General: He is a pleasant male currently in no distress, seen resting comfortably in bed and again when sitting up in the chair.  Visit Vitals  /65 (BP Location: Left arm, Patient Position: Sitting)   Pulse 68   Temp 36.3 °C (97.3 °F) (Temporal)   Resp 18   Ht 1.727 m (5' 8\")   Wt 105 kg (231 lb 6.4 oz)   SpO2 92%   BMI 35.18 kg/m²   Smoking Status Former   BSA 2.24 m²     Body mass index is 35.25 kg/m².   HEENT: Normocephalic and atraumatic.   NECK: Supple. Trach midline. No JVD.   CHEST: Breathing comfortably on 3L NC.  Lungs with audible wheezes.  Chest tube placed to WS, sero sang drg, no airleak appreciated.   HEART: Regular rate and rhythm. NSR per tele review.   ABDOMEN: Soft, flat, nontender.   : manley to gd, removed on AM rounds.   NEUROLOGIC: Alert and oriented. Grossly intact.   EXTREMITIES: Moves all extremities equally.  Pedal pulses are palpable. No lower extremity edema. No calf tenderness.     Home Medications     Medication List      START taking these medications     guaiFENesin 600 mg 12 hr tablet; Commonly known as: Mucinex; Take 1   tablet (600 mg) by mouth 2 times a day for 14 days. Do not crush, chew, or   split.   lidocaine 4 % patch; Place 1 patch over 12 hours on the skin once daily.   Remove & discard patch within 12 hours or as directed by MD.; Start taking   on: September 13, 2024   oxyCODONE 5 mg immediate release tablet; Commonly known as: Roxicodone;   Take 1 tablet (5 mg) by mouth every 6 hours if needed (pain).     CHANGE how you take these medications     clopidogrel 75 mg tablet; Commonly known as: Plavix; Take 1 tablet (75   mg) by mouth once daily. RESUME PLAVIX ON LATISHA 9/15/2024; What changed:   additional instructions   NovoLOG Mix 70-30FlexPen U-100 100 unit/mL (70-30) injection; Generic   drug: insulin asp prt-insulin aspart; Take 15 units with breakfast and 10   units with dinner; What changed: " "how much to take, how to take this, when   to take this, additional instructions     CONTINUE taking these medications     acetaminophen 325 mg tablet; Commonly known as: Tylenol   amLODIPine 5 mg tablet; Commonly known as: Norvasc; Take 1 tablet (5 mg)   by mouth once daily.   aspirin 81 mg chewable tablet   atorvastatin 40 mg tablet; Commonly known as: Lipitor; Take 1 tablet (40   mg) by mouth once daily at bedtime.   BD Ultra-Fine Lin Pen Needle 32 gauge x 5/32\" needle; Generic drug: pen   needle, diabetic   carvedilol 25 mg tablet; Commonly known as: Coreg; TAKE 1 TABLET TWICE   DAILY   Contour Next Gen Meter misc; Generic drug: blood-glucose meter   fish oil concentrate 120-180 mg capsule; Commonly known as: Omega-3   nitroglycerin 0.4 mg SL tablet; Commonly known as: Nitrostat   VITAMIN B-12 ORAL     STOP taking these medications     insulin lispro 100 unit/mL injection; Commonly known as: HumaLOG   metFORMIN 1,000 mg tablet; Commonly known as: Glucophage       Outpatient Follow-Up  Future Appointments   Date Time Provider Department Center   9/20/2024 10:30 AM Siena Momin MD [id number] [place]   9/27/2024  9:45 AM Siena Momin MD [id number] [place]   11/14/2024  1:00 PM Bonifacio Quinones DO [id number] [facility]   1/7/2025  4:30 PM MANDO Maier [id number] [facility]   5/9/2025  3:00 PM ELY ULTRASOUND 5 ELYUS [place]   5/15/2025  9:45 AM Anthony Owens MD [id number] [place]     Time spent with discharge was >30 minutes and included explanation of discharge instructions, arranging homegoing prescriptions, supplies and follow-up, and creating the discharge summary of the patient's hospialization.    Sheila Blackwell APRN-CNP  "

## 2024-09-12 NOTE — PROGRESS NOTES
Amandeep Lara is a 69 y.o. male on day 3 of admission presenting with Lung nodule, solitary.    Subjective   Patient seen at the bedside.   He is eating well. Reviewed discharge recommendations for insulin for glargine and lispro, but patient very much wants to continue his mixed insulin. We discussed that it is important to eat at least breakfast and dinner as the mixed insulin has both long and short acting insulin. Patient agrees to at least eat a small breakfast. He will follow up with his endo.   I have reviewed histories, allergies and medications have been reviewed and there are no changes     Objective   Review of Systems   Constitutional:  Positive for fatigue. Negative for activity change, appetite change, diaphoresis and unexpected weight change.   HENT: Negative.  Negative for sore throat.    Eyes: Negative.    Respiratory:  Negative for cough, chest tightness and shortness of breath.    Cardiovascular:  Negative for chest pain.   Gastrointestinal:  Negative for abdominal pain, diarrhea and nausea.   Endocrine: Negative for cold intolerance, heat intolerance, polydipsia, polyphagia and polyuria.   Genitourinary:  Negative for dysuria and frequency.   Musculoskeletal:  Negative for gait problem and joint swelling.   Neurological:  Negative for speech difficulty, numbness and headaches.   Psychiatric/Behavioral:  Negative for agitation, behavioral problems and confusion.      Physical Exam  Constitutional:       General: He is not in acute distress.     Appearance: Normal appearance.   HENT:      Nose: Nose normal.      Mouth/Throat:      Mouth: Mucous membranes are moist.      Pharynx: Oropharynx is clear.   Cardiovascular:      Rate and Rhythm: Normal rate and regular rhythm.   Pulmonary:      Effort: Pulmonary effort is normal. No respiratory distress.   Abdominal:      General: There is no distension.      Palpations: Abdomen is soft.   Musculoskeletal:         General: Normal range of motion.  "  Skin:     General: Skin is warm and dry.   Neurological:      Mental Status: He is alert and oriented to person, place, and time.   Psychiatric:         Mood and Affect: Mood normal.         Behavior: Behavior normal.     Last Recorded Vitals  Blood pressure 109/70, pulse 73, temperature 36.2 °C (97.2 °F), temperature source Temporal, resp. rate 18, height 1.727 m (5' 8\"), weight 105 kg (231 lb 6.4 oz), SpO2 95%.  Intake/Output last 3 Shifts:  I/O last 3 completed shifts:  In: 1180 (11.2 mL/kg) [P.O.:1080; IV Piggyback:100]  Out: 2355 (22.4 mL/kg) [Urine:1825 (0.5 mL/kg/hr); Chest Tube:530]  Weight: 105 kg     Relevant Results  Results from last 7 days   Lab Units 09/12/24  0859 09/12/24  0445 09/12/24  0429 09/12/24  0030 09/11/24  2017 09/11/24  1638 09/11/24  0803 09/11/24  0418 09/10/24  0425 09/09/24  2332 09/06/24  1457   POCT GLUCOSE mg/dL 168* 142*  --  117* 182* 207*   < >  --    < > 148*  --    GLUCOSE mg/dL  --   --  150*  --   --   --   --  247*  --  239* 184*    < > = values in this interval not displayed.   Lab Review  Lab Results   Component Value Date    BILITOT 0.4 06/12/2024    CALCIUM 8.0 (L) 09/12/2024    CO2 26 09/12/2024     09/12/2024    CREATININE 1.98 (H) 09/12/2024    GLUCOSE 150 (H) 09/12/2024    ALKPHOS 85 06/12/2024    K 4.6 09/12/2024    PROT 5.9 (L) 06/12/2024     09/12/2024    AST 10 06/12/2024    ALT 10 06/12/2024    BUN 27 (H) 09/12/2024    ANIONGAP 13 09/12/2024    MG 2.08 09/12/2024    PHOS 3.5 05/26/2021    ALBUMIN 3.3 (L) 06/12/2024    LIPASE 12 06/21/2020    GFRMALE 62 07/13/2023     Lab Results   Component Value Date    TRIG 144 10/20/2023    CHOL 204 (H) 10/20/2023    LDLCALC 129 (H) 10/20/2023    HDL 45.9 10/20/2023     Lab Results   Component Value Date    HGBA1C 8.9 (H) 09/06/2024    HGBA1C 8.0 (H) 10/20/2023    HGBA1C 8.0 (A) 07/13/2023     The ASCVD Risk score (Ton DK, et al., 2019) failed to calculate for the following reasons:    The patient has a " prior MI or stroke diagnosis  Scheduled medications  acetaminophen, 650 mg, oral, q6h  amLODIPine, 5 mg, oral, Daily  aspirin, 81 mg, oral, Daily  atorvastatin, 40 mg, oral, Nightly  carvedilol, 25 mg, oral, BID  ceFAZolin, , ,   ceFAZolin, , ,   dexAMETHasone, , ,   fentaNYL PF, , ,   glycopyrrolate, , ,   guaiFENesin, 600 mg, oral, BID  heparin (porcine), 5,000 Units, subcutaneous, q8h  insulin glargine, 10 Units, subcutaneous, q24h  insulin lispro, 0-5 Units, subcutaneous, Before meals & nightly  insulin lispro, 4 Units, subcutaneous, TID  ipratropium-albuteroL, 3 mL, nebulization, q6h  ketamine in NaCl, iso-osmotic, , ,   lactated Ringer's, , ,   lactated Ringer's, , ,   lidocaine (cardiac), , ,   lidocaine, 1 patch, transdermal, Daily  midazolam, , ,   phenylephrine, , ,   phenylephrine HCl in 0.9% NaCl, , ,   rocuronium, , ,   rocuronium, , ,   sennosides-docusate sodium, 2 tablet, oral, BID  sevoflurane, , ,   sodium chloride 0.9%, , ,   sodium chloride 0.9%, , ,   sodium chloride 0.9%, , ,       Continuous medications     PRN medications  PRN medications: ceFAZolin, ceFAZolin, dexAMETHasone, dextrose, dextrose, fentaNYL PF, glucagon, glycopyrrolate, ketamine in NaCl, iso-osmotic, lactated Ringer's, lactated Ringer's, lidocaine (cardiac), midazolam, naloxone, ondansetron, oxyCODONE, oxyCODONE, oxygen, phenylephrine, phenylephrine HCl in 0.9% NaCl, rocuronium, rocuronium, sevoflurane, sodium chloride 0.9%, sodium chloride 0.9%, sodium chloride 0.9%    Assessment/Plan   Assessment & Plan  Lung nodule, solitary    Lung nodule    LUIS FELIPE (obstructive sleep apnea)    Chronic obstructive pulmonary disease (Multi)    Diabetic polyneuropathy associated with type 2 diabetes mellitus (Multi)    Amandeep Lara is a 69 y.o. male with a past medical history of type 2 diabetes who is s/p lobectomy 9/10/24.      Patient seen and interviewed at the bedside. He is eating fairly well.      Diabetes History  Type of diabetes: Type  2  Year diagnosed or age: 20 yrs +  Hospitalizations for DKA or HHS:  no  Complications: neuropathy, CAD, CVA  Seen by PCP or Endocrinology: PCP  Frequency of glucose checks: 2-3 times per day  Glucose review: mostly in the 200, higher in the afternoons.   Frequency of Hypoglycemia: rare  Hypoglycemia unawareness:  can sense in the 60's  Severe hypoglycemia requiring assistance from others: no     Home Medications  Basal/Prandial: Novolog 70/30 according to his rather complicated scale:  If glucose is in the 200's, he takes 20 units. If his glucose is less than 180, he subtracts 60 pts from his glucose (his target BG) and then subtracts a unit for every 10 pts. For example, if glucose is 180, he would take 12 units.  If glucose is 300, he would take 30 units  If glucose is around 100, he would hold the dose  Orals: metformin 1000 mg BID  GLP-1: no  CGM: no. Patient will think about using CGM at discharge. He is uncomfortable with using his phone as a , is overwhelmed with technology.     PLAN  Steroids: none  Nutrition: carb consistent 60 grams/meal  - continue glargine 10 units daily in the morning       If NPO: reduce to 5 units  - continue lispro 4 with meals plus scale       If NPO: hold  - continue lispro corrective scale #2 with meals and HS, adjust to q4h if NPO or if persistently hyperglycemic   = 0u  151-200 = 2u  201-250 = 4u  251-300 = 6u  301-350 = 8u  351-400 = 10u     -Accuchecks (not BMP) TIDAC and QHS- kindly ensure QHS Accucheck is drawn; it is often missed   - Goal -180  -Hypoglycemia protocol  -Diabetes Diet- low carb 60 CHO  -Will continue to follow and titrate insulin accordingly      Discharge planning:   [x] patient declined to switch to glargine and lispro.  In this case, recommend 70/30 15 units with breakfast and 10 units with dinner.   [x] stop metformin at discharge due to FENG  [x] patient declines the  pharmacy platinum plan program  [x]follow up with local endo      I spent 50 minutes in the professional and overall care of this patient.    Leesa Topete, APRN-CNP

## 2024-09-12 NOTE — HOSPITAL COURSE
Amandeep Lara is a 69 y.o. male with a history of HTN, HLD, CAD, remote inferior infarct, DM2 (HgA1c = 8.9 on 9/6/2024), PVD, CVA and current tobacco use who presents with a PET avid, enlarging spiculated 2.4 cm right upper lobe nodule. He is now status post: Robotic assisted RUL wedge resection, Robotic assisted RUL lobectomy, robotic assisted mediastinal lymph node dissection, intercostal nerve block and bronchoscopy. Satisfactory post op course. Chest tube placed to WS on Am rounds.

## 2024-09-12 NOTE — PROGRESS NOTES
Home Care was notified of pt's plan to discharge home today. Pt was given a wheeled walker for home use. DME referral was made.  Jessica Spain RN  Add:  HC confirmed start of care in 24-48 hrs.

## 2024-09-14 ENCOUNTER — HOME CARE VISIT (OUTPATIENT)
Dept: HOME HEALTH SERVICES | Facility: HOME HEALTH | Age: 69
End: 2024-09-14
Payer: COMMERCIAL

## 2024-09-14 VITALS
HEART RATE: 70 BPM | OXYGEN SATURATION: 96 % | DIASTOLIC BLOOD PRESSURE: 60 MMHG | RESPIRATION RATE: 18 BRPM | WEIGHT: 231 LBS | SYSTOLIC BLOOD PRESSURE: 94 MMHG | TEMPERATURE: 97.6 F | HEIGHT: 68 IN | BODY MASS INDEX: 35.01 KG/M2

## 2024-09-14 VITALS
TEMPERATURE: 98.3 F | HEART RATE: 65 BPM | OXYGEN SATURATION: 94 % | RESPIRATION RATE: 14 BRPM | SYSTOLIC BLOOD PRESSURE: 119 MMHG | DIASTOLIC BLOOD PRESSURE: 62 MMHG

## 2024-09-14 PROCEDURE — G0151 HHCP-SERV OF PT,EA 15 MIN: HCPCS

## 2024-09-14 PROCEDURE — 169592 NO-PAY CLAIM PROCEDURE

## 2024-09-14 PROCEDURE — G0299 HHS/HOSPICE OF RN EA 15 MIN: HCPCS

## 2024-09-14 ASSESSMENT — ENCOUNTER SYMPTOMS
PERSON REPORTING PAIN: PATIENT
PAIN LOCATION: INCISION
HYPOTENSION: 1
COUGH CHARACTERISTICS: PRODUCTIVE
DIZZINESS: 1
COUGH: 1
PAIN LOCATION - PAIN DURATION: INTERMITTENT
DEPRESSION: 0
CHANGE IN APPETITE: UNCHANGED
LOSS OF SENSATION IN FEET: 0
PAIN: 1
MUSCLE WEAKNESS: 1
LAST BOWEL MOVEMENT: 67094
PAIN SEVERITY GOAL: 0/10
PERSON REPORTING PAIN: PATIENT
SPUTUM COLOR: BLOODY
PAIN LOCATION - PAIN SEVERITY: 7/10
SPUTUM CONSISTENCY: THICK
LOWEST PAIN SEVERITY IN PAST 24 HOURS: 0/10
SPUTUM AMOUNT: SCANT
PAIN LOCATION - PAIN QUALITY: ACHE
PAIN: 1
OCCASIONAL FEELINGS OF UNSTEADINESS: 1
STOOL FREQUENCY: LESS THAN DAILY
HIGHEST PAIN SEVERITY IN PAST 24 HOURS: 7/10
COUGH CHARACTERISTICS: MOIST
PAIN LOCATION - PAIN FREQUENCY: INTERMITTENT
SPUTUM PRODUCTION: 1
FATIGUES EASILY: 1
PAIN LOCATION: CHEST
APPETITE LEVEL: GOOD
PAIN LOCATION - RELIEVING FACTORS: REST/RX

## 2024-09-14 ASSESSMENT — PAIN SCALES - PAIN ASSESSMENT IN ADVANCED DEMENTIA (PAINAD)
CONSOLABILITY: 0
BODYLANGUAGE: 0 - RELAXED.
CONSOLABILITY: 0 - NO NEED TO CONSOLE.
NEGVOCALIZATION: 0 - NONE.
BREATHING: 0
TOTALSCORE: 0
FACIALEXPRESSION: 0
BODYLANGUAGE: 0
FACIALEXPRESSION: 0 - SMILING OR INEXPRESSIVE.
NEGVOCALIZATION: 0

## 2024-09-14 ASSESSMENT — ACTIVITIES OF DAILY LIVING (ADL)
AMBULATION ASSISTANCE ON FLAT SURFACES: 1
PHYSICAL TRANSFERS ASSESSED: 1
TOILETING: 1
TOILETING: INDEPENDENT
CURRENT_FUNCTION: INDEPENDENT
ENTERING_EXITING_HOME: NEEDS ASSISTANCE
AMBULATION ASSISTANCE: STAND BY ASSIST
OASIS_M1830: 03
AMBULATION ASSISTANCE: 1

## 2024-09-16 ENCOUNTER — HOME CARE VISIT (OUTPATIENT)
Dept: HOME HEALTH SERVICES | Facility: HOME HEALTH | Age: 69
End: 2024-09-16
Payer: COMMERCIAL

## 2024-09-16 VITALS
HEART RATE: 72 BPM | SYSTOLIC BLOOD PRESSURE: 130 MMHG | RESPIRATION RATE: 18 BRPM | OXYGEN SATURATION: 96 % | TEMPERATURE: 98.5 F | DIASTOLIC BLOOD PRESSURE: 64 MMHG

## 2024-09-16 PROCEDURE — G0152 HHCP-SERV OF OT,EA 15 MIN: HCPCS

## 2024-09-16 ASSESSMENT — ENCOUNTER SYMPTOMS
LOWEST PAIN SEVERITY IN PAST 24 HOURS: 1/10
PERSON REPORTING PAIN: PATIENT
PAIN LOCATION - PAIN QUALITY: ACHING, SORE
PAIN LOCATION - PAIN SEVERITY: 4/10
HIGHEST PAIN SEVERITY IN PAST 24 HOURS: 5/10
PAIN: 1
SUBJECTIVE PAIN PROGRESSION: WAXING AND WANING
PAIN LOCATION: INCISION
PAIN SEVERITY GOAL: 0/10

## 2024-09-17 DIAGNOSIS — C34.90 MALIGNANT NEOPLASM OF LUNG, UNSPECIFIED LATERALITY, UNSPECIFIED PART OF LUNG (MULTI): ICD-10-CM

## 2024-09-17 SDOH — ECONOMIC STABILITY: HOUSING INSECURITY
HOME SAFETY: ON, ROBOTIC ASSISTED RUL LOBECTOMY, ROBOTIC ASSISTED MEDIASTINAL LYMPH NODE DISSECTION  PLOF: LIVES IN SPLIT LEVEL HOME, STEPS TO ENTER WITH HR, LIVES WITH SPOUSE AND ADULT SON, PATIENT ALONE OCCASIONALLY THROUGHOUT THE DAY AND NORMALLY PREPARES SMAL

## 2024-09-17 SDOH — ECONOMIC STABILITY: HOUSING INSECURITY: HOME SAFETY: FETY AND FALL PREVENTION AND AE/DME INSTRUCTION AND EDUCATION.

## 2024-09-17 SDOH — ECONOMIC STABILITY: HOUSING INSECURITY
HOME SAFETY: L MEALS, INDEP SHOWERING AND DRESSING (STANDING IN SHOWER)  PATIENT NOW PRESENTS WITH INCREASED SOB WITH ALL ACTIVITY, 1 FALL SINCE DC TO HOME, NON COMPLIANT WITH USE OF AD FOR TRANSFERS AND MOBILITY, MIN A BATHING AND DRESSING (HAS NOT SHOWERED SINC

## 2024-09-17 SDOH — ECONOMIC STABILITY: HOUSING INSECURITY
HOME SAFETY: 68 Y.O. MALE WITH A HISTORY OF HTN, HLD, CAD, REMOTE INFERIOR INFARCT, DM2 , PVD, CVA AND CURRENT TOBACCO USE WHO PRESENTS WITH A PET AVID, ENLARGING SPICULATED 2.4 CM RIGHT UPPER LOBE NODULE. HE IS NOW STATUS POST: ROBOTIC ASSISTED RUL WEDGE RESECTI

## 2024-09-17 SDOH — ECONOMIC STABILITY: HOUSING INSECURITY
HOME SAFETY: RECEPTIVE TO SAFETY RECOMMENDATIONS. PATIENT AGREEABLE TO MINIMAL OT VISITS STATING " AS LONG AS YOU DON'T MAKE ME DO TOO MUCH."  OT TO TX 2W2, 1W1 TO ADDRESS ADLS, FUNCTIONAL TRANSFERS, ENERGY CONSERVATION, STRENGTHENING AND UE HEP, BALANCE, HOME SA

## 2024-09-17 SDOH — ECONOMIC STABILITY: HOUSING INSECURITY
HOME SAFETY: TO USE WHEELED WALKER. RECOMMENDED SHOWER CHAIR WITH HANDLES FOR SAFETY AND INDEPENDENCE WITH SHOWER TRANSFERS AND ADLS. RECOMMENDED MOVING ITEMS/FURNITURE IF POSSIBLE AND DECLUTTERING AREAS FOR SAFETY.   SON PRESENT DURING ASSESSMENT THIS DATE AND

## 2024-09-17 SDOH — ECONOMIC STABILITY: HOUSING INSECURITY
HOME SAFETY: E DC HOME, SPONGE BATHING), MIN A TUB/SHOWER TRANSFER DEMONSTRATION THIS DATE.   RECOMMENDED USE OF AT LEAST THE SPC WITH MOBILITY AND TRANSFERS TO DECREASE RISK OF FALLS. PATIENTS LIVING AREA CLUTTERED AND SOME NARROW PATHWAYS THAT MAKE IT DIFFICULT

## 2024-09-17 ASSESSMENT — ACTIVITIES OF DAILY LIVING (ADL)
AMBULATION ASSISTANCE: CONTACT GUARD ASSIST
DRESSING_LB_CURRENT_FUNCTION: MINIMUM ASSIST
CURRENT_FUNCTION: MINIMUM ASSIST
TOILETING: 1
PHYSICAL TRANSFERS ASSESSED: 1
TOILETING: STAND BY ASSIST
AMBULATION ASSISTANCE: 1
DRESSING_UB_CURRENT_FUNCTION: STAND BY ASSIST

## 2024-09-18 ENCOUNTER — HOME CARE VISIT (OUTPATIENT)
Dept: HOME HEALTH SERVICES | Facility: HOME HEALTH | Age: 69
End: 2024-09-18
Payer: COMMERCIAL

## 2024-09-18 VITALS
SYSTOLIC BLOOD PRESSURE: 147 MMHG | HEART RATE: 68 BPM | RESPIRATION RATE: 18 BRPM | OXYGEN SATURATION: 98 % | TEMPERATURE: 98.5 F | DIASTOLIC BLOOD PRESSURE: 70 MMHG

## 2024-09-18 VITALS
OXYGEN SATURATION: 98 % | HEART RATE: 67 BPM | RESPIRATION RATE: 18 BRPM | SYSTOLIC BLOOD PRESSURE: 147 MMHG | DIASTOLIC BLOOD PRESSURE: 70 MMHG | TEMPERATURE: 98.5 F

## 2024-09-18 PROCEDURE — G0157 HHC PT ASSISTANT EA 15: HCPCS | Mod: CQ

## 2024-09-18 PROCEDURE — G0299 HHS/HOSPICE OF RN EA 15 MIN: HCPCS

## 2024-09-18 ASSESSMENT — ENCOUNTER SYMPTOMS
LOWER EXTREMITY EDEMA: 1
PAIN LOCATION - PAIN FREQUENCY: CONSTANT
HIGHEST PAIN SEVERITY IN PAST 24 HOURS: 5/10
COUGH CHARACTERISTICS: MOIST
PAIN: 1
LOWEST PAIN SEVERITY IN PAST 24 HOURS: 5/10
PAIN LOCATION: RIGHT BREAST
APPETITE LEVEL: FAIR
PERSON REPORTING PAIN: PATIENT
PAIN LOCATION - PAIN FREQUENCY: FREQUENT
SUBJECTIVE PAIN PROGRESSION: UNCHANGED
LAST BOWEL MOVEMENT: 67092
CONSTIPATION: 1
SUBJECTIVE PAIN PROGRESSION: UNCHANGED
HIGHEST PAIN SEVERITY IN PAST 24 HOURS: 5/10
PAIN LOCATION: CHEST
PAIN: 1
PERSON REPORTING PAIN: PATIENT
PAIN LOCATION - PAIN SEVERITY: 4/10
PAIN SEVERITY GOAL: 0/10
COUGH: 1
PAIN LOCATION - RELIEVING FACTORS: REST, ICE
PAIN SEVERITY GOAL: 5/10
PAIN LOCATION - PAIN QUALITY: ACHING
PAIN LOCATION - PAIN SEVERITY: 5/10
COUGH CHARACTERISTICS: PRODUCTIVE
LOWEST PAIN SEVERITY IN PAST 24 HOURS: 3/10
PAIN LOCATION - PAIN QUALITY: THROBBING

## 2024-09-18 ASSESSMENT — ACTIVITIES OF DAILY LIVING (ADL)
CURRENT_FUNCTION: STAND BY ASSIST
AMBULATION ASSISTANCE: STAND BY ASSIST

## 2024-09-19 ENCOUNTER — HOME CARE VISIT (OUTPATIENT)
Dept: HOME HEALTH SERVICES | Facility: HOME HEALTH | Age: 69
End: 2024-09-19
Payer: COMMERCIAL

## 2024-09-19 VITALS
SYSTOLIC BLOOD PRESSURE: 140 MMHG | HEART RATE: 68 BPM | TEMPERATURE: 98.7 F | RESPIRATION RATE: 18 BRPM | OXYGEN SATURATION: 95 % | DIASTOLIC BLOOD PRESSURE: 68 MMHG

## 2024-09-19 PROCEDURE — G0152 HHCP-SERV OF OT,EA 15 MIN: HCPCS

## 2024-09-19 ASSESSMENT — ENCOUNTER SYMPTOMS
PAIN: 1
PAIN LOCATION - PAIN DURATION: SINCE HOSPITALIZATION
PERSON REPORTING PAIN: PATIENT
PAIN LOCATION - PAIN SEVERITY: 4/10
PAIN LOCATION: ABDOMEN
PAIN LOCATION - PAIN FREQUENCY: INTERMITTENT
PAIN LOCATION - RELIEVING FACTORS: PAIN MEDICATION
PAIN LOCATION - PAIN QUALITY: ACHING

## 2024-09-19 NOTE — H&P (VIEW-ONLY)
Subjective   Amandeep Lara  is a 69 y.o. male with a pmhx of CAD, HTN, HLD, DM, PAD, stroke, COPD and former smoker who presents today for postop follow up. Patient underwent robotic assisted RUL lobectomy for NSCLC on 9/10/24. Did well postop. Hospital course was uncomplicated. He is doing well today. He is working with home PT.     There is no significant change in patient's past medical history, past surgical history, social history, family history, or review of systems since last visit.     Objective   Visit Vitals  /54   Pulse 73   Temp 36.4 °C (97.5 °F)   Resp 20     Physical Exam  Constitutional:       General: He is not in acute distress.     Appearance: Normal appearance. He is not ill-appearing, toxic-appearing or diaphoretic.   HENT:      Head: Normocephalic and atraumatic.      Mouth/Throat:      Mouth: Mucous membranes are moist.      Pharynx: Oropharynx is clear.   Eyes:      General: No scleral icterus.     Extraocular Movements: Extraocular movements intact.      Conjunctiva/sclera: Conjunctivae normal.      Pupils: Pupils are equal, round, and reactive to light.   Cardiovascular:      Rate and Rhythm: Normal rate and regular rhythm.      Pulses: Normal pulses.      Heart sounds: Normal heart sounds.   Pulmonary:      Effort: Pulmonary effort is normal. No respiratory distress.      Breath sounds: Normal breath sounds. No stridor. No wheezing, rhonchi or rales.      Comments: Incisions c/d/i  Chest:      Chest wall: No tenderness.   Abdominal:      General: There is no distension.      Palpations: Abdomen is soft.      Tenderness: There is no abdominal tenderness. There is no guarding or rebound.   Musculoskeletal:         General: No swelling or tenderness. Normal range of motion.      Cervical back: Normal range of motion and neck supple. No rigidity or tenderness.      Right lower leg: No edema.      Left lower leg: No edema.   Skin:     General: Skin is warm and dry.      Coloration: Skin  is not jaundiced.   Neurological:      General: No focal deficit present.      Mental Status: He is alert and oriented to person, place, and time. Mental status is at baseline.   Psychiatric:         Mood and Affect: Mood normal.         Behavior: Behavior normal.           Diagnostic Review  I reviewed the operative note. It showed right upper lobe nodule frozen section confirm squamous cell carcinoma. No pleural implants or invasion noted. Thickened right upper lobe airway.   His pathology report is still pending.       Assessment/Plan   Amandeep Lara  is doing well overall. I will call him when pathology report comes back and decide if he needs additional treatment. Otherwise, I recommend follow up in 6 month with CT chest for lung cancer surveillance.     Siena Momin MD  Thoracic Surgeon  [facility]   of Medicine  [facility]  Office phone: [phone]  Fax: [phone]  Pager: [phone]

## 2024-09-19 NOTE — PROGRESS NOTES
Subjective   Amandeep Lara  is a 69 y.o. male with a pmhx of CAD, HTN, HLD, DM, PAD, stroke, COPD and former smoker who presents today for postop follow up. Patient underwent robotic assisted RUL lobectomy for NSCLC on 9/10/24. Did well postop. Hospital course was uncomplicated. He is doing well today. He is working with home PT.     There is no significant change in patient's past medical history, past surgical history, social history, family history, or review of systems since last visit.     Objective   Visit Vitals  /54   Pulse 73   Temp 36.4 °C (97.5 °F)   Resp 20     Physical Exam  Constitutional:       General: He is not in acute distress.     Appearance: Normal appearance. He is not ill-appearing, toxic-appearing or diaphoretic.   HENT:      Head: Normocephalic and atraumatic.      Mouth/Throat:      Mouth: Mucous membranes are moist.      Pharynx: Oropharynx is clear.   Eyes:      General: No scleral icterus.     Extraocular Movements: Extraocular movements intact.      Conjunctiva/sclera: Conjunctivae normal.      Pupils: Pupils are equal, round, and reactive to light.   Cardiovascular:      Rate and Rhythm: Normal rate and regular rhythm.      Pulses: Normal pulses.      Heart sounds: Normal heart sounds.   Pulmonary:      Effort: Pulmonary effort is normal. No respiratory distress.      Breath sounds: Normal breath sounds. No stridor. No wheezing, rhonchi or rales.      Comments: Incisions c/d/i  Chest:      Chest wall: No tenderness.   Abdominal:      General: There is no distension.      Palpations: Abdomen is soft.      Tenderness: There is no abdominal tenderness. There is no guarding or rebound.   Musculoskeletal:         General: No swelling or tenderness. Normal range of motion.      Cervical back: Normal range of motion and neck supple. No rigidity or tenderness.      Right lower leg: No edema.      Left lower leg: No edema.   Skin:     General: Skin is warm and dry.      Coloration: Skin  is not jaundiced.   Neurological:      General: No focal deficit present.      Mental Status: He is alert and oriented to person, place, and time. Mental status is at baseline.   Psychiatric:         Mood and Affect: Mood normal.         Behavior: Behavior normal.           Diagnostic Review  I reviewed the operative note. It showed right upper lobe nodule frozen section confirm squamous cell carcinoma. No pleural implants or invasion noted. Thickened right upper lobe airway.   His pathology report is still pending.       Assessment/Plan   Amandeep Lara  is doing well overall. I will call him when pathology report comes back and decide if he needs additional treatment. Otherwise, I recommend follow up in 6 month with CT chest for lung cancer surveillance.     Siena Momin MD  Thoracic Surgeon  [facility]   of Medicine  [facility]  Office phone: [phone]  Fax: [phone]  Pager: [phone]

## 2024-09-20 ENCOUNTER — OFFICE VISIT (OUTPATIENT)
Dept: SURGERY | Facility: CLINIC | Age: 69
End: 2024-09-20
Payer: COMMERCIAL

## 2024-09-20 ENCOUNTER — HOME CARE VISIT (OUTPATIENT)
Dept: HOME HEALTH SERVICES | Facility: HOME HEALTH | Age: 69
End: 2024-09-20
Payer: COMMERCIAL

## 2024-09-20 ENCOUNTER — APPOINTMENT (OUTPATIENT)
Dept: RADIOLOGY | Facility: HOSPITAL | Age: 69
End: 2024-09-20
Payer: COMMERCIAL

## 2024-09-20 ENCOUNTER — APPOINTMENT (OUTPATIENT)
Dept: CARDIOLOGY | Facility: HOSPITAL | Age: 69
End: 2024-09-20
Payer: COMMERCIAL

## 2024-09-20 ENCOUNTER — HOSPITAL ENCOUNTER (OUTPATIENT)
Dept: RADIOLOGY | Facility: HOSPITAL | Age: 69
Discharge: HOME | End: 2024-09-20
Payer: COMMERCIAL

## 2024-09-20 ENCOUNTER — HOSPITAL ENCOUNTER (EMERGENCY)
Facility: HOSPITAL | Age: 69
Discharge: HOME | End: 2024-09-20
Attending: EMERGENCY MEDICINE
Payer: COMMERCIAL

## 2024-09-20 VITALS
OXYGEN SATURATION: 99 % | TEMPERATURE: 97.3 F | HEIGHT: 69 IN | DIASTOLIC BLOOD PRESSURE: 82 MMHG | HEART RATE: 85 BPM | WEIGHT: 234 LBS | SYSTOLIC BLOOD PRESSURE: 177 MMHG | BODY MASS INDEX: 34.66 KG/M2 | RESPIRATION RATE: 16 BRPM

## 2024-09-20 VITALS
HEIGHT: 69 IN | DIASTOLIC BLOOD PRESSURE: 54 MMHG | SYSTOLIC BLOOD PRESSURE: 109 MMHG | RESPIRATION RATE: 20 BRPM | WEIGHT: 234 LBS | TEMPERATURE: 97.5 F | BODY MASS INDEX: 34.66 KG/M2 | HEART RATE: 73 BPM | OXYGEN SATURATION: 96 %

## 2024-09-20 DIAGNOSIS — J90 PLEURAL EFFUSION: ICD-10-CM

## 2024-09-20 DIAGNOSIS — C34.11 MALIGNANT NEOPLASM OF UPPER LOBE OF RIGHT LUNG (MULTI): ICD-10-CM

## 2024-09-20 DIAGNOSIS — C34.11 MALIGNANT NEOPLASM OF UPPER LOBE OF RIGHT LUNG (MULTI): Primary | ICD-10-CM

## 2024-09-20 DIAGNOSIS — C34.90 MALIGNANT NEOPLASM OF LUNG, UNSPECIFIED LATERALITY, UNSPECIFIED PART OF LUNG (MULTI): ICD-10-CM

## 2024-09-20 DIAGNOSIS — R74.8 CARDIAC ENZYMES ELEVATED: ICD-10-CM

## 2024-09-20 DIAGNOSIS — R55 SYNCOPE, UNSPECIFIED SYNCOPE TYPE: Primary | ICD-10-CM

## 2024-09-20 LAB
ALBUMIN SERPL BCP-MCNC: 2.7 G/DL (ref 3.4–5)
ALP SERPL-CCNC: 87 U/L (ref 33–136)
ALT SERPL W P-5'-P-CCNC: 10 U/L (ref 10–52)
ANION GAP SERPL CALC-SCNC: 12 MMOL/L (ref 10–20)
APTT PPP: 31 SECONDS (ref 27–38)
AST SERPL W P-5'-P-CCNC: 12 U/L (ref 9–39)
ATRIAL RATE: 56 BPM
ATRIAL RATE: 63 BPM
BASOPHILS # BLD AUTO: 0.09 X10*3/UL (ref 0–0.1)
BASOPHILS NFR BLD AUTO: 0.8 %
BILIRUB SERPL-MCNC: 0.4 MG/DL (ref 0–1.2)
BNP SERPL-MCNC: 415 PG/ML (ref 0–99)
BUN SERPL-MCNC: 18 MG/DL (ref 6–23)
CALCIUM SERPL-MCNC: 8.1 MG/DL (ref 8.6–10.3)
CARDIAC TROPONIN I PNL SERPL HS: 43 NG/L (ref 0–20)
CARDIAC TROPONIN I PNL SERPL HS: 51 NG/L (ref 0–20)
CHLORIDE SERPL-SCNC: 105 MMOL/L (ref 98–107)
CO2 SERPL-SCNC: 24 MMOL/L (ref 21–32)
CREAT SERPL-MCNC: 1.6 MG/DL (ref 0.5–1.3)
D DIMER PPP FEU-MCNC: 1978 NG/ML FEU
EGFRCR SERPLBLD CKD-EPI 2021: 47 ML/MIN/1.73M*2
EOSINOPHIL # BLD AUTO: 0.49 X10*3/UL (ref 0–0.7)
EOSINOPHIL NFR BLD AUTO: 4.4 %
ERYTHROCYTE [DISTWIDTH] IN BLOOD BY AUTOMATED COUNT: 13.2 % (ref 11.5–14.5)
GLUCOSE BLD MANUAL STRIP-MCNC: 77 MG/DL (ref 74–99)
GLUCOSE SERPL-MCNC: 75 MG/DL (ref 74–99)
HCT VFR BLD AUTO: 29.4 % (ref 41–52)
HGB BLD-MCNC: 9.8 G/DL (ref 13.5–17.5)
IMM GRANULOCYTES # BLD AUTO: 0.05 X10*3/UL (ref 0–0.7)
IMM GRANULOCYTES NFR BLD AUTO: 0.4 % (ref 0–0.9)
INR PPP: 1 (ref 0.9–1.1)
LACTATE SERPL-SCNC: 1.7 MMOL/L (ref 0.4–2)
LACTATE SERPL-SCNC: 2.4 MMOL/L (ref 0.4–2)
LYMPHOCYTES # BLD AUTO: 2.24 X10*3/UL (ref 1.2–4.8)
LYMPHOCYTES NFR BLD AUTO: 20.1 %
MAGNESIUM SERPL-MCNC: 1.75 MG/DL (ref 1.6–2.4)
MCH RBC QN AUTO: 29.4 PG (ref 26–34)
MCHC RBC AUTO-ENTMCNC: 33.3 G/DL (ref 32–36)
MCV RBC AUTO: 88 FL (ref 80–100)
MONOCYTES # BLD AUTO: 0.98 X10*3/UL (ref 0.1–1)
MONOCYTES NFR BLD AUTO: 8.8 %
NEUTROPHILS # BLD AUTO: 7.3 X10*3/UL (ref 1.2–7.7)
NEUTROPHILS NFR BLD AUTO: 65.5 %
NRBC BLD-RTO: 0 /100 WBCS (ref 0–0)
P AXIS: 31 DEGREES
P AXIS: 57 DEGREES
P OFFSET: 168 MS
P OFFSET: 174 MS
P ONSET: 117 MS
P ONSET: 123 MS
PLATELET # BLD AUTO: 433 X10*3/UL (ref 150–450)
POTASSIUM SERPL-SCNC: 3.9 MMOL/L (ref 3.5–5.3)
PR INTERVAL: 192 MS
PR INTERVAL: 208 MS
PROT SERPL-MCNC: 5.3 G/DL (ref 6.4–8.2)
PROTHROMBIN TIME: 10.9 SECONDS (ref 9.8–12.8)
Q ONSET: 219 MS
Q ONSET: 221 MS
QRS COUNT: 11 BEATS
QRS COUNT: 9 BEATS
QRS DURATION: 152 MS
QRS DURATION: 158 MS
QT INTERVAL: 504 MS
QT INTERVAL: 512 MS
QTC CALCULATION(BAZETT): 486 MS
QTC CALCULATION(BAZETT): 523 MS
QTC FREDERICIA: 493 MS
QTC FREDERICIA: 520 MS
R AXIS: 19 DEGREES
R AXIS: 21 DEGREES
RBC # BLD AUTO: 3.33 X10*6/UL (ref 4.5–5.9)
SODIUM SERPL-SCNC: 137 MMOL/L (ref 136–145)
T AXIS: 118 DEGREES
T AXIS: 67 DEGREES
T OFFSET: 473 MS
T OFFSET: 475 MS
VENTRICULAR RATE: 56 BPM
VENTRICULAR RATE: 63 BPM
WBC # BLD AUTO: 11.2 X10*3/UL (ref 4.4–11.3)

## 2024-09-20 PROCEDURE — 3052F HG A1C>EQUAL 8.0%<EQUAL 9.0%: CPT | Performed by: STUDENT IN AN ORGANIZED HEALTH CARE EDUCATION/TRAINING PROGRAM

## 2024-09-20 PROCEDURE — 3008F BODY MASS INDEX DOCD: CPT | Performed by: STUDENT IN AN ORGANIZED HEALTH CARE EDUCATION/TRAINING PROGRAM

## 2024-09-20 PROCEDURE — 3078F DIAST BP <80 MM HG: CPT | Performed by: STUDENT IN AN ORGANIZED HEALTH CARE EDUCATION/TRAINING PROGRAM

## 2024-09-20 PROCEDURE — 71275 CT ANGIOGRAPHY CHEST: CPT | Mod: FOREIGN READ | Performed by: RADIOLOGY

## 2024-09-20 PROCEDURE — 71045 X-RAY EXAM CHEST 1 VIEW: CPT | Mod: FOREIGN READ | Performed by: RADIOLOGY

## 2024-09-20 PROCEDURE — 85610 PROTHROMBIN TIME: CPT | Performed by: EMERGENCY MEDICINE

## 2024-09-20 PROCEDURE — 1036F TOBACCO NON-USER: CPT | Performed by: STUDENT IN AN ORGANIZED HEALTH CARE EDUCATION/TRAINING PROGRAM

## 2024-09-20 PROCEDURE — 3074F SYST BP LT 130 MM HG: CPT | Performed by: STUDENT IN AN ORGANIZED HEALTH CARE EDUCATION/TRAINING PROGRAM

## 2024-09-20 PROCEDURE — 85379 FIBRIN DEGRADATION QUANT: CPT | Performed by: EMERGENCY MEDICINE

## 2024-09-20 PROCEDURE — 1157F ADVNC CARE PLAN IN RCRD: CPT | Performed by: STUDENT IN AN ORGANIZED HEALTH CARE EDUCATION/TRAINING PROGRAM

## 2024-09-20 PROCEDURE — 85730 THROMBOPLASTIN TIME PARTIAL: CPT | Performed by: EMERGENCY MEDICINE

## 2024-09-20 PROCEDURE — 2500000004 HC RX 250 GENERAL PHARMACY W/ HCPCS (ALT 636 FOR OP/ED): Performed by: EMERGENCY MEDICINE

## 2024-09-20 PROCEDURE — 83605 ASSAY OF LACTIC ACID: CPT | Performed by: EMERGENCY MEDICINE

## 2024-09-20 PROCEDURE — 70450 CT HEAD/BRAIN W/O DYE: CPT | Performed by: RADIOLOGY

## 2024-09-20 PROCEDURE — 2550000001 HC RX 255 CONTRASTS: Performed by: EMERGENCY MEDICINE

## 2024-09-20 PROCEDURE — 36415 COLL VENOUS BLD VENIPUNCTURE: CPT | Performed by: EMERGENCY MEDICINE

## 2024-09-20 PROCEDURE — 85025 COMPLETE CBC W/AUTO DIFF WBC: CPT | Performed by: EMERGENCY MEDICINE

## 2024-09-20 PROCEDURE — 71275 CT ANGIOGRAPHY CHEST: CPT

## 2024-09-20 PROCEDURE — 84484 ASSAY OF TROPONIN QUANT: CPT | Performed by: EMERGENCY MEDICINE

## 2024-09-20 PROCEDURE — 1111F DSCHRG MED/CURRENT MED MERGE: CPT | Performed by: STUDENT IN AN ORGANIZED HEALTH CARE EDUCATION/TRAINING PROGRAM

## 2024-09-20 PROCEDURE — 93005 ELECTROCARDIOGRAM TRACING: CPT

## 2024-09-20 PROCEDURE — 82947 ASSAY GLUCOSE BLOOD QUANT: CPT

## 2024-09-20 PROCEDURE — 1159F MED LIST DOCD IN RCRD: CPT | Performed by: STUDENT IN AN ORGANIZED HEALTH CARE EDUCATION/TRAINING PROGRAM

## 2024-09-20 PROCEDURE — 71045 X-RAY EXAM CHEST 1 VIEW: CPT

## 2024-09-20 PROCEDURE — 96360 HYDRATION IV INFUSION INIT: CPT | Mod: 59

## 2024-09-20 PROCEDURE — 99285 EMERGENCY DEPT VISIT HI MDM: CPT | Mod: 25

## 2024-09-20 PROCEDURE — 80053 COMPREHEN METABOLIC PANEL: CPT | Performed by: EMERGENCY MEDICINE

## 2024-09-20 PROCEDURE — 83735 ASSAY OF MAGNESIUM: CPT | Performed by: EMERGENCY MEDICINE

## 2024-09-20 PROCEDURE — 83880 ASSAY OF NATRIURETIC PEPTIDE: CPT | Performed by: EMERGENCY MEDICINE

## 2024-09-20 PROCEDURE — 99211 OFF/OP EST MAY X REQ PHY/QHP: CPT | Performed by: STUDENT IN AN ORGANIZED HEALTH CARE EDUCATION/TRAINING PROGRAM

## 2024-09-20 PROCEDURE — 70450 CT HEAD/BRAIN W/O DYE: CPT

## 2024-09-20 RX ORDER — LIDOCAINE HYDROCHLORIDE AND EPINEPHRINE 10; 10 MG/ML; UG/ML
INJECTION, SOLUTION INFILTRATION; PERINEURAL
Status: DISCONTINUED
Start: 2024-09-20 | End: 2024-09-20 | Stop reason: WASHOUT

## 2024-09-20 ASSESSMENT — LIFESTYLE VARIABLES
EVER HAD A DRINK FIRST THING IN THE MORNING TO STEADY YOUR NERVES TO GET RID OF A HANGOVER: NO
EVER FELT BAD OR GUILTY ABOUT YOUR DRINKING: NO
HAVE YOU EVER FELT YOU SHOULD CUT DOWN ON YOUR DRINKING: NO
HAVE PEOPLE ANNOYED YOU BY CRITICIZING YOUR DRINKING: NO
TOTAL SCORE: 0

## 2024-09-20 ASSESSMENT — PAIN SCALES - GENERAL
PAINLEVEL_OUTOF10: 0 - NO PAIN

## 2024-09-20 ASSESSMENT — PAIN - FUNCTIONAL ASSESSMENT
PAIN_FUNCTIONAL_ASSESSMENT: 0-10
PAIN_FUNCTIONAL_ASSESSMENT: 0-10

## 2024-09-20 NOTE — DISCHARGE INSTRUCTIONS
Follow-up with your primary care doctor, thoracic surgery, and cardiology.  Return to the emergency department if symptoms worsen or change.  We discussed possible admission for further evaluation of your symptoms today.  However, you declined and prefers to follow-up closely outpatient instead.  If you change your mind at any time you can return to the emergency department for further evaluation.

## 2024-09-20 NOTE — ED PROVIDER NOTES
69-year-old male presents emergency department after being called as a rapid response from outpatient x-ray for episode of syncope.  Patient is accompanied by significant other who aids in providing history.  They report that he has been having episodes of syncope intermittently since March.  He has been admitted for the symptoms as well as following up outpatient.  He and significant other reports that he gets these episodes once a month or more where he will get very hot and sweaty and feel dizzy/lightheaded and then lose consciousness.  No reported full body shaking or urinary incontinence.  No biting of the tongue.  Patient reports he had a similar episode today prior to getting his x-ray.  He states that he did feel hot and sweaty and dizzy and the next thing he knew he was coming here to the emergency department.  He did not fall or hit his head and was seated in a wheelchair.  He denies any fevers or congestion.  Admits occasional cough.  He denies any chest pain or significant difficulty breathing.  No abdominal pain, nausea, vomiting, dysuria, diarrhea, black or bloody stools.  Patient does admit to constipation.  He also has significant past medical history of recent thoracotomy last Tuesday for a lung mass. Patient is on Plavix.  Chart review shows significant past medical history for CVA, hypertension, diabetes, CAD, hyperlipidemia, elevated BMI, tobacco use, diabetes, generalized weakness, peripheral arterial disease, hypertension, obstructive sleep apnea, COPD, and malignant neoplasm of the upper lobe of the right lung.  Patient had right upper lobe wedge resection and lobectomy along with lymph node dissection performed on 9/10/2024 for possible squamous cell carcinoma of the lung.  Patient was advised to hold his Plavix until this past Sunday and was obtaining a postoperative x-ray today.      History provided by:  Patient and spouse   used: No            ------------------------------------------------------------------------------------------------------------------------------------------    VS: As documented in the triage note and EMR flowsheet from this visit were reviewed.    Review of Systems  Constitutional: no fever, chills reports diaphoresis with episode  Eyes: no redness, discharge, pain  HENT: no sore throat, nose bleeds, congestion, rhinorrhea   Cardiovascular: no chest pain, leg edema, palpitations  Respiratory: no shortness of breath,  wheezing admits to cough  GI: no nausea, diarrhea, pain, vomiting, BRBPR, melena reports constipation  : no dysuria, frequency, hematuria  Musculoskeletal: no neck pain, stiffness,  no joint deformity, swelling  Skin: no rash, erythema, patient has postoperative right chest wound  Neurological: no headache, weakness, numbness, or tingling admits to dizziness/lightheadedness and episode of altered level of consciousness  Psychiatric: no suicidal thoughts, confusion, agitation  Metabolic: no polyuria or polydipsia  Hematologic: Patient is on Plavix  Immunology: Patient is currently being evaluated outpatient for lung cancer.    [facility]  Nursing notes reviewed and confirmed by me.  Chart review performed including medications, allergies, and medical, surgical, and family history  Visit Vitals  /82   Pulse 85   Temp 36.3 °C (97.3 °F) (Temporal)   Resp 16     Physical Exam  Vitals and nursing note reviewed.   Constitutional:       General: He is not in acute distress.     Appearance: Normal appearance. He is not ill-appearing.   HENT:      Head: Normocephalic and atraumatic.      Right Ear: External ear normal.      Left Ear: External ear normal.      Nose: Nose normal. No congestion or rhinorrhea.      Mouth/Throat:      Mouth: Mucous membranes are moist.      Pharynx: No oropharyngeal exudate or posterior oropharyngeal erythema.   Eyes:      Extraocular Movements: Extraocular movements intact.       Conjunctiva/sclera: Conjunctivae normal.      Pupils: Pupils are equal, round, and reactive to light.   Cardiovascular:      Rate and Rhythm: Normal rate and regular rhythm.      Pulses: Normal pulses.      Heart sounds: Normal heart sounds.   Pulmonary:      Effort: Pulmonary effort is normal. No respiratory distress.      Breath sounds: No stridor. No wheezing, rhonchi or rales.      Comments: Coarse breath sounds bilaterally somewhat diminished on the right when compared to the left.  Right sided postoperative chest wounds healing with scabs no surrounding erythema or warmth.  No crepitance.  No purulent drainage.  No significant tenderness to palpation.  Abdominal:      General: There is no distension.      Palpations: Abdomen is soft.      Tenderness: There is no abdominal tenderness. There is no guarding or rebound.   Musculoskeletal:         General: No swelling or deformity. Normal range of motion.      Cervical back: Normal range of motion and neck supple. No rigidity.      Right lower leg: No edema.      Left lower leg: No edema.      Comments: No calf tenderness    Skin:     General: Skin is warm and dry.      Capillary Refill: Capillary refill takes less than 2 seconds.      Coloration: Skin is not jaundiced.      Findings: No rash.      Comments: Postoperative wounds as described in pulm   Neurological:      General: No focal deficit present.      Mental Status: He is alert and oriented to person, place, and time.      Sensory: No sensory deficit.      Motor: No weakness.      Comments: Cranial nerves II through XII grossly intact.  NIH stroke scale is 0.   Psychiatric:         Mood and Affect: Mood normal.         Behavior: Behavior normal.        Past Medical History:   Diagnosis Date    Cataract     Cerebral vascular accident (Multi)     10/2023    Cervical disc disease     COPD (chronic obstructive pulmonary disease) (Multi)     Coronary artery disease     Diabetes mellitus (Multi)      Hyperlipidemia     Hypertension     Immune deficiency disorder (Multi)     Lung nodules     Personal history of other diseases of the circulatory system     History of hypertension    Personal history of other endocrine, nutritional and metabolic disease     History of diabetes mellitus    Skin cancer     right index finger top of it was removed    Sleep apnea     sleep study done/unknown results    Stroke (Multi)     2023    Type 2 diabetes mellitus (Multi)       Past Surgical History:   Procedure Laterality Date    CARPAL TUNNEL RELEASE  2015    Neuroplasty Decompression Median Nerve At Carpal Tunnel    CATARACT EXTRACTION Right     2024    CT ANGIO NECK  2021    CT NECK ANGIO W AND WO IV CONTRAST 6/3/2021 [facility]    CT HEAD ANGIO W AND WO IV CONTRAST  2021    CT HEAD ANGIO W AND WO IV CONTRAST 6/3/2021 [facility]    LUNG LOBECTOMY Right 09/10/2024    Robotic RUL wedge to lobectomy    MR HEAD ANGIO WO IV CONTRAST  2021    MR HEAD ANGIO WO IV CONTRAST 2021 [facility]    MR HEAD ANGIO WO IV CONTRAST  2021    MR HEAD ANGIO WO IV CONTRAST    NECK SURGERY  2021    Neck Surgery    OTHER SURGICAL HISTORY  2021    Surgery    OTHER SURGICAL HISTORY  2021    Cardiac catheterization    OTHER SURGICAL HISTORY  2021    Finger amputation    OTHER SURGICAL HISTORY  2021    Colonoscopy    OTHER SURGICAL HISTORY  2021    Wrist surgery    OTHER SURGICAL HISTORY      Tailbone    ROTATOR CUFF REPAIR  2021    Rotator Cuff Repair    TONSILLECTOMY  2015    Tonsillectomy      Social History     Socioeconomic History    Marital status:    Tobacco Use    Smoking status: Former     Current packs/day: 0.00     Types: Cigarettes     Quit date: 10/2023     Years since quittin.9     Passive exposure: Never    Smokeless tobacco: Never   Vaping Use    Vaping status: Never Used   Substance and Sexual Activity     Alcohol use: Yes     Comment: 2-3x a year    Drug use: Never    Sexual activity: Yes     Partners: Female     Birth control/protection: None     Social Determinants of Health     Financial Resource Strain: Low Risk  (3/20/2024)    Overall Financial Resource Strain (CARDIA)     Difficulty of Paying Living Expenses: Not very hard   Food Insecurity: Patient Declined (10/20/2023)    Hunger Vital Sign     Worried About Running Out of Food in the Last Year: Patient declined     Ran Out of Food in the Last Year: Patient declined   Transportation Needs: No Transportation Needs (9/14/2024)    OASIS : Transportation     Lack of Transportation (Medical): No     Lack of Transportation (Non-Medical): No     Patient Unable or Declines to Respond: No   Physical Activity: Inactive (10/20/2023)    Exercise Vital Sign     Days of Exercise per Week: 0 days     Minutes of Exercise per Session: 0 min   Stress: No Stress Concern Present (10/20/2023)    [employer] [place] [facility] [employer] [facility] - Occupational Stress Questionnaire     Feeling of Stress : Not at all   Social Connections: Feeling Socially Integrated (9/14/2024)    OASIS : Social Isolation     Frequency of experiencing loneliness or isolation: Rarely   Intimate Partner Violence: Not At Risk (10/20/2023)    Humiliation, Afraid, Rape, and Kick questionnaire     Fear of Current or Ex-Partner: No     Emotionally Abused: No     Physically Abused: No     Sexually Abused: No   Housing Stability: Low Risk  (3/20/2024)    Housing Stability Vital Sign     Unable to Pay for Housing in the Last Year: No     Number of Places Lived in the Last Year: 1     Unstable Housing in the Last Year: No      ------------------------------------------------------------------------------------------------------------------------------------------  CT head wo IV contrast   Final Result   1. Small focus of encephalomalacia in the left frontal lobe, similar   to the prior study.   2. No evidence  of acute cortical infarct or intracranial hemorrhage.             MACRO:   None             Signed by: Kamran Kramer 9/20/2024 3:17 PM   Dictation workstation:   [id number]      CT angio chest for pulmonary embolism   Final Result   1.  No definite CT evidence of an acute pulmonary embolus extending to   level the segmental branch vessels.   2.  Postoperative changes within the right lung from presumed prior   pulmonary resection.  There are soft tissue consolidative changes   noted in the right hilum presumed to be postsurgical in etiology.    Recommend attention on future oncologic surveillance.   3.  Moderate right pleural effusion and bibasilar atelectasis.   4..  Other findings as stated above.   Signed by Jamie Andrew MD      XR chest 1 view   Final Result   Persistent right perihilar density which may represent atelectasis.    Left basilar atelectasis.   Signed by Jamie Andrew MD         Labs Reviewed   CBC WITH AUTO DIFFERENTIAL - Abnormal       Result Value    WBC 11.2      nRBC 0.0      RBC 3.33 (*)     Hemoglobin 9.8 (*)     Hematocrit 29.4 (*)     MCV 88      MCH 29.4      MCHC 33.3      RDW 13.2      Platelets 433      Neutrophils % 65.5      Immature Granulocytes %, Automated 0.4      Lymphocytes % 20.1      Monocytes % 8.8      Eosinophils % 4.4      Basophils % 0.8      Neutrophils Absolute 7.30      Immature Granulocytes Absolute, Automated 0.05      Lymphocytes Absolute 2.24      Monocytes Absolute 0.98      Eosinophils Absolute 0.49      Basophils Absolute 0.09     COMPREHENSIVE METABOLIC PANEL - Abnormal    Glucose 75      Sodium 137      Potassium 3.9      Chloride 105      Bicarbonate 24      Anion Gap 12      Urea Nitrogen 18      Creatinine 1.60 (*)     eGFR 47 (*)     Calcium 8.1 (*)     Albumin 2.7 (*)     Alkaline Phosphatase 87      Total Protein 5.3 (*)     AST 12      Bilirubin, Total 0.4      ALT 10     LACTATE - Abnormal    Lactate 2.4 (*)     Narrative:     Venipuncture immediately  after or during the administration of Metamizole may lead to falsely low results. Testing should be performed immediately prior to Metamizole dosing.   B-TYPE NATRIURETIC PEPTIDE - Abnormal     (*)     Narrative:        <100 pg/mL - Heart failure unlikely  100-299 pg/mL - Intermediate probability of acute heart                  failure exacerbation. Correlate with clinical                  context and patient history.    >=300 pg/mL - Heart Failure likely. Correlate with clinical                  context and patient history.    BNP testing is performed using different testing methodology at [facility] than at other [facility]. Direct result comparisons should only be made within the same method.      D-DIMER, VTE EXCLUSION - Abnormal    D-Dimer, Quantitative VTE Exclusion 1,978 (*)     Narrative:     The VTE Exclusion D-Dimer assay is reported in ng/mL Fibrinogen Equivalent Units (FEU).    Per 's instructions for use, a value of less than 500 ng/mL (FEU) may help to exclude DVT or PE in outpatients when the assay is used with a clinical pretest probability assessment.(AE must utilize and document eCalc 'Wells Score Deep Vein Thrombosis Risk' for DVT exclusion only. Emergency Department should utilize  Guidelines for Emergency Department Use of the VTE Exclusion D-Dimer and Clinical Pretest probability assessment model for DVT or PE exclusion.)   SERIAL TROPONIN-INITIAL - Abnormal    Troponin I, High Sensitivity 51 (*)     Narrative:     Less than 99th percentile of normal range cutoff-  Female and children under 18 years old <14 ng/L; Male <21 ng/L: Negative  Repeat testing should be performed if clinically indicated.     Female and children under 18 years old 14-50 ng/L; Male 21-50 ng/L:  Consistent with possible cardiac damage and possible increased clinical   risk. Serial measurements may help to assess extent of myocardial damage.     >50 ng/L: Consistent with cardiac  damage, increased clinical risk and  myocardial infarction. Serial measurements may help assess extent of   myocardial damage.      NOTE: Children less than 1 year old may have higher baseline troponin   levels and results should be interpreted in conjunction with the overall   clinical context.     NOTE: Troponin I testing is performed using a different   testing methodology at [facility] than at other   [facility]. Direct result comparisons should only   be made within the same method.   SERIAL TROPONIN, 1 HOUR - Abnormal    Troponin I, High Sensitivity 43 (*)     Narrative:     Less than 99th percentile of normal range cutoff-  Female and children under 18 years old <14 ng/L; Male <21 ng/L: Negative  Repeat testing should be performed if clinically indicated.     Female and children under 18 years old 14-50 ng/L; Male 21-50 ng/L:  Consistent with possible cardiac damage and possible increased clinical   risk. Serial measurements may help to assess extent of myocardial damage.     >50 ng/L: Consistent with cardiac damage, increased clinical risk and  myocardial infarction. Serial measurements may help assess extent of   myocardial damage.      NOTE: Children less than 1 year old may have higher baseline troponin   levels and results should be interpreted in conjunction with the overall   clinical context.     NOTE: Troponin I testing is performed using a different   testing methodology at [facility] [place] [facility] than at other   [facility]. Direct result comparisons should only   be made within the same method.   MAGNESIUM - Normal    Magnesium 1.75     PROTIME-INR - Normal    Protime 10.9      INR 1.0     APTT - Normal    aPTT 31      Narrative:     The APTT is no longer used for monitoring Unfractionated Heparin Therapy. For monitoring Heparin Therapy, use the Heparin Assay.   LACTATE - Normal    Lactate 1.7      Narrative:     Venipuncture immediately after or during the administration  of Metamizole may lead to falsely low results. Testing should be performed immediately prior to Metamizole dosing.   POCT GLUCOSE - Normal    POCT Glucose 77     TROPONIN SERIES- (INITIAL, 1 HR)    Narrative:     The following orders were created for panel order Troponin I Series, High Sensitivity (0, 1 HR).  Procedure                               Abnormality         Status                     ---------                               -----------         ------                     Troponin I, High Sensiti...[[id number]]  Abnormal            Final result               Troponin, High Sensitivi...[[id number]]  Abnormal            Final result                 Please view results for these tests on the individual orders.        Medical Decision Making  EKG interpreted by ED physician: Sinus bradycardia rate of 56.  ME prolonged at 208 consistent with first-degree AV block.  QRS prolonged at 152 consistent with right bundle branch block.  QTc is within normal range.  No significant ST elevations or depressions.  No significant Q waves.  Good R wave progression.  Normal axis.  Nonspecific ST-T wave changes appreciated in lateral leads.  This EKG is not significantly changed from previous June 12, 2024 aside from new first-degree AV block.      EKG interpreted by ED physician: Normal sinus rhythm rate of 63.  ME within normal range.  QRS is prolonged at 158 consistent with right bundle branch block.  QTc is prolonged at 523.  No significant ST elevations or depressions.  No significant Q waves.  Good R wave progression.  Normal axis.  Nonspecific ST-T wave changes present in anterior lateral leads.      69-year-old male presents emergency department after being activated as a rapid response at outpatient x-ray for episode of syncope.  He has significant history of recent thoracotomy.  On my exam patient is afebrile and nontoxic.  Patient and family are adamant that he has a chronic issue with these episodes of syncope and has  followed up extensively outpatient for this.  They report that the episode today was no different than usual.  Given the patient's presenting complaints a thorough workup is obtained.  CBC does not show significant leukocytosis and shows anemia at baseline.  Patient does not have findings of sepsis.  Lactate is within normal range I have low suspicion for seizure.  Though I did discuss with family possibility of atypical seizure and they report they have followed up with neurology regarding these episodes.  Cardiac enzymes x 2 are elevated, but not significantly increasing.  EKG does not show acute ACS.  D-dimer is elevated and subsequent CT angio study does not show pulmonary embolus.  BMP is elevated though chest x-ray does show finding pulmonary edema or significant pleural effusion.  Head CT shows no acute intracranial process such as hemorrhage or mass effect.  CMP is consistent with the patient's chronic kidney disease.  CT PE study does show postoperative changes as well as moderate right-sided pleural effusion.  Given the patient's recent surgery I did reach out to thoracic surgery and spoke with the patient's surgeon Dr. Momin he reported to me after reviewing imaging chest findings are consistent with postsurgical changes and to be expected.  He states he does not believe the patient's pleural effusion or abnormal chest findings are contributing to his syncope.  He also states that the troponins can be mildly elevated for up to 2 weeks after surgery and this is likely the cause of his troponin bump.  He does also confirm patient has history of frequent episodes of syncope.  I discussed these findings with the patient and family.  I did offer admission for further syncope workup.  Patient and family declined this workup stating that this is a chronic issue for him and he is feeling fine.  They do state that they are following with cardiology regarding these episodes and I recommended they reach out to  cardiology for follow-up early next week as opposed to early October as it is scheduled now.  I also advised patient and family on reasons to return to the emergency department and welcomed them back to the ED should they change their mind at any time and want further workup for his episode today.  Patient and family comfortable returning home.       Diagnoses as of 09/20/24 2055   Syncope, unspecified syncope type   Cardiac enzymes elevated   Pleural effusion      1. Syncope, unspecified syncope type        2. Cardiac enzymes elevated        3. Pleural effusion           Procedures     This note was dictated using dragon software and may contain errors related to dictation interpretation errors.      Brian Cerrato, DO  09/20/24 2055

## 2024-09-20 NOTE — NURSING NOTE
This RN received a call from Ryan Grajeda that a rapid response is going to be called in the radiology waiting room. When this RN arrived to the waiting room, the pt was pale diaphoretic and slumped in the wheelchair, responsive to stimuli and answers questions name and birthday. Pt wife with the pt and states the pt has had these episodes before since March and has been in the ER several times for the same thing, pt wife not very agreeable to go to the ER. Per the wife the pt had a lobectomy last week and was here today for a follow-up chest x-ray. Vitals obtained and glucose 77. Pt moved to a cart and connected to the zoll. A 20 gauge IV inserted to the left forearm and labs drawn. Jamie Griggs RN from the MICU at the pt side and decision made to get the pt to the ER for further evaluation. Jennifer from MICU called the nurse supervisor and the ER. Pt monitored on the way to the ED and bedside report given to ER RN.

## 2024-09-21 ENCOUNTER — HOME CARE VISIT (OUTPATIENT)
Dept: HOME HEALTH SERVICES | Facility: HOME HEALTH | Age: 69
End: 2024-09-21
Payer: COMMERCIAL

## 2024-09-23 ENCOUNTER — HOME CARE VISIT (OUTPATIENT)
Dept: HOME HEALTH SERVICES | Facility: HOME HEALTH | Age: 69
End: 2024-09-23
Payer: COMMERCIAL

## 2024-09-23 PROCEDURE — G0152 HHCP-SERV OF OT,EA 15 MIN: HCPCS

## 2024-09-23 ASSESSMENT — ENCOUNTER SYMPTOMS: DENIES PAIN: 1

## 2024-09-24 ENCOUNTER — HOME CARE VISIT (OUTPATIENT)
Dept: HOME HEALTH SERVICES | Facility: HOME HEALTH | Age: 69
End: 2024-09-24
Payer: COMMERCIAL

## 2024-09-24 VITALS
OXYGEN SATURATION: 97 % | DIASTOLIC BLOOD PRESSURE: 70 MMHG | HEART RATE: 70 BPM | SYSTOLIC BLOOD PRESSURE: 138 MMHG | RESPIRATION RATE: 18 BRPM | TEMPERATURE: 98 F

## 2024-09-24 LAB
ATRIAL RATE: 56 BPM
ATRIAL RATE: 63 BPM
P AXIS: 31 DEGREES
P AXIS: 57 DEGREES
P OFFSET: 168 MS
P OFFSET: 174 MS
P ONSET: 117 MS
P ONSET: 123 MS
PR INTERVAL: 192 MS
PR INTERVAL: 208 MS
Q ONSET: 219 MS
Q ONSET: 221 MS
QRS COUNT: 11 BEATS
QRS COUNT: 9 BEATS
QRS DURATION: 152 MS
QRS DURATION: 158 MS
QT INTERVAL: 504 MS
QT INTERVAL: 512 MS
QTC CALCULATION(BAZETT): 486 MS
QTC CALCULATION(BAZETT): 523 MS
QTC FREDERICIA: 493 MS
QTC FREDERICIA: 520 MS
R AXIS: 19 DEGREES
R AXIS: 21 DEGREES
T AXIS: 118 DEGREES
T AXIS: 67 DEGREES
T OFFSET: 473 MS
T OFFSET: 475 MS
VENTRICULAR RATE: 56 BPM
VENTRICULAR RATE: 63 BPM

## 2024-09-24 PROCEDURE — G0157 HHC PT ASSISTANT EA 15: HCPCS | Mod: CQ

## 2024-09-24 ASSESSMENT — ENCOUNTER SYMPTOMS
PERSON REPORTING PAIN: PATIENT
DENIES PAIN: 1

## 2024-09-25 ENCOUNTER — HOME CARE VISIT (OUTPATIENT)
Dept: HOME HEALTH SERVICES | Facility: HOME HEALTH | Age: 69
End: 2024-09-25
Payer: COMMERCIAL

## 2024-09-25 VITALS
DIASTOLIC BLOOD PRESSURE: 89 MMHG | OXYGEN SATURATION: 95 % | TEMPERATURE: 98.8 F | RESPIRATION RATE: 18 BRPM | SYSTOLIC BLOOD PRESSURE: 157 MMHG | HEART RATE: 66 BPM

## 2024-09-25 LAB
LAB AP ASR DISCLAIMER: NORMAL
LAB AP BLOCK FOR ADDITIONAL STUDIES: NORMAL
LABORATORY COMMENT REPORT: NORMAL
Lab: NORMAL
PATH REPORT.ADDENDUM SPEC: NORMAL
PATH REPORT.FINAL DX SPEC: NORMAL
PATH REPORT.GROSS SPEC: NORMAL
PATH REPORT.RELEVANT HX SPEC: NORMAL
PATH REPORT.TOTAL CANCER: NORMAL
PATHOLOGY SYNOPTIC REPORT: NORMAL

## 2024-09-25 PROCEDURE — G0299 HHS/HOSPICE OF RN EA 15 MIN: HCPCS

## 2024-09-25 PROCEDURE — G0152 HHCP-SERV OF OT,EA 15 MIN: HCPCS

## 2024-09-25 SDOH — ECONOMIC STABILITY: GENERAL

## 2024-09-25 ASSESSMENT — ACTIVITIES OF DAILY LIVING (ADL): MONEY MANAGEMENT (EXPENSES/BILLS): INDEPENDENT

## 2024-09-25 ASSESSMENT — ENCOUNTER SYMPTOMS
BOWEL PATTERN NORMAL: 1
PAIN SEVERITY GOAL: 0/10
LAST BOWEL MOVEMENT: 67107
COUGH: 1
PAIN LOCATION - PAIN QUALITY: SORENESS
LOWEST PAIN SEVERITY IN PAST 24 HOURS: 0/10
PAIN LOCATION - PAIN SEVERITY: 2/10
HIGHEST PAIN SEVERITY IN PAST 24 HOURS: 2/10
PAIN LOCATION: INCISION
COUGH CHARACTERISTICS: PRODUCTIVE
STOOL FREQUENCY: DAILY
PERSON REPORTING PAIN: PATIENT
DENIES PAIN: 1
CHANGE IN APPETITE: UNCHANGED
PAIN: 1
SUBJECTIVE PAIN PROGRESSION: GRADUALLY IMPROVING
APPETITE LEVEL: FAIR

## 2024-09-26 ENCOUNTER — HOME CARE VISIT (OUTPATIENT)
Dept: HOME HEALTH SERVICES | Facility: HOME HEALTH | Age: 69
End: 2024-09-26
Payer: COMMERCIAL

## 2024-09-26 VITALS
TEMPERATURE: 97.8 F | SYSTOLIC BLOOD PRESSURE: 142 MMHG | RESPIRATION RATE: 18 BRPM | OXYGEN SATURATION: 98 % | DIASTOLIC BLOOD PRESSURE: 76 MMHG | HEART RATE: 78 BPM

## 2024-09-26 PROCEDURE — G0157 HHC PT ASSISTANT EA 15: HCPCS | Mod: CQ

## 2024-09-26 ASSESSMENT — ENCOUNTER SYMPTOMS
PERSON REPORTING PAIN: PATIENT
DENIES PAIN: 1

## 2024-09-27 ENCOUNTER — APPOINTMENT (OUTPATIENT)
Dept: SURGERY | Facility: CLINIC | Age: 69
End: 2024-09-27
Payer: COMMERCIAL

## 2024-09-27 LAB
ELECTRONICALLY SIGNED BY: NORMAL
FOCUSED SOLID TUMOR DNA/RNA RESULTS: NORMAL

## 2024-09-30 ENCOUNTER — HOME CARE VISIT (OUTPATIENT)
Dept: HOME HEALTH SERVICES | Facility: HOME HEALTH | Age: 69
End: 2024-09-30
Payer: COMMERCIAL

## 2024-09-30 DIAGNOSIS — R91.1 LUNG NODULE: ICD-10-CM

## 2024-09-30 PROCEDURE — G0152 HHCP-SERV OF OT,EA 15 MIN: HCPCS

## 2024-09-30 ASSESSMENT — ENCOUNTER SYMPTOMS: DENIES PAIN: 1

## 2024-10-01 ENCOUNTER — HOME CARE VISIT (OUTPATIENT)
Dept: HOME HEALTH SERVICES | Facility: HOME HEALTH | Age: 69
End: 2024-10-01
Payer: COMMERCIAL

## 2024-10-01 DIAGNOSIS — E11.69 TYPE 2 DIABETES MELLITUS WITH OTHER SPECIFIED COMPLICATION, WITH LONG-TERM CURRENT USE OF INSULIN (HCC): ICD-10-CM

## 2024-10-01 DIAGNOSIS — Z79.4 TYPE 2 DIABETES MELLITUS WITH OTHER SPECIFIED COMPLICATION, WITH LONG-TERM CURRENT USE OF INSULIN (HCC): ICD-10-CM

## 2024-10-02 ENCOUNTER — HOME CARE VISIT (OUTPATIENT)
Dept: HOME HEALTH SERVICES | Facility: HOME HEALTH | Age: 69
End: 2024-10-02
Payer: COMMERCIAL

## 2024-10-02 VITALS
OXYGEN SATURATION: 99 % | DIASTOLIC BLOOD PRESSURE: 89 MMHG | RESPIRATION RATE: 18 BRPM | TEMPERATURE: 98.4 F | SYSTOLIC BLOOD PRESSURE: 139 MMHG | HEART RATE: 69 BPM

## 2024-10-02 VITALS
OXYGEN SATURATION: 98 % | TEMPERATURE: 97.8 F | HEART RATE: 78 BPM | RESPIRATION RATE: 18 BRPM | DIASTOLIC BLOOD PRESSURE: 86 MMHG | SYSTOLIC BLOOD PRESSURE: 142 MMHG

## 2024-10-02 PROCEDURE — G0299 HHS/HOSPICE OF RN EA 15 MIN: HCPCS

## 2024-10-02 PROCEDURE — G0157 HHC PT ASSISTANT EA 15: HCPCS | Mod: CQ

## 2024-10-02 RX ORDER — INSULIN ASPART 100 [IU]/ML
INJECTION, SUSPENSION SUBCUTANEOUS
Qty: 15 ML | Refills: 1 | Status: SHIPPED | OUTPATIENT
Start: 2024-10-02

## 2024-10-02 SDOH — ECONOMIC STABILITY: GENERAL

## 2024-10-02 ASSESSMENT — ENCOUNTER SYMPTOMS
PAIN SEVERITY GOAL: 0/10
PERSON REPORTING PAIN: PATIENT
HIGHEST PAIN SEVERITY IN PAST 24 HOURS: 0/10
PERSON REPORTING PAIN: PATIENT
SPUTUM COLOR: WHITE
CHANGE IN APPETITE: UNCHANGED
SPUTUM PRODUCTION: 1
COUGH: 1
SUBJECTIVE PAIN PROGRESSION: RESOLVED
LOWEST PAIN SEVERITY IN PAST 24 HOURS: 0/10
DENIES PAIN: 1
SPUTUM CONSISTENCY: THIN
LOWER EXTREMITY EDEMA: 1
DENIES PAIN: 1
COUGH CHARACTERISTICS: MOIST
APPETITE LEVEL: FAIR
SPUTUM AMOUNT: SCANT
COUGH CHARACTERISTICS: PRODUCTIVE

## 2024-10-02 ASSESSMENT — ACTIVITIES OF DAILY LIVING (ADL): MONEY MANAGEMENT (EXPENSES/BILLS): INDEPENDENT

## 2024-10-04 ENCOUNTER — HOME CARE VISIT (OUTPATIENT)
Dept: HOME HEALTH SERVICES | Facility: HOME HEALTH | Age: 69
End: 2024-10-04
Payer: COMMERCIAL

## 2024-10-04 VITALS
SYSTOLIC BLOOD PRESSURE: 136 MMHG | HEART RATE: 78 BPM | DIASTOLIC BLOOD PRESSURE: 84 MMHG | OXYGEN SATURATION: 98 % | RESPIRATION RATE: 18 BRPM | TEMPERATURE: 98.1 F

## 2024-10-04 PROCEDURE — G0157 HHC PT ASSISTANT EA 15: HCPCS | Mod: CQ

## 2024-10-04 ASSESSMENT — ENCOUNTER SYMPTOMS
DENIES PAIN: 1
PERSON REPORTING PAIN: PATIENT

## 2024-10-07 ENCOUNTER — APPOINTMENT (OUTPATIENT)
Dept: CARDIOLOGY | Facility: HOSPITAL | Age: 69
End: 2024-10-07
Payer: COMMERCIAL

## 2024-10-07 ENCOUNTER — HOSPITAL ENCOUNTER (OUTPATIENT)
Facility: HOSPITAL | Age: 69
Setting detail: OUTPATIENT SURGERY
Discharge: HOME | End: 2024-10-07
Attending: INTERNAL MEDICINE | Admitting: INTERNAL MEDICINE
Payer: COMMERCIAL

## 2024-10-07 VITALS
BODY MASS INDEX: 33.08 KG/M2 | DIASTOLIC BLOOD PRESSURE: 75 MMHG | HEART RATE: 69 BPM | WEIGHT: 218.26 LBS | TEMPERATURE: 97.9 F | OXYGEN SATURATION: 98 % | SYSTOLIC BLOOD PRESSURE: 162 MMHG | HEIGHT: 68 IN | RESPIRATION RATE: 18 BRPM

## 2024-10-07 DIAGNOSIS — I63.9 CRYPTOGENIC STROKE (MULTI): ICD-10-CM

## 2024-10-07 DIAGNOSIS — R55 NEAR SYNCOPE: ICD-10-CM

## 2024-10-07 DIAGNOSIS — R55 SYNCOPE AND COLLAPSE: Primary | ICD-10-CM

## 2024-10-07 DIAGNOSIS — R91.1 LUNG NODULE: ICD-10-CM

## 2024-10-07 LAB
ANION GAP SERPL CALC-SCNC: 12 MMOL/L (ref 10–20)
APTT PPP: 29 SECONDS (ref 27–38)
ATRIAL RATE: 67 BPM
BUN SERPL-MCNC: 14 MG/DL (ref 6–23)
CALCIUM SERPL-MCNC: 8.4 MG/DL (ref 8.6–10.3)
CHLORIDE SERPL-SCNC: 103 MMOL/L (ref 98–107)
CO2 SERPL-SCNC: 25 MMOL/L (ref 21–32)
CREAT SERPL-MCNC: 1.53 MG/DL (ref 0.5–1.3)
EGFRCR SERPLBLD CKD-EPI 2021: 49 ML/MIN/1.73M*2
ERYTHROCYTE [DISTWIDTH] IN BLOOD BY AUTOMATED COUNT: 13.2 % (ref 11.5–14.5)
GLUCOSE SERPL-MCNC: 175 MG/DL (ref 74–99)
HCT VFR BLD AUTO: 35.2 % (ref 41–52)
HGB BLD-MCNC: 11.6 G/DL (ref 13.5–17.5)
INR PPP: 0.8 (ref 0.9–1.1)
MCH RBC QN AUTO: 29.4 PG (ref 26–34)
MCHC RBC AUTO-ENTMCNC: 33 G/DL (ref 32–36)
MCV RBC AUTO: 89 FL (ref 80–100)
NRBC BLD-RTO: 0 /100 WBCS (ref 0–0)
P AXIS: 41 DEGREES
P OFFSET: 197 MS
P ONSET: 145 MS
PLATELET # BLD AUTO: 436 X10*3/UL (ref 150–450)
POTASSIUM SERPL-SCNC: 4.3 MMOL/L (ref 3.5–5.3)
PR INTERVAL: 154 MS
PROTHROMBIN TIME: 9.2 SECONDS (ref 9.8–12.8)
Q ONSET: 222 MS
QRS COUNT: 11 BEATS
QRS DURATION: 150 MS
QT INTERVAL: 470 MS
QTC CALCULATION(BAZETT): 496 MS
QTC FREDERICIA: 488 MS
R AXIS: -22 DEGREES
RBC # BLD AUTO: 3.94 X10*6/UL (ref 4.5–5.9)
SODIUM SERPL-SCNC: 136 MMOL/L (ref 136–145)
T AXIS: 48 DEGREES
T OFFSET: 457 MS
VENTRICULAR RATE: 67 BPM
WBC # BLD AUTO: 11.6 X10*3/UL (ref 4.4–11.3)

## 2024-10-07 PROCEDURE — 7100000009 HC PHASE TWO TIME - INITIAL BASE CHARGE: Performed by: INTERNAL MEDICINE

## 2024-10-07 PROCEDURE — 7100000010 HC PHASE TWO TIME - EACH INCREMENTAL 1 MINUTE: Performed by: INTERNAL MEDICINE

## 2024-10-07 PROCEDURE — 2500000001 HC RX 250 WO HCPCS SELF ADMINISTERED DRUGS (ALT 637 FOR MEDICARE OP): Performed by: NURSE PRACTITIONER

## 2024-10-07 PROCEDURE — 85027 COMPLETE CBC AUTOMATED: CPT | Performed by: NURSE PRACTITIONER

## 2024-10-07 PROCEDURE — 2500000004 HC RX 250 GENERAL PHARMACY W/ HCPCS (ALT 636 FOR OP/ED): Performed by: INTERNAL MEDICINE

## 2024-10-07 PROCEDURE — 85730 THROMBOPLASTIN TIME PARTIAL: CPT | Performed by: NURSE PRACTITIONER

## 2024-10-07 PROCEDURE — 80048 BASIC METABOLIC PNL TOTAL CA: CPT | Performed by: NURSE PRACTITIONER

## 2024-10-07 PROCEDURE — 93005 ELECTROCARDIOGRAM TRACING: CPT | Mod: 59

## 2024-10-07 PROCEDURE — 93010 ELECTROCARDIOGRAM REPORT: CPT | Performed by: INTERNAL MEDICINE

## 2024-10-07 PROCEDURE — 33285 INSJ SUBQ CAR RHYTHM MNTR: CPT | Performed by: INTERNAL MEDICINE

## 2024-10-07 PROCEDURE — 36415 COLL VENOUS BLD VENIPUNCTURE: CPT | Performed by: NURSE PRACTITIONER

## 2024-10-07 PROCEDURE — 7100000001 HC RECOVERY ROOM TIME - INITIAL BASE CHARGE: Performed by: INTERNAL MEDICINE

## 2024-10-07 PROCEDURE — C1764 EVENT RECORDER, CARDIAC: HCPCS | Performed by: INTERNAL MEDICINE

## 2024-10-07 PROCEDURE — 7100000002 HC RECOVERY ROOM TIME - EACH INCREMENTAL 1 MINUTE: Performed by: INTERNAL MEDICINE

## 2024-10-07 PROCEDURE — 99222 1ST HOSP IP/OBS MODERATE 55: CPT | Performed by: INTERNAL MEDICINE

## 2024-10-07 PROCEDURE — 2500000004 HC RX 250 GENERAL PHARMACY W/ HCPCS (ALT 636 FOR OP/ED): Performed by: NURSE PRACTITIONER

## 2024-10-07 PROCEDURE — 2780000003 HC OR 278 NO HCPCS: Performed by: INTERNAL MEDICINE

## 2024-10-07 DEVICE — ICM LNQ22 LINQ II USA
Type: IMPLANTABLE DEVICE | Site: CHEST | Status: FUNCTIONAL
Brand: LINQ II™

## 2024-10-07 RX ORDER — LIDOCAINE HYDROCHLORIDE 10 MG/ML
INJECTION, SOLUTION EPIDURAL; INFILTRATION; INTRACAUDAL; PERINEURAL AS NEEDED
Status: DISCONTINUED | OUTPATIENT
Start: 2024-10-07 | End: 2024-10-07 | Stop reason: HOSPADM

## 2024-10-07 RX ORDER — FENTANYL CITRATE 50 UG/ML
INJECTION, SOLUTION INTRAMUSCULAR; INTRAVENOUS AS NEEDED
Status: DISCONTINUED | OUTPATIENT
Start: 2024-10-07 | End: 2024-10-07 | Stop reason: HOSPADM

## 2024-10-07 RX ORDER — CHLORHEXIDINE GLUCONATE 40 MG/ML
SOLUTION TOPICAL ONCE
Status: COMPLETED | OUTPATIENT
Start: 2024-10-07 | End: 2024-10-07

## 2024-10-07 RX ORDER — SODIUM CHLORIDE 9 MG/ML
20 INJECTION, SOLUTION INTRAVENOUS CONTINUOUS
Status: DISCONTINUED | OUTPATIENT
Start: 2024-10-07 | End: 2024-10-07 | Stop reason: HOSPADM

## 2024-10-07 RX ORDER — MUPIROCIN 20 MG/G
1 OINTMENT TOPICAL ONCE
Status: COMPLETED | OUTPATIENT
Start: 2024-10-07 | End: 2024-10-07

## 2024-10-07 RX ORDER — MIDAZOLAM HYDROCHLORIDE 1 MG/ML
INJECTION, SOLUTION INTRAMUSCULAR; INTRAVENOUS AS NEEDED
Status: DISCONTINUED | OUTPATIENT
Start: 2024-10-07 | End: 2024-10-07 | Stop reason: HOSPADM

## 2024-10-07 RX ORDER — VANCOMYCIN HYDROCHLORIDE 1 G/200ML
1000 INJECTION, SOLUTION INTRAVENOUS ONCE
Status: COMPLETED | OUTPATIENT
Start: 2024-10-07 | End: 2024-10-07

## 2024-10-07 RX ORDER — OXYCODONE HYDROCHLORIDE 5 MG/1
5 TABLET ORAL EVERY 6 HOURS PRN
Start: 2024-10-07

## 2024-10-07 RX ORDER — ONDANSETRON HYDROCHLORIDE 2 MG/ML
4 INJECTION, SOLUTION INTRAVENOUS EVERY 8 HOURS PRN
Status: DISCONTINUED | OUTPATIENT
Start: 2024-10-07 | End: 2024-10-07 | Stop reason: HOSPADM

## 2024-10-07 RX ORDER — ACETAMINOPHEN 325 MG/1
650 TABLET ORAL EVERY 4 HOURS PRN
Status: DISCONTINUED | OUTPATIENT
Start: 2024-10-07 | End: 2024-10-07 | Stop reason: HOSPADM

## 2024-10-07 ASSESSMENT — COLUMBIA-SUICIDE SEVERITY RATING SCALE - C-SSRS
2. HAVE YOU ACTUALLY HAD ANY THOUGHTS OF KILLING YOURSELF?: NO
1. IN THE PAST MONTH, HAVE YOU WISHED YOU WERE DEAD OR WISHED YOU COULD GO TO SLEEP AND NOT WAKE UP?: NO
6. HAVE YOU EVER DONE ANYTHING, STARTED TO DO ANYTHING, OR PREPARED TO DO ANYTHING TO END YOUR LIFE?: NO

## 2024-10-07 ASSESSMENT — PAIN - FUNCTIONAL ASSESSMENT: PAIN_FUNCTIONAL_ASSESSMENT: 0-10

## 2024-10-07 ASSESSMENT — PAIN SCALES - GENERAL: PAINLEVEL_OUTOF10: 0 - NO PAIN

## 2024-10-07 NOTE — SIGNIFICANT EVENT
Patient post loop implant. Ice pack to left pectoral. Patient doing well. No complaint of pain.   Patient care ongoing.

## 2024-10-07 NOTE — INTERVAL H&P NOTE
H&P reviewed. The patient was examined and there are no changes to the H&P.    In addition,    Patient denies any arrhythmia symptoms of palpitation, lightheadedness, near syncope, or syncope.    ROS  Cardiac syncope/ spell  Pulmonary negative  Neuro spell. dizziness, decreased memory  Other systems negative     Counseling greater than 50% of the visit.  Patient and I discussed preoperative cardiac evaluation, update and review of systems, shared decision making, informed consent confirmed, device, treatment options, risk, benefits, and imponderables.  Also discussed follow-up care with loop recorder.  All questions answered in detail.  Patient appreciative of care.

## 2024-10-07 NOTE — NURSING NOTE
Patient states understanding of discharge instructions as given via teach back. Follow up appointments and medications reviewed with patient as wel.l

## 2024-10-08 NOTE — SIGNIFICANT EVENT
Spoke with wife, states Amandeep is doing well, will reach out to office with any questions or problems

## 2024-10-09 ENCOUNTER — HOME CARE VISIT (OUTPATIENT)
Dept: HOME HEALTH SERVICES | Facility: HOME HEALTH | Age: 69
End: 2024-10-09
Payer: COMMERCIAL

## 2024-10-09 VITALS
TEMPERATURE: 97.8 F | RESPIRATION RATE: 14 BRPM | HEART RATE: 51 BPM | DIASTOLIC BLOOD PRESSURE: 62 MMHG | SYSTOLIC BLOOD PRESSURE: 142 MMHG | OXYGEN SATURATION: 94 %

## 2024-10-09 VITALS
HEART RATE: 65 BPM | SYSTOLIC BLOOD PRESSURE: 146 MMHG | RESPIRATION RATE: 20 BRPM | DIASTOLIC BLOOD PRESSURE: 70 MMHG | TEMPERATURE: 98.4 F | OXYGEN SATURATION: 98 %

## 2024-10-09 DIAGNOSIS — Z95.818 PRESENCE OF OTHER CARDIAC IMPLANTS AND GRAFTS: Primary | ICD-10-CM

## 2024-10-09 DIAGNOSIS — R55 SYNCOPE AND COLLAPSE: ICD-10-CM

## 2024-10-09 PROCEDURE — G0151 HHCP-SERV OF PT,EA 15 MIN: HCPCS

## 2024-10-09 PROCEDURE — G0299 HHS/HOSPICE OF RN EA 15 MIN: HCPCS

## 2024-10-09 SDOH — HEALTH STABILITY: PHYSICAL HEALTH: PHYSICAL EXERCISE: 15

## 2024-10-09 SDOH — HEALTH STABILITY: PHYSICAL HEALTH: EXERCISE ACTIVITIES SETS: 1

## 2024-10-09 SDOH — HEALTH STABILITY: PHYSICAL HEALTH: EXERCISE TYPE: LE EXERCISES

## 2024-10-09 SDOH — HEALTH STABILITY: PHYSICAL HEALTH: EXERCISE ACTIVITY: MARCHING

## 2024-10-09 SDOH — HEALTH STABILITY: PHYSICAL HEALTH: PHYSICAL EXERCISE: STANDING

## 2024-10-09 SDOH — HEALTH STABILITY: PHYSICAL HEALTH: EXERCISE ACTIVITY: HEEL RAISES/TOE RAISES

## 2024-10-09 SDOH — HEALTH STABILITY: PHYSICAL HEALTH: EXERCISE ACTIVITY: HS CURLS

## 2024-10-09 SDOH — HEALTH STABILITY: PHYSICAL HEALTH: EXERCISE ACTIVITY: HIP ABDUCTION

## 2024-10-09 SDOH — HEALTH STABILITY: PHYSICAL HEALTH: EXERCISE ACTIVITY: HIP EXTENSION

## 2024-10-09 SDOH — HEALTH STABILITY: PHYSICAL HEALTH: EXERCISE ACTIVITY: MINI SQUATS

## 2024-10-09 ASSESSMENT — GAIT ASSESSMENTS
BALANCE AND GAIT SCORE: 25
TRUNK SCORE: 2
STEP SYMMETRY: 1 - RIGHT AND LEFT STEP LENGTH APPEAR EQUAL
PATH SCORE: 2
TRUNK: 2 - NO SWAY, NO FLEXION, NO USE OF ARMS, NO WALKING AID
INITIATION OF GAIT IMMEDIATELY AFTER GO: 1 - NO HESITANCY
GAIT SCORE: 12
STEP CONTINUITY: 1 - STEPS APPEAR CONTINUOUS
WALKING STANCE: 1 - HEELS ALMOST TOUCHING WHILE WALKING
PATH: 2 - STRAIGHT WITHOUT WALKING AID

## 2024-10-09 ASSESSMENT — BALANCE ASSESSMENTS
SITTING DOWN: 1 - USES ARMS OR NOT SMOOTH MOTION
BALANCE SCORE: 13
IMMEDIATE STANDING BALANCE FIRST 5 SECONDS: 2 - STEADY WITHOUT WALKER OR OTHER SUPPORT
SITTING BALANCE: 1 - STEADY, SAFE
EYES CLOSED AT MAXIMUM POSITION NUDGED: 1 - STEADY
ARISES: 1 - ABLE, USES ARMS TO HELP
NUDGED: 2 - STEADY
ATTEMPTS TO ARISE: 2 - ABLE TO RISE, ONE ATTEMPT
STANDING BALANCE: 1 - STEADY BUT WIDE STANCE AND USES CANE OR OTHER SUPPORT
NUDGED SCORE: 2
ARISING SCORE: 1
TURNING 360 DEGREES STEPS: 1 - CONTINUOUS STEPS

## 2024-10-09 ASSESSMENT — ACTIVITIES OF DAILY LIVING (ADL)
AMBULATION_DISTANCE/DURATION_TOLERATED: 150 FT
HOME_HEALTH_OASIS: 01
AMBULATION ASSISTANCE ON FLAT SURFACES: 1
OASIS_M1830: 00

## 2024-10-09 ASSESSMENT — ENCOUNTER SYMPTOMS
DENIES PAIN: 1
PERSON REPORTING PAIN: PATIENT
OCCASIONAL FEELINGS OF UNSTEADINESS: 1
PERSON REPORTING PAIN: PATIENT
DENIES PAIN: 1

## 2024-10-14 ENCOUNTER — APPOINTMENT (OUTPATIENT)
Dept: HEMATOLOGY/ONCOLOGY | Facility: CLINIC | Age: 69
End: 2024-10-14
Payer: COMMERCIAL

## 2024-10-14 ENCOUNTER — APPOINTMENT (OUTPATIENT)
Dept: CARDIOLOGY | Facility: CLINIC | Age: 69
End: 2024-10-14
Payer: COMMERCIAL

## 2024-10-17 ENCOUNTER — OFFICE VISIT (OUTPATIENT)
Dept: ENDOCRINOLOGY | Age: 69
End: 2024-10-17
Payer: COMMERCIAL

## 2024-10-17 VITALS
WEIGHT: 215 LBS | DIASTOLIC BLOOD PRESSURE: 78 MMHG | BODY MASS INDEX: 31.74 KG/M2 | SYSTOLIC BLOOD PRESSURE: 138 MMHG | OXYGEN SATURATION: 98 % | HEART RATE: 95 BPM

## 2024-10-17 DIAGNOSIS — Z79.4 TYPE 2 DIABETES MELLITUS WITH OTHER SPECIFIED COMPLICATION, WITH LONG-TERM CURRENT USE OF INSULIN (HCC): Primary | ICD-10-CM

## 2024-10-17 DIAGNOSIS — E11.69 TYPE 2 DIABETES MELLITUS WITH OTHER SPECIFIED COMPLICATION, WITH LONG-TERM CURRENT USE OF INSULIN (HCC): Primary | ICD-10-CM

## 2024-10-17 PROCEDURE — G8427 DOCREV CUR MEDS BY ELIG CLIN: HCPCS | Performed by: INTERNAL MEDICINE

## 2024-10-17 PROCEDURE — 2022F DILAT RTA XM EVC RTNOPTHY: CPT | Performed by: INTERNAL MEDICINE

## 2024-10-17 PROCEDURE — 1036F TOBACCO NON-USER: CPT | Performed by: INTERNAL MEDICINE

## 2024-10-17 PROCEDURE — 82962 GLUCOSE BLOOD TEST: CPT | Performed by: INTERNAL MEDICINE

## 2024-10-17 PROCEDURE — G8484 FLU IMMUNIZE NO ADMIN: HCPCS | Performed by: INTERNAL MEDICINE

## 2024-10-17 PROCEDURE — G8417 CALC BMI ABV UP PARAM F/U: HCPCS | Performed by: INTERNAL MEDICINE

## 2024-10-17 PROCEDURE — 1123F ACP DISCUSS/DSCN MKR DOCD: CPT | Performed by: INTERNAL MEDICINE

## 2024-10-17 PROCEDURE — 3052F HG A1C>EQUAL 8.0%<EQUAL 9.0%: CPT | Performed by: INTERNAL MEDICINE

## 2024-10-17 PROCEDURE — 3017F COLORECTAL CA SCREEN DOC REV: CPT | Performed by: INTERNAL MEDICINE

## 2024-10-17 PROCEDURE — 99213 OFFICE O/P EST LOW 20 MIN: CPT | Performed by: INTERNAL MEDICINE

## 2024-10-17 RX ORDER — INSULIN ASPART 100 [IU]/ML
INJECTION, SUSPENSION SUBCUTANEOUS
Qty: 20 ML | Refills: 5 | Status: SHIPPED | OUTPATIENT
Start: 2024-10-17

## 2024-10-17 RX ORDER — PEN NEEDLE, DIABETIC 32 GX 1/6"
1 NEEDLE, DISPOSABLE MISCELLANEOUS 2 TIMES DAILY
Qty: 100 EACH | Refills: 3 | Status: SHIPPED | OUTPATIENT
Start: 2024-10-17

## 2024-10-17 RX ORDER — AMLODIPINE BESYLATE 5 MG/1
5 TABLET ORAL DAILY
COMMUNITY
Start: 2024-08-20

## 2024-10-17 RX ORDER — CARVEDILOL 25 MG/1
1 TABLET, FILM COATED ORAL 2 TIMES DAILY
Qty: 100 EACH | Refills: 3 | Status: SHIPPED | OUTPATIENT
Start: 2024-10-17

## 2024-10-17 NOTE — PROGRESS NOTES
is normal.   Musculoskeletal:         General: Normal range of motion.      Cervical back: Normal range of motion and neck supple.   Neurological:      General: No focal deficit present.      Mental Status: He is alert and oriented to person, place, and time.   Psychiatric:         Mood and Affect: Mood normal.         Behavior: Behavior normal.

## 2024-10-18 ENCOUNTER — APPOINTMENT (OUTPATIENT)
Dept: CARDIOLOGY | Facility: HOSPITAL | Age: 69
End: 2024-10-18
Payer: COMMERCIAL

## 2024-10-18 ENCOUNTER — APPOINTMENT (OUTPATIENT)
Dept: RADIOLOGY | Facility: HOSPITAL | Age: 69
End: 2024-10-18
Payer: COMMERCIAL

## 2024-10-18 ENCOUNTER — OFFICE VISIT (OUTPATIENT)
Dept: CARDIOLOGY | Facility: CLINIC | Age: 69
End: 2024-10-18
Payer: COMMERCIAL

## 2024-10-18 ENCOUNTER — APPOINTMENT (OUTPATIENT)
Dept: CARDIOLOGY | Facility: CLINIC | Age: 69
End: 2024-10-18
Payer: COMMERCIAL

## 2024-10-18 ENCOUNTER — HOSPITAL ENCOUNTER (EMERGENCY)
Facility: HOSPITAL | Age: 69
Discharge: HOME | End: 2024-10-18
Attending: EMERGENCY MEDICINE | Admitting: INTERNAL MEDICINE
Payer: COMMERCIAL

## 2024-10-18 VITALS
HEIGHT: 68 IN | TEMPERATURE: 97.2 F | HEART RATE: 62 BPM | WEIGHT: 221 LBS | BODY MASS INDEX: 33.49 KG/M2 | SYSTOLIC BLOOD PRESSURE: 152 MMHG | DIASTOLIC BLOOD PRESSURE: 78 MMHG | RESPIRATION RATE: 16 BRPM | OXYGEN SATURATION: 98 %

## 2024-10-18 DIAGNOSIS — R55 SYNCOPE AND COLLAPSE: ICD-10-CM

## 2024-10-18 DIAGNOSIS — R55 NEAR SYNCOPE: ICD-10-CM

## 2024-10-18 DIAGNOSIS — I25.10 CORONARY ARTERY DISEASE INVOLVING NATIVE CORONARY ARTERY OF NATIVE HEART WITHOUT ANGINA PECTORIS: ICD-10-CM

## 2024-10-18 DIAGNOSIS — E66.811 CLASS 1 OBESITY WITH BODY MASS INDEX (BMI) OF 33.0 TO 33.9 IN ADULT, UNSPECIFIED OBESITY TYPE, UNSPECIFIED WHETHER SERIOUS COMORBIDITY PRESENT: ICD-10-CM

## 2024-10-18 DIAGNOSIS — J18.9 PNEUMONIA DUE TO INFECTIOUS ORGANISM, UNSPECIFIED LATERALITY, UNSPECIFIED PART OF LUNG: ICD-10-CM

## 2024-10-18 DIAGNOSIS — Z71.89 ENCOUNTER TO DISCUSS TREATMENT OPTIONS: ICD-10-CM

## 2024-10-18 DIAGNOSIS — R01.1 HEART MURMUR: ICD-10-CM

## 2024-10-18 DIAGNOSIS — F17.200 CURRENT EVERY DAY SMOKER: ICD-10-CM

## 2024-10-18 DIAGNOSIS — R55 SYNCOPE AND COLLAPSE: Primary | ICD-10-CM

## 2024-10-18 DIAGNOSIS — I10 ESSENTIAL HYPERTENSION: ICD-10-CM

## 2024-10-18 DIAGNOSIS — I63.9 CRYPTOGENIC STROKE (MULTI): ICD-10-CM

## 2024-10-18 PROBLEM — J15.69 PNEUMONIA DUE TO GRAM-NEGATIVE BACTERIA (MULTI): Status: ACTIVE | Noted: 2024-10-18

## 2024-10-18 LAB
ALBUMIN SERPL BCP-MCNC: 3.1 G/DL (ref 3.4–5)
ALP SERPL-CCNC: 91 U/L (ref 33–136)
ALT SERPL W P-5'-P-CCNC: 10 U/L (ref 10–52)
ANION GAP SERPL CALC-SCNC: 12 MMOL/L (ref 10–20)
AST SERPL W P-5'-P-CCNC: 11 U/L (ref 9–39)
BASOPHILS # BLD AUTO: 0.08 X10*3/UL (ref 0–0.1)
BASOPHILS NFR BLD AUTO: 0.7 %
BILIRUB SERPL-MCNC: 0.3 MG/DL (ref 0–1.2)
BNP SERPL-MCNC: 83 PG/ML (ref 0–99)
BODY SURFACE AREA: 2.19 M2
BUN SERPL-MCNC: 21 MG/DL (ref 6–23)
CALCIUM SERPL-MCNC: 8 MG/DL (ref 8.6–10.3)
CARDIAC TROPONIN I PNL SERPL HS: 12 NG/L (ref 0–20)
CARDIAC TROPONIN I PNL SERPL HS: 12 NG/L (ref 0–20)
CHLORIDE SERPL-SCNC: 105 MMOL/L (ref 98–107)
CO2 SERPL-SCNC: 23 MMOL/L (ref 21–32)
CREAT SERPL-MCNC: 1.79 MG/DL (ref 0.5–1.3)
EGFRCR SERPLBLD CKD-EPI 2021: 41 ML/MIN/1.73M*2
EOSINOPHIL # BLD AUTO: 0.38 X10*3/UL (ref 0–0.7)
EOSINOPHIL NFR BLD AUTO: 3.3 %
ERYTHROCYTE [DISTWIDTH] IN BLOOD BY AUTOMATED COUNT: 13.2 % (ref 11.5–14.5)
GLUCOSE SERPL-MCNC: 157 MG/DL (ref 74–99)
HCT VFR BLD AUTO: 34.4 % (ref 41–52)
HGB BLD-MCNC: 11.3 G/DL (ref 13.5–17.5)
IMM GRANULOCYTES # BLD AUTO: 0.04 X10*3/UL (ref 0–0.7)
IMM GRANULOCYTES NFR BLD AUTO: 0.3 % (ref 0–0.9)
LACTATE SERPL-SCNC: 2 MMOL/L (ref 0.4–2)
LACTATE SERPL-SCNC: 2.6 MMOL/L (ref 0.4–2)
LYMPHOCYTES # BLD AUTO: 2.63 X10*3/UL (ref 1.2–4.8)
LYMPHOCYTES NFR BLD AUTO: 22.9 %
MAGNESIUM SERPL-MCNC: 1.6 MG/DL (ref 1.6–2.4)
MCH RBC QN AUTO: 29.1 PG (ref 26–34)
MCHC RBC AUTO-ENTMCNC: 32.8 G/DL (ref 32–36)
MCV RBC AUTO: 89 FL (ref 80–100)
MONOCYTES # BLD AUTO: 0.71 X10*3/UL (ref 0.1–1)
MONOCYTES NFR BLD AUTO: 6.2 %
NEUTROPHILS # BLD AUTO: 7.64 X10*3/UL (ref 1.2–7.7)
NEUTROPHILS NFR BLD AUTO: 66.6 %
NRBC BLD-RTO: 0 /100 WBCS (ref 0–0)
PLATELET # BLD AUTO: 387 X10*3/UL (ref 150–450)
POTASSIUM SERPL-SCNC: 4.3 MMOL/L (ref 3.5–5.3)
PROT SERPL-MCNC: 5.7 G/DL (ref 6.4–8.2)
RBC # BLD AUTO: 3.88 X10*6/UL (ref 4.5–5.9)
SARS-COV-2 RNA RESP QL NAA+PROBE: NOT DETECTED
SODIUM SERPL-SCNC: 136 MMOL/L (ref 136–145)
WBC # BLD AUTO: 11.5 X10*3/UL (ref 4.4–11.3)

## 2024-10-18 PROCEDURE — 93291 INTERROG DEV EVAL SCRMS IP: CPT | Performed by: INTERNAL MEDICINE

## 2024-10-18 PROCEDURE — 99285 EMERGENCY DEPT VISIT HI MDM: CPT | Mod: 25

## 2024-10-18 PROCEDURE — 99214 OFFICE O/P EST MOD 30 MIN: CPT | Performed by: INTERNAL MEDICINE

## 2024-10-18 PROCEDURE — 84484 ASSAY OF TROPONIN QUANT: CPT | Performed by: EMERGENCY MEDICINE

## 2024-10-18 PROCEDURE — 96366 THER/PROPH/DIAG IV INF ADDON: CPT

## 2024-10-18 PROCEDURE — G0378 HOSPITAL OBSERVATION PER HR: HCPCS

## 2024-10-18 PROCEDURE — 85025 COMPLETE CBC W/AUTO DIFF WBC: CPT | Performed by: EMERGENCY MEDICINE

## 2024-10-18 PROCEDURE — 70450 CT HEAD/BRAIN W/O DYE: CPT | Performed by: RADIOLOGY

## 2024-10-18 PROCEDURE — 93005 ELECTROCARDIOGRAM TRACING: CPT

## 2024-10-18 PROCEDURE — 2500000004 HC RX 250 GENERAL PHARMACY W/ HCPCS (ALT 636 FOR OP/ED): Performed by: EMERGENCY MEDICINE

## 2024-10-18 PROCEDURE — 70450 CT HEAD/BRAIN W/O DYE: CPT

## 2024-10-18 PROCEDURE — 87635 SARS-COV-2 COVID-19 AMP PRB: CPT | Performed by: EMERGENCY MEDICINE

## 2024-10-18 PROCEDURE — 93005 ELECTROCARDIOGRAM TRACING: CPT | Mod: 59

## 2024-10-18 PROCEDURE — 36415 COLL VENOUS BLD VENIPUNCTURE: CPT | Performed by: EMERGENCY MEDICINE

## 2024-10-18 PROCEDURE — 84075 ASSAY ALKALINE PHOSPHATASE: CPT | Performed by: EMERGENCY MEDICINE

## 2024-10-18 PROCEDURE — 83880 ASSAY OF NATRIURETIC PEPTIDE: CPT | Performed by: EMERGENCY MEDICINE

## 2024-10-18 PROCEDURE — 71045 X-RAY EXAM CHEST 1 VIEW: CPT | Mod: FOREIGN READ | Performed by: RADIOLOGY

## 2024-10-18 PROCEDURE — 83605 ASSAY OF LACTIC ACID: CPT | Performed by: EMERGENCY MEDICINE

## 2024-10-18 PROCEDURE — 3052F HG A1C>EQUAL 8.0%<EQUAL 9.0%: CPT | Performed by: INTERNAL MEDICINE

## 2024-10-18 PROCEDURE — 93291 INTERROG DEV EVAL SCRMS IP: CPT

## 2024-10-18 PROCEDURE — 71045 X-RAY EXAM CHEST 1 VIEW: CPT

## 2024-10-18 PROCEDURE — 96365 THER/PROPH/DIAG IV INF INIT: CPT

## 2024-10-18 PROCEDURE — 1157F ADVNC CARE PLAN IN RCRD: CPT | Performed by: INTERNAL MEDICINE

## 2024-10-18 PROCEDURE — 83735 ASSAY OF MAGNESIUM: CPT | Performed by: EMERGENCY MEDICINE

## 2024-10-18 RX ORDER — ENOXAPARIN SODIUM 100 MG/ML
40 INJECTION SUBCUTANEOUS EVERY 24 HOURS
Status: CANCELLED | OUTPATIENT
Start: 2024-10-18

## 2024-10-18 RX ORDER — SENNOSIDES 8.6 MG/1
2 TABLET ORAL 2 TIMES DAILY
Status: CANCELLED | OUTPATIENT
Start: 2024-10-18

## 2024-10-18 RX ORDER — LEVOFLOXACIN 5 MG/ML
750 INJECTION, SOLUTION INTRAVENOUS
Status: CANCELLED | OUTPATIENT
Start: 2024-10-18

## 2024-10-18 RX ORDER — CLOPIDOGREL BISULFATE 75 MG/1
75 TABLET ORAL DAILY
Status: CANCELLED | OUTPATIENT
Start: 2024-10-18

## 2024-10-18 RX ORDER — INSULIN LISPRO 100 [IU]/ML
0-10 INJECTION, SOLUTION INTRAVENOUS; SUBCUTANEOUS
Status: CANCELLED | OUTPATIENT
Start: 2024-10-18

## 2024-10-18 RX ORDER — DOXYCYCLINE 100 MG/1
100 CAPSULE ORAL 2 TIMES DAILY
Qty: 20 CAPSULE | Refills: 0 | Status: SHIPPED | OUTPATIENT
Start: 2024-10-18 | End: 2024-10-28

## 2024-10-18 RX ORDER — DEXTROSE 50 % IN WATER (D50W) INTRAVENOUS SYRINGE
25
Status: CANCELLED | OUTPATIENT
Start: 2024-10-18

## 2024-10-18 RX ORDER — ACETAMINOPHEN 160 MG/5ML
650 SOLUTION ORAL EVERY 4 HOURS PRN
Status: CANCELLED | OUTPATIENT
Start: 2024-10-18

## 2024-10-18 RX ORDER — NAPROXEN SODIUM 220 MG/1
81 TABLET, FILM COATED ORAL DAILY
Status: CANCELLED | OUTPATIENT
Start: 2024-10-18

## 2024-10-18 RX ORDER — DEXTROSE 50 % IN WATER (D50W) INTRAVENOUS SYRINGE
12.5
Status: CANCELLED | OUTPATIENT
Start: 2024-10-18

## 2024-10-18 RX ORDER — ACETAMINOPHEN 650 MG/1
650 SUPPOSITORY RECTAL EVERY 4 HOURS PRN
Status: CANCELLED | OUTPATIENT
Start: 2024-10-18

## 2024-10-18 RX ORDER — CARVEDILOL 12.5 MG/1
25 TABLET ORAL 2 TIMES DAILY
Status: CANCELLED | OUTPATIENT
Start: 2024-10-18

## 2024-10-18 RX ORDER — ACETAMINOPHEN 325 MG/1
650 TABLET ORAL EVERY 4 HOURS PRN
Status: CANCELLED | OUTPATIENT
Start: 2024-10-18

## 2024-10-18 RX ORDER — LEVOFLOXACIN 5 MG/ML
750 INJECTION, SOLUTION INTRAVENOUS ONCE
Status: COMPLETED | OUTPATIENT
Start: 2024-10-18 | End: 2024-10-18

## 2024-10-18 RX ORDER — ATORVASTATIN CALCIUM 20 MG/1
40 TABLET, FILM COATED ORAL NIGHTLY
Status: CANCELLED | OUTPATIENT
Start: 2024-10-18

## 2024-10-18 RX ORDER — AMLODIPINE BESYLATE 5 MG/1
5 TABLET ORAL DAILY
Status: CANCELLED | OUTPATIENT
Start: 2024-10-18

## 2024-10-18 ASSESSMENT — ENCOUNTER SYMPTOMS
LIGHT-HEADEDNESS: 1
VOMITING: 1
NAUSEA: 1
NEAR-SYNCOPE: 1
DYSPNEA ON EXERTION: 0
DIAPHORESIS: 1
PALPITATIONS: 0
ROS SKIN COMMENTS: PALE
DIZZINESS: 1

## 2024-10-18 ASSESSMENT — LIFESTYLE VARIABLES
EVER HAD A DRINK FIRST THING IN THE MORNING TO STEADY YOUR NERVES TO GET RID OF A HANGOVER: NO
TOTAL SCORE: 0
HAVE PEOPLE ANNOYED YOU BY CRITICIZING YOUR DRINKING: NO
EVER FELT BAD OR GUILTY ABOUT YOUR DRINKING: NO
HAVE YOU EVER FELT YOU SHOULD CUT DOWN ON YOUR DRINKING: NO

## 2024-10-18 ASSESSMENT — PAIN - FUNCTIONAL ASSESSMENT: PAIN_FUNCTIONAL_ASSESSMENT: 0-10

## 2024-10-18 ASSESSMENT — PAIN SCALES - GENERAL: PAINLEVEL_OUTOF10: 0 - NO PAIN
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
## 2024-10-18 NOTE — PROGRESS NOTES
Patient was seen for a wound check. While here in office for the wound check, patient began vomiting, appeared pale and clammy, was weak, had near syncope, and at times became verbally unresponsive. EMS was called and patient was taken to [facility] for evaluation at the direction of Dr. Maryellen Hung.

## 2024-10-18 NOTE — CARE PLAN
Evaluated pt at bedside. Discussed his presenting complaints. He and wife do not want admission at this time. D/w emergency physician Dr. Cabrera.

## 2024-10-18 NOTE — PROGRESS NOTES
"Chief Complaint:   Near syncope     History Of Present Illness:    Amandeep Lara is a 69 y.o. male presenting with fatigue and near syncope.  He is accompanied by his wife.    She reports that he could barely make it up to the office today.  He almost passed out.  Onset occurred suddenly as he was \"fine at home.\"    He has not eaten or drank anything yet     Last Recorded Vitals:  Heart rate 62    Past Medical History:  See List    Past Surgical History:  See List      Social History:  He reports that he quit smoking about 12 months ago. His smoking use included cigarettes. He has never been exposed to tobacco smoke. He has never used smokeless tobacco. He reports current alcohol use. He reports that he does not use drugs.    Family History:  Family History   Problem Relation Name Age of Onset    Other (malignant neoplasm of urinary bladder) Mother      Other (arteriosclerotic cardiovascular disease) Father      Other (malignant neoplasm of prostate) Father      Other (arteriosclerotic cardiovascular disease) Brother          Allergies:  Ether, Statins-hmg-coa reductase inhibitors, Sulfa (sulfonamide antibiotics), and Sulfamethoxazole-trimethoprim    Outpatient Medications:  Current Outpatient Medications   Medication Instructions    acetaminophen (Tylenol) 325 mg tablet 2 tablets, oral, Every 4 hours PRN, mild pain     amLODIPine (NORVASC) 5 mg, oral, Daily    aspirin 81 mg chewable tablet 1 tablet, oral, Daily    atorvastatin (LIPITOR) 40 mg, oral, Nightly    BD Ultra-Fine Lin Pen Needle 32 gauge x 5/32\" needle use 1 each 2 times daily    carvedilol (COREG) 25 mg, oral, 2 times daily    clopidogrel (PLAVIX) 75 mg, oral, Daily, RESUME PLAVIX ON LATISHA 9/15/2024    Contour Next Gen Meter misc Dispense one meter that insurance will cover.    cyanocobalamin, vitamin B-12, (VITAMIN B-12 ORAL) 1 tablet, oral, Daily    fish oil concentrate (Omega-3) 120-180 mg capsule 1 capsule, oral, Daily    nitroglycerin " (Nitrostat) 0.4 mg SL tablet 1 tablet, sublingual, Every 5 min PRN    NovoLOG Mix 70-30FlexPen U-100 100 unit/mL (70-30) injection Take 15 units with breakfast and 10 units with dinner    oxyCODONE (ROXICODONE) 5 mg, oral, Every 6 hours PRN, severe pain 7-10   Review of Systems   Constitutional: Positive for diaphoresis.   Cardiovascular:  Positive for near-syncope. Negative for chest pain, dyspnea on exertion and palpitations.   Skin:         pale   Musculoskeletal:  Positive for muscle weakness.   Gastrointestinal:  Positive for nausea and vomiting.   Neurological:  Positive for dizziness and light-headedness.   All other systems reviewed and are negative.        Physical Exam:  Constitutional:       Appearance: Ill-appearing, acutely ill-appearing and diaphoretic.   Pulmonary:      Effort: Increased respiratory effort.      Comments: Noticeable increase in respirations/patient's breathing pattern  Chest:      Comments: Loop recorder in place, healing well   Skin:     General: Skin is moist.      Coloration: Skin is pale.      Comments: Clammy, pale   Neurological:      Mental Status: Lethargic.           Last Labs:  CBC -  Lab Results   Component Value Date    WBC 11.5 (H) 10/18/2024    HGB 11.3 (L) 10/18/2024    HCT 34.4 (L) 10/18/2024    MCV 89 10/18/2024     10/18/2024       CMP -  Lab Results   Component Value Date    CALCIUM 8.4 (L) 10/07/2024    PHOS 3.5 05/26/2021    PROT 5.3 (L) 09/20/2024    ALBUMIN 2.7 (L) 09/20/2024    AST 12 09/20/2024    ALT 10 09/20/2024    ALKPHOS 87 09/20/2024    BILITOT 0.4 09/20/2024       LIPID PANEL -   Lab Results   Component Value Date    CHOL 204 (H) 10/20/2023    TRIG 144 10/20/2023    HDL 45.9 10/20/2023    CHHDL 4.4 10/20/2023    LDLF 127 (H) 11/02/2022    VLDL 29 10/20/2023    NHDL 158 (H) 10/20/2023       RENAL FUNCTION PANEL -   Lab Results   Component Value Date    GLUCOSE 175 (H) 10/07/2024     10/07/2024    K 4.3 10/07/2024     10/07/2024    CO2  25 10/07/2024    ANIONGAP 12 10/07/2024    BUN 14 10/07/2024    CREATININE 1.53 (H) 10/07/2024    GFRMALE 62 07/13/2023    CALCIUM 8.4 (L) 10/07/2024    PHOS 3.5 05/26/2021    ALBUMIN 2.7 (L) 09/20/2024        Lab Results   Component Value Date     (H) 09/20/2024    HGBA1C 8.9 (H) 09/06/2024       Last Cardiology Tests:  ECG:  ECG 12 lead STAT 10/07/2024  Echo:  Transthoracic Echo (TTE) Complete 07/22/2024      Ejection Fractions:  EF   Date/Time Value Ref Range Status   07/22/2024 01:48 PM 48 %        Stress Test:  Nuclear Stress Test 07/11/2024          Patient presented to the office for a wound check s/p loop recorder implantation. While here in office for the wound check, patient began vomiting, appeared pale and clammy, was weak, had near syncope, and at times became verbally unresponsive. EMS was called and patient was taken to [facility] for evaluation at the direction of Dr. Maryellen Hung.       Assessment/Plan   Diagnoses and all orders for this visit:  Coronary artery disease involving native coronary artery of native heart without angina pectoris  Heart murmur  Essential hypertension  Near syncope  Current every day smoker  Syncope and collapse  Class 1 obesity with body mass index (BMI) of 33.0 to 33.9 in adult, unspecified obesity type, unspecified whether serious comorbidity present  Cryptogenic stroke (Multi)  Encounter to discuss treatment options        Alejandra Watt RN    Near syncope.  Associated pallor, diaphoresis and emesis in the office.  P.o. fluids given.  Likely neurally mediated etiology.  Recommended ER evaluation to assess patient.  EMS activated.  Cryptogenic CVA.  Status post loop recorder.  Normal wound check.    Counseling with 50% of the visit.  Patient, wife, not discussed vasovagal, near syncope, blood pressure palpable, heart rate, loop recorder, EMS activated, treatment options, risk, benefits, and imponderables.  All questions answered.  Patient and wife  appreciative of care.  Extended time of visit for coordination of care, EMS, and assessment

## 2024-10-18 NOTE — PROGRESS NOTES
Pt. here for wound check of loop implant by Dr. Hung on 10/7/24 at [facility]. Sternal area is well approximated with no erythema or drainage. Pt. denies any fever or chills. Temp 98.5

## 2024-10-20 LAB
ATRIAL RATE: 61 BPM
ATRIAL RATE: 64 BPM
P AXIS: 36 DEGREES
P AXIS: 51 DEGREES
P OFFSET: 182 MS
P OFFSET: 185 MS
P ONSET: 124 MS
P ONSET: 129 MS
PR INTERVAL: 182 MS
PR INTERVAL: 196 MS
Q ONSET: 220 MS
Q ONSET: 222 MS
QRS COUNT: 10 BEATS
QRS COUNT: 11 BEATS
QRS DURATION: 150 MS
QRS DURATION: 152 MS
QT INTERVAL: 466 MS
QT INTERVAL: 484 MS
QTC CALCULATION(BAZETT): 469 MS
QTC CALCULATION(BAZETT): 499 MS
QTC FREDERICIA: 468 MS
QTC FREDERICIA: 494 MS
R AXIS: 40 DEGREES
R AXIS: 9 DEGREES
T AXIS: 206 DEGREES
T AXIS: 74 DEGREES
T OFFSET: 455 MS
T OFFSET: 462 MS
VENTRICULAR RATE: 61 BPM
VENTRICULAR RATE: 64 BPM

## 2024-10-20 NOTE — PROGRESS NOTES
[facility] - Medical Oncology New Patient Visit    Patient ID: Amandeep Lara is a 69 y.o. male.  Referring Physician: Siena Momin MD  [id number] [place] [address]  Primary Care Provider: Jasmine Zamudio MD       DIAGNOSIS  NSCLC squamous cell ca      STAGING  pT2a pN0 Mx     CURRENT SITES OF DISEASE        MOLECULAR GENOMICS  PD-L1 60%   DISEASE ASSOCIATED GENOMIC FINDINGS: Not Detected.    DISEASE RELEVANT ALTERATIONS NOT DETECTED:   Negative for ALK fusion.  Negative for BRAF V600E.  Negative for EGFR sensitizing mutation.  Negative for ERBB2 activating mutation  Negative for KRAS G12C.  Negative for MET exon 14 skipping mutation.  Negative for NTRK fusion.  Negative for RET fusion.  Negative for ROS1 fusion.     PRIOR THERAPY  RUL wedge resection 9/10/24      CURRENT THERAPY           CURRENT ONCOLOGICAL PROBLEMS           HISTORY OF PRESENT ILLNESS  Mr. Lara is a 68 yo with multiple medical comorbidities including CAD, HTN, DMII, PAD, CVA, COPD, CHF who was found to have an incidental lung nodule during work-up for an ischemic stroke in 10/2023. At that time, CT chest w/o contrast on 10/22/23 noted a RUL nodular closter with nodule measuring 14 mm and adidtional RML opacity measuring 28 x 21 mm. Repeat CT chest on 3/13/24 with RUL nodule now measuring 2.3 x 1.3 cm, and Rml consolidation measuring 2.9 x 1.4 cm. PET/CT on 5/10/24 with FDG avidity of the RUL lesion SUV max 7.6, and no other FDG avidity. He met Dr. Momin on 7/12/24, who obtained updated CT w/o contrast of the chest on 8/23/24 with growth of the RULlesion now measuring 1.9 x 2.8 x 3.1 cm. RUL wedge resection on 9/10/24 with invasive, moderately differentiated, keratinizing squamous cell ca of the RUL, negative lymph nodes 7, 8, 11RS, 10, 12R, 11R, 4R, and 9. Greatest dimension of tumor 4.0 cm, final staging pT2a pN0. PD-L1 60%, no mutations identified. Of note, he has had multiple ER visits for  "syncope and near-syncope, most recently on 10/18/24 with CXR concerning for medial bibasilar atelectasis vs consolidation.     He is here today with his wife. He is currently on doxycycline since the ER. He does have a cough which wouldn't go away. Cough was only productive of a little phlegm, still coughing with a little phleghm. After surgery just a little blood, but not since. No fevers. He will complete the doxycycline on Sunday. His breathing has improved after surgery and he's been using the incentive spirometer. He does walk with a cane to stabilize. He does walk around the house without it. He's been more tired ever since having Covid in 2020. He does some cooking. He spends much of the day lying down on the couch watching TV. He can the flight of stairs at home without stopping. Doesn't have a great appetite since [facility]PC.      PAST MEDICAL HISTORY  CAD  HTN  HLD  DMII  PAD  CVA (10/2023) w/residual tingling/numbness on the R entire side  COPD  CHF, EF 45-50% (7/2024)     SOCIAL HISTORY  Lives at home with his wife, and also some of his adult children (youngest son and daughter). 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 Wife is a nurse in a memory care unit.   Tob: quit 10/2023, started 5 yo, <1 ppd  EtOH: rarely  Illicits: none     FAMILY HISTORY  Mother - lung cancer, uterine ca  Father - prostate ca    Meds (Current):    Current Outpatient Medications:     acetaminophen (Tylenol) 325 mg tablet, Take 2 tablets by mouth every 4 hours if needed for mild pain (1 - 3). mild pain , Disp: , Rfl:     amLODIPine (Norvasc) 5 mg tablet, Take 1 tablet (5 mg) by mouth once daily., Disp: 90 tablet, Rfl: 3    aspirin 81 mg chewable tablet, Chew 1 tablet (81 mg) once daily., Disp: , Rfl:     atorvastatin (Lipitor) 40 mg tablet, Take 1 tablet (40 mg) by mouth once daily at bedtime., Disp: 90 tablet, Rfl: 3    BD Ultra-Fine Lin Pen Needle 32 gauge x 5/32\" " "needle, use 1 each 2 times daily, Disp: , Rfl:     carvedilol (Coreg) 25 mg tablet, TAKE 1 TABLET TWICE DAILY, Disp: 180 tablet, Rfl: 3    clopidogrel (Plavix) 75 mg tablet, Take 1 tablet (75 mg) by mouth once daily. RESUME PLAVIX ON LATISHA 9/15/2024, Disp: , Rfl:     Contour Next Gen Meter misc, Dispense one meter that insurance will cover., Disp: , Rfl:     cyanocobalamin, vitamin B-12, (VITAMIN B-12 ORAL), Take 1 tablet by mouth once daily., Disp: , Rfl:     doxycycline (Vibramycin) 100 mg capsule, Take 1 capsule (100 mg) by mouth 2 times a day for 10 days. Take with at least 8 ounces (large glass) of water, do not lie down for 30 minutes after, Disp: 20 capsule, Rfl: 0    fish oil concentrate (Omega-3) 120-180 mg capsule, Take 1 capsule (1 g) by mouth once daily., Disp: , Rfl:     nitroglycerin (Nitrostat) 0.4 mg SL tablet, Place 1 tablet under the tongue every 5 minutes if needed for chest pain (up to 3 doses)., Disp: , Rfl:     NovoLOG Mix 70-30FlexPen U-100 100 unit/mL (70-30) injection, Take 15 units with breakfast and 10 units with dinner, Disp: , Rfl:     oxyCODONE (Roxicodone) 5 mg immediate release tablet, Take 1 tablet (5 mg) by mouth every 6 hours if needed (pain). severe pain 7-10, Disp: , Rfl:     Allergies   Allergen Reactions    Ether Unknown, Nausea And Vomiting and Other    Statins-Hmg-Coa Reductase Inhibitors Other    Sulfa (Sulfonamide Antibiotics) Hives    Sulfamethoxazole-Trimethoprim Other and Rash       Review of Systems   All other systems reviewed and are negative.       Objective   BSA: 2.15 meters squared  Wt Readings from Last 5 Encounters:   10/21/24 96.9 kg (213 lb 11.2 oz)   10/18/24 100 kg (221 lb)   10/07/24 99 kg (218 lb 4.1 oz)   09/20/24 106 kg (234 lb)   09/20/24 106 kg (234 lb)     /68 (BP Location: Left arm, Patient Position: Sitting)   Pulse 75   Temp 36.5 °C (97.7 °F) (Temporal)   Resp 16   Ht (S) 1.72 m (5' 7.72\")   Wt 96.9 kg (213 lb 11.2 oz)   SpO2 98%   " BMI 32.77 kg/m²     ECOG Score: 2- Ambulatory and  capable of all selfcare; unable to carry out work activities.  Up and about > 50% of waking hrs.      Physical Exam  Vitals reviewed.   Constitutional:       General: He is not in acute distress.  HENT:      Head: Normocephalic and atraumatic.   Eyes:      Pupils: Pupils are equal, round, and reactive to light.   Cardiovascular:      Rate and Rhythm: Normal rate and regular rhythm.   Pulmonary:      Effort: Pulmonary effort is normal. No respiratory distress.      Breath sounds: No wheezing.   Abdominal:      General: There is no distension.      Palpations: Abdomen is soft.   Skin:     General: Skin is warm and dry.   Neurological:      Mental Status: He is oriented to person, place, and time.   Psychiatric:         Mood and Affect: Mood normal.         Behavior: Behavior normal.          Results:  Labs:  Lab Results   Component Value Date    WBC 11.5 (H) 10/18/2024    HGB 11.3 (L) 10/18/2024    HCT 34.4 (L) 10/18/2024    MCV 89 10/18/2024     10/18/2024      Lab Results   Component Value Date    NEUTROABS 7.64 10/18/2024      Lab Results   Component Value Date    GLUCOSE 157 (H) 10/18/2024    CALCIUM 8.0 (L) 10/18/2024     10/18/2024    K 4.3 10/18/2024    CO2 23 10/18/2024     10/18/2024    BUN 21 10/18/2024    CREATININE 1.79 (H) 10/18/2024     Lab Results   Component Value Date    ALT 10 10/18/2024    AST 11 10/18/2024    ALKPHOS 91 10/18/2024    BILITOT 0.3 10/18/2024        Imaging:  I have personally reviewed the below imaging and concur with the reported findings unless otherwise stated:    === Results for orders placed during the hospital encounter of 10/18/24 ===    CT head wo IV contrast [[id number]] 10/18/2024    Status: Normal  NO ACUTE INTRACRANIAL PROCESS    MACRO:  None    Signed by: Samuel Delgado 10/18/2024 2:40 PM  Dictation workstation:   [id number]      === Results for orders placed during the hospital encounter of 09/20/24  ===    CT angio chest for pulmonary embolism [[id number]] 09/20/2024    Status: Normal  1.  No definite CT evidence of an acute pulmonary embolus extending to  level the segmental branch vessels.  2.  Postoperative changes within the right lung from presumed prior  pulmonary resection.  There are soft tissue consolidative changes  noted in the right hilum presumed to be postsurgical in etiology.  Recommend attention on future oncologic surveillance.  3.  Moderate right pleural effusion and bibasilar atelectasis.  4..  Other findings as stated above.  Signed by Jamie Andrew MD    ___________________________________________________________________________    CT head wo IV contrast [[id number]] 09/20/2024    Status: Normal  1. Small focus of encephalomalacia in the left frontal lobe, similar  to the prior study.  2. No evidence of acute cortical infarct or intracranial hemorrhage.      MACRO:  None      Signed by: Kamran Kramer 9/20/2024 3:17 PM  Dictation workstation:   [id number]      === Results for orders placed during the hospital encounter of 08/23/24 ===    CT chest wo IV contrast [[id number]] 08/23/2024    Status: Normal  Increased size of the lobulated right upper lobe mass seen within the  anterior segment with this mass now measuring 1.9 x 2.8 x 3.1 cm in  size consistent with bronchogenic carcinoma extending to the visceral  pleural surface.    Stable mediastinal lymph nodes which are within normal-sized limits.    Stable 5 mm left upper lobe ground-glass opacity.    No evidence for hepatic, adrenal, or bony metastases within the  field-of-view.    Pulmonary emphysema with septal thickening at each lung base  unchanged.    Stable 2 cm cystic lesion within the pancreatic tail with 5.2 cm  right renal cyst and with bilateral nonobstructing intrarenal calculi.    MACRO:  None    Signed by: Berna Fonseca 8/27/2024 6:09 PM  Dictation workstation:   [id number]      === Results for orders placed during the hospital encounter  of 03/20/24 ===    CT angio chest abdomen pelvis [[id number]] 03/20/2024    Status: Normal  No thoracic or abdominal aortic aneurysm or acute aortic pathology.  The inferior mesenteric artery has a short segment of complete  occlusion with reconstitution distally. The right common femoral  artery has near-complete occlusion. The left common femoral artery  has critical stenosis/near complete occlusion. There is complete  occlusion of the left superficial femoral artery without evidence of  reconstitution within the visualized field of view. Additional  vasculature findings as described above.    The right upper lobe pulmonary lesion is similar in size compared to  prior exam. There is redemonstrated small area of chronic  consolidation within the right middle lobe. There is similar  prominent lymph nodes within the mediastinum as described above.    Redemonstrated 2.1 x 1.7 cm hypodense pancreatic IPMN that has been  stable since CT exam in 2012.    Additional findings as described above.    I personally reviewed the images/study and I agree with the findings  as stated by Simone Arroyo MD. This study was interpreted at  [facility], [place], [place].    MACRO:  None.    Signed by: Norma Nair 3/20/2024 4:23 PM  Dictation workstation:   [id number]  === Results for orders placed during the hospital encounter of 10/20/23 ===    MR brain wo IV contrast [[id number]] 10/20/2023    Status: Normal  Small foci of acute to subacute ischemic infarcts in the left  thalamus and midbrain. Possible punctate focus of restricted  diffusion in the right occipital lobe. No associated mass effect or  midline shift present.    Gradient echo signal corresponding with calcifications in the  bilateral thalami. Findings are atypical and correlation with  underlying toxic, metabolic or other etiology suggested.    Small remote left frontal infarct and additional findings as  detailed.    MACRO:  None    Signed by: Carlabri BlountAna Maria 10/20/2023 11:55 PM  Dictation workstation:   [id number]      === Results for orders placed in visit on 04/01/21 ===    MR ANGIO HEAD WO IV CONTRAST [[id number]] 04/01/2021    Status: Normal  MRI Brain:    Nonspecific white matter changes may represent small-vessel ischemic  disease in a patient of this age. There is involvement of the enmanuel.  There is no evidence of acute ischemic injury or intracranial mass.    MRA:    Multifocal areas of irregularity and narrowing of the bilateral  internal carotid arteries, left PCA, and left posterior communicating  artery. There are areas of severe stenosis involving the lacerum  segments of the ICAs.    Luminal irregularity due to atherosclerotic disease versus is  aneurysm projecting inferiorly from the communicating segment of the  right ICA.    ___________________________________________________________________________    MR BRAIN WO IV CONTRAST [[id number]] 04/01/2021    Status: Normal  MRI Brain:    Nonspecific white matter changes may represent small-vessel ischemic  disease in a patient of this age. There is involvement of the enmanuel.  There is no evidence of acute ischemic injury or intracranial mass.    MRA:    Multifocal areas of irregularity and narrowing of the bilateral  internal carotid arteries, left PCA, and left posterior communicating  artery. There are areas of severe stenosis involving the lacerum  segments of the ICAs.    Luminal irregularity due to atherosclerotic disease versus is  aneurysm projecting inferiorly from the communicating segment of the  right ICA.  === Results for orders placed during the hospital encounter of 07/11/24 ===    Nuclear Stress Test [[id number]] 07/11/2024    Status: Normal  Abnormal Lexiscan "[employer]view cardiac perfusion stress test.  No  myocardial ischemia by perfusion imaging.  No LOCATION myocardial infarction by perfusion imaging. Left  ventricular systolic function. Left  ventricular ejection fraction 35  %. When compared to prior study from June 20, 2022, the ejection  fraction has improved from 10%. Non invasive risk stratification is  intermediate risk.      Signed by: Smooth Gutierrez 7/11/2024 12:49 PM  Dictation workstation:   [id number]      === Results for orders placed in visit on 06/08/22 ===    Nuclear Stress Test [VBCAEG25] 06/08/2022    Status: Normal  Abnormal Lexiscan Myoview cardiac perfusion stress test.  No  myocardial ischemia by perfusion imaging.  No  myocardial infarction by perfusion imaging.  Abnormal left ventricular systolic function.  Left ventricular ejection fraction 40 %.  When compared to prior study from July 2011, the ejection fraction  has declined from 46%  None invasive risk stratification is intermediate risk.    ___________________________________________________________________________    NM PART 2 STRESS OR REST NO CHARGE [[id number]] 06/08/2022    Status: Normal  Abnormal Lexiscan Myoview cardiac perfusion stress test.  No  myocardial ischemia by perfusion imaging.  No  myocardial infarction by perfusion imaging.  Abnormal left ventricular systolic function.  Left ventricular ejection fraction 40 %.  When compared to prior study from July 2011, the ejection fraction  has declined from 46%  None invasive risk stratification is intermediate risk.  === Results for orders placed during the hospital encounter of 06/12/24 ===    Arterial Blood Gas (ABG) [[id number]] 06/12/2024    Status: Normal  === Results for orders placed during the hospital encounter of 05/10/24 ===    NM PET CT lung CA initial diagnosis [[id number]] 05/10/2024    Status: Normal  Mildly intense hypermetabolic tracer activity along the anteromedial  aspect of the right upper lobe corresponding to the known  lesion/nodule in the right upper lobe as identified on recent  diagnostic C T. finding is of an intensity that would be consistent  with an active neoplastic/metastatic process.    No other  definite areas of abnormal hypermetabolic tracer activity to  suggest an active neoplastic/metastatic process are identified.    I personally reviewed the image(s) / study and agree with the  findings and interpretation as stated. This study was interpreted at  [facility].    Signed by: Amandeep Cancino 5/10/2024 11:13 AM  Dictation workstation:   [id number]  === Results for orders placed in visit on 06/22/20 ===    XR CHEST 1 VIEW [[id number]] 06/22/2020    Status: Normal  No acute cardiopulmonary process.      === Results for orders placed in visit on 06/21/20 ===    CHEST 2 VIEW [[id number]] 06/21/2020    Status: Normal  No evidence of pneumonia or pulmonary edema.      === Results for orders placed in visit on 06/11/20 ===    CHEST 2 VIEW [[id number]] 06/11/2020    Status: Normal  1. No evidence of acute cardiopulmonary process.    Pathology:    Lab Results   Component Value Date    ADD1  09/10/2024     PD-L1 22C3  by Immunohistochemistry with Interpretation, pembrolizumab  (KEYTRUDA)    Block used:  A4    Interpretation: High expression    Tumor Proportion Score (TPS): 60%      Intended use:  PD-L1 22C3 by IHC with Interpretation is a qualitative immunohistochemical assay using Monoclonal Mouse Anti-PD-L1, Clone 22C3 intended for use in the detection of PD-L1 protein in formalin-fixed, paraffin-embedded (FFPE) non-small cell lung cancer (NSCLC) tissue using the Optiview detection on a [place] BenchMark Ultra. The specimen submitted for testing should contain at least 100 viable tumor cells to be considered adequate for evaluation. This assay is indicated as an aid in identifying NSCLC patients for treatment with pembrolizumab (KEYTRUDA).    Methodology:  PD-L1 protein expression is determined by using Tumor Proportion Score (TPS), which is the percentage of viable tumor cells showing partial or complete membrane staining at any intensity. In the clinical setting of first-line therapy  (treatment-naïve patients), the specimen is considered PD-L1 positive if the TPS is equal to or greater than 50 percent. In the setting of second-line therapy, the specimen is considered positive if the TPS is equal to or greater than 1 percent.    Reference Range:  High Expression >=50% TPS  Low Expression 1-49% TPS  No Expression <1% TPS    This assay is validated and FDA-approved for lung cancer specimens only. For all other specimen types, results should be interpreted with caution and within the appropriate clinical context. The use of this assay on decalcified tissues has not been validated and is not recommended.   One or more of the reagents used to perform assays on this specimen MAY have contained components considered to be Laboratory Developed Tests (LDT).  LDT's have not been cleared or approved by the U.S. Food and Drug Administration.  These assays/tests were developed and their performance characteristics determined by the Department of Pathology Immunohistochemistry Lab at [facility]. The FDA does not require this test to go through premarket FDA review. This test is used for clinical purposes. It should not be regarded as investigational or for research. This laboratory is certified under the Clinical Laboratory Improvement Amendments (CLIA) as qualified to perform high complexity clinical laboratory testing.  The assays/tests were performed with appropriate positive and negative controls which stained appropriately.           Assessment/Plan      Amandeep Lara is a 69 y.o. male here for recommendations and to establish care for NSCLC squamous cell ca.    # NSCLC squamous cell ca  - pT2a pN0 Mx, pending brain MRI  - s/p RUL wedge resection 9/10/24  - PD-L1 60%, no mutations identified  - will send full tempus NGS  - discussed with patient that given size of tumor and wedge resection, would recommend adjuvant chemotherapy followed by a year of adjuvant  immunotherapy  - discussed benefit in reducing recurrence and increased overall survival  - discussed potential side effects broadly, as he has concerns about side effects particularly given his multiple medical problems  - discussed that I also have concerns not only because of his multiple comorbidities, but also his poor performance status   - he would like to have the brain MRI and think further about this before making a decision. Also discussed that would not recommend starting adjuvant therapy after more than 3 months post resection  - RTC after brain MRI    Precious Serrano MD

## 2024-10-21 ENCOUNTER — APPOINTMENT (OUTPATIENT)
Dept: CARDIOLOGY | Facility: CLINIC | Age: 69
End: 2024-10-21
Payer: COMMERCIAL

## 2024-10-21 ENCOUNTER — OFFICE VISIT (OUTPATIENT)
Dept: HEMATOLOGY/ONCOLOGY | Facility: CLINIC | Age: 69
End: 2024-10-21
Payer: COMMERCIAL

## 2024-10-21 VITALS
RESPIRATION RATE: 16 BRPM | TEMPERATURE: 97.7 F | HEIGHT: 68 IN | HEART RATE: 75 BPM | OXYGEN SATURATION: 98 % | DIASTOLIC BLOOD PRESSURE: 68 MMHG | SYSTOLIC BLOOD PRESSURE: 135 MMHG | WEIGHT: 213.7 LBS | BODY MASS INDEX: 32.39 KG/M2

## 2024-10-21 DIAGNOSIS — R91.1 LUNG NODULE: ICD-10-CM

## 2024-10-21 DIAGNOSIS — C34.11 MALIGNANT NEOPLASM OF UPPER LOBE OF RIGHT LUNG (MULTI): Primary | ICD-10-CM

## 2024-10-21 PROCEDURE — 99205 OFFICE O/P NEW HI 60 MIN: CPT | Performed by: STUDENT IN AN ORGANIZED HEALTH CARE EDUCATION/TRAINING PROGRAM

## 2024-10-21 PROCEDURE — 3078F DIAST BP <80 MM HG: CPT | Performed by: STUDENT IN AN ORGANIZED HEALTH CARE EDUCATION/TRAINING PROGRAM

## 2024-10-21 PROCEDURE — 3075F SYST BP GE 130 - 139MM HG: CPT | Performed by: STUDENT IN AN ORGANIZED HEALTH CARE EDUCATION/TRAINING PROGRAM

## 2024-10-21 PROCEDURE — 1159F MED LIST DOCD IN RCRD: CPT | Performed by: STUDENT IN AN ORGANIZED HEALTH CARE EDUCATION/TRAINING PROGRAM

## 2024-10-21 PROCEDURE — 1126F AMNT PAIN NOTED NONE PRSNT: CPT | Performed by: STUDENT IN AN ORGANIZED HEALTH CARE EDUCATION/TRAINING PROGRAM

## 2024-10-21 PROCEDURE — 1157F ADVNC CARE PLAN IN RCRD: CPT | Performed by: STUDENT IN AN ORGANIZED HEALTH CARE EDUCATION/TRAINING PROGRAM

## 2024-10-21 PROCEDURE — 3008F BODY MASS INDEX DOCD: CPT | Performed by: STUDENT IN AN ORGANIZED HEALTH CARE EDUCATION/TRAINING PROGRAM

## 2024-10-21 PROCEDURE — 99215 OFFICE O/P EST HI 40 MIN: CPT | Performed by: STUDENT IN AN ORGANIZED HEALTH CARE EDUCATION/TRAINING PROGRAM

## 2024-10-21 PROCEDURE — 3052F HG A1C>EQUAL 8.0%<EQUAL 9.0%: CPT | Performed by: STUDENT IN AN ORGANIZED HEALTH CARE EDUCATION/TRAINING PROGRAM

## 2024-10-21 ASSESSMENT — PAIN SCALES - GENERAL: PAINLEVEL_OUTOF10: 0-NO PAIN

## 2024-10-24 DIAGNOSIS — E11.40 TYPE 2 DIABETES MELLITUS WITH DIABETIC NEUROPATHY, UNSPECIFIED (MULTI): ICD-10-CM

## 2024-10-24 DIAGNOSIS — Z12.5 ENCOUNTER FOR SCREENING FOR MALIGNANT NEOPLASM OF PROSTATE: ICD-10-CM

## 2024-10-24 DIAGNOSIS — E78.5 HYPERLIPIDEMIA, UNSPECIFIED: Primary | ICD-10-CM

## 2024-10-24 DIAGNOSIS — E55.9 VITAMIN D DEFICIENCY, UNSPECIFIED: ICD-10-CM

## 2024-10-28 PROBLEM — F17.200 SMOKES TOBACCO DAILY: Status: RESOLVED | Noted: 2018-09-14 | Resolved: 2024-10-28

## 2024-10-28 PROBLEM — F17.200 CURRENT EVERY DAY SMOKER: Status: RESOLVED | Noted: 2024-02-01 | Resolved: 2024-10-28

## 2024-11-06 ENCOUNTER — LAB (OUTPATIENT)
Dept: LAB | Facility: LAB | Age: 69
End: 2024-11-06
Payer: COMMERCIAL

## 2024-11-06 DIAGNOSIS — E78.5 HYPERLIPIDEMIA, UNSPECIFIED: ICD-10-CM

## 2024-11-06 DIAGNOSIS — E11.40 TYPE 2 DIABETES MELLITUS WITH DIABETIC NEUROPATHY, UNSPECIFIED (MULTI): ICD-10-CM

## 2024-11-06 DIAGNOSIS — E55.9 VITAMIN D DEFICIENCY, UNSPECIFIED: ICD-10-CM

## 2024-11-06 DIAGNOSIS — Z12.5 ENCOUNTER FOR SCREENING FOR MALIGNANT NEOPLASM OF PROSTATE: ICD-10-CM

## 2024-11-06 LAB
25(OH)D3 SERPL-MCNC: <7 NG/ML (ref 30–100)
CHOLEST SERPL-MCNC: 112 MG/DL (ref 0–199)
CHOLESTEROL/HDL RATIO: 2.3
EST. AVERAGE GLUCOSE BLD GHB EST-MCNC: 174 MG/DL
HBA1C MFR BLD: 7.7 %
HDLC SERPL-MCNC: 47.9 MG/DL
LDLC SERPL CALC-MCNC: 44 MG/DL
LDLC SERPL DIRECT ASSAY-MCNC: 42 MG/DL (ref 0–129)
NON HDL CHOLESTEROL: 64 MG/DL (ref 0–149)
PSA SERPL-MCNC: 2.83 NG/ML
TRIGL SERPL-MCNC: 99 MG/DL (ref 0–149)
VLDL: 20 MG/DL (ref 0–40)

## 2024-11-06 PROCEDURE — 83721 ASSAY OF BLOOD LIPOPROTEIN: CPT

## 2024-11-06 PROCEDURE — 36415 COLL VENOUS BLD VENIPUNCTURE: CPT

## 2024-11-06 PROCEDURE — 82306 VITAMIN D 25 HYDROXY: CPT

## 2024-11-06 PROCEDURE — 83036 HEMOGLOBIN GLYCOSYLATED A1C: CPT

## 2024-11-06 PROCEDURE — 84153 ASSAY OF PSA TOTAL: CPT

## 2024-11-06 PROCEDURE — 80061 LIPID PANEL: CPT

## 2024-11-09 LAB
DNA RANGE(S) EXAMINED NAR: NORMAL
GENE DIS ANL INTERP-IMP: POSITIVE
GENE DIS ASSESSED: NORMAL
GENE MUT TESTED BLD/T: 8.4 M/MB
MSI CA SPEC-IMP: NORMAL
REASON FOR STUDY: NORMAL
TEMPUS GERMLINE NOTE: NORMAL
TEMPUS PERTINENTNEGATIVES: NORMAL
TEMPUS PORTAL: NORMAL
TEMPUS THERAPY1: NORMAL
TEMPUS THERAPY2: NORMAL
TEMPUS THERAPY3: NORMAL
TEMPUS THERAPY4: NORMAL
TEMPUS THERAPY5: NORMAL
TEMPUS THERAPY6: NORMAL
TEMPUS THERAPYCOUNT: 6
TEMPUS TRIALCOUNT: 3
TEMPUS TRIALMATCHES1: NORMAL
TEMPUS TRIALMATCHES2: NORMAL
TEMPUS TRIALMATCHES3: NORMAL
TEMPUS XR RESULT 1: NORMAL

## 2024-11-13 ENCOUNTER — LAB (OUTPATIENT)
Dept: LAB | Facility: LAB | Age: 69
End: 2024-11-13
Payer: COMMERCIAL

## 2024-11-13 DIAGNOSIS — E11.40 TYPE 2 DIABETES MELLITUS WITH DIABETIC NEUROPATHY, UNSPECIFIED (MULTI): Primary | ICD-10-CM

## 2024-11-13 LAB — GLUCOSE SERPL-MCNC: 130 MG/DL (ref 74–99)

## 2024-11-13 PROCEDURE — 36415 COLL VENOUS BLD VENIPUNCTURE: CPT

## 2024-11-13 PROCEDURE — 82947 ASSAY GLUCOSE BLOOD QUANT: CPT

## 2024-11-14 ENCOUNTER — APPOINTMENT (OUTPATIENT)
Dept: CARDIOLOGY | Facility: CLINIC | Age: 69
End: 2024-11-14
Payer: COMMERCIAL

## 2024-11-14 VITALS
SYSTOLIC BLOOD PRESSURE: 130 MMHG | HEIGHT: 68 IN | HEART RATE: 72 BPM | DIASTOLIC BLOOD PRESSURE: 72 MMHG | WEIGHT: 218.1 LBS | BODY MASS INDEX: 33.05 KG/M2

## 2024-11-14 DIAGNOSIS — R55 SYNCOPE AND COLLAPSE: ICD-10-CM

## 2024-11-14 DIAGNOSIS — I25.10 ARTERIOSCLEROSIS OF CORONARY ARTERY: ICD-10-CM

## 2024-11-14 DIAGNOSIS — I73.9 PERIPHERAL ARTERIAL DISEASE (CMS-HCC): ICD-10-CM

## 2024-11-14 DIAGNOSIS — I10 ESSENTIAL HYPERTENSION: ICD-10-CM

## 2024-11-14 DIAGNOSIS — R01.1 HEART MURMUR: ICD-10-CM

## 2024-11-14 DIAGNOSIS — E66.811 CLASS 1 OBESITY WITH BODY MASS INDEX (BMI) OF 33.0 TO 33.9 IN ADULT, UNSPECIFIED OBESITY TYPE, UNSPECIFIED WHETHER SERIOUS COMORBIDITY PRESENT: ICD-10-CM

## 2024-11-14 DIAGNOSIS — I65.23 STENOSIS OF INTRACRANIAL PORTION OF BOTH INTERNAL CAROTID ARTERIES: ICD-10-CM

## 2024-11-14 DIAGNOSIS — I25.10 CORONARY ARTERY DISEASE INVOLVING NATIVE CORONARY ARTERY OF NATIVE HEART WITHOUT ANGINA PECTORIS: ICD-10-CM

## 2024-11-14 DIAGNOSIS — E78.2 MIXED HYPERLIPIDEMIA: ICD-10-CM

## 2024-11-14 PROCEDURE — 1159F MED LIST DOCD IN RCRD: CPT | Performed by: INTERNAL MEDICINE

## 2024-11-14 PROCEDURE — 3008F BODY MASS INDEX DOCD: CPT | Performed by: INTERNAL MEDICINE

## 2024-11-14 PROCEDURE — 3078F DIAST BP <80 MM HG: CPT | Performed by: INTERNAL MEDICINE

## 2024-11-14 PROCEDURE — 1036F TOBACCO NON-USER: CPT | Performed by: INTERNAL MEDICINE

## 2024-11-14 PROCEDURE — 1157F ADVNC CARE PLAN IN RCRD: CPT | Performed by: INTERNAL MEDICINE

## 2024-11-14 PROCEDURE — 3051F HG A1C>EQUAL 7.0%<8.0%: CPT | Performed by: INTERNAL MEDICINE

## 2024-11-14 PROCEDURE — 99214 OFFICE O/P EST MOD 30 MIN: CPT | Performed by: INTERNAL MEDICINE

## 2024-11-14 PROCEDURE — 3075F SYST BP GE 130 - 139MM HG: CPT | Performed by: INTERNAL MEDICINE

## 2024-11-14 PROCEDURE — 3048F LDL-C <100 MG/DL: CPT | Performed by: INTERNAL MEDICINE

## 2024-11-14 NOTE — PATIENT INSTRUCTIONS
Continue same medications/treatment.  Patient educated on proper medication use.  Patient educated on risk factor modification.  Please bring any lab results from other providers/physicians to your next appointment.    Please bring all medicines, vitamins, and herbal supplements with you when you come to the office.    Prescriptions will not be filled unless you are compliant with your follow up appointments or have a follow up appointment scheduled as per instruction of your physician. Refills should be requested at the time of your visit.    Follow up in March I, HAILEY LASSITER RN, AM SCRIBING FOR AND IN THE PRESENCE OF DR. MAGNOLIA GREEN, DO, FACC
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
## 2024-11-15 ENCOUNTER — HOSPITAL ENCOUNTER (OUTPATIENT)
Dept: CARDIOLOGY | Facility: HOSPITAL | Age: 69
Discharge: HOME | End: 2024-11-15
Payer: COMMERCIAL

## 2024-11-15 DIAGNOSIS — R55 SYNCOPE AND COLLAPSE: ICD-10-CM

## 2024-11-15 DIAGNOSIS — Z95.818 PRESENCE OF OTHER CARDIAC IMPLANTS AND GRAFTS: ICD-10-CM

## 2024-11-15 PROCEDURE — 93298 REM INTERROG DEV EVAL SCRMS: CPT

## 2024-12-13 ENCOUNTER — HOSPITAL ENCOUNTER (OUTPATIENT)
Dept: RADIOLOGY | Facility: HOSPITAL | Age: 69
Discharge: HOME | End: 2024-12-13
Payer: COMMERCIAL

## 2024-12-13 DIAGNOSIS — C34.11 MALIGNANT NEOPLASM OF UPPER LOBE OF RIGHT LUNG (MULTI): ICD-10-CM

## 2024-12-13 PROCEDURE — A9575 INJ GADOTERATE MEGLUMI 0.1ML: HCPCS | Performed by: STUDENT IN AN ORGANIZED HEALTH CARE EDUCATION/TRAINING PROGRAM

## 2024-12-13 PROCEDURE — 70553 MRI BRAIN STEM W/O & W/DYE: CPT

## 2024-12-13 PROCEDURE — 2550000001 HC RX 255 CONTRASTS: Performed by: STUDENT IN AN ORGANIZED HEALTH CARE EDUCATION/TRAINING PROGRAM

## 2024-12-13 RX ORDER — GADOTERATE MEGLUMINE 376.9 MG/ML
0.2 INJECTION INTRAVENOUS
Status: COMPLETED | OUTPATIENT
Start: 2024-12-13 | End: 2024-12-13

## 2024-12-20 ENCOUNTER — HOSPITAL ENCOUNTER (OUTPATIENT)
Dept: CARDIOLOGY | Facility: HOSPITAL | Age: 69
Discharge: HOME | End: 2024-12-20
Payer: COMMERCIAL

## 2024-12-20 DIAGNOSIS — Z95.818 PRESENCE OF OTHER CARDIAC IMPLANTS AND GRAFTS: ICD-10-CM

## 2024-12-20 DIAGNOSIS — R55 SYNCOPE AND COLLAPSE: ICD-10-CM

## 2024-12-20 PROCEDURE — 93298 REM INTERROG DEV EVAL SCRMS: CPT

## 2025-01-06 NOTE — PROGRESS NOTES
Subjective   Amandeep Lara is a 69 y.o.   male.   HPI  The patient is being seen after being hospitalized for bilateral strokes on 10-21-23.? They have right-sided residual sensory deficit. At last follow up, he was taking Plavix, aspirin, and statin and are doing well. He did have an ED visit in September of 2024 and in October 2024 for syncopal episodes. He had a loop recorder placed after last ED visit and follows with Dr. Hung.    Today, he is doing ok. Continues with right sided residual N&T, may be his new baseline. He continues with DAPT and statin. No overt bleeding on the Plavix and aspirin. He does have a loop recorder inserted since October for syncope. He continues following with Oncology for lung CA.    Review of systems are negative unless otherwise specified in HPI.   Objective   Neurological Exam  Mental Status  Awake, alert and oriented to person, place and time. Speech is normal. Language is fluent with no aphasia. Attention and concentration are normal.    Cranial Nerves  CN III, IV, VI: Pupils equal round and reactive to light bilaterally.  And EOM's Normal.    Motor   Strength is 5/5 throughout all four extremities.    Sensory  Light touch abnormality: Decreased right side of body.     Reflexes  Deep tendon reflexes are 2+ and symmetric in all four extremities.    Gait  Casual gait: Wide stance. Reduced stride length.    Physical Exam  HENT:      Right Ear: Decreased hearing noted.      Left Ear: Decreased hearing noted.   Eyes:      Pupils: Pupils are equal, round, and reactive to light.   Neurological:      Motor: Motor strength is normal.     Deep Tendon Reflexes: Reflexes are normal and symmetric.   Psychiatric:         Speech: Speech normal.           Assessment/Plan   #CVA  Follow up in 1 year.  Continue with current regimen.  Discussed bleeding precautions with patient while on anti platelet and/or anticoagulation therapy. Discussed stroke prevention such as: HgbA1c <7, tight blood  pressure control <130/<80, LDL <70 with statin therapy (if indicated), CPAP/BiPAP compliance (if indicated) and lifestyle modification (Mediterranean diet, daily exercise, nicotine cessation & limiting alcohol use).   Discussed s/sx of stroke such as: face drooping, arm/leg weakness, speech difficulty; sudden numbness of face/extremity, sudden confusion, sudden trouble seeing, sudden trouble walking, sudden severe headache, dizziness, loss of balance or coordination and to call 911 immediately.?

## 2025-01-07 ENCOUNTER — APPOINTMENT (OUTPATIENT)
Dept: NEUROLOGY | Facility: CLINIC | Age: 70
End: 2025-01-07
Payer: COMMERCIAL

## 2025-01-08 ENCOUNTER — APPOINTMENT (OUTPATIENT)
Dept: NEUROLOGY | Facility: CLINIC | Age: 70
End: 2025-01-08
Payer: COMMERCIAL

## 2025-01-08 VITALS
WEIGHT: 219 LBS | SYSTOLIC BLOOD PRESSURE: 122 MMHG | HEIGHT: 68 IN | HEART RATE: 70 BPM | BODY MASS INDEX: 33.19 KG/M2 | DIASTOLIC BLOOD PRESSURE: 70 MMHG

## 2025-01-08 DIAGNOSIS — R55 SYNCOPE AND COLLAPSE: ICD-10-CM

## 2025-01-08 DIAGNOSIS — I63.9 CEREBROVASCULAR ACCIDENT (CVA), UNSPECIFIED MECHANISM (MULTI): Primary | ICD-10-CM

## 2025-01-08 PROCEDURE — 3074F SYST BP LT 130 MM HG: CPT

## 2025-01-08 PROCEDURE — 3078F DIAST BP <80 MM HG: CPT

## 2025-01-08 PROCEDURE — 1157F ADVNC CARE PLAN IN RCRD: CPT

## 2025-01-08 PROCEDURE — 1159F MED LIST DOCD IN RCRD: CPT

## 2025-01-08 PROCEDURE — 3008F BODY MASS INDEX DOCD: CPT

## 2025-01-08 PROCEDURE — 99214 OFFICE O/P EST MOD 30 MIN: CPT

## 2025-01-08 PROCEDURE — 1160F RVW MEDS BY RX/DR IN RCRD: CPT

## 2025-01-08 PROCEDURE — 1036F TOBACCO NON-USER: CPT

## 2025-01-08 RX ORDER — ASPIRIN 325 MG
50000 TABLET, DELAYED RELEASE (ENTERIC COATED) ORAL
COMMUNITY
Start: 2024-12-02

## 2025-01-08 RX ORDER — CLOPIDOGREL BISULFATE 75 MG/1
75 TABLET ORAL DAILY
Qty: 90 TABLET | Refills: 4 | Status: SHIPPED | OUTPATIENT
Start: 2025-01-08

## 2025-01-08 RX ORDER — CARVEDILOL 25 MG/1
1 TABLET, FILM COATED ORAL 2 TIMES DAILY
Qty: 100 EACH | Refills: 3 | Status: SHIPPED | OUTPATIENT
Start: 2025-01-08

## 2025-01-08 NOTE — TELEPHONE ENCOUNTER
Requested Prescriptions     Pending Prescriptions Disp Refills    blood glucose test strips (CONTOUR TEST) strip 100 each 3     Si each by In Vitro route 2 times daily As needed.       Refill Please

## 2025-01-24 ENCOUNTER — HOSPITAL ENCOUNTER (OUTPATIENT)
Dept: CARDIOLOGY | Facility: HOSPITAL | Age: 70
Discharge: HOME | End: 2025-01-24
Payer: COMMERCIAL

## 2025-01-24 DIAGNOSIS — R55 SYNCOPE AND COLLAPSE: ICD-10-CM

## 2025-01-24 DIAGNOSIS — Z95.818 PRESENCE OF OTHER CARDIAC IMPLANTS AND GRAFTS: ICD-10-CM

## 2025-01-24 PROCEDURE — 93298 REM INTERROG DEV EVAL SCRMS: CPT

## 2025-01-27 ENCOUNTER — OFFICE VISIT (OUTPATIENT)
Dept: HEMATOLOGY/ONCOLOGY | Facility: CLINIC | Age: 70
End: 2025-01-27
Payer: COMMERCIAL

## 2025-01-27 VITALS
SYSTOLIC BLOOD PRESSURE: 133 MMHG | DIASTOLIC BLOOD PRESSURE: 67 MMHG | BODY MASS INDEX: 33.19 KG/M2 | TEMPERATURE: 96.1 F | OXYGEN SATURATION: 93 % | RESPIRATION RATE: 14 BRPM | HEART RATE: 67 BPM | WEIGHT: 218.3 LBS

## 2025-01-27 DIAGNOSIS — C34.11 MALIGNANT NEOPLASM OF UPPER LOBE OF RIGHT LUNG (MULTI): ICD-10-CM

## 2025-01-27 PROCEDURE — 99214 OFFICE O/P EST MOD 30 MIN: CPT | Performed by: STUDENT IN AN ORGANIZED HEALTH CARE EDUCATION/TRAINING PROGRAM

## 2025-01-27 PROCEDURE — 1159F MED LIST DOCD IN RCRD: CPT | Performed by: STUDENT IN AN ORGANIZED HEALTH CARE EDUCATION/TRAINING PROGRAM

## 2025-01-27 PROCEDURE — 1126F AMNT PAIN NOTED NONE PRSNT: CPT | Performed by: STUDENT IN AN ORGANIZED HEALTH CARE EDUCATION/TRAINING PROGRAM

## 2025-01-27 PROCEDURE — 1157F ADVNC CARE PLAN IN RCRD: CPT | Performed by: STUDENT IN AN ORGANIZED HEALTH CARE EDUCATION/TRAINING PROGRAM

## 2025-01-27 PROCEDURE — 3075F SYST BP GE 130 - 139MM HG: CPT | Performed by: STUDENT IN AN ORGANIZED HEALTH CARE EDUCATION/TRAINING PROGRAM

## 2025-01-27 PROCEDURE — 3078F DIAST BP <80 MM HG: CPT | Performed by: STUDENT IN AN ORGANIZED HEALTH CARE EDUCATION/TRAINING PROGRAM

## 2025-01-27 ASSESSMENT — PAIN SCALES - GENERAL: PAINLEVEL_OUTOF10: 0-NO PAIN

## 2025-01-27 NOTE — PROGRESS NOTES
[facility] - Medical Oncology Follow-Up Visit    Patient ID: Amandeep Lara is a 69 y.o. male with NSCLC squamous cell ca.     Current therapy: [No matching plan found]     Chief Concern: follow-up    Oncologic History:     DIAGNOSIS  NSCLC squamous cell ca      STAGING  pT2a pN0 Mx     CURRENT SITES OF DISEASE        MOLECULAR GENOMICS  PD-L1 60%   DISEASE ASSOCIATED GENOMIC FINDINGS: Not Detected.     DISEASE RELEVANT ALTERATIONS NOT DETECTED:   Negative for ALK fusion.  Negative for BRAF V600E.  Negative for EGFR sensitizing mutation.  Negative for ERBB2 activating mutation  Negative for KRAS G12C.  Negative for MET exon 14 skipping mutation.  Negative for NTRK fusion.  Negative for RET fusion.  Negative for ROS1 fusion.     PRIOR THERAPY  RUL wedge resection 9/10/24      CURRENT THERAPY           CURRENT ONCOLOGICAL PROBLEMS           HISTORY OF PRESENT ILLNESS  Mr. Lara is a 70 yo with multiple medical comorbidities including CAD, HTN, DMII, PAD, CVA, COPD, CHF who was found to have an incidental lung nodule during work-up for an ischemic stroke in 10/2023. At that time, CT chest w/o contrast on 10/22/23 noted a RUL nodular closter with nodule measuring 14 mm and adidtional RML opacity measuring 28 x 21 mm. Repeat CT chest on 3/13/24 with RUL nodule now measuring 2.3 x 1.3 cm, and Rml consolidation measuring 2.9 x 1.4 cm. PET/CT on 5/10/24 with FDG avidity of the RUL lesion SUV max 7.6, and no other FDG avidity. He met Dr. Momin on 7/12/24, who obtained updated CT w/o contrast of the chest on 8/23/24 with growth of the RULlesion now measuring 1.9 x 2.8 x 3.1 cm. RUL wedge resection on 9/10/24 with invasive, moderately differentiated, keratinizing squamous cell ca of the RUL, negative lymph nodes 7, 8, 11RS, 10, 12R, 11R, 4R, and 9. Greatest dimension of tumor 4.0 cm, final staging pT2a pN0. PD-L1 60%, no mutations identified. Of note, he has had multiple ER visits for syncope  "and near-syncope, most recently on 10/18/24 with CXR concerning for medial bibasilar atelectasis vs consolidation.      PAST MEDICAL HISTORY  CAD  HTN  HLD  DMII  PAD  CVA (10/2023) w/residual tingling/numbness on the R entire side  COPD  CHF, EF 45-50% (7/2024)     SOCIAL HISTORY  Lives at home with his wife, and also some of his adult children (youngest son and daughter). 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 Wife is a nurse in a memory care unit.   Tob: quit 10/2023, started 7 yo, <1 ppd  EtOH: rarely  Illicits: none     FAMILY HISTORY  Mother - lung cancer, uterine ca  Father - prostate ca       HPI   He is here today with his wife. Still coughs, breathing is ok. Doesn't walk around that much.     Meds (Current):    Current Outpatient Medications:     acetaminophen (Tylenol) 325 mg tablet, Take 2 tablets (650 mg) by mouth every 4 hours if needed for mild pain (1 - 3). mild pain, Disp: , Rfl:     amLODIPine (Norvasc) 5 mg tablet, Take 1 tablet (5 mg) by mouth once daily., Disp: 90 tablet, Rfl: 3    aspirin 81 mg chewable tablet, Chew 1 tablet (81 mg) once daily., Disp: , Rfl:     atorvastatin (Lipitor) 40 mg tablet, Take 1 tablet (40 mg) by mouth once daily at bedtime., Disp: 90 tablet, Rfl: 3    BD Ultra-Fine Lin Pen Needle 32 gauge x 5/32\" needle, use 1 each 2 times daily, Disp: , Rfl:     carvedilol (Coreg) 25 mg tablet, TAKE 1 TABLET TWICE DAILY, Disp: 180 tablet, Rfl: 3    cholecalciferol (Vitamin D-3) 50,000 unit capsule, Take 1 capsule (50,000 Units) by mouth 1 (one) time per week., Disp: , Rfl:     clopidogrel (Plavix) 75 mg tablet, Take 1 tablet (75 mg) by mouth once daily. RESUME PLAVIX ON LATISHA 9/15/2024, Disp: 90 tablet, Rfl: 4    Contour Next Gen Meter misc, Dispense one meter that insurance will cover., Disp: , Rfl:     cyanocobalamin, vitamin B-12, (VITAMIN B-12 ORAL), Take 1 tablet by mouth once daily., Disp: , Rfl:     fish oil " "concentrate (Omega-3) 120-180 mg capsule, Take 1 capsule (1 g) by mouth once daily., Disp: , Rfl:     nitroglycerin (Nitrostat) 0.4 mg SL tablet, Place 1 tablet (0.4 mg) under the tongue every 5 minutes if needed for chest pain (up to 3 doses)., Disp: , Rfl:     NovoLOG Mix 70-30FlexPen U-100 100 unit/mL (70-30) injection, Take 15 units with breakfast and 10 units with dinner, Disp: , Rfl:     oxyCODONE (Roxicodone) 5 mg immediate release tablet, Take 1 tablet (5 mg) by mouth every 6 hours if needed (pain). severe pain 7-10 (Patient not taking: Reported on 11/14/2024), Disp: , Rfl:     Review of Systems   All other systems reviewed and are negative.       Objective   BSA: 2.18 meters squared  Wt Readings from Last 5 Encounters:   01/27/25 99 kg (218 lb 4.8 oz)   01/08/25 99.3 kg (219 lb)   11/14/24 98.9 kg (218 lb 1.6 oz)   10/21/24 96.9 kg (213 lb 11.2 oz)   10/18/24 100 kg (221 lb)     /67 (BP Location: Left arm, Patient Position: Sitting)   Pulse 67   Temp 35.6 °C (96.1 °F) (Temporal)   Resp 14   Wt 99 kg (218 lb 4.8 oz)   SpO2 93%   BMI 33.19 kg/m²     ECOG Score: 1- Restricted in physically strenuous activity.  Carries out light duty.      Physical Exam     Results:  Labs:  Lab Results   Component Value Date    WBC 11.5 (H) 10/18/2024    HGB 11.3 (L) 10/18/2024    HCT 34.4 (L) 10/18/2024    MCV 89 10/18/2024     10/18/2024      Lab Results   Component Value Date    NEUTROABS 7.64 10/18/2024      Lab Results   Component Value Date    GLUCOSE 130 (H) 11/13/2024    CALCIUM 8.0 (L) 10/18/2024     10/18/2024    K 4.3 10/18/2024    CO2 23 10/18/2024     10/18/2024    BUN 21 10/18/2024    CREATININE 1.79 (H) 10/18/2024    MG 1.60 10/18/2024     Lab Results   Component Value Date    ALT 10 10/18/2024    AST 11 10/18/2024    ALKPHOS 91 10/18/2024    BILITOT 0.3 10/18/2024    BILIDIR 0.1 08/21/2020      No results found for: \"ACTH\", \"CORTISOL\", \"TSH\", \"FREET4\"    Imaging:  I have personally " reviewed the below imaging and concur with the reported findings unless otherwise stated:      Brain MRI 12/13/24  IMPRESSION  * There is no evidence of mass, cerebral infarction or hemorrhage.  *Abnormal signal within the basal ganglia is nonspecific but  unchanged compared to the previous exam. *No evidence of metastasis.          === Results for orders placed during the hospital encounter of 12/13/24 ===    MR brain w and wo IV contrast [[id number]] 12/13/2024    Status: Normal      Assessment/Plan      Amandeep Lara is a 69 y.o. male here for follow up of NSCLC squamous cell ca.    # NSCLC squamous cell ca  - pT2a pN0 Mx, pending brain MRI  - s/p RUL wedge resection 9/10/24  - PD-L1 60%, no mutations identified  - discussed with patient that given size of tumor and wedge resection, would recommend adjuvant chemotherapy followed by a year of adjuvant immunotherapy  - discussed benefit in reducing recurrence and increased overall survival  - discussed potential side effects broadly, as he has concerns about side effects particularly given his multiple medical problems  - discussed that I also have concerns not only because of his multiple comorbidities, but also his poor performance status   - brain Mri was delayed, and discussed that would not recommend starting adjuvant therapy after more than 3 months post resection  - he is in agreement and will continue to follow in surveillance with thoracic surgery  - RTC CAMILO Serrano MD  [facility]

## 2025-02-08 DIAGNOSIS — Z79.4 TYPE 2 DIABETES MELLITUS WITH OTHER SPECIFIED COMPLICATION, WITH LONG-TERM CURRENT USE OF INSULIN (HCC): ICD-10-CM

## 2025-02-08 DIAGNOSIS — E11.69 TYPE 2 DIABETES MELLITUS WITH OTHER SPECIFIED COMPLICATION, WITH LONG-TERM CURRENT USE OF INSULIN (HCC): ICD-10-CM

## 2025-02-11 ENCOUNTER — APPOINTMENT (OUTPATIENT)
Dept: CARDIOLOGY | Facility: CLINIC | Age: 70
End: 2025-02-11
Payer: COMMERCIAL

## 2025-02-11 ENCOUNTER — HOSPITAL ENCOUNTER (OUTPATIENT)
Dept: CARDIOLOGY | Facility: HOSPITAL | Age: 70
Discharge: HOME | End: 2025-02-11
Payer: COMMERCIAL

## 2025-02-11 VITALS
BODY MASS INDEX: 33.34 KG/M2 | SYSTOLIC BLOOD PRESSURE: 128 MMHG | HEART RATE: 62 BPM | WEIGHT: 220 LBS | HEIGHT: 68 IN | DIASTOLIC BLOOD PRESSURE: 60 MMHG

## 2025-02-11 DIAGNOSIS — Z71.2 ENCOUNTER TO DISCUSS TEST RESULTS: ICD-10-CM

## 2025-02-11 DIAGNOSIS — I10 ESSENTIAL HYPERTENSION: ICD-10-CM

## 2025-02-11 DIAGNOSIS — R55 NEAR SYNCOPE: ICD-10-CM

## 2025-02-11 DIAGNOSIS — R55 SYNCOPE AND COLLAPSE: ICD-10-CM

## 2025-02-11 DIAGNOSIS — E78.2 MIXED HYPERLIPIDEMIA: ICD-10-CM

## 2025-02-11 DIAGNOSIS — E66.811 CLASS 1 OBESITY WITH BODY MASS INDEX (BMI) OF 33.0 TO 33.9 IN ADULT, UNSPECIFIED OBESITY TYPE, UNSPECIFIED WHETHER SERIOUS COMORBIDITY PRESENT: ICD-10-CM

## 2025-02-11 DIAGNOSIS — Z71.89 ENCOUNTER FOR MEDICATION REVIEW AND COUNSELING: ICD-10-CM

## 2025-02-11 DIAGNOSIS — Z87.891 FORMER SMOKER: ICD-10-CM

## 2025-02-11 DIAGNOSIS — I63.9 CEREBROVASCULAR ACCIDENT (CVA), UNSPECIFIED MECHANISM (MULTI): ICD-10-CM

## 2025-02-11 DIAGNOSIS — Z95.818 PRESENCE OF OTHER CARDIAC IMPLANTS AND GRAFTS: Primary | ICD-10-CM

## 2025-02-11 PROBLEM — E66.9 OBESITY: Status: RESOLVED | Noted: 2024-02-01 | Resolved: 2025-02-11

## 2025-02-11 PROCEDURE — 1036F TOBACCO NON-USER: CPT | Performed by: INTERNAL MEDICINE

## 2025-02-11 PROCEDURE — 93000 ELECTROCARDIOGRAM COMPLETE: CPT | Mod: DISTINCT PROCEDURAL SERVICE | Performed by: INTERNAL MEDICINE

## 2025-02-11 PROCEDURE — 1157F ADVNC CARE PLAN IN RCRD: CPT | Performed by: INTERNAL MEDICINE

## 2025-02-11 PROCEDURE — 3074F SYST BP LT 130 MM HG: CPT | Performed by: INTERNAL MEDICINE

## 2025-02-11 PROCEDURE — 3008F BODY MASS INDEX DOCD: CPT | Performed by: INTERNAL MEDICINE

## 2025-02-11 PROCEDURE — 93291 INTERROG DEV EVAL SCRMS IP: CPT

## 2025-02-11 PROCEDURE — 99214 OFFICE O/P EST MOD 30 MIN: CPT | Performed by: INTERNAL MEDICINE

## 2025-02-11 PROCEDURE — 1159F MED LIST DOCD IN RCRD: CPT | Performed by: INTERNAL MEDICINE

## 2025-02-11 PROCEDURE — 3078F DIAST BP <80 MM HG: CPT | Performed by: INTERNAL MEDICINE

## 2025-02-11 RX ORDER — METFORMIN HYDROCHLORIDE 1000 MG/1
1 TABLET ORAL
COMMUNITY
Start: 2025-02-10

## 2025-02-11 RX ORDER — CLOPIDOGREL BISULFATE 75 MG/1
75 TABLET ORAL DAILY
Qty: 90 TABLET | Refills: 3 | Status: SHIPPED | OUTPATIENT
Start: 2025-02-11 | End: 2026-02-11

## 2025-02-11 RX ORDER — AMLODIPINE BESYLATE 5 MG/1
5 TABLET ORAL DAILY
Qty: 90 TABLET | Refills: 3 | Status: SHIPPED | OUTPATIENT
Start: 2025-02-11 | End: 2026-02-11

## 2025-02-11 RX ORDER — ATORVASTATIN CALCIUM 40 MG/1
40 TABLET, FILM COATED ORAL NIGHTLY
Qty: 90 TABLET | Refills: 3 | Status: SHIPPED | OUTPATIENT
Start: 2025-02-11 | End: 2026-02-11

## 2025-02-11 RX ORDER — CARVEDILOL 25 MG/1
25 TABLET ORAL 2 TIMES DAILY
Qty: 180 TABLET | Refills: 3 | Status: SHIPPED | OUTPATIENT
Start: 2025-02-11 | End: 2026-02-11

## 2025-02-11 ASSESSMENT — ENCOUNTER SYMPTOMS
DYSPNEA ON EXERTION: 0
FEVER: 0
CHILLS: 0
PALPITATIONS: 0

## 2025-02-11 NOTE — PROGRESS NOTES
"Chief Complaint:   Follow-up (Patient is present for 4 month follow up with device check /)     History Of Present Illness:    Amandeep Lara is a 69 y.o. male presenting with follow-up.  He is accompanied by his wife.    Patient denies any arrhythmia symptoms of palpitation, lightheadedness, near syncope, or syncope.  He does not even notice his loop recorder.      Last Recorded Vitals:  Vitals:    02/11/25 1244   BP: 128/60   BP Location: Right arm   Patient Position: Sitting   Pulse: 62   Weight: 99.8 kg (220 lb)   Height: 1.727 m (5' 8\")       Past Medical History:  See list    Past Surgical History:  See list    Social History:  He reports that he quit smoking about 16 months ago. His smoking use included cigarettes. He has never been exposed to tobacco smoke. He has never used smokeless tobacco. He reports current alcohol use. He reports that he does not use drugs.    Family History:  Family History   Problem Relation Name Age of Onset    Other (malignant neoplasm of urinary bladder) Mother      Other (arteriosclerotic cardiovascular disease) Father      Other (malignant neoplasm of prostate) Father      Other (arteriosclerotic cardiovascular disease) Brother          Allergies:  Ether, Statins-hmg-coa reductase inhibitors, Sulfa (sulfonamide antibiotics), and Sulfamethoxazole-trimethoprim    Outpatient Medications:  Current Outpatient Medications   Medication Instructions    amLODIPine (NORVASC) 5 mg, oral, Daily    aspirin 81 mg chewable tablet 1 tablet, Daily    atorvastatin (LIPITOR) 40 mg, oral, Nightly    BD Ultra-Fine Lin Pen Needle 32 gauge x 5/32\" needle use 1 each 2 times daily    carvedilol (COREG) 25 mg, oral, 2 times daily    cholecalciferol (VITAMIN D-3) 50,000 Units, Once Weekly    clopidogrel (PLAVIX) 75 mg, oral, Daily, RESUME PLAVIX ON LATISHA 9/15/2024    Contour Next Gen Meter misc Dispense one meter that insurance will cover.    cyanocobalamin, vitamin B-12, (VITAMIN B-12 ORAL) 1 tablet, " Daily    fish oil concentrate (Omega-3) 120-180 mg capsule 1 capsule, Daily    metFORMIN (Glucophage) 1,000 mg tablet 1 tablet, 2 times daily (morning and late afternoon)    nitroglycerin (Nitrostat) 0.4 mg SL tablet 1 tablet, Every 5 min PRN    NovoLOG Mix 70-30FlexPen U-100 100 unit/mL (70-30) injection Take 15 units with breakfast and 10 units with dinner    oxyCODONE (ROXICODONE) 5 mg, oral, Every 6 hours PRN, severe pain 7-10   Review of Systems   Constitutional: Negative for chills, fever and malaise/fatigue.   Cardiovascular:  Negative for chest pain, dyspnea on exertion and palpitations.   All other systems reviewed and are negative.        Physical Exam:  Constitutional:       Appearance: Healthy appearance.   HENT:      Nose: Nose normal.   Pulmonary:      Breath sounds: Normal breath sounds. No wheezing.   Cardiovascular:      PMI at left midclavicular line. Normal rate. Regular rhythm. S1 with normal intensity. S2 with normal intensity.       Murmurs: There is no murmur.      No gallop.    Pulses:     Intact distal pulses.   Edema:     Peripheral edema absent.   Abdominal:      Palpations: Abdomen is soft.   Musculoskeletal: Normal range of motion.      Cervical back: Neck supple. Skin:     General: Skin is dry.   Neurological:      General: No focal deficit present.      Mental Status: Oriented to person, place and time.            Last Labs:  CBC -  Lab Results   Component Value Date    WBC 11.5 (H) 10/18/2024    HGB 11.3 (L) 10/18/2024    HCT 34.4 (L) 10/18/2024    MCV 89 10/18/2024     10/18/2024       CMP -  Lab Results   Component Value Date    CALCIUM 8.0 (L) 10/18/2024    PHOS 3.5 05/26/2021    PROT 5.7 (L) 10/18/2024    ALBUMIN 3.1 (L) 10/18/2024    AST 11 10/18/2024    ALT 10 10/18/2024    ALKPHOS 91 10/18/2024    BILITOT 0.3 10/18/2024       LIPID PANEL -   Lab Results   Component Value Date    CHOL 112 11/06/2024    TRIG 99 11/06/2024    HDL 47.9 11/06/2024    CHHDL 2.3 11/06/2024     LDLF 127 (H) 11/02/2022    VLDL 20 11/06/2024    NHDL 64 11/06/2024       RENAL FUNCTION PANEL -   Lab Results   Component Value Date    GLUCOSE 130 (H) 11/13/2024     10/18/2024    K 4.3 10/18/2024     10/18/2024    CO2 23 10/18/2024    ANIONGAP 12 10/18/2024    BUN 21 10/18/2024    CREATININE 1.79 (H) 10/18/2024    GFRMALE 62 07/13/2023    CALCIUM 8.0 (L) 10/18/2024    PHOS 3.5 05/26/2021    ALBUMIN 3.1 (L) 10/18/2024        Lab Results   Component Value Date    BNP 83 10/18/2024    HGBA1C 7.7 (H) 11/06/2024       Last Cardiology Tests:  ECG:  ECG 12 lead 10/18/2024    Today.  Normal sinus rhythm.  Normal axis.  Corrected QT interval 409 ms.  Right bundle branch block    Device check today.  Medtronic L NQ22 ordered.  Battery okay.  No events    Echo:  Transthoracic Echo (TTE) Complete 07/22/2024    Ejection Fractions:  EF   Date/Time Value Ref Range Status   07/22/2024 01:48 PM 48 %        Stress Test:  Nuclear Stress Test 07/11/2024        Lab review: I have personally reviewed the laboratory result(s) see above    Assessment/Plan   Diagnoses and all orders for this visit:  Presence of other cardiac implants and grafts  Former smoker  Class 1 obesity with body mass index (BMI) of 33.0 to 33.9 in adult, unspecified obesity type, unspecified whether serious comorbidity present  Near syncope  Syncope and collapse  Encounter to discuss test results  Encounter for medication review and counseling  Essential hypertension  Mixed hyperlipidemia  Cerebrovascular accident (CVA), unspecified mechanism (Multi)        Roxana Palacios LPN    Cryptogenic CVA and recurrent syncope.  Negative evaluation in hospital.    Status post loop recorder.  Tronic L and Q22 loop recorder.  Normal device function.  Reviewed device checks and follow-up.  Ordered device checks.  Plan for monthly remote monitor checks and  6-month device clinic checks.  1 annual EP office visit with me.  Other medical issues of coronary artery  disease, hyperlipidemia, hypertension, lung neoplasm, and overweight noted.  American heart association recommendations for exercise and diet reviewed    Counseling greater than 50% of visit performed.  Patient, wife, and I discussed arrhythmia, cryptogenic CVA, loop recorder, and of care for device follow-up, indications for and if refills needed medications, treatment options, blood pressure, vasovagal response in office in the past, risk, benefits, imponderables of therapy.  Behavior modifications and American Heart Association recommendations reinforced.  All questions answered.  Patient and wife appreciative care

## 2025-02-11 NOTE — PATIENT INSTRUCTIONS
Use your compression stockings, and increase your non- caffeinated fluid intake as discussed.  Follow up in 6 months with Dr. Quinones, and 1 year with Dr. Ramirez   Remote loop recorder checks will occur monthly.  In clinic loop recorder checks are due at 6 and 12 months  Continue same medications and treatments.   Patient educated on proper medication use.   Patient educated on risk factor modification.   Please bring any lab results from other providers / physicians to your next appointment.     Please bring all medicines, vitamins, and herbal supplements with you when you come to the office.     Prescriptions will not be filled unless you are compliant with your follow up appointments or have a follow up appointment scheduled as per instruction of your physician. Refills should be requested at the time of your visit.  IDINORA LPN, AM SCRIBING FOR AND IN THE PRESENCE OF DR. CLEM RAMIREZ MD, FACC, FACP, FHRS

## 2025-02-21 DIAGNOSIS — Z79.4 TYPE 2 DIABETES MELLITUS WITH OTHER SPECIFIED COMPLICATION, WITH LONG-TERM CURRENT USE OF INSULIN (HCC): ICD-10-CM

## 2025-02-21 DIAGNOSIS — E11.69 TYPE 2 DIABETES MELLITUS WITH OTHER SPECIFIED COMPLICATION, WITH LONG-TERM CURRENT USE OF INSULIN (HCC): ICD-10-CM

## 2025-02-21 RX ORDER — PEN NEEDLE, DIABETIC 32 GX 1/6"
1 NEEDLE, DISPOSABLE MISCELLANEOUS 2 TIMES DAILY
Qty: 100 EACH | Refills: 3 | Status: SHIPPED | OUTPATIENT
Start: 2025-02-21

## 2025-03-17 ENCOUNTER — APPOINTMENT (OUTPATIENT)
Dept: CARDIOLOGY | Facility: CLINIC | Age: 70
End: 2025-03-17
Payer: COMMERCIAL

## 2025-03-18 ENCOUNTER — TELEPHONE (OUTPATIENT)
Dept: VASCULAR SURGERY | Facility: HOSPITAL | Age: 70
End: 2025-03-18
Payer: COMMERCIAL

## 2025-03-18 NOTE — TELEPHONE ENCOUNTER
I have attempted to contact  Mr. Lara . There is no answer at the following phone number  [phone]   . I  am unable to leave  a  voice mail message.     I have  called  to reschedule his 5/15/2025 appointment  as Dr. Owens will be out of the office on 5/15/2025    Radha Marshall RN BSN

## 2025-03-21 ENCOUNTER — HOSPITAL ENCOUNTER (OUTPATIENT)
Dept: RADIOLOGY | Facility: HOSPITAL | Age: 70
Discharge: HOME | End: 2025-03-21
Payer: COMMERCIAL

## 2025-03-21 ENCOUNTER — HOSPITAL ENCOUNTER (OUTPATIENT)
Dept: CARDIOLOGY | Facility: HOSPITAL | Age: 70
Discharge: HOME | End: 2025-03-21
Payer: COMMERCIAL

## 2025-03-21 ENCOUNTER — OFFICE VISIT (OUTPATIENT)
Dept: SURGERY | Facility: CLINIC | Age: 70
End: 2025-03-21
Payer: COMMERCIAL

## 2025-03-21 VITALS
TEMPERATURE: 96.6 F | BODY MASS INDEX: 33.95 KG/M2 | WEIGHT: 224 LBS | HEART RATE: 67 BPM | DIASTOLIC BLOOD PRESSURE: 90 MMHG | SYSTOLIC BLOOD PRESSURE: 186 MMHG | OXYGEN SATURATION: 98 % | HEIGHT: 68 IN

## 2025-03-21 DIAGNOSIS — C34.11 MALIGNANT NEOPLASM OF UPPER LOBE OF RIGHT LUNG (MULTI): ICD-10-CM

## 2025-03-21 DIAGNOSIS — C34.11 MALIGNANT NEOPLASM OF UPPER LOBE OF RIGHT LUNG (MULTI): Primary | ICD-10-CM

## 2025-03-21 DIAGNOSIS — R55 NEAR SYNCOPE: ICD-10-CM

## 2025-03-21 DIAGNOSIS — Z95.818 PRESENCE OF OTHER CARDIAC IMPLANTS AND GRAFTS: ICD-10-CM

## 2025-03-21 LAB — BODY SURFACE AREA: 2.21 M2

## 2025-03-21 PROCEDURE — 71250 CT THORAX DX C-: CPT

## 2025-03-21 PROCEDURE — 3080F DIAST BP >= 90 MM HG: CPT | Performed by: STUDENT IN AN ORGANIZED HEALTH CARE EDUCATION/TRAINING PROGRAM

## 2025-03-21 PROCEDURE — 1159F MED LIST DOCD IN RCRD: CPT | Performed by: STUDENT IN AN ORGANIZED HEALTH CARE EDUCATION/TRAINING PROGRAM

## 2025-03-21 PROCEDURE — 3008F BODY MASS INDEX DOCD: CPT | Performed by: STUDENT IN AN ORGANIZED HEALTH CARE EDUCATION/TRAINING PROGRAM

## 2025-03-21 PROCEDURE — 1157F ADVNC CARE PLAN IN RCRD: CPT | Performed by: STUDENT IN AN ORGANIZED HEALTH CARE EDUCATION/TRAINING PROGRAM

## 2025-03-21 PROCEDURE — 3077F SYST BP >= 140 MM HG: CPT | Performed by: STUDENT IN AN ORGANIZED HEALTH CARE EDUCATION/TRAINING PROGRAM

## 2025-03-21 PROCEDURE — 1160F RVW MEDS BY RX/DR IN RCRD: CPT | Performed by: STUDENT IN AN ORGANIZED HEALTH CARE EDUCATION/TRAINING PROGRAM

## 2025-03-21 PROCEDURE — 93298 REM INTERROG DEV EVAL SCRMS: CPT

## 2025-03-21 PROCEDURE — 99214 OFFICE O/P EST MOD 30 MIN: CPT | Mod: 25 | Performed by: STUDENT IN AN ORGANIZED HEALTH CARE EDUCATION/TRAINING PROGRAM

## 2025-03-21 PROCEDURE — 1036F TOBACCO NON-USER: CPT | Performed by: STUDENT IN AN ORGANIZED HEALTH CARE EDUCATION/TRAINING PROGRAM

## 2025-03-21 PROCEDURE — 99214 OFFICE O/P EST MOD 30 MIN: CPT | Performed by: STUDENT IN AN ORGANIZED HEALTH CARE EDUCATION/TRAINING PROGRAM

## 2025-03-21 PROCEDURE — G2211 COMPLEX E/M VISIT ADD ON: HCPCS | Performed by: STUDENT IN AN ORGANIZED HEALTH CARE EDUCATION/TRAINING PROGRAM

## 2025-03-21 ASSESSMENT — PATIENT HEALTH QUESTIONNAIRE - PHQ9
SUM OF ALL RESPONSES TO PHQ9 QUESTIONS 1 AND 2: 0
2. FEELING DOWN, DEPRESSED OR HOPELESS: NOT AT ALL
1. LITTLE INTEREST OR PLEASURE IN DOING THINGS: NOT AT ALL

## 2025-03-21 NOTE — PROGRESS NOTES
Subjective   Amandeep Lara  is a 69 y.o. 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 He was seen by Dr Serrano from oncology postop. He decided to proceed with surveillance. He is doing well. He denied SOB or GONZALEZ. He is smoking free for over a year now.     There is no significant change in patient's past medical history, past surgical history, social history, family history, or review of systems since last visit.     Objective   Visit Vitals  BP (!) 186/90   Pulse 67   Temp 35.9 °C (96.6 °F)       Physical Exam  Constitutional:       General: He is not in acute distress.     Appearance: Normal appearance. He is not ill-appearing, toxic-appearing or diaphoretic.   HENT:      Head: Normocephalic and atraumatic.      Mouth/Throat:      Mouth: Mucous membranes are moist.      Pharynx: Oropharynx is clear.   Eyes:      General: No scleral icterus.     Extraocular Movements: Extraocular movements intact.      Conjunctiva/sclera: Conjunctivae normal.      Pupils: Pupils are equal, round, and reactive to light.   Cardiovascular:      Rate and Rhythm: Normal rate and regular rhythm.      Pulses: Normal pulses.      Heart sounds: Normal heart sounds.   Pulmonary:      Effort: Pulmonary effort is normal. No respiratory distress.      Breath sounds: Normal breath sounds. No stridor. No wheezing, rhonchi or rales.      Comments: Incisions c/d/i  Chest:      Chest wall: No tenderness.   Abdominal:      General: There is no distension.      Palpations: Abdomen is soft.      Tenderness: There is no abdominal tenderness. There is no guarding or rebound.   Musculoskeletal:         General: No swelling or tenderness. Normal range of motion.      Cervical back: Normal range of motion and neck supple. No rigidity or tenderness.      Right lower leg: No edema.      Left lower leg: No edema.   Skin:     General: Skin is  warm and dry.      Coloration: Skin is not jaundiced.   Neurological:      General: No focal deficit present.      Mental Status: He is alert and oriented to person, place, and time. Mental status is at baseline.   Psychiatric:         Mood and Affect: Mood normal.         Behavior: Behavior normal.           Diagnostic Review  I reviewed his CT scan from 3/21/25, 9/20/24 and 8/23/24.  It showed post-surgical change of RUL lobectomy. Previous seen right pleural effusion has resolved.  --no mediastinal lymphadenopathy  I reviewed the operative note. It showed right upper lobe nodule frozen section confirm squamous cell carcinoma. No pleural implants or invasion noted. Thickened right upper lobe airway.   I reviewed his pathology report. The RUL SCC is pT2aN0 with VPI. All margin negative. PDL1 60%. NGS showed no targetable mutations.       Assessment/Plan   Amandeep Lara  is doing well. We reviewed imaging today. There is no sign of recurrence or new cancer. I recommend follow up in 6 month with CT chest for lung cancer surveillance per NCCN guideline.     Siena Momin MD  Thoracic Surgeon  [facility]   [facility]  [facility]  Office phone: [phone]  Fax: [phone]  Pager: [phone]    Amount of time spent:  -Prep time on date of patient encounter: 10 min  -Time spend with patient/family/caregiver: 10 min  -Additional time spent on patient care activities: 0 min  -Documentation time in medical record: 10 min  -Other time spent on date of patient encounter: 0 min  Total time: 30 min

## 2025-03-25 ENCOUNTER — TELEPHONE (OUTPATIENT)
Dept: VASCULAR SURGERY | Facility: HOSPITAL | Age: 70
End: 2025-03-25
Payer: COMMERCIAL

## 2025-03-25 NOTE — TELEPHONE ENCOUNTER
I have attempted to contact   Mr. Lara . There is no answer at the following phone number   [phone]      . I am unable to leave a voice mail message.     Dr. Owens will be out of the office 5/15/2025  I have  called  to reschedule  the appointment.   Radha Marshall RN BSN

## 2025-04-01 ENCOUNTER — TELEPHONE (OUTPATIENT)
Dept: VASCULAR SURGERY | Facility: HOSPITAL | Age: 70
End: 2025-04-01
Payer: COMMERCIAL

## 2025-04-01 NOTE — TELEPHONE ENCOUNTER
I have attempted to contact  Marco   . There is no answer at the following phone number   [phone]   . I am unable to leave a voice mail message  as his mail box is full   Radha Marshall RN BSN

## 2025-04-17 ENCOUNTER — OFFICE VISIT (OUTPATIENT)
Age: 70
End: 2025-04-17
Payer: COMMERCIAL

## 2025-04-17 VITALS
HEART RATE: 64 BPM | WEIGHT: 223 LBS | HEIGHT: 69 IN | DIASTOLIC BLOOD PRESSURE: 68 MMHG | BODY MASS INDEX: 33.03 KG/M2 | SYSTOLIC BLOOD PRESSURE: 133 MMHG

## 2025-04-17 DIAGNOSIS — Z79.4 TYPE 2 DIABETES MELLITUS WITH OTHER SPECIFIED COMPLICATION, WITH LONG-TERM CURRENT USE OF INSULIN: Primary | ICD-10-CM

## 2025-04-17 DIAGNOSIS — E11.69 TYPE 2 DIABETES MELLITUS WITH OTHER SPECIFIED COMPLICATION, WITH LONG-TERM CURRENT USE OF INSULIN: Primary | ICD-10-CM

## 2025-04-17 LAB
CHP ED QC CHECK: NORMAL
GLUCOSE BLD-MCNC: 175 MG/DL
HBA1C MFR BLD: 9.1 %

## 2025-04-17 PROCEDURE — 2022F DILAT RTA XM EVC RTNOPTHY: CPT | Performed by: INTERNAL MEDICINE

## 2025-04-17 PROCEDURE — G8427 DOCREV CUR MEDS BY ELIG CLIN: HCPCS | Performed by: INTERNAL MEDICINE

## 2025-04-17 PROCEDURE — 3017F COLORECTAL CA SCREEN DOC REV: CPT | Performed by: INTERNAL MEDICINE

## 2025-04-17 PROCEDURE — 1123F ACP DISCUSS/DSCN MKR DOCD: CPT | Performed by: INTERNAL MEDICINE

## 2025-04-17 PROCEDURE — 1036F TOBACCO NON-USER: CPT | Performed by: INTERNAL MEDICINE

## 2025-04-17 PROCEDURE — 82962 GLUCOSE BLOOD TEST: CPT | Performed by: INTERNAL MEDICINE

## 2025-04-17 PROCEDURE — 83036 HEMOGLOBIN GLYCOSYLATED A1C: CPT | Performed by: INTERNAL MEDICINE

## 2025-04-17 PROCEDURE — 99213 OFFICE O/P EST LOW 20 MIN: CPT | Performed by: INTERNAL MEDICINE

## 2025-04-17 PROCEDURE — 3046F HEMOGLOBIN A1C LEVEL >9.0%: CPT | Performed by: INTERNAL MEDICINE

## 2025-04-17 PROCEDURE — G8417 CALC BMI ABV UP PARAM F/U: HCPCS | Performed by: INTERNAL MEDICINE

## 2025-04-17 NOTE — PROGRESS NOTES
Eyes:      General: No scleral icterus.        Right eye: No discharge.         Left eye: No discharge.      Extraocular Movements: Extraocular movements intact.      Conjunctiva/sclera: Conjunctivae normal.   Cardiovascular:      Rate and Rhythm: Normal rate.   Pulmonary:      Effort: Pulmonary effort is normal.   Musculoskeletal:         General: Normal range of motion.      Cervical back: Normal range of motion and neck supple.   Neurological:      General: No focal deficit present.      Mental Status: He is alert and oriented to person, place, and time.   Psychiatric:         Mood and Affect: Mood normal.         Behavior: Behavior normal.

## 2025-04-18 ASSESSMENT — ENCOUNTER SYMPTOMS: EYES NEGATIVE: 1

## 2025-04-25 ENCOUNTER — HOSPITAL ENCOUNTER (OUTPATIENT)
Dept: CARDIOLOGY | Facility: HOSPITAL | Age: 70
Discharge: HOME | End: 2025-04-25
Payer: COMMERCIAL

## 2025-04-25 DIAGNOSIS — Z95.818 PRESENCE OF OTHER CARDIAC IMPLANTS AND GRAFTS: ICD-10-CM

## 2025-04-25 DIAGNOSIS — R55 NEAR SYNCOPE: ICD-10-CM

## 2025-04-25 PROCEDURE — 93298 REM INTERROG DEV EVAL SCRMS: CPT

## 2025-05-09 ENCOUNTER — HOSPITAL ENCOUNTER (OUTPATIENT)
Dept: RADIOLOGY | Facility: HOSPITAL | Age: 70
End: 2025-05-09
Payer: COMMERCIAL

## 2025-05-15 ENCOUNTER — APPOINTMENT (OUTPATIENT)
Dept: VASCULAR SURGERY | Facility: CLINIC | Age: 70
End: 2025-05-15
Payer: COMMERCIAL

## 2025-05-29 ENCOUNTER — APPOINTMENT (OUTPATIENT)
Dept: VASCULAR SURGERY | Facility: CLINIC | Age: 70
End: 2025-05-29
Payer: COMMERCIAL

## 2025-05-30 ENCOUNTER — HOSPITAL ENCOUNTER (OUTPATIENT)
Dept: CARDIOLOGY | Facility: HOSPITAL | Age: 70
Discharge: HOME | End: 2025-05-30
Payer: COMMERCIAL

## 2025-05-30 DIAGNOSIS — Z95.818 PRESENCE OF OTHER CARDIAC IMPLANTS AND GRAFTS: ICD-10-CM

## 2025-05-30 DIAGNOSIS — R55 NEAR SYNCOPE: ICD-10-CM

## 2025-05-30 PROCEDURE — 93298 REM INTERROG DEV EVAL SCRMS: CPT | Performed by: INTERNAL MEDICINE

## 2025-05-30 PROCEDURE — 93298 REM INTERROG DEV EVAL SCRMS: CPT

## 2025-06-04 DIAGNOSIS — T82.7XXA: ICD-10-CM

## 2025-06-04 DIAGNOSIS — I73.9 PAD (PERIPHERAL ARTERY DISEASE): ICD-10-CM

## 2025-06-04 DIAGNOSIS — I73.9 CLAUDICATION IN PERIPHERAL VASCULAR DISEASE: ICD-10-CM

## 2025-06-12 ENCOUNTER — HOSPITAL ENCOUNTER (OUTPATIENT)
Dept: CARDIOLOGY | Facility: HOSPITAL | Age: 70
Discharge: HOME | End: 2025-06-12
Payer: COMMERCIAL

## 2025-06-12 ENCOUNTER — APPOINTMENT (OUTPATIENT)
Dept: CARDIOLOGY | Facility: HOSPITAL | Age: 70
End: 2025-06-12
Payer: COMMERCIAL

## 2025-06-12 DIAGNOSIS — I73.9 CLAUDICATION IN PERIPHERAL VASCULAR DISEASE: ICD-10-CM

## 2025-06-12 DIAGNOSIS — I73.9 PAD (PERIPHERAL ARTERY DISEASE): ICD-10-CM

## 2025-06-12 PROCEDURE — 93922 UPR/L XTREMITY ART 2 LEVELS: CPT | Performed by: SURGERY

## 2025-06-12 PROCEDURE — 93922 UPR/L XTREMITY ART 2 LEVELS: CPT

## 2025-06-25 ENCOUNTER — APPOINTMENT (OUTPATIENT)
Dept: CARDIOLOGY | Facility: HOSPITAL | Age: 70
End: 2025-06-25
Payer: MEDICARE

## 2025-06-25 ENCOUNTER — HOSPITAL ENCOUNTER (EMERGENCY)
Facility: HOSPITAL | Age: 70
Discharge: HOME | End: 2025-06-25
Payer: MEDICARE

## 2025-06-25 VITALS
DIASTOLIC BLOOD PRESSURE: 62 MMHG | HEART RATE: 66 BPM | OXYGEN SATURATION: 97 % | WEIGHT: 230 LBS | RESPIRATION RATE: 18 BRPM | HEIGHT: 68 IN | BODY MASS INDEX: 34.86 KG/M2 | SYSTOLIC BLOOD PRESSURE: 156 MMHG | TEMPERATURE: 97.3 F

## 2025-06-25 DIAGNOSIS — N17.9 AKI (ACUTE KIDNEY INJURY): ICD-10-CM

## 2025-06-25 DIAGNOSIS — R55 SYNCOPE, VASOVAGAL: Primary | ICD-10-CM

## 2025-06-25 LAB
ALBUMIN SERPL BCP-MCNC: 3.1 G/DL (ref 3.4–5)
ALP SERPL-CCNC: 63 U/L (ref 33–136)
ALT SERPL W P-5'-P-CCNC: 13 U/L (ref 10–52)
ANION GAP SERPL CALC-SCNC: 9 MMOL/L (ref 10–20)
AST SERPL W P-5'-P-CCNC: 10 U/L (ref 9–39)
ATRIAL RATE: 62 BPM
BASOPHILS # BLD AUTO: 0.08 X10*3/UL (ref 0–0.1)
BASOPHILS NFR BLD AUTO: 0.6 %
BILIRUB DIRECT SERPL-MCNC: 0.1 MG/DL (ref 0–0.3)
BILIRUB SERPL-MCNC: 0.4 MG/DL (ref 0–1.2)
BODY SURFACE AREA: 2.24 M2
BUN SERPL-MCNC: 30 MG/DL (ref 6–23)
CALCIUM SERPL-MCNC: 8.3 MG/DL (ref 8.6–10.3)
CARDIAC TROPONIN I PNL SERPL HS: 18 NG/L (ref 0–20)
CHLORIDE SERPL-SCNC: 106 MMOL/L (ref 98–107)
CO2 SERPL-SCNC: 24 MMOL/L (ref 21–32)
CREAT SERPL-MCNC: 2.35 MG/DL (ref 0.5–1.3)
EGFRCR SERPLBLD CKD-EPI 2021: 29 ML/MIN/1.73M*2
EOSINOPHIL # BLD AUTO: 0.55 X10*3/UL (ref 0–0.7)
EOSINOPHIL NFR BLD AUTO: 4.5 %
ERYTHROCYTE [DISTWIDTH] IN BLOOD BY AUTOMATED COUNT: 12.9 % (ref 11.5–14.5)
GLUCOSE SERPL-MCNC: 150 MG/DL (ref 74–99)
HCT VFR BLD AUTO: 33.9 % (ref 41–52)
HGB BLD-MCNC: 11.4 G/DL (ref 13.5–17.5)
IMM GRANULOCYTES # BLD AUTO: 0.05 X10*3/UL (ref 0–0.7)
IMM GRANULOCYTES NFR BLD AUTO: 0.4 % (ref 0–0.9)
INR PPP: 0.9 (ref 0.9–1.1)
LYMPHOCYTES # BLD AUTO: 2.56 X10*3/UL (ref 1.2–4.8)
LYMPHOCYTES NFR BLD AUTO: 20.8 %
MAGNESIUM SERPL-MCNC: 2.04 MG/DL (ref 1.6–2.4)
MCH RBC QN AUTO: 29.6 PG (ref 26–34)
MCHC RBC AUTO-ENTMCNC: 33.6 G/DL (ref 32–36)
MCV RBC AUTO: 88 FL (ref 80–100)
MONOCYTES # BLD AUTO: 1.11 X10*3/UL (ref 0.1–1)
MONOCYTES NFR BLD AUTO: 9 %
NEUTROPHILS # BLD AUTO: 7.97 X10*3/UL (ref 1.2–7.7)
NEUTROPHILS NFR BLD AUTO: 64.7 %
NRBC BLD-RTO: 0 /100 WBCS (ref 0–0)
P AXIS: 32 DEGREES
P OFFSET: 165 MS
P ONSET: 108 MS
PLATELET # BLD AUTO: 318 X10*3/UL (ref 150–450)
POTASSIUM SERPL-SCNC: 4.1 MMOL/L (ref 3.5–5.3)
PR INTERVAL: 224 MS
PROT SERPL-MCNC: 5.6 G/DL (ref 6.4–8.2)
PROTHROMBIN TIME: 10.2 SECONDS (ref 9.8–12.4)
Q ONSET: 220 MS
QRS COUNT: 10 BEATS
QRS DURATION: 152 MS
QT INTERVAL: 480 MS
QTC CALCULATION(BAZETT): 487 MS
QTC FREDERICIA: 485 MS
R AXIS: -8 DEGREES
RBC # BLD AUTO: 3.85 X10*6/UL (ref 4.5–5.9)
SODIUM SERPL-SCNC: 135 MMOL/L (ref 136–145)
T AXIS: 125 DEGREES
T OFFSET: 460 MS
VENTRICULAR RATE: 62 BPM
WBC # BLD AUTO: 12.3 X10*3/UL (ref 4.4–11.3)

## 2025-06-25 PROCEDURE — 36415 COLL VENOUS BLD VENIPUNCTURE: CPT | Performed by: NURSE PRACTITIONER

## 2025-06-25 PROCEDURE — 85610 PROTHROMBIN TIME: CPT | Performed by: NURSE PRACTITIONER

## 2025-06-25 PROCEDURE — 96360 HYDRATION IV INFUSION INIT: CPT

## 2025-06-25 PROCEDURE — 99284 EMERGENCY DEPT VISIT MOD MDM: CPT | Mod: 25

## 2025-06-25 PROCEDURE — 93291 INTERROG DEV EVAL SCRMS IP: CPT

## 2025-06-25 PROCEDURE — 93005 ELECTROCARDIOGRAM TRACING: CPT

## 2025-06-25 PROCEDURE — 93291 INTERROG DEV EVAL SCRMS IP: CPT | Performed by: INTERNAL MEDICINE

## 2025-06-25 PROCEDURE — 83735 ASSAY OF MAGNESIUM: CPT | Performed by: NURSE PRACTITIONER

## 2025-06-25 PROCEDURE — 82248 BILIRUBIN DIRECT: CPT | Performed by: NURSE PRACTITIONER

## 2025-06-25 PROCEDURE — 96361 HYDRATE IV INFUSION ADD-ON: CPT

## 2025-06-25 PROCEDURE — 85025 COMPLETE CBC W/AUTO DIFF WBC: CPT | Performed by: NURSE PRACTITIONER

## 2025-06-25 PROCEDURE — 80053 COMPREHEN METABOLIC PANEL: CPT | Performed by: NURSE PRACTITIONER

## 2025-06-25 PROCEDURE — 84484 ASSAY OF TROPONIN QUANT: CPT | Performed by: NURSE PRACTITIONER

## 2025-06-25 PROCEDURE — 2500000004 HC RX 250 GENERAL PHARMACY W/ HCPCS (ALT 636 FOR OP/ED): Performed by: NURSE PRACTITIONER

## 2025-06-25 RX ADMIN — SODIUM CHLORIDE 1000 ML: 0.9 INJECTION, SOLUTION INTRAVENOUS at 11:40

## 2025-06-25 ASSESSMENT — PAIN SCALES - GENERAL: PAINLEVEL_OUTOF10: 0 - NO PAIN

## 2025-06-25 ASSESSMENT — PAIN - FUNCTIONAL ASSESSMENT: PAIN_FUNCTIONAL_ASSESSMENT: 0-10

## 2025-06-25 NOTE — ED PROVIDER NOTES
HPI   Chief Complaint   Patient presents with    Syncope     Pt was having breakfast and got diaphoretic and gray and felt dizzy. Pt denies LOC.       70-year-old male presents emergency department, present with his sister, states that she took him out for breakfast this morning, they were sitting at the table when suddenly the patient got diaphoretic and was unresponsive, slumped in the chair.  EMS was called, when they arrived they checked his blood sugar and found it to be 130s.    On arrival he is awake and alert, has no complaints.  Patient tells me is multiple these episodes, they always do an extensive workup, keep him in the hospital, but do not find anything and discharge him.  States he has been worked up by cardiology, in fact has a loop recorder in.    Tells me he is feeling fine right now      History provided by:  Patient          Patient History   Medical History[1]  Surgical History[2]  Family History[3]  Social History[4]    Physical Exam   ED Triage Vitals [06/25/25 1115]   Temperature Heart Rate Respirations BP   36.3 °C (97.3 °F) 64 18 130/70      Pulse Ox Temp Source Heart Rate Source Patient Position   98 % Temporal Monitor Lying      BP Location FiO2 (%)     Left arm --       Physical Exam  Constitutional: Vitals noted, no distress. Afebrile.   Cardiovascular: Regular, rate, rhythm, no murmur.   Pulmonary: Lungs clear bilaterally with good aeration. No adventitious breath sounds.   Gastrointestinal: Soft, nonsurgical. Nontender. No peritoneal signs. Normoactive bowel sounds.   Musculoskeletal: No peripheral edema. Negative Homans bilaterally, no cords.   Skin: No rash.   Neuro: No focal neurologic deficits, NIH score of 0.      ED Course & MDM   Diagnoses as of 06/25/25 1408   Syncope, vasovagal   FENG (acute kidney injury)     Labs Reviewed   CBC WITH AUTO DIFFERENTIAL - Abnormal       Result Value    WBC 12.3 (*)     nRBC 0.0      RBC 3.85 (*)     Hemoglobin 11.4 (*)     Hematocrit 33.9 (*)      MCV 88      MCH 29.6      MCHC 33.6      RDW 12.9      Platelets 318      Neutrophils % 64.7      Immature Granulocytes %, Automated 0.4      Lymphocytes % 20.8      Monocytes % 9.0      Eosinophils % 4.5      Basophils % 0.6      Neutrophils Absolute 7.97 (*)     Immature Granulocytes Absolute, Automated 0.05      Lymphocytes Absolute 2.56      Monocytes Absolute 1.11 (*)     Eosinophils Absolute 0.55      Basophils Absolute 0.08     COMPREHENSIVE METABOLIC PANEL - Abnormal    Glucose 150 (*)     Sodium 135 (*)     Potassium 4.1      Chloride 106      Bicarbonate 24      Anion Gap 9 (*)     Urea Nitrogen 30 (*)     Creatinine 2.35 (*)     eGFR 29 (*)     Calcium 8.3 (*)     Albumin 3.1 (*)     Alkaline Phosphatase 63      Total Protein 5.6 (*)     AST 10      Bilirubin, Total 0.4      ALT 13     MAGNESIUM - Normal    Magnesium 2.04     PROTIME-INR - Normal    Protime 10.2      INR 0.9     TROPONIN I, HIGH SENSITIVITY - Normal    Troponin I, High Sensitivity 18      Narrative:     Less than 99th percentile of normal range cutoff-  Female and children under 18 years old <14 ng/L; Male <21 ng/L: Negative  Repeat testing should be performed if clinically indicated.     Female and children under 18 years old 14-50 ng/L; Male 21-50 ng/L:  Consistent with possible cardiac damage and possible increased clinical   risk. Serial measurements may help to assess extent of myocardial damage.     >50 ng/L: Consistent with cardiac damage, increased clinical risk and  myocardial infarction. Serial measurements may help assess extent of   myocardial damage.      NOTE: Children less than 1 year old may have higher baseline troponin   levels and results should be interpreted in conjunction with the overall   clinical context.     NOTE: Troponin I testing is performed using a different   testing methodology at [facility] than at other   [facility]. Direct result comparisons should only   be made within the same  method.   BILIRUBIN, DIRECT - Normal    Bilirubin, Direct 0.1          Cardiac Device Check - Inpatient                       No data recorded     Donaldson Coma Scale Score: 15 (06/25/25 1116 : Fiordaliza Mccoy RN)                   Medical Decision Making    Discussed workup with the patient.    EKG at 1120 with ventricular to 62, interpreted me, shows a sinus rhythm with first-degree AV block, nonspecific intraventricular block, unremarkable ST and T wave patterns, no evidence of acute ischemia.  CBC with a white count of 12.3, hemoglobin 11.4, platelets 318.  INR 0.9.  Metabolic panel with glucose of 150, calcium 4.1, magnesium 2.04, creatinine 2.35, this is elevated from recent baseline.  Troponin 18.    I was able to interrogate the patient's loop recorder, unremarkable.    Patient was given a liter IV fluids, went back to discuss his workup, concerned about his acute kidney injury, and recommended hospitalization for further hydration and workup with the patient declined.  He is present with his sister, states he will drink plenty fluids and follow-up closely with his doctor.  Discussed my concerns for syncopal episode, again he reassures me that he is worked up for this multiple times and does not want to stay in the hospital.    I will discharge him home, again recommend close, 1 to 2-day follow-up with primary care, return with any worsening symptoms or additional concerns.    Procedure  Procedures       [1]   Past Medical History:  Diagnosis Date    Cataract     Cerebral vascular accident (Multi)     10/2023    Cervical disc disease     COPD (chronic obstructive pulmonary disease) (Multi)     Coronary artery disease     Diabetes mellitus (Multi)     Hyperlipidemia     Hypertension     Immune deficiency disorder (Multi)     Lung nodules     Personal history of other diseases of the circulatory system     History of hypertension    Personal history of other endocrine, nutritional and metabolic disease     History  of diabetes mellitus    Skin cancer     right index finger top of it was removed    Sleep apnea     sleep study done/unknown results    Stroke (Multi)     october 2023    Type 2 diabetes mellitus    [2]   Past Surgical History:  Procedure Laterality Date    CARDIAC ELECTROPHYSIOLOGY PROCEDURE Left 10/7/2024    Procedure: Loop Insertion;  Surgeon: Maryellen Hung MD;  Location: [facility];  Service: Electrophysiology;  Laterality: Left;  holding ASA and Plavix and Fish Oil 7 days,Metformin x24 hr    CARPAL TUNNEL RELEASE  04/28/2015    Neuroplasty Decompression Median Nerve At Carpal Tunnel    CATARACT EXTRACTION Right     01/2024    CT ANGIO NECK  06/03/2021    CT NECK ANGIO W AND WO IV CONTRAST 6/3/2021 [facility]    CT HEAD ANGIO W AND WO IV CONTRAST  06/03/2021    CT HEAD ANGIO W AND WO IV CONTRAST 6/3/2021 [facility]    LUNG LOBECTOMY Right 09/10/2024    Robotic RUL wedge to lobectomy    MR HEAD ANGIO WO IV CONTRAST  04/01/2021    MR HEAD ANGIO WO IV CONTRAST 4/1/2021 [facility]    MR HEAD ANGIO WO IV CONTRAST  04/01/2021    MR HEAD ANGIO WO IV CONTRAST    NECK SURGERY  12/27/2021    Neck Surgery    OTHER SURGICAL HISTORY  12/27/2021    Surgery    OTHER SURGICAL HISTORY  12/27/2021    Cardiac catheterization    OTHER SURGICAL HISTORY  12/27/2021    Finger amputation    OTHER SURGICAL HISTORY  12/27/2021    Colonoscopy    OTHER SURGICAL HISTORY  12/27/2021    Wrist surgery    OTHER SURGICAL HISTORY      Tailbone    ROTATOR CUFF REPAIR  12/27/2021    Rotator Cuff Repair    TONSILLECTOMY  04/28/2015    Tonsillectomy   [3]   Family History  Problem Relation Name Age of Onset    Other (malignant neoplasm of urinary bladder) Mother      Other (arteriosclerotic cardiovascular disease) Father      Other (malignant neoplasm of prostate) Father      Other (arteriosclerotic cardiovascular disease) Brother     [4]   Social History  Tobacco Use    Smoking status: Former     Current  packs/day: 0.00     Types: Cigarettes     Quit date: 10/2023     Years since quittin.7     Passive exposure: Never    Smokeless tobacco: Never   Vaping Use    Vaping status: Never Used   Substance Use Topics    Alcohol use: Yes     Comment: 2-3x a year    Drug use: Never        Balbina Syed, MALAIKA-CNP  [phone]

## 2025-07-03 ENCOUNTER — HOSPITAL ENCOUNTER (OUTPATIENT)
Dept: CARDIOLOGY | Facility: HOSPITAL | Age: 70
Discharge: HOME | End: 2025-07-03
Payer: COMMERCIAL

## 2025-07-03 DIAGNOSIS — Z95.818 PRESENCE OF OTHER CARDIAC IMPLANTS AND GRAFTS: ICD-10-CM

## 2025-07-03 DIAGNOSIS — R55 NEAR SYNCOPE: ICD-10-CM

## 2025-07-10 ENCOUNTER — APPOINTMENT (OUTPATIENT)
Dept: VASCULAR SURGERY | Facility: CLINIC | Age: 70
End: 2025-07-10
Payer: COMMERCIAL

## 2025-07-10 VITALS
DIASTOLIC BLOOD PRESSURE: 59 MMHG | SYSTOLIC BLOOD PRESSURE: 137 MMHG | HEIGHT: 68 IN | TEMPERATURE: 98 F | BODY MASS INDEX: 35.61 KG/M2 | WEIGHT: 235 LBS | HEART RATE: 70 BPM

## 2025-07-10 DIAGNOSIS — I73.9 PAD (PERIPHERAL ARTERY DISEASE): Primary | ICD-10-CM

## 2025-07-10 DIAGNOSIS — I73.9 PAD (PERIPHERAL ARTERY DISEASE): ICD-10-CM

## 2025-07-10 PROCEDURE — 3078F DIAST BP <80 MM HG: CPT | Performed by: SURGERY

## 2025-07-10 PROCEDURE — 1159F MED LIST DOCD IN RCRD: CPT | Performed by: SURGERY

## 2025-07-10 PROCEDURE — 3008F BODY MASS INDEX DOCD: CPT | Performed by: SURGERY

## 2025-07-10 PROCEDURE — 3075F SYST BP GE 130 - 139MM HG: CPT | Performed by: SURGERY

## 2025-07-10 PROCEDURE — 99213 OFFICE O/P EST LOW 20 MIN: CPT | Performed by: SURGERY

## 2025-07-10 ASSESSMENT — PATIENT HEALTH QUESTIONNAIRE - PHQ9
SUM OF ALL RESPONSES TO PHQ9 QUESTIONS 1 AND 2: 0
1. LITTLE INTEREST OR PLEASURE IN DOING THINGS: NOT AT ALL
2. FEELING DOWN, DEPRESSED OR HOPELESS: NOT AT ALL

## 2025-07-10 NOTE — PROGRESS NOTES
Vascular Surgery Clinic Note    CC: PAD    HPI:  Amandeep Lara is 70 y.o. male with history of iliac disease and PAD. I reviewed CARROL, which are slightly lower on the right, stable on the left. He has no rest pain. No claudication as he walks very slowly with a cane. He has chronic numbness/tingling in the right leg/foot as well as upper extremity due to prior stroke.    Medical History:   has a past medical history of Cataract, Cerebral vascular accident (Multi), Cervical disc disease, COPD (chronic obstructive pulmonary disease) (Multi), Coronary artery disease, Diabetes mellitus (Multi), Hyperlipidemia, Hypertension, Immune deficiency disorder (Multi), Lung nodules, Personal history of other diseases of the circulatory system, Personal history of other endocrine, nutritional and metabolic disease, Skin cancer, Sleep apnea, Stroke (Multi), and Type 2 diabetes mellitus.    He has no past medical history of Reactive airway disease ([facility]).    Meds:   Medications Ordered Prior to Encounter[1]     Allergies:   RX Allergies[2]    SH:    Social Drivers of Health     Tobacco Use: Medium Risk (7/10/2025)    Patient History     Smoking Tobacco Use: Former     Smokeless Tobacco Use: Never     Passive Exposure: Never   Alcohol Use: Not At Risk (3/20/2024)    AUDIT-C     Frequency of Alcohol Consumption: Monthly or less     Average Number of Drinks: 1 or 2     Frequency of Binge Drinking: Never   Financial Resource Strain: Low Risk  (3/20/2024)    Overall Financial Resource Strain (CARDIA)     Difficulty of Paying Living Expenses: Not very hard   Food Insecurity: Patient Declined (10/20/2023)    Hunger Vital Sign     Worried About Running Out of Food in the Last Year: Patient declined     Ran Out of Food in the Last Year: Patient declined   Transportation Needs: No Transportation Needs (10/9/2024)    OASIS : Transportation     Lack of Transportation (Medical): No     Lack of Transportation (Non-Medical): No      Patient Unable or Declines to Respond: No   Physical Activity: Inactive (10/20/2023)    Exercise Vital Sign     Days of Exercise per Week: 0 days     Minutes of Exercise per Session: 0 min   Stress: No Stress Concern Present (10/20/2023)    Citizen of [place] of Occupational [employer] - Occupational Stress Questionnaire     Feeling of Stress : Not at all   Social Connections: Feeling Socially Integrated (10/9/2024)    OASIS : Social Isolation     Frequency of experiencing loneliness or isolation: Never   Intimate Partner Violence: Not At Risk (10/20/2023)    Humiliation, Afraid, Rape, and Kick questionnaire     Fear of Current or Ex-Partner: No     Emotionally Abused: No     Physically Abused: No     Sexually Abused: No   Depression: Not at risk (7/10/2025)    PHQ-2     PHQ-2 Score: 0   Housing Stability: Low Risk  (3/20/2024)    Housing Stability Vital Sign     Unable to Pay for Housing in the Last Year: No     Number of Places Lived in the Last Year: 1     Unstable Housing in the Last Year: No   Utilities: Not At Risk (10/20/2023)    [facility] Utilities     Threatened with loss of utilities: No   Digital Equity: Not on file   Health Literacy: Inadequate Health Literacy (10/9/2024)    OASIS : Health Literacy     Frequency of needing help to read materials from doctor or pharmacy: Sometimes        FH:  Family History[3]     ROS:  All systems were reviewed and are negative except as per HPI.    Objective:  Vitals:  Vitals:    07/10/25 1424   BP: 137/59   Pulse: 70   Temp: 36.7 °C (98 °F)        Exam:  In NAD, well appearing  Abd Soft, ND/NT      Assessment & Plan:  Amandeep Lara is 70 y.o. male with stable asymptomatic PAD. RTC yearly for CARROL.      I spent a total of 20 minutes on the day of the visit.         Anthony Owens M.D.             [1]   Current Outpatient Medications on File Prior to Visit   Medication Sig Dispense Refill    amLODIPine (Norvasc) 5 mg tablet Take 1 tablet (5 mg) by mouth once daily. 90  "tablet 3    aspirin 81 mg chewable tablet Chew 1 tablet (81 mg) once daily.      atorvastatin (Lipitor) 40 mg tablet Take 1 tablet (40 mg) by mouth once daily at bedtime. 90 tablet 3    BD Ultra-Fine Lin Pen Needle 32 gauge x 5/32\" needle use 1 each 2 times daily      carvedilol (Coreg) 25 mg tablet Take 1 tablet (25 mg) by mouth 2 times a day. 180 tablet 3    cholecalciferol (Vitamin D-3) 50,000 unit capsule Take 1 capsule (50,000 Units) by mouth 1 (one) time per week.      clopidogrel (Plavix) 75 mg tablet Take 1 tablet (75 mg) by mouth once daily. 90 tablet 3    Contour Next Gen Meter misc Dispense one meter that insurance will cover.      cyanocobalamin, vitamin B-12, (VITAMIN B-12 ORAL) Take 1 tablet by mouth once daily.      fish oil concentrate (Omega-3) 120-180 mg capsule Take 1 capsule (1 g) by mouth once daily.      metFORMIN (Glucophage) 1,000 mg tablet Take 1 tablet (1,000 mg) by mouth 2 times daily (morning and late afternoon).      nitroglycerin (Nitrostat) 0.4 mg SL tablet Place 1 tablet (0.4 mg) under the tongue every 5 minutes if needed for chest pain (up to 3 doses).      NovoLOG Mix 70-30FlexPen U-100 100 unit/mL (70-30) injection Take 15 units with breakfast and 10 units with dinner      oxyCODONE (Roxicodone) 5 mg immediate release tablet Take 1 tablet (5 mg) by mouth every 6 hours if needed (pain). severe pain 7-10       No current facility-administered medications on file prior to visit.   [2]   Allergies  Allergen Reactions    Ether Unknown, Nausea And Vomiting and Other    Statins-Hmg-Coa Reductase Inhibitors Other    Sulfa (Sulfonamide Antibiotics) Hives    Sulfamethoxazole-Trimethoprim Other and Rash   [3]   Family History  Problem Relation Name Age of Onset    Other (malignant neoplasm of urinary bladder) Mother      Other (arteriosclerotic cardiovascular disease) Father      Other (malignant neoplasm of prostate) Father      Other (arteriosclerotic cardiovascular disease) Brother       "

## 2025-07-11 ENCOUNTER — TELEPHONE (OUTPATIENT)
Age: 70
End: 2025-07-11

## 2025-07-11 DIAGNOSIS — E11.69 TYPE 2 DIABETES MELLITUS WITH OTHER SPECIFIED COMPLICATION, WITH LONG-TERM CURRENT USE OF INSULIN (HCC): ICD-10-CM

## 2025-07-11 DIAGNOSIS — Z79.4 TYPE 2 DIABETES MELLITUS WITH OTHER SPECIFIED COMPLICATION, WITH LONG-TERM CURRENT USE OF INSULIN (HCC): ICD-10-CM

## 2025-07-11 NOTE — TELEPHONE ENCOUNTER
Requested Prescriptions     Pending Prescriptions Disp Refills    CONTOUR NEXT TEST strip 100 strip 3     Sig: USE TO TEST BS THREE TIMES DAILY    Insulin Pen Needle 33G X 6 MM MISC 200 each 3     Sig: 3 times daily E11.65

## 2025-07-18 LAB
ATRIAL RATE: 62 BPM
P AXIS: 32 DEGREES
P OFFSET: 165 MS
P ONSET: 108 MS
PR INTERVAL: 224 MS
Q ONSET: 220 MS
QRS COUNT: 10 BEATS
QRS DURATION: 152 MS
QT INTERVAL: 480 MS
QTC CALCULATION(BAZETT): 487 MS
QTC FREDERICIA: 485 MS
R AXIS: -8 DEGREES
T AXIS: 125 DEGREES
T OFFSET: 460 MS
VENTRICULAR RATE: 62 BPM

## 2025-07-30 ENCOUNTER — OFFICE VISIT (OUTPATIENT)
Age: 70
End: 2025-07-30
Payer: COMMERCIAL

## 2025-07-30 VITALS
OXYGEN SATURATION: 96 % | WEIGHT: 218 LBS | BODY MASS INDEX: 32.29 KG/M2 | DIASTOLIC BLOOD PRESSURE: 71 MMHG | HEART RATE: 66 BPM | HEIGHT: 69 IN | SYSTOLIC BLOOD PRESSURE: 136 MMHG

## 2025-07-30 DIAGNOSIS — N18.30 STAGE 3 CHRONIC KIDNEY DISEASE, UNSPECIFIED WHETHER STAGE 3A OR 3B CKD (HCC): ICD-10-CM

## 2025-07-30 DIAGNOSIS — E11.69 TYPE 2 DIABETES MELLITUS WITH OTHER SPECIFIED COMPLICATION, WITH LONG-TERM CURRENT USE OF INSULIN (HCC): Primary | ICD-10-CM

## 2025-07-30 DIAGNOSIS — Z79.4 TYPE 2 DIABETES MELLITUS WITH OTHER SPECIFIED COMPLICATION, WITH LONG-TERM CURRENT USE OF INSULIN (HCC): Primary | ICD-10-CM

## 2025-07-30 LAB
CHP ED QC CHECK: NORMAL
GLUCOSE BLD-MCNC: 116 MG/DL
HBA1C MFR BLD: 9.6 %

## 2025-07-30 PROCEDURE — 99213 OFFICE O/P EST LOW 20 MIN: CPT | Performed by: INTERNAL MEDICINE

## 2025-07-30 PROCEDURE — 1036F TOBACCO NON-USER: CPT | Performed by: INTERNAL MEDICINE

## 2025-07-30 PROCEDURE — 82962 GLUCOSE BLOOD TEST: CPT | Performed by: INTERNAL MEDICINE

## 2025-07-30 PROCEDURE — 3046F HEMOGLOBIN A1C LEVEL >9.0%: CPT | Performed by: INTERNAL MEDICINE

## 2025-07-30 PROCEDURE — 83036 HEMOGLOBIN GLYCOSYLATED A1C: CPT | Performed by: INTERNAL MEDICINE

## 2025-07-30 PROCEDURE — 2022F DILAT RTA XM EVC RTNOPTHY: CPT | Performed by: INTERNAL MEDICINE

## 2025-07-30 PROCEDURE — 3017F COLORECTAL CA SCREEN DOC REV: CPT | Performed by: INTERNAL MEDICINE

## 2025-07-30 PROCEDURE — G8427 DOCREV CUR MEDS BY ELIG CLIN: HCPCS | Performed by: INTERNAL MEDICINE

## 2025-07-30 PROCEDURE — 1123F ACP DISCUSS/DSCN MKR DOCD: CPT | Performed by: INTERNAL MEDICINE

## 2025-07-30 PROCEDURE — G8417 CALC BMI ABV UP PARAM F/U: HCPCS | Performed by: INTERNAL MEDICINE

## 2025-07-30 NOTE — PROGRESS NOTES
Negative.    Musculoskeletal:  Positive for gait problem.   All other systems reviewed and are negative.      Objective:   Physical Exam  Vitals reviewed.   Constitutional:       General: He is not in acute distress.     Appearance: Normal appearance. He is obese.   HENT:      Head: Normocephalic and atraumatic.      Right Ear: External ear normal.      Left Ear: External ear normal.      Nose: Nose normal.   Eyes:      General: No scleral icterus.        Right eye: No discharge.         Left eye: No discharge.      Extraocular Movements: Extraocular movements intact.      Conjunctiva/sclera: Conjunctivae normal.   Cardiovascular:      Rate and Rhythm: Normal rate.   Pulmonary:      Effort: Pulmonary effort is normal.   Musculoskeletal:         General: Normal range of motion.      Cervical back: Normal range of motion and neck supple.   Neurological:      General: No focal deficit present.      Mental Status: He is alert and oriented to person, place, and time.   Psychiatric:         Mood and Affect: Mood normal.         Behavior: Behavior normal.

## 2025-08-08 ENCOUNTER — HOSPITAL ENCOUNTER (OUTPATIENT)
Dept: CARDIOLOGY | Facility: HOSPITAL | Age: 70
Discharge: HOME | End: 2025-08-08
Payer: COMMERCIAL

## 2025-08-08 DIAGNOSIS — R55 NEAR SYNCOPE: ICD-10-CM

## 2025-08-08 DIAGNOSIS — Z95.818 PRESENCE OF OTHER CARDIAC IMPLANTS AND GRAFTS: ICD-10-CM

## 2025-08-08 PROCEDURE — 93298 REM INTERROG DEV EVAL SCRMS: CPT

## 2025-08-11 DIAGNOSIS — I10 ESSENTIAL HYPERTENSION: ICD-10-CM

## 2025-08-12 ENCOUNTER — APPOINTMENT (OUTPATIENT)
Dept: CARDIOLOGY | Facility: CLINIC | Age: 70
End: 2025-08-12
Payer: COMMERCIAL

## 2025-08-12 ENCOUNTER — HOSPITAL ENCOUNTER (OUTPATIENT)
Dept: CARDIOLOGY | Facility: HOSPITAL | Age: 70
Discharge: HOME | End: 2025-08-12
Payer: COMMERCIAL

## 2025-08-12 VITALS
WEIGHT: 238.4 LBS | BODY MASS INDEX: 36.13 KG/M2 | HEIGHT: 68 IN | SYSTOLIC BLOOD PRESSURE: 132 MMHG | HEART RATE: 60 BPM | DIASTOLIC BLOOD PRESSURE: 78 MMHG

## 2025-08-12 DIAGNOSIS — Z95.818 PRESENCE OF OTHER CARDIAC IMPLANTS AND GRAFTS: ICD-10-CM

## 2025-08-12 DIAGNOSIS — J42 CHRONIC BRONCHITIS, UNSPECIFIED CHRONIC BRONCHITIS TYPE (MULTI): ICD-10-CM

## 2025-08-12 DIAGNOSIS — I65.23 STENOSIS OF INTRACRANIAL PORTION OF BOTH INTERNAL CAROTID ARTERIES: ICD-10-CM

## 2025-08-12 DIAGNOSIS — I25.10 CORONARY ARTERY DISEASE INVOLVING NATIVE CORONARY ARTERY OF NATIVE HEART WITHOUT ANGINA PECTORIS: ICD-10-CM

## 2025-08-12 DIAGNOSIS — Z87.891 FORMER SMOKER: ICD-10-CM

## 2025-08-12 DIAGNOSIS — I63.9 CEREBROVASCULAR ACCIDENT (CVA), UNSPECIFIED MECHANISM (MULTI): ICD-10-CM

## 2025-08-12 DIAGNOSIS — R55 NEAR SYNCOPE: ICD-10-CM

## 2025-08-12 DIAGNOSIS — I10 ESSENTIAL HYPERTENSION: ICD-10-CM

## 2025-08-12 DIAGNOSIS — G47.33 OSA (OBSTRUCTIVE SLEEP APNEA): ICD-10-CM

## 2025-08-12 DIAGNOSIS — R01.1 HEART MURMUR: ICD-10-CM

## 2025-08-12 DIAGNOSIS — E78.2 MIXED HYPERLIPIDEMIA: ICD-10-CM

## 2025-08-12 PROCEDURE — 99214 OFFICE O/P EST MOD 30 MIN: CPT | Performed by: INTERNAL MEDICINE

## 2025-08-12 PROCEDURE — 3075F SYST BP GE 130 - 139MM HG: CPT | Performed by: INTERNAL MEDICINE

## 2025-08-12 PROCEDURE — 3078F DIAST BP <80 MM HG: CPT | Performed by: INTERNAL MEDICINE

## 2025-08-12 PROCEDURE — 1159F MED LIST DOCD IN RCRD: CPT | Performed by: INTERNAL MEDICINE

## 2025-08-12 PROCEDURE — 3008F BODY MASS INDEX DOCD: CPT | Performed by: INTERNAL MEDICINE

## 2025-08-12 PROCEDURE — 93291 INTERROG DEV EVAL SCRMS IP: CPT

## 2025-08-12 RX ORDER — AMLODIPINE BESYLATE 5 MG/1
5 TABLET ORAL DAILY
Qty: 90 TABLET | Refills: 3 | Status: SHIPPED | OUTPATIENT
Start: 2025-08-12 | End: 2026-08-12

## 2025-08-19 DIAGNOSIS — Z79.4 TYPE 2 DIABETES MELLITUS WITH OTHER SPECIFIED COMPLICATION, WITH LONG-TERM CURRENT USE OF INSULIN (HCC): ICD-10-CM

## 2025-08-19 DIAGNOSIS — E11.69 TYPE 2 DIABETES MELLITUS WITH OTHER SPECIFIED COMPLICATION, WITH LONG-TERM CURRENT USE OF INSULIN (HCC): ICD-10-CM

## 2026-01-05 ENCOUNTER — APPOINTMENT (OUTPATIENT)
Dept: NEUROLOGY | Facility: CLINIC | Age: 71
End: 2026-01-05
Payer: COMMERCIAL

## 2026-02-10 ENCOUNTER — APPOINTMENT (OUTPATIENT)
Dept: CARDIOLOGY | Facility: CLINIC | Age: 71
End: 2026-02-10
Payer: COMMERCIAL

## 2026-02-16 ENCOUNTER — APPOINTMENT (OUTPATIENT)
Dept: CARDIOLOGY | Facility: CLINIC | Age: 71
End: 2026-02-16
Payer: COMMERCIAL

## (undated) DEVICE — SYRINGE IRRIG 60ML SFT PLIABLE BLB EZ TO GRP 1 HND USE W/

## (undated) DEVICE — Device

## (undated) DEVICE — SPONGE, LAP, XRAY DECT, 18IN X 18IN, W/LOOP, STERILE

## (undated) DEVICE — 1010 S-DRAPE TOWEL DRAPE 10/BX: Brand: STERI-DRAPE™

## (undated) DEVICE — BAG, TISSUE RETRIEVAL, 15MM, ANCHOR

## (undated) DEVICE — DRESSING, ISLAND, TELFA, 4 X 5 IN

## (undated) DEVICE — DRESSING, ADHESIVE, ISLAND, TELFA, 2 X 3.75 IN, LF

## (undated) DEVICE — GRASPER, LONG, BIPOLAR, DAVINCI XI

## (undated) DEVICE — RELOAD, SUREFORM 45, 4.6 BLACK

## (undated) DEVICE — CATHETER, THORACIC, STRAIGHT, ADULT, 28 FR, PVC

## (undated) DEVICE — TOWEL, SURGICAL, NEURO, O/R, 16 X 26, BLUE, STERILE

## (undated) DEVICE — GLOVE SURG SZ 75 L12IN FNGR THK13MIL BRN LTX SYN POLYMER W

## (undated) DEVICE — SUTURE, ETHIBOND, XTRA, 30 IN, 0, CT-1, GREEN

## (undated) DEVICE — GRASPER, FENESTRATED TIP-UP XI

## (undated) DEVICE — CATHETER TRAY, SURESTEP, 16FR, URINE METER W/STATLOCK

## (undated) DEVICE — TUBING, SUCTION, CONNECTING, STERILE 0.25 X 120 IN., LF

## (undated) DEVICE — COVER, CART, 45 X 27 X 48 IN, CLEAR

## (undated) DEVICE — LABEL MED MINI W/ MARKER

## (undated) DEVICE — DRAPE, INCISE, ANTIMICROBIAL, IOBAN 2, LARGE, 17 X 23 IN, DISPOSABLE, STERILE

## (undated) DEVICE — MANIFOLD, 4 PORT NEPTUNE STANDARD

## (undated) DEVICE — HAND II: Brand: MEDLINE INDUSTRIES, INC.

## (undated) DEVICE — CURITY NON-ADHERENT STRIPS: Brand: CURITY

## (undated) DEVICE — STAPLER, SUREFORM 45, CURVED TIP

## (undated) DEVICE — SHEET, SPLIT, CARDIOVASCULAR TIBURON

## (undated) DEVICE — LOOP, VESSEL, MAXI, RED

## (undated) DEVICE — CATHETER, URETHRAL, ROBNEL, 10 FR,16 IN, LF, RED

## (undated) DEVICE — RELOAD, SUREFORM 45, 2.5 WHITE

## (undated) DEVICE — RELOAD, SUREFORM 45, 4.3 GREEN

## (undated) DEVICE — DRESSING, MEPILEX BORDER, POST-OP AG, 3.5 X 4 IN

## (undated) DEVICE — TUBING SET, TRI-LUMEN, FILTERED, F/AIRSEAL

## (undated) DEVICE — ACCESS PORT, 12MM, 100MM LENGTH, LOW PROFILE W/BLADELESS OPTICAL TIP

## (undated) DEVICE — CHLORAPREP 26ML ORANGE

## (undated) DEVICE — COLLECTION UNIT, DRAINAGE, THORACIC, SINGLE TUBE, DRY SUCTION, ATS COMPATIBLE, OASIS 3600, LF

## (undated) DEVICE — SUTURE, MONOCRYL, 3-0, 18 IN, PS2, UNDYED

## (undated) DEVICE — ELECTRODE, ELECTROSURGICAL, BLADE, INSULATED, ENT/IMA, STERILE

## (undated) DEVICE — KIT, ROBOTIC, CUSTOM UHC

## (undated) DEVICE — GAUZE, KITTNER ROLL, STERILE

## (undated) DEVICE — FORCEPS, CADIERE, DAVINCI XI

## (undated) DEVICE — GOWN,AURORA,NONREINFORCED,LARGE: Brand: MEDLINE

## (undated) DEVICE — SPONGE GAUZE, XRAY SC+RFID, 4X4 16 PLY, STERILE

## (undated) DEVICE — DRAPE, COLUMN, DAVINCI XI

## (undated) DEVICE — OBTURATOR, BLADELESS , SU

## (undated) DEVICE — BANDAGE GZ W2XL75IN ST RAYON POLY CNFRM STRTCH LTWT

## (undated) DEVICE — CANNULA SEAL, STAPLER, DAVINCI XI

## (undated) DEVICE — DRAPE, ARM XI

## (undated) DEVICE — SPONGE GZ W4XL4IN COT 12 PLY TYP VII WVN C FLD DSGN

## (undated) DEVICE — SEAL, UNIVERSAL 5-8MM  XI

## (undated) DEVICE — GLOVE ORANGE PI 7 1/2   MSG9075

## (undated) DEVICE — RELOAD, SUREFORM 45, 3.5 BLUE

## (undated) DEVICE — BANDAGE COBAN 2 IN COMPR FOAM 2INX5YD COFLX LF2

## (undated) DEVICE — DRAPE, TIBURON, SPLIT SHEET, REINF ADH STRIP, 77X122

## (undated) DEVICE — SUTURE, VICRYL, 2-0, 36 IN, CT-1, UNDYED